# Patient Record
Sex: MALE | Race: WHITE | NOT HISPANIC OR LATINO | Employment: OTHER | URBAN - METROPOLITAN AREA
[De-identification: names, ages, dates, MRNs, and addresses within clinical notes are randomized per-mention and may not be internally consistent; named-entity substitution may affect disease eponyms.]

---

## 2017-01-27 ENCOUNTER — LAB CONVERSION - ENCOUNTER (OUTPATIENT)
Dept: OTHER | Facility: OTHER | Age: 82
End: 2017-01-27

## 2017-01-27 LAB — HBA1C MFR BLD HPLC: 6.2 % OF TOTAL HGB

## 2017-01-28 ENCOUNTER — GENERIC CONVERSION - ENCOUNTER (OUTPATIENT)
Dept: OTHER | Facility: OTHER | Age: 82
End: 2017-01-28

## 2017-02-02 ENCOUNTER — ALLSCRIPTS OFFICE VISIT (OUTPATIENT)
Dept: OTHER | Facility: OTHER | Age: 82
End: 2017-02-02

## 2017-05-09 ENCOUNTER — GENERIC CONVERSION - ENCOUNTER (OUTPATIENT)
Dept: OTHER | Facility: OTHER | Age: 82
End: 2017-05-09

## 2017-07-31 DIAGNOSIS — E78.5 HYPERLIPIDEMIA: ICD-10-CM

## 2017-07-31 DIAGNOSIS — I10 ESSENTIAL (PRIMARY) HYPERTENSION: ICD-10-CM

## 2017-07-31 DIAGNOSIS — R73.01 IMPAIRED FASTING GLUCOSE: ICD-10-CM

## 2017-08-18 ENCOUNTER — GENERIC CONVERSION - ENCOUNTER (OUTPATIENT)
Dept: OTHER | Facility: OTHER | Age: 82
End: 2017-08-18

## 2017-08-18 ENCOUNTER — LAB CONVERSION - ENCOUNTER (OUTPATIENT)
Dept: OTHER | Facility: OTHER | Age: 82
End: 2017-08-18

## 2017-08-18 LAB
BUN SERPL-MCNC: 19 MG/DL (ref 7–25)
BUN/CREA RATIO (HISTORICAL): 17 (CALC) (ref 6–22)
CALCIUM SERPL-MCNC: 9.3 MG/DL (ref 8.6–10.3)
CHLORIDE SERPL-SCNC: 101 MMOL/L (ref 98–110)
CHOLEST SERPL-MCNC: 156 MG/DL (ref 125–200)
CHOLEST/HDLC SERPL: 2.7 (CALC)
CO2 SERPL-SCNC: 30 MMOL/L (ref 20–31)
CREAT SERPL-MCNC: 1.14 MG/DL (ref 0.7–1.11)
EGFR AFRICAN AMERICAN (HISTORICAL): 68 ML/MIN/1.73M2
EGFR-AMERICAN CALC (HISTORICAL): 59 ML/MIN/1.73M2
GLUCOSE (HISTORICAL): 98 MG/DL (ref 65–99)
HBA1C MFR BLD HPLC: 5.9 % OF TOTAL HGB
HDLC SERPL-MCNC: 57 MG/DL
LDL CHOLESTEROL (HISTORICAL): 71 MG/DL (CALC)
NON-HDL-CHOL (CHOL-HDL) (HISTORICAL): 99 MG/DL (CALC)
POTASSIUM SERPL-SCNC: 4.4 MMOL/L (ref 3.5–5.3)
SODIUM SERPL-SCNC: 139 MMOL/L (ref 135–146)
TRIGL SERPL-MCNC: 139 MG/DL

## 2017-08-24 ENCOUNTER — ALLSCRIPTS OFFICE VISIT (OUTPATIENT)
Dept: OTHER | Facility: OTHER | Age: 82
End: 2017-08-24

## 2017-10-26 ENCOUNTER — ALLSCRIPTS OFFICE VISIT (OUTPATIENT)
Dept: OTHER | Facility: OTHER | Age: 82
End: 2017-10-26

## 2018-01-12 NOTE — PROGRESS NOTES
Discussion/Summary    Immunizations up to date  No issues identified    Discussed end of life decisions  At a later date may want to look at "Five Wishes" and eventually POLST when appropriate  Chief Complaint  Wellness exam visit      History of Present Illness  HPI: Offers no concerns    Screenings are all non-remarkable  MMSE good   Welcome to [employer] and Wellness Visits: The patient is being seen for the initial annual wellness visit  Medicare Screening and Risk Factors   Hospitalizations: no previous hospitalizations  Medicare Screening Tests Risk Questions   Abdominal aortic aneurysm risk assessment: none indicated  Osteoporosis risk assessment:  and over 48years of age  HIV risk assessment: none indicated  Drug and Alcohol Use: The patient is a former cigarette smoker and quit smoking [id number]  He has smoked for 15 years year(s) and has 1 pack year(s) of cigarette use  The patient reports rare alcohol use  Alcohol concern:   The patient has no concerns about alcohol abuse  He has never used illicit drugs  Diet and Physical Activity: Current diet includes well balanced meals, low fat food choices, 1 servings of fruit per day, 1 servings of whole grains per day and 1 servings of dairy products per day  He exercises 6 times per week  Exercise: walking 45 minutes per day  Mood Disorder and Cognitive Impairment Screening:   Depression screening  no significant symptoms  He denies feeling down, depressed, or hopeless over the past two weeks  He denies feeling little interest or pleasure in doing things over the past two weeks  Cognitive impairment screening: denies difficulty learning/retaining new information, denies difficulty handling complex tasks, denies difficulty with reasoning, denies difficulty with spatial ability and orientation, denies difficulty with language and denies difficulty with behavior     Functional Ability/Level of Safety: Hearing is normal bilaterally, normal in the right ear, normal in the left ear and a hearing aid is not used  The patient is currently able to do activities of daily living without limitations, able to do instrumental activities of daily living without limitations, able to participate in social activities without limitations and able to drive without limitations  Activities of daily living details: does not need help using the phone, no transportation help needed, does not need help shopping, no meal preparation help needed, does not need help doing housework, does not need help doing laundry, does not need help managing medications and does not need help managing money  Fall risk factors: The patient fell 1 times in the past 12 months , no polypharmacy, no alcohol use, no mobility impairment, no antidepressant use, no deconditioning, no postural hypotension, no sedative use, no visual impairment, no urinary incontinence, no antihypertensive use, no cognitive impairment, up and go test was normal and no previous fall   tripped  Home safety risk factors:  no unfamiliar surroundings, no loose rugs, no poor household lighting, no uneven floors, no household clutter, grab bars in the bathroom and handrails on the stairs  Advance Directives: Advance directives: living will, durable power of  for health care directives and advance directives  Co-Managers and Medical Equipment/Suppliers: See Patient Care Team      Patient Care Team    Care Team Member Role Specialty Office Number   Amee MILLIGAN ALFONZO  Orthopedic Surgery [phone]     Review of Systems    Over the past 2 weeks, how often have you been bothered by the following problems? 1 ) Little interest or pleasure in doing things? Not at all    2 ) Feeling down, depressed or hopeless? Not at all    3 ) Trouble falling asleep or sleeping too much? Not at all    4 ) Feeling tired or having little energy? Not at all    5 ) Poor appetite or overeating?  Not at all    6 ) Feeling bad about yourself, or that you are a failure, or have let yourself or your family down? Not at all    7 ) Trouble concentrating on things, such as reading a newspaper or watching television? Not at all    8 ) Moving or speaking so slowly that other people could have noticed, or the opposite, moving or speaking faster than usual? Not at all    9 ) Thoughts that you would be better off dead or of hurting yourself in some way? Not at all  Active Problems    1  Allergic rhinitis (477 9) (J30 9)   2  Arteriosclerotic coronary artery disease (414 00) (I25 10)   3  Benign essential hypertension (401 1) (I10)   4  Erectile dysfunction of non-organic origin (302 72) (F52 21)   5  Eustachian tube dysfunction (381 81) (H69 80)   6  Hyperlipidemia (272 4) (E78 5)   7  Hypocalcemia (275 41) (E83 51)   8  Need for pneumococcal vaccination (V03 82) (Z23)   9   Seborrheic keratosis (702 19) (L82 1)    Past Medical History    · History of Acute myocardial infarction (410 90) (I21 3)   · History of Anxiety (300 00) (F41 9)   · History of Bimalleolar fracture of left ankle (824 4) (H66 687Q)   · History of Cough (786 2) (R05)   · History of Erectile dysfunction of non-organic origin (302 72) (F52 21)   · Former smoker (U38 51) (O13 541)   · History of Herpes zoster with complication (042 5) (G38 5)   · History of acute bronchitis (V12 69) (Z87 09)   · History of acute sinusitis (V12 69) (Z87 09)   · History of benign prostatic hypertrophy (V13 89) (E44 906)   · History of hematuria (V13 09) (Z87 448)   · History of Impaired fasting glucose (790 21) (R73 01)   · History of Insomnia (780 52) (G47 00)   · History of Joint pain, knee (719 46) (M25 569)    Surgical History    · History of CABG    Family History    · Family history of Malignant Pancreatic Neoplasm    · Family history of Essential Hypertension    Social History    · Being A Social Drinker   · Death In The Family Spouse   · Exercising Regularly   · Marital History -    · Phylicia Ardon  after a prolonged francis with lymphoma   · Never a smoker   · Occupation: Retired   · clergy    Current Meds   1  Cialis 5 MG Oral Tablet; TAKE AS DIRECTED; Therapy: 46CBE6966 to Recorded   2  Claritin 10 MG Oral Tablet; take 1 tab daily; Therapy: 2015 to (Evaluate:2015); Last Rx:2015 Ordered   3  DrRx Medrol Dose Pack 4 MG #21; Take as directed; Therapy: 2015 to (Last Rx:2015) Ordered   4  Fluticasone Propionate 50 MCG/ACT Nasal Suspension; use 2 spr  in each nostril daily; Therapy: 2015 to (Last Rx:2015)  Requested for: 2015 Ordered   5  Furosemide 20 MG Oral Tablet; TAKE 1 TABLET DAILY; Therapy: 99TBB3422 to (Evaluate:63Fzu6881)  Requested for: 2015; Last   Rx:2015 Ordered   6  Hydrocod Polst-CPM Polst ER 10-8 MG/5ML Oral Liquid Extended Release; Take 1/2 - 1   teaspoon every 12 hours as needed for cough; Therapy: 57PUW4643 to (Evaluate:2015); Last Rx:2015 Ordered   7  Klor-Con M20 20 MEQ Oral Tablet Extended Release; TAKE 1 TABLET DAILY; Therapy: 70XZD4118 to (Evaluate:63Ath6453)  Requested for: 31HDT1678; Last   Rx:28Qzc9802 Ordered   8  LORazepam 0 5 MG Oral Tablet; take 1 tab before bedtime; Therapy: 50EPI3580 to (Last Rx:15Ytx6334) Ordered   9  Losartan Potassium 50 MG Oral Tablet; take 1 tablet by mouth once daily as directed; Therapy: 51Gbw8513 to (Evaluate:26Fqr6839)  Requested for: 38Ehj3869; Last   Rx:2015 Ordered   10  Metoprolol Tartrate 100 MG Oral Tablet; TAKE 1 TABLET BY MOUTH ONCE DAILY; Therapy: 08PLA5874 to (Evaluate:2017)  Requested for: 55HBT6415; Last    Rx:2016 Ordered   11  Simvastatin 40 MG Oral Tablet; TAKE 1 TABLET DAILY IN THE EVENING AS    DIRECTED; Therapy: 44PTS5809 to (Pereira Keely)  Requested for: 83QWE8610; Last    Rx:2015 Ordered    Allergies    1  Penicillins    Immunizations   ** Printed in Appendix #1 below       Vitals  Signs [Data Includes: Current Encounter]    Temperature: 97 5 F  Heart Rate: 71  Respiration: 16  Systolic: 871  Diastolic: 80  Height: 5 ft 7 in  Weight: 203 lb 4 00 oz  BMI Calculated: 31 83  BSA Calculated: 2 04  O2 Saturation: 96  Pain Scale: 0    Health Management  Health Maintenance   Medicare Annual Wellness Visit; every 1 year; Next Due: 72Blr7515;  Overdue    Signatures   Electronically signed by : CHEL Wong ; 2016  2:46PM EST                       (Author)    Appendix #1     Patient: Marlyse Romberg ; : 1932; MRN: [id number]      1 2 3 4 5 6    Influenza  55Wpk3106 52GDE8906 37IIQ9507 54QNI6687 93IRL2461 23LKC1276    Pneumococcal   85IOV2516        Td/DT  54IUG2403 31Rhu7638 38QWD1295       Zoster  Temporarily Deferred: Patient reports item recently done, Got at pharmacy in past

## 2018-01-12 NOTE — PROGRESS NOTES
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)   8  Erectile dysfunction of non-organic origin (302 72) (F52 21)   9  Hyperlipidemia (272 4) (E78 5)   10  Impaired fasting glucose (790 21) (R73 01)   11  Medicare annual wellness visit, initial (V70 0) (Z00 00)   12  Need for influenza vaccination (V04 81) (Z23)   13  Obesity (278 00) (E66 9)   14  Seborrheic keratosis (702 19) (L82 1)    Current Meds   1  Furosemide 20 MG Oral Tablet; TAKE 1 TABLET DAILY; Therapy: 15YKF4308 to (Evaluate:66Xgu8642)  Requested for: 08Liu2712; Last   Rx:00Bbr8168 Ordered   2  Klor-Con M20 20 MEQ Oral Tablet Extended Release; TAKE 1 TABLET DAILY; Therapy: 68ZUU0223 to (Evaluate:90Ugj4460)  Requested for: 51GVM9321; Last   Rx:36Rxz6853 Ordered   3  Losartan Potassium 50 MG Oral Tablet; take 1 tablet by mouth once daily as directed; Therapy: 79Bfk6891 to (Evaluate:92Rsi7089)  Requested for: 17Zen2308; Last   Rx:41Zop4262 Ordered   4  Metoprolol Tartrate 100 MG Oral Tablet; TAKE 1 TABLET BY MOUTH ONCE DAILY; Therapy: 95RRX6885 to (Evaluate:12Jan2017)  Requested for: 05SVK8577; Last   Rx:18Jan2016 Ordered   5  Simvastatin 40 MG Oral Tablet; TAKE 1 TABLET DAILY IN THE EVENING AS   DIRECTED; Therapy: 61XPQ4846 to (Kathy Drain)  Requested for: 20DRB0023; Last   Rx:07Mar2016 Ordered   6  Zolpidem Tartrate 5 MG Oral Tablet; take 1 tablet at bedtime as needed; Therapy: 27DDR6240 to (Evaluate:81Dql9057); Last Rx:21Pkj2725 Ordered    Allergies    1   Penicillins    Plan  Need for influenza vaccination    · Fluzone High-Dose 0 5 ML Intramuscular Suspension Prefilled Syringe    Future Appointments    Date/Time Provider Specialty Site   02/02/2017 03:00 PM CHEL Wetzel  Family Medicine [address]     Signatures   Electronically signed by :  Yong Treviño MD; Sep  8 2016  4:53PM EST                       (Review)

## 2018-01-13 VITALS
RESPIRATION RATE: 12 BRPM | HEIGHT: 67 IN | DIASTOLIC BLOOD PRESSURE: 78 MMHG | BODY MASS INDEX: 30.96 KG/M2 | SYSTOLIC BLOOD PRESSURE: 130 MMHG | HEART RATE: 64 BPM | WEIGHT: 197.25 LBS | TEMPERATURE: 98.5 F

## 2018-01-13 VITALS
RESPIRATION RATE: 16 BRPM | HEART RATE: 64 BPM | HEIGHT: 67 IN | BODY MASS INDEX: 31.55 KG/M2 | TEMPERATURE: 98.3 F | SYSTOLIC BLOOD PRESSURE: 132 MMHG | WEIGHT: 201 LBS | DIASTOLIC BLOOD PRESSURE: 78 MMHG

## 2018-01-13 NOTE — RESULT NOTES
Verified Results  (1) CBC/PLT/DIFF 38IJR9735 06:42AM Rei Saleh   REPORT COMMENT:  FASTING:YES     Test Name Result Flag Reference   WHITE BLOOD CELL COUNT 7 7 Thousand/uL  3 8-10 8   RED BLOOD CELL COUNT 4 49 Million/uL  4 20-5 80   HEMOGLOBIN 13 5 g/dL  13 2-17 1   HEMATOCRIT 41 0 %  38 5-50 0   MCV 91 3 fL  80 0-100 0   MCH 30 0 pg  27 0-33 0   MCHC 32 9 g/dL  32 0-36 0   RDW 14 1 %  11 0-15 0   PLATELET COUNT 091 Thousand/uL  140-400   MPV 8 5 fL  7 5-11 5   ABSOLUTE NEUTROPHILS 3411 cells/uL  2044-2905   ABSOLUTE LYMPHOCYTES 3180 cells/uL  850-3900   ABSOLUTE MONOCYTES 801 cells/uL  200-950   ABSOLUTE EOSINOPHILS 270 cells/uL     ABSOLUTE BASOPHILS 39 cells/uL  0-200   NEUTROPHILS 44 3 %     LYMPHOCYTES 41 3 %     MONOCYTES 10 4 %     EOSINOPHILS 3 5 %     BASOPHILS 0 5 %       (1) COMPREHENSIVE METABOLIC PANEL [id number] 60:49UB José Luis Saleh     Test Name Result Flag Reference   GLUCOSE 100 mg/dL H 65-99   Fasting reference interval   UREA NITROGEN (BUN) 16 mg/dL  7-25   CREATININE 0 94 mg/dL  0 70-1 11   For patients >52years of age, the reference limit  for Creatinine is approximately 13% higher for people  identified as -American  eGFR NON-AFR   AMERICAN 75 mL/min/1 73m2  > OR = 60   eGFR AFRICAN AMERICAN 87 mL/min/1 73m2  > OR = 60   BUN/CREATININE RATIO   4-38   NOT APPLICABLE (calc)   SODIUM 138 mmol/L  135-146   POTASSIUM 4 2 mmol/L  3 5-5 3   CHLORIDE 101 mmol/L     CARBON DIOXIDE 26 mmol/L  19-30   CALCIUM 8 9 mg/dL  8 6-10 3   PROTEIN, TOTAL 6 9 g/dL  6 1-8 1   ALBUMIN 4 3 g/dL  3 6-5 1   GLOBULIN 2 6 g/dL (calc)  1 9-3 7   ALBUMIN/GLOBULIN RATIO 1 7 (calc)  1 0-2 5   BILIRUBIN, TOTAL 0 4 mg/dL  0 2-1 2   ALKALINE PHOSPHATASE 59 U/L     AST 14 U/L  10-35   ALT 14 U/L  9-46     (1) LIPID PANEL, FASTING [id number] 06:42AM eRi Saleh     Test Name Result Flag Reference   CHOLESTEROL, TOTAL 166 mg/dL  125-200   HDL CHOLESTEROL 50 mg/dL  > OR = 40   TRIGLICERIDES 234 mg/dL H <150   LDL-CHOLESTEROL 69 mg/dL (calc)  <130   Desirable range <100 mg/dL for patients with CHD or  diabetes and <70 mg/dL for diabetic patients with  known heart disease  CHOL/HDLC RATIO 3 3 (calc)  < OR = 5 0   NON HDL CHOLESTEROL 116 mg/dL (calc)     Target for non-HDL cholesterol is 30 mg/dL higher than   LDL cholesterol target       (1) URINALYSIS (will reflex a microscopy if leukocytes, occult blood, protein or nitrites are not within normal limits) [id number] 06:42AM Abgott, Isidoro Gottron     Test Name Result Flag Reference   COLOR YELLOW  YELLOW   APPEARANCE CLEAR  CLEAR   SPECIFIC GRAVITY 1 023  1 001-1 035   PH 6 0  5 0-8 0   GLUCOSE NEGATIVE  NEGATIVE   BILIRUBIN NEGATIVE  NEGATIVE   KETONES NEGATIVE  NEGATIVE   OCCULT BLOOD NEGATIVE  NEGATIVE   PROTEIN NEGATIVE  NEGATIVE   NITRITE NEGATIVE  NEGATIVE   LEUKOCYTE ESTERASE NEGATIVE  NEGATIVE   WBC NONE SEEN /HPF  < OR = 5   RBC NONE SEEN /HPF  < OR = 2   SQUAMOUS EPITHELIAL CELLS NONE SEEN /HPF  < OR = 5   BACTERIA NONE SEEN /HPF  NONE SEEN   HYALINE CAST NONE SEEN /LPF  NONE SEEN

## 2018-01-14 NOTE — RESULT NOTES
Verified Results  (1) BASIC METABOLIC PROFILE [id number] 06:50AM Georgina Saleh     Test Name Result Flag Reference   GLUCOSE 100 mg/dL H 65-99   Fasting reference interval   UREA NITROGEN (BUN) 19 mg/dL  7-25   CREATININE 1 07 mg/dL  0 70-1 11   For patients >52years of age, the reference limit  for Creatinine is approximately 13% higher for people  identified as -American  eGFR NON-AFR  AMERICAN 63 mL/min/1 73m2  > OR = 60   eGFR AFRICAN AMERICAN 73 mL/min/1 73m2  > OR = 60   BUN/CREATININE RATIO   3-91   NOT APPLICABLE (calc)   SODIUM 139 mmol/L  135-146   POTASSIUM 4 1 mmol/L  3 5-5 3   CHLORIDE 102 mmol/L     CARBON DIOXIDE 31 mmol/L  20-31   CALCIUM 9 2 mg/dL  8 6-10 3     (1) LIPID PANEL, FASTING [id number] 06:50AM Georgina Saleh     Test Name Result Flag Reference   CHOLESTEROL, TOTAL 166 mg/dL  125-200   HDL CHOLESTEROL 52 mg/dL  > OR = 40   TRIGLICERIDES 879 mg/dL H <150   LDL-CHOLESTEROL 78 mg/dL (calc)  <130   Desirable range <100 mg/dL for patients with CHD or  diabetes and <70 mg/dL for diabetic patients with  known heart disease  CHOL/HDLC RATIO 3 2 (calc)  < OR = 5 0   NON HDL CHOLESTEROL 114 mg/dL (calc)     Target for non-HDL cholesterol is 30 mg/dL higher than   LDL cholesterol target  (Q) HEMOGLOBIN A1c 26Jan2017 06:50AM Georgina Saleh   REPORT COMMENT:  FASTING:YES     Test Name Result Flag Reference   HEMOGLOBIN A1c 6 2 % of total Hgb H <5 7   According to ADA guidelines, hemoglobin A1c <7 0%  represents optimal control in non-pregnant diabetic  patients  Different metrics may apply to specific  patient populations  Standards of Medical Care in    Diabetes Care   2013;36:s11-s66     For the purpose of screening for the presence of  diabetes  <5 7%       Consistent with the absence of diabetes  5 7-6 4%    Consistent with increased risk for diabetes              (prediabetes)  >or=6 5%    Consistent with diabetes     This assay result is consistent with a higher risk  of diabetes  Currently, no consensus exists for use of hemoglobin  A1c for diagnosis of diabetes for children

## 2018-01-16 NOTE — RESULT NOTES
Verified Results  (1) LIPID PANEL, FASTING 21Jan2016 06:30AM Letty Saleh   REPORT COMMENT:  FASTING:YES     Test Name Result Flag Reference   CHOLESTEROL, TOTAL 166 mg/dL  125-200   HDL CHOLESTEROL 51 mg/dL  > OR = 40   TRIGLICERIDES 836 mg/dL H <150   LDL-CHOLESTEROL 81 mg/dL (calc)  <130   Desirable range <100 mg/dL for patients with CHD or  diabetes and <70 mg/dL for diabetic patients with  known heart disease  CHOL/HDLC RATIO 3 3 (calc)  < OR = 5 0   NON HDL CHOLESTEROL 115 mg/dL (calc)     Target for non-HDL cholesterol is 30 mg/dL higher than   LDL cholesterol target

## 2018-01-17 NOTE — PROGRESS NOTES
Assessment    1  Benign essential hypertension (401 1) (I10)   2  Arteriosclerotic coronary artery disease (414 00) (I25 10)   · MI 1980 admitted 18 Shanon Cervantes MD follows him / CABG 1999 at      [address] [facility] [address]   3  Hyperlipidemia (272 4) (E78 5)   4  Seborrheic keratosis (702 19) (L82 1)   5  Post-nasal drip (784 91) (R09 82)    Plan  Arteriosclerotic coronary artery disease, Benign essential hypertension, Hyperlipidemia    · (1) CBC/PLT/DIFF; Status:Active; Requested SAF:33DET2436;    · (1) COMPREHENSIVE METABOLIC PANEL; Status:Active; Requested KGJ:13AHL2380;    · (1) LIPID PANEL, FASTING; Status:Active; Requested MQB:09CLE7823;    · (1) URINALYSIS (will reflex a microscopy if leukocytes, occult blood, protein or nitrites are  not within normal limits); Status:Active; Requested AHE:43QJY0793;   Post-nasal drip    · Follow-up visit in 6 months Evaluation and Treatment  Follow-up  Status: Hold For -  Scheduling  Requested for: 69HAL5354    Discussion/Summary    Continue current therapy    Reveiwed recent lipids - excellent  Has been watching carbos    Labs before next visit    Try sinus rinses   May also use loratidine and flonase prn  Chief Complaint  Patient is here for his 6 months f/u visit      History of Present Illness  Generally doing well    Has some post nasal drip that makes him cough (tickle  No sinus pain or nasal congestion    Continues to be active  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       Review of Systems    Constitutional: as noted in HPI  Eyes: gets annual eye exam - has early cataract - plans on seeing Dr Jennifer Kumar  ENT: as noted in HPI  Cardiovascular: no chest pain, no intermittent leg claudication, no palpitations and no extremity edema  Respiratory: cough, but as noted in HPI and no shortness of breath during exertion  Gastrointestinal: no problems  Genitourinary: uses Cialis prn  Musculoskeletal: no problems  Neurological: no headache and no confusion  Psychiatric: no anxiety and no depression  Over the past 2 weeks, how often have you been bothered by the following problems? 1 ) Little interest or pleasure in doing things? Not at all    2 ) Feeling down, depressed or hopeless? Not at all    3 ) Trouble falling asleep or sleeping too much? Not at all    4 ) Feeling tired or having little energy? Not at all    5 ) Poor appetite or overeating? Not at all    6 ) Feeling bad about yourself, or that you are a failure, or have let yourself or your family down? Not at all    7 ) Trouble concentrating on things, such as reading a newspaper or watching television? Not at all    8 ) Moving or speaking so slowly that other people could have noticed, or the opposite, moving or speaking faster than usual? Not at all    9 ) Thoughts that you would be better off dead or of hurting yourself in some way? Not at all  Score 0      Active Problems    1  Allergic rhinitis (477 9) (J30 9)   2  Arteriosclerotic coronary artery disease (414 00) (I25 10)   3  Benign essential hypertension (401 1) (I10)   4  Erectile dysfunction of non-organic origin (302 72) (F52 21)   5  Eustachian tube dysfunction (381 81) (H69 80)   6  Hyperlipidemia (272 4) (E78 5)   7  Hypocalcemia (275 41) (E83 51)   8  Need for pneumococcal vaccination (V03 82) (Z23)   9  Seborrheic keratosis (702 19) (L82 1)    Past Medical History    1  History of Acute myocardial infarction (410 90) (I21 3)   2  History of Anxiety (300 00) (F41 9)   3  History of Bimalleolar fracture of left ankle (824 4) (S82 842A)   4  History of Cough (786 2) (R05)   5  History of Erectile dysfunction of non-organic origin (302 72) (F52 21)   6  Former smoker (V15 82) (T68 016)   7  History of Herpes zoster with complication (364 3) (X17 9)   8  History of acute bronchitis (V12 69) (Z87 09)   9  History of acute sinusitis (V12 69) (Z87 09)   10   History of benign prostatic hypertrophy (V13 89) (Z87 438)   11  History of hematuria (V13 09) (Z87 448)   12  History of Impaired fasting glucose (790 21) (R73 01)   13  History of Insomnia (780 52) (G47 00)   14  History of Joint pain, knee (719 46) (M25 569)    Surgical History    1  History of CABG    Family History    1  Family history of Malignant Pancreatic Neoplasm    2  Family history of Essential Hypertension    Social History    · Being A Social Drinker   · Death In The Family Spouse   · Exercising Regularly   · Marital History -    · Never a smoker   · Occupation: Retired    Current Meds   1  Cialis 5 MG Oral Tablet; TAKE AS DIRECTED; Therapy: 82HNQ5083 to Recorded   2  Claritin 10 MG Oral Tablet; take 1 tab daily; Therapy: 50Vbt4683 to (Evaluate:21Apr2015); Last Rx:14Apr2015 Ordered   3  DrRx Medrol Dose Pack 4 MG #21; Take as directed; Therapy: 92Rsw5099 to (Last Rx:14Apr2015) Ordered   4  Fluticasone Propionate 50 MCG/ACT Nasal Suspension; use 2 spr  in each nostril daily; Therapy: 85Vef2927 to (Last Rx:14Apr2015)  Requested for: 14Apr2015 Ordered   5  Furosemide 20 MG Oral Tablet; TAKE 1 TABLET DAILY; Therapy: 35QHY0675 to (Evaluate:43Ytu6221)  Requested for: 85Xoh9546; Last   Rx:28Dec2015 Ordered   6  Hydrocod Polst-CPM Polst ER 10-8 MG/5ML Oral Liquid Extended Release; Take 1/2 - 1   teaspoon every 12 hours as needed for cough; Therapy: 26OLV1734 to (Evaluate:11Jan2015); Last Rx:05Jan2015 Ordered   7  Klor-Con M20 20 MEQ Oral Tablet Extended Release; TAKE 1 TABLET DAILY; Therapy: 33VMF2394 to (Evaluate:47Zmw6657)  Requested for: 04PAY8434; Last   Rx:59Cmx7778 Ordered   8  LORazepam 0 5 MG Oral Tablet; take 1 tab before bedtime; Therapy: 95JED1487 to (Last Rx:70Ovt8019) Ordered   9  Losartan Potassium 50 MG Oral Tablet; take 1 tablet by mouth once daily as directed; Therapy: 71Pty8381 to (Evaluate:52Bmv1047)  Requested for: 92Alr4225; Last   Rx:30Fot6878 Ordered   10   Metoprolol Tartrate 100 MG Oral Tablet; TAKE 1 TABLET BY MOUTH ONCE DAILY; Therapy: 22GVJ2713 to (Evaluate:12Jan2017)  Requested for: 22IET8332; Last    Rx:18Jan2016 Ordered   11  Simvastatin 40 MG Oral Tablet; TAKE 1 TABLET DAILY IN THE EVENING AS DIRECTED; Therapy: 99NPT1273 to (Caterina Halim)  Requested for: 79NKO5887; Last    Rx:05Jan2015 Ordered    The medication list was reviewed and updated today  Allergies    1  Penicillins    Vitals  Vital Signs [Data Includes: Current Encounter]    Recorded: 30DCZ6933 01:43PM   Temperature 97 5 F   Heart Rate 71   Respiration 16   Systolic 229   Diastolic 80   Height 5 ft 7 in   Weight 203 lb 4 00 oz   BMI Calculated 31 83   BSA Calculated 2 04   O2 Saturation 96   Pain Scale 0     Physical Exam    Constitutional   General appearance: No acute distress, well appearing and well nourished  Eyes glasses  Ears, Nose, Mouth, and Throat   Otoscopic examination: Tympanic membrance translucent with normal light reflex  Canals patent without erythema  Nasal mucosa, septum, and turbinates: Abnormal   slightly boggy turbinates  Oropharynx: Normal with no erythema, edema, exudate or lesions  Pulmonary   Respiratory effort: No increased work of breathing or signs of respiratory distress  Auscultation of lungs: Clear to auscultation, equal breath sounds bilaterally, no wheezes, no rales, no rhonci  Cardiovascular   Auscultation of heart: Normal rate and rhythm, normal S1 and S2, without murmurs  Examination of extremities for edema and/or varicosities: Normal     Carotid pulses: Normal     Skin MANY seborrheic keratosis over body - no suspicious noted on limited exam today     Psychiatric   Orientation to person, place and time: Normal     Mood and affect: Normal          Results/Data  Results   (1) LIPID PANEL, FASTING [id number] 06:30AM Chris Saleh   REPORT COMMENT:  FASTING:YES     Test Name Result Flag Reference   CHOLESTEROL, TOTAL 166 mg/dL  125-200   HDL CHOLESTEROL 51 mg/dL  > OR = 40 TRIGLICERIDES 013 mg/dL H <150   LDL-CHOLESTEROL 81 mg/dL (calc)  <130   Desirable range <100 mg/dL for patients with CHD or  diabetes and <70 mg/dL for diabetic patients with  known heart disease  CHOL/HDLC RATIO 3 3 (calc)  < OR = 5 0   NON HDL CHOLESTEROL 115 mg/dL (calc)     Target for non-HDL cholesterol is 30 mg/dL higher than   LDL cholesterol target  (1) LIPID PANEL, FASTING 55UFD7818 07:12AM [employer]     Test Name Result Flag Reference   CHOLESTEROL, TOTAL 159 mg/dL  125-200   HDL CHOLESTEROL 52 mg/dL  > OR = 40   TRIGLICERIDES 059 mg/dL H <150   LDL-CHOLESTEROL 67 mg/dL (calc)  <130   Desirable range <100 mg/dL for patients with CHD or  diabetes and <70 mg/dL for diabetic patients with  known heart disease  CHOL/HDLC RATIO 3 1 (calc)  < OR = 5 0   NON HDL CHOLESTEROL 107 mg/dL (calc)     Target for non-HDL cholesterol is 30 mg/dL higher than   LDL cholesterol target  (1) COMPREHENSIVE METABOLIC PANEL 89JEB9673 49:17KA [employer]     Test Name Result Flag Reference   GLUCOSE 96 mg/dL  65-99   Fasting reference interval   UREA NITROGEN (BUN) 16 mg/dL  7-25   CREATININE 0 95 mg/dL  0 70-1 11   For patients >52years of age, the reference limit  for Creatinine is approximately 13% higher for people  identified as -American  eGFR NON-AFR   AMERICAN 74 mL/min/1 73m2  > OR = 60   eGFR AFRICAN AMERICAN 86 mL/min/1 73m2  > OR = 60   BUN/CREATININE RATIO   9-28   NOT APPLICABLE (calc)   SODIUM 140 mmol/L  135-146   POTASSIUM 4 1 mmol/L  3 5-5 3   CHLORIDE 103 mmol/L     CARBON DIOXIDE 26 mmol/L  19-30   CALCIUM 9 0 mg/dL  8 6-10 3   PROTEIN, TOTAL 6 7 g/dL  6 1-8 1   ALBUMIN 4 2 g/dL  3 6-5 1   GLOBULIN 2 5 g/dL (calc)  1 9-3 7   ALBUMIN/GLOBULIN RATIO 1 7 (calc)  1 0-2 5   BILIRUBIN, TOTAL 0 4 mg/dL  0 2-1 2   ALKALINE PHOSPHATASE 56 U/L     AST 16 U/L  10-35   ALT 15 U/L  9-46     (1) URINALYSIS (will reflex a microscopy if leukocytes, occult blood, protein or nitrites are not within normal limits) 21LCW4170 07:12AM Sixto Saleh   REPORT COMMENT:  ON HOLD  FASTING:YES     Test Name Result Flag Reference   COLOR YELLOW  YELLOW   APPEARANCE CLEAR  CLEAR   SPECIFIC GRAVITY 1 021  1 001-1 035   PH 6 5  5 0-8 0   GLUCOSE NEGATIVE  NEGATIVE   BILIRUBIN NEGATIVE  NEGATIVE   KETONES NEGATIVE  NEGATIVE   OCCULT BLOOD NEGATIVE  NEGATIVE   PROTEIN NEGATIVE  NEGATIVE   NITRITE NEGATIVE  NEGATIVE   LEUKOCYTE ESTERASE NEGATIVE  NEGATIVE   WBC NONE SEEN /HPF  < OR = 5   RBC 0-2 /HPF  < OR = 2   SQUAMOUS EPITHELIAL CELLS NONE SEEN /HPF  < OR = 5   BACTERIA NONE SEEN /HPF  NONE SEEN   HYALINE CAST NONE SEEN /LPF  NONE SEEN     Health Management  Health Maintenance   Medicare Annual Wellness Visit; every 1 year; Next Due: 79Hkv0878;  Overdue    Signatures   Electronically signed by : CHEL Mckeon ; Jan 28 2016  3:13PM EST                       (Author)

## 2018-01-17 NOTE — PROGRESS NOTES
Chief Complaint  Patient presents for a high-dose flu shot  Active Problems    1  Arteriosclerotic coronary artery disease (414 00) (I25 10)   2  Benign essential hypertension (401 1) (I10)   3  Erectile dysfunction of non-organic origin (302 72) (F52 21)   4  Hyperlipidemia (272 4) (E78 5)   5  Impaired fasting glucose (790 21) (R73 01)   6  Need for influenza vaccination (V04 81) (Z23)   7  Obesity (278 00) (E66 9)   8  Seborrheic keratosis (702 19) (L82 1)    Current Meds   1  Furosemide 20 MG Oral Tablet; TAKE 1 TABLET DAILY; Therapy: 76IQH8714 to (Evaluate:53Wby0177)  Requested for: 12Jho5569; Last   Rx:90Elq0548 Ordered   2  Klor-Con M20 20 MEQ Oral Tablet Extended Release; TAKE 1 TABLET DAILY; Therapy: 81YQR7812 to ()  Requested for: 87LPB1645; Last   Rx:14Jan2017 Ordered   3  Losartan Potassium 50 MG Oral Tablet; take 1 tablet by mouth once daily as directed; Therapy: 23Ejh6082 to (Evaluate:83Wiw9615)  Requested for: 43Wgh3885; Last   Rx:45Yeu7973 Ordered   4  Metoprolol Tartrate 100 MG Oral Tablet; TAKE 1 TABLET BY MOUTH ONCE DAILY; Therapy: 02HOV5067 to (22 319895)  Requested for: 64ZEO3761; Last   Rx:12Jan2017 Ordered   5  Simvastatin 40 MG Oral Tablet; TAKE 1 TABLET DAILY IN THE EVENING AS   DIRECTED; Therapy: 81RPH5702 to (Evaluate:15Mar2018)  Requested for: 20Mar2017; Last   Rx:20Mar2017 Ordered   6  Zolpidem Tartrate 5 MG Oral Tablet; take 1 tablet at bedtime as needed; Therapy: 70KPK4746 to (Evaluate:18Jan2017); Last Rx:63Apo0991 Ordered    Allergies    1  Penicillins    Plan  Need for influenza vaccination    · Fluzone High-Dose 0 5 ML Intramuscular Suspension Prefilled Syringe    Future Appointments    Date/Time Provider Specialty Site   03/29/2018 01:40 PM CHEL Craig   [address]     Signatures   Electronically signed by : CHEL Roland ; Oct 26 2017  9:55PM EST                       (Author)

## 2018-01-18 NOTE — RESULT NOTES
Verified Results  (1) LIPID PANEL, FASTING [id number] 07:05AM Casey Saleh Albaroelkin     Test Name Result Flag Reference   CHOLESTEROL, TOTAL 156 mg/dL  125-200   HDL CHOLESTEROL 57 mg/dL  > OR = 40   TRIGLICERIDES 215 mg/dL  <150   LDL-CHOLESTEROL 71 mg/dL (calc)  <130   Desirable range <100 mg/dL for patients with CHD or  diabetes and <70 mg/dL for diabetic patients with  known heart disease  CHOL/HDLC RATIO 2 7 (calc)  < OR = 5 0   NON HDL CHOLESTEROL 99 mg/dL (calc)     Target for non-HDL cholesterol is 30 mg/dL higher than   LDL cholesterol target  (1) BASIC METABOLIC PROFILE 42GTD7621 07:05AM Abgott, Isidoro Gottron     Test Name Result Flag Reference   GLUCOSE 98 mg/dL  65-99   Fasting reference interval   UREA NITROGEN (BUN) 19 mg/dL  7-25   CREATININE 1 14 mg/dL H 0 70-1 11   For patients >52years of age, the reference limit  for Creatinine is approximately 13% higher for people  identified as -American  eGFR NON-AFR  AMERICAN 59 mL/min/1 73m2 L > OR = 60   eGFR AFRICAN AMERICAN 68 mL/min/1 73m2  > OR = 60   BUN/CREATININE RATIO 17 (calc)  6-22   SODIUM 139 mmol/L  135-146   POTASSIUM 4 4 mmol/L  3 5-5 3   CHLORIDE 101 mmol/L     CARBON DIOXIDE 30 mmol/L  20-31   CALCIUM 9 3 mg/dL  8 6-10 3     (Q) HEMOGLOBIN A1c 13Uff9105 07:05AM Rei Saleh   REPORT COMMENT:  FASTING:YES     Test Name Result Flag Reference   HEMOGLOBIN A1c 5 9 % of total Hgb H <5 7   For someone without known diabetes, a hemoglobin   A1c value between 5 7% and 6 4% is consistent with  prediabetes and should be confirmed with a   follow-up test      For someone with known diabetes, a value <7%  indicates that their diabetes is well controlled  A1c  targets should be individualized based on duration of  diabetes, age, comorbid conditions, and other  considerations  This assay result is consistent with an increased risk  of diabetes       Currently, no consensus exists regarding use of  hemoglobin A1c for diagnosis of diabetes for children

## 2018-02-26 RX ORDER — FUROSEMIDE 20 MG/1
1 TABLET ORAL DAILY
COMMUNITY
Start: 2011-10-13 | End: 2018-09-27 | Stop reason: SDUPTHER

## 2018-02-26 RX ORDER — METOPROLOL TARTRATE 100 MG/1
50 TABLET ORAL EVERY 12 HOURS SCHEDULED
COMMUNITY
Start: 2011-11-07 | End: 2018-09-27 | Stop reason: SDUPTHER

## 2018-02-26 RX ORDER — LOSARTAN POTASSIUM 50 MG/1
1 TABLET ORAL DAILY
COMMUNITY
Start: 2012-04-26 | End: 2018-03-07 | Stop reason: HOSPADM

## 2018-02-26 RX ORDER — POTASSIUM CHLORIDE 20 MEQ/1
1 TABLET, EXTENDED RELEASE ORAL DAILY
COMMUNITY
Start: 2011-10-13 | End: 2018-09-27 | Stop reason: SDUPTHER

## 2018-02-26 RX ORDER — SIMVASTATIN 40 MG
40 TABLET ORAL
COMMUNITY
Start: 2011-11-07 | End: 2018-03-07 | Stop reason: HOSPADM

## 2018-02-26 RX ORDER — ZOLPIDEM TARTRATE 5 MG/1
1 TABLET ORAL
COMMUNITY
Start: 2016-07-28 | End: 2018-03-06

## 2018-03-06 ENCOUNTER — HOSPITAL ENCOUNTER (INPATIENT)
Facility: HOSPITAL | Age: 83
LOS: 1 days | Discharge: HOME HEALTH | DRG: 065 | End: 2018-03-07
Attending: EMERGENCY MEDICINE | Admitting: INTERNAL MEDICINE
Payer: COMMERCIAL

## 2018-03-06 ENCOUNTER — APPOINTMENT (INPATIENT)
Dept: RADIOLOGY | Facility: HOSPITAL | Age: 83
DRG: 065 | End: 2018-03-06
Payer: COMMERCIAL

## 2018-03-06 ENCOUNTER — TELEPHONE (OUTPATIENT)
Dept: NEUROLOGY | Facility: CLINIC | Age: 83
End: 2018-03-06

## 2018-03-06 ENCOUNTER — APPOINTMENT (EMERGENCY)
Dept: RADIOLOGY | Facility: HOSPITAL | Age: 83
DRG: 065 | End: 2018-03-06
Payer: COMMERCIAL

## 2018-03-06 ENCOUNTER — APPOINTMENT (INPATIENT)
Dept: NON INVASIVE DIAGNOSTICS | Facility: HOSPITAL | Age: 83
DRG: 065 | End: 2018-03-06
Payer: COMMERCIAL

## 2018-03-06 DIAGNOSIS — I63.9 CVA (CEREBRAL VASCULAR ACCIDENT) (HCC): ICD-10-CM

## 2018-03-06 DIAGNOSIS — I48.91 ATRIAL FIBRILLATION AND FLUTTER (HCC): ICD-10-CM

## 2018-03-06 DIAGNOSIS — I48.92 ATRIAL FIBRILLATION AND FLUTTER (HCC): ICD-10-CM

## 2018-03-06 DIAGNOSIS — R29.90 STROKE-LIKE SYMPTOM: Primary | ICD-10-CM

## 2018-03-06 PROBLEM — I25.10 CAD (CORONARY ARTERY DISEASE): Chronic | Status: ACTIVE | Noted: 2018-03-06

## 2018-03-06 PROBLEM — I10 ESSENTIAL HYPERTENSION: Chronic | Status: ACTIVE | Noted: 2018-03-06

## 2018-03-06 PROBLEM — E78.5 HLD (HYPERLIPIDEMIA): Chronic | Status: ACTIVE | Noted: 2018-03-06

## 2018-03-06 LAB
ANION GAP BLD CALC-SCNC: 16 MMOL/L (ref 4–13)
ANION GAP SERPL CALCULATED.3IONS-SCNC: 8 MMOL/L (ref 4–13)
APTT PPP: 28 SECONDS (ref 23–35)
ATRIAL RATE: 326 BPM
BUN BLD-MCNC: 23 MG/DL (ref 5–25)
BUN SERPL-MCNC: 19 MG/DL (ref 5–25)
CA-I BLD-SCNC: 1.13 MMOL/L (ref 1.12–1.32)
CALCIUM SERPL-MCNC: 8.8 MG/DL (ref 8.3–10.1)
CHLORIDE BLD-SCNC: 102 MMOL/L (ref 100–108)
CHLORIDE SERPL-SCNC: 104 MMOL/L (ref 100–108)
CO2 SERPL-SCNC: 28 MMOL/L (ref 21–32)
CREAT BLD-MCNC: 1 MG/DL (ref 0.6–1.3)
CREAT SERPL-MCNC: 1.15 MG/DL (ref 0.6–1.3)
ERYTHROCYTE [DISTWIDTH] IN BLOOD BY AUTOMATED COUNT: 13.2 % (ref 11.6–15.1)
GFR SERPL CREATININE-BSD FRML MDRD: 58 ML/MIN/1.73SQ M
GFR SERPL CREATININE-BSD FRML MDRD: 68 ML/MIN/1.73SQ M
GLUCOSE SERPL-MCNC: 120 MG/DL (ref 65–140)
GLUCOSE SERPL-MCNC: 127 MG/DL (ref 65–140)
HCT VFR BLD AUTO: 42.9 % (ref 36.5–49.3)
HCT VFR BLD CALC: 44 % (ref 36.5–49.3)
HGB BLD-MCNC: 14.8 G/DL (ref 12–17)
HGB BLDA-MCNC: 15 G/DL (ref 12–17)
INR PPP: 1.01 (ref 0.86–1.16)
MCH RBC QN AUTO: 30.9 PG (ref 26.8–34.3)
MCHC RBC AUTO-ENTMCNC: 34.5 G/DL (ref 31.4–37.4)
MCV RBC AUTO: 90 FL (ref 82–98)
P AXIS: 73 DEGREES
PCO2 BLD: 29 MMOL/L (ref 21–32)
PLATELET # BLD AUTO: 182 THOUSANDS/UL (ref 149–390)
PMV BLD AUTO: 10.5 FL (ref 8.9–12.7)
POTASSIUM BLD-SCNC: 4.7 MMOL/L (ref 3.5–5.3)
POTASSIUM SERPL-SCNC: 4.3 MMOL/L (ref 3.5–5.3)
PROTHROMBIN TIME: 13.3 SECONDS (ref 12.1–14.4)
QRS AXIS: -72 DEGREES
QRSD INTERVAL: 84 MS
QT INTERVAL: 364 MS
QTC INTERVAL: 442 MS
RBC # BLD AUTO: 4.79 MILLION/UL (ref 3.88–5.62)
SODIUM BLD-SCNC: 140 MMOL/L (ref 136–145)
SODIUM SERPL-SCNC: 140 MMOL/L (ref 136–145)
SPECIMEN SOURCE: ABNORMAL
T WAVE AXIS: 179 DEGREES
VENTRICULAR RATE: 89 BPM
WBC # BLD AUTO: 8.62 THOUSAND/UL (ref 4.31–10.16)

## 2018-03-06 PROCEDURE — 70551 MRI BRAIN STEM W/O DYE: CPT

## 2018-03-06 PROCEDURE — 85014 HEMATOCRIT: CPT

## 2018-03-06 PROCEDURE — 71045 X-RAY EXAM CHEST 1 VIEW: CPT

## 2018-03-06 PROCEDURE — 99291 CRITICAL CARE FIRST HOUR: CPT | Performed by: PHYSICIAN ASSISTANT

## 2018-03-06 PROCEDURE — 80047 BASIC METABLC PNL IONIZED CA: CPT

## 2018-03-06 PROCEDURE — 93306 TTE W/DOPPLER COMPLETE: CPT | Performed by: INTERNAL MEDICINE

## 2018-03-06 PROCEDURE — 36415 COLL VENOUS BLD VENIPUNCTURE: CPT | Performed by: EMERGENCY MEDICINE

## 2018-03-06 PROCEDURE — 99285 EMERGENCY DEPT VISIT HI MDM: CPT

## 2018-03-06 PROCEDURE — 85730 THROMBOPLASTIN TIME PARTIAL: CPT | Performed by: EMERGENCY MEDICINE

## 2018-03-06 PROCEDURE — 85610 PROTHROMBIN TIME: CPT | Performed by: EMERGENCY MEDICINE

## 2018-03-06 PROCEDURE — 99221 1ST HOSP IP/OBS SF/LOW 40: CPT | Performed by: INTERNAL MEDICINE

## 2018-03-06 PROCEDURE — C8929 TTE W OR WO FOL WCON,DOPPLER: HCPCS

## 2018-03-06 PROCEDURE — 70450 CT HEAD/BRAIN W/O DYE: CPT

## 2018-03-06 PROCEDURE — 80048 BASIC METABOLIC PNL TOTAL CA: CPT | Performed by: EMERGENCY MEDICINE

## 2018-03-06 PROCEDURE — 93005 ELECTROCARDIOGRAM TRACING: CPT

## 2018-03-06 PROCEDURE — 99223 1ST HOSP IP/OBS HIGH 75: CPT | Performed by: INTERNAL MEDICINE

## 2018-03-06 PROCEDURE — 70496 CT ANGIOGRAPHY HEAD: CPT

## 2018-03-06 PROCEDURE — 85027 COMPLETE CBC AUTOMATED: CPT | Performed by: EMERGENCY MEDICINE

## 2018-03-06 PROCEDURE — 70498 CT ANGIOGRAPHY NECK: CPT

## 2018-03-06 RX ORDER — UREA 10 %
500 LOTION (ML) TOPICAL DAILY
COMMUNITY
End: 2021-11-05 | Stop reason: ALTCHOICE

## 2018-03-06 RX ORDER — ASPIRIN 325 MG
325 TABLET ORAL ONCE
Status: COMPLETED | OUTPATIENT
Start: 2018-03-06 | End: 2018-03-06

## 2018-03-06 RX ORDER — UREA 10 %
500 LOTION (ML) TOPICAL DAILY
Status: DISCONTINUED | OUTPATIENT
Start: 2018-03-06 | End: 2018-03-07 | Stop reason: HOSPADM

## 2018-03-06 RX ORDER — CLOPIDOGREL BISULFATE 75 MG/1
300 TABLET ORAL ONCE
Status: COMPLETED | OUTPATIENT
Start: 2018-03-06 | End: 2018-03-06

## 2018-03-06 RX ORDER — ASPIRIN 81 MG/1
81 TABLET ORAL DAILY
Status: DISCONTINUED | OUTPATIENT
Start: 2018-03-07 | End: 2018-03-07 | Stop reason: HOSPADM

## 2018-03-06 RX ORDER — CHOLECALCIFEROL (VITAMIN D3) 125 MCG
200 CAPSULE ORAL DAILY
Status: DISCONTINUED | OUTPATIENT
Start: 2018-03-06 | End: 2018-03-07 | Stop reason: HOSPADM

## 2018-03-06 RX ORDER — ONDANSETRON 2 MG/ML
4 INJECTION INTRAMUSCULAR; INTRAVENOUS EVERY 6 HOURS PRN
Status: DISCONTINUED | OUTPATIENT
Start: 2018-03-06 | End: 2018-03-07 | Stop reason: HOSPADM

## 2018-03-06 RX ORDER — LANOLIN ALCOHOL/MO/W.PET/CERES
6 CREAM (GRAM) TOPICAL
Status: DISCONTINUED | OUTPATIENT
Start: 2018-03-06 | End: 2018-03-07 | Stop reason: HOSPADM

## 2018-03-06 RX ORDER — CHLORAL HYDRATE 500 MG
1000 CAPSULE ORAL DAILY
Status: DISCONTINUED | OUTPATIENT
Start: 2018-03-06 | End: 2018-03-07 | Stop reason: HOSPADM

## 2018-03-06 RX ORDER — UBIDECARENONE 200 MG
200 CAPSULE ORAL DAILY
COMMUNITY

## 2018-03-06 RX ORDER — CHLORAL HYDRATE 500 MG
1000 CAPSULE ORAL DAILY
COMMUNITY
End: 2018-05-31 | Stop reason: ALTCHOICE

## 2018-03-06 RX ORDER — ATORVASTATIN CALCIUM 40 MG/1
40 TABLET, FILM COATED ORAL
Status: DISCONTINUED | OUTPATIENT
Start: 2018-03-06 | End: 2018-03-07 | Stop reason: HOSPADM

## 2018-03-06 RX ORDER — ASPIRIN 81 MG/1
81 TABLET ORAL DAILY
COMMUNITY
End: 2019-11-06 | Stop reason: HOSPADM

## 2018-03-06 RX ORDER — MAGNESIUM HYDROXIDE 400 MG/5ML
1 SUSPENSION, ORAL (FINAL DOSE FORM) ORAL DAILY
COMMUNITY
End: 2019-11-14 | Stop reason: ALTCHOICE

## 2018-03-06 RX ORDER — ACETAMINOPHEN 325 MG/1
650 TABLET ORAL EVERY 4 HOURS PRN
Status: DISCONTINUED | OUTPATIENT
Start: 2018-03-06 | End: 2018-03-07 | Stop reason: HOSPADM

## 2018-03-06 RX ORDER — SODIUM PHOSPHATE,MONO-DIBASIC 19G-7G/118
500 ENEMA (ML) RECTAL DAILY
Status: DISCONTINUED | OUTPATIENT
Start: 2018-03-06 | End: 2018-03-07 | Stop reason: HOSPADM

## 2018-03-06 RX ORDER — MULTIVIT WITH MINERALS/LUTEIN
1000 TABLET ORAL DAILY
COMMUNITY
End: 2018-05-31 | Stop reason: ALTCHOICE

## 2018-03-06 RX ORDER — SODIUM CHLORIDE 9 MG/ML
60 INJECTION, SOLUTION INTRAVENOUS CONTINUOUS
Status: DISCONTINUED | OUTPATIENT
Start: 2018-03-06 | End: 2018-03-07 | Stop reason: HOSPADM

## 2018-03-06 RX ADMIN — ASPIRIN 325 MG: 325 TABLET ORAL at 08:58

## 2018-03-06 RX ADMIN — MELATONIN TAB 3 MG 6 MG: 3 TAB at 22:05

## 2018-03-06 RX ADMIN — CLOPIDOGREL BISULFATE 300 MG: 75 TABLET ORAL at 08:59

## 2018-03-06 RX ADMIN — PERFLUTREN 1 ML/MIN: 6.52 INJECTION, SUSPENSION INTRAVENOUS at 09:57

## 2018-03-06 RX ADMIN — Medication 1 TABLET: at 17:12

## 2018-03-06 RX ADMIN — Medication 200 MG: at 17:13

## 2018-03-06 RX ADMIN — Medication 500 MG: at 17:14

## 2018-03-06 RX ADMIN — ENOXAPARIN SODIUM 90 MG: 100 INJECTION SUBCUTANEOUS at 09:00

## 2018-03-06 RX ADMIN — Medication 1000 MG: at 17:13

## 2018-03-06 RX ADMIN — IOHEXOL 100 ML: 350 INJECTION, SOLUTION INTRAVENOUS at 07:44

## 2018-03-06 RX ADMIN — SODIUM CHLORIDE 1000 ML: 0.9 INJECTION, SOLUTION INTRAVENOUS at 09:00

## 2018-03-06 RX ADMIN — ATORVASTATIN CALCIUM 40 MG: 40 TABLET, FILM COATED ORAL at 17:13

## 2018-03-06 RX ADMIN — SODIUM CHLORIDE 60 ML/HR: 0.9 INJECTION, SOLUTION INTRAVENOUS at 16:19

## 2018-03-06 NOTE — CONSULTS
Consultation - Cardiology Team One  Jessica Spears 80 y o  male MRN: [id number]  Unit/Bed#: [facility] 705-01 Encounter: [id number]    Inpatient consult to Cardiology  Consult performed by: Fritz Hill ordered by: Alisha Jordan      Physician Requesting Consult: Umu Chow MD  Reason for Consult / Principal Problem: New onset atrial flutter    History of Present Illness   HPI: Jessica Spears is a 80y o  year old male who has a history of CAD s/p CABG in 1999 at [facility], hyperlipidemia and hypertension  He does not  follow with a cardiologist for the past 5-6 years  He presents to [facility] ER 3/6/18 with complaint of R sided weakness  He reports yesterday evening he was in his normal state of health but on waking up this morning he noticed he was stumbling and had weakness in his R arm  He called his close friend who brought him into the hospital  Stroke alert was called on his arrival  CT head showed small to moderate sized chronic right frontal cortical infarction without acute intracranial hemorrhage  CTA of head showed no hemodynamically significant stenosis in the major arteries of the neck and no intracranial aneurysm or major intracranial arterial stenosis  MRI of brain showed small acute and recent L MCA cortical infarction involving a small portion of the insular cortex extending into the periventricular white matter  Cardiology consulted due to ECG on admission showed atrial flutter  He reports no history of dysrhythmias in the past  He denies palpitations, chest pain or SOB  He converted to NSR  Echocardiogram showed EF 45% with akinesis and aneurysmal deformity of the mid-apical anterior, mid anteroseptal, apical inferior, apical septal and apical walls and no evidence of thrombus  No recent echocardiogram to compare  Patient reports no recent cardiac workup  He lives independently  He denies chest pain, palpitations or SOB at rest or with exertion   He denies recent illness  No falls  Review of Systems   Constitution: Negative for chills and fever  Cardiovascular: Negative for chest pain, leg swelling, orthopnea and palpitations  Respiratory: Negative for shortness of breath  Musculoskeletal: Negative for falls  Gastrointestinal: Negative for nausea and vomiting  Neurological: Negative for dizziness and light-headedness  Psychiatric/Behavioral: Negative for altered mental status       Historical Information   Past Medical History:   Diagnosis Date    CAD (coronary artery disease)     Hyperlipidemia     Hypertension      Past Surgical History:   Procedure Laterality Date    ANKLE FRACTURE SURGERY Left     CORONARY ARTERY BYPASS GRAFT       History   Alcohol Use No     Comment: 2 times/yr     History   Drug Use No     History   Smoking Status    Never Smoker   Smokeless Tobacco    Never Used     Family History:   Family History   Problem Relation Age of Onset    Cancer Mother        Meds/Allergies   all current active meds have been reviewed and current meds:   Current Facility-Administered Medications   Medication Dose Route Frequency    acetaminophen (TYLENOL) tablet 650 mg  650 mg Oral Q4H PRN    [START ON 3/7/2018] aspirin (ECOTRIN LOW STRENGTH) EC tablet 81 mg  81 mg Oral Daily    atorvastatin (LIPITOR) tablet 40 mg  40 mg Oral Daily With Dinner    co-enzyme Q-10 capsule 200 mg  200 mg Oral Daily    [START ON 3/7/2018] enoxaparin (LOVENOX) subcutaneous injection 40 mg  40 mg Subcutaneous Daily    fish oil capsule 1,000 mg  1,000 mg Oral Daily    glucosamine sulfate capsule 500 mg  500 mg Oral Daily    magnesium gluconate (MAGONATE) tablet 500 mg  500 mg Oral Daily    multivitamin-minerals (CENTRUM) tablet 1 tablet  1 tablet Oral Daily    ondansetron (ZOFRAN) injection 4 mg  4 mg Intravenous Q6H PRN    sodium chloride 0 9 % infusion  60 mL/hr Intravenous Continuous       sodium chloride 60 mL/hr       Allergies   Allergen Reactions    Penicillins Edema and Rash     Reaction Date: ; Category: Allergy; Annotation - [id number]: Severe reaction with hospitaization at [facility]       Objective   Vitals: Blood pressure 133/75, pulse 75, temperature 98 °F (36 7 °C), temperature source Oral, resp  rate 18, weight 91 3 kg (201 lb 4 5 oz), SpO2 98 %  ,     Body mass index is 31 52 kg/m²  ,     Systolic (36KXQ), WDI:264 , Min:102 , GH     Diastolic (55QFL), WLI:23, Min:62, Max:80            Intake/Output Summary (Last 24 hours) at 18 1531  Last data filed at 18 1000   Gross per 24 hour   Intake             1000 ml   Output                0 ml   Net             1000 ml     Weight (last 2 days)     Date/Time   Weight    18 0708  91 3 (201 28)            Invasive Devices     Peripheral Intravenous Line            Peripheral IV 18 Left Forearm less than 1 day                  Physical Exam   Constitutional: He is oriented to person, place, and time  No distress  Neck: Neck supple  JVD present  Mild JVD    Cardiovascular: Normal rate, regular rhythm, normal heart sounds and intact distal pulses  Pulmonary/Chest: Effort normal and breath sounds normal  No respiratory distress  He has no wheezes  Abdominal: Soft  Bowel sounds are normal    Musculoskeletal: He exhibits no edema  Neurological: He is alert and oriented to person, place, and time  Skin: Skin is warm and dry  He is not diaphoretic  Psychiatric: He has a normal mood and affect   His behavior is normal  Judgment and thought content normal      LABORATORY RESULTS:      CBC with diff:   Results from last 7 days  Lab Units 1817 1814   WBC Thousand/uL 8 62  --    HEMOGLOBIN g/dL 14 8  --    I STAT HEMOGLOBIN g/dl  --  15 0   HEMATOCRIT % 42 9  --    MCV fL 90  --    PLATELETS Thousands/uL 182  --    MCH pg 30 9  --    MCHC g/dL 34 5  --    RDW % 13 2  --    MPV fL 10 5  --        CMP:  Results from last 7 days  Lab Units 18  0717 18  0714   SODIUM mmol/L 140  --    POTASSIUM mmol/L 4 3  --    CHLORIDE mmol/L 104  --    CO2 mmol/L 28  --    ANION GAP mmol/L 8  --    BUN mg/dL 19  --    CREATININE mg/dL 1 15  --    GLUCOSE RANDOM mg/dL 120  --    GLUCOSE, ISTAT mg/dl  --  127   CALCIUM mg/dL 8 8  --    EGFR ml/min/1 73sq m 58 68       BMP:  Results from last 7 days  Lab Units 18  0717   SODIUM mmol/L 140   POTASSIUM mmol/L 4 3   CHLORIDE mmol/L 104   CO2 mmol/L 28   BUN mg/dL 19   CREATININE mg/dL 1 15   GLUCOSE RANDOM mg/dL 120   CALCIUM mg/dL 8 8           Results from last 7 days  Lab Units 18  0717   INR  1 01     Lipid Profile:   Lab Results   Component Value Date    CHOL 156 2017    CHOL 166 2017    CHOL 166 2016     Lab Results   Component Value Date    HDL 57 2017    HDL 52 2017    HDL 50 2016     No results found for: [facility]  Lab Results   Component Value Date    TRIG 139 2017    TRIG 179 (H) 2017    TRIG 234 (H) 2016         Cardiac testing:   Results for orders placed during the hospital encounter of 18   Echo complete with contrast if indicated    Freeman [address]  [address] [place] [address]  [phone]    Transthoracic Echocardiogram  2D, M-mode, Doppler, and Color Doppler    Study date:  06-Mar-2018    Patient: Cori Shannon  MR number: [id number]  Account number: [de-identified]  : 01-Sep-1932  Age: 80 years  Gender: Male  Status: Inpatient  Location: Bedside  Height: 67 in  Weight: 200 6 lb  BP: 125/ 71 mmHg    Indications: Stroke    Diagnoses: G45 9 - Transient cerebral ischemic attack, unspecified    Sonographer:  BRANDY Coy, RDCS  Primary Physician:  Vania Cancino MD  Referring Physician:  Blayne Vasquez DO  Group:  [facility]  Interpreting Physician:  Raleigh Zafar MD    SUMMARY    PROCEDURE INFORMATION:  This was a technically difficult study    Intravenous contrast ( 1 0 mL/min of Definity in NSS) was administered to opacify the left ventricle  LEFT VENTRICLE:  Size was normal   Systolic function was mildly reduced  Ejection fraction was estimated to be 45 %  There was akinesis and aneurysmal deformity of the mid-apical anterior, mid anteroseptal, apical inferior, apical septal, and apical wall(s)  No evidence of apical thrombus  HISTORY: PRIOR HISTORY: Atrial Fibrillation, CAD, Hyperlipidemia, Obesity    PROCEDURE: The procedure was performed at the bedside  This was a routine study  The transthoracic approach was used  The study included complete 2D imaging, M-mode, complete spectral Doppler, and color Doppler  The heart rate was 104 bpm,  at the start of the study  Images were obtained from the parasternal, apical, subcostal, and suprasternal notch acoustic windows  Intravenous contrast ( 1 0 mL/min of Definity in NSS) was administered to opacify the left ventricle  Echocardiographic views were limited due to poor acoustic window availability and lung interference  This was a technically difficult study  LEFT VENTRICLE: Size was normal  Systolic function was mildly reduced  Ejection fraction was estimated to be 45 %  There was akinesis and aneurysmal deformity of the mid-apical anterior, mid anteroseptal, apical inferior, apical septal,  and apical wall(s)  Wall thickness was normal  No evidence of apical thrombus  DOPPLER: Transmitral flow pattern: atrial fibrillation  VENTRICULAR SEPTUM: There was sigmoid septal appearance  RIGHT VENTRICLE: The size was normal  Systolic function was normal  Not well visualized  LEFT ATRIUM: The atrium was mildly dilated  RIGHT ATRIUM: Size was normal  Not well visualized  MITRAL VALVE: Valve structure was normal  There was normal leaflet separation  DOPPLER: There was no evidence for stenosis  There was trace regurgitation  AORTIC VALVE: The valve was trileaflet   Leaflets exhibited normal thickness, calcification, normal cuspal separation, and sclerosis  DOPPLER: There was no evidence for stenosis  There was no regurgitation  TRICUSPID VALVE: The valve structure was normal  There was normal leaflet separation  DOPPLER: There was no evidence for stenosis  There was no significant regurgitation  PULMONIC VALVE: Leaflets exhibited normal thickness, no calcification, and normal cuspal separation  DOPPLER: The transpulmonic velocity was within the normal range  There was no regurgitation  PERICARDIUM: There was no pericardial effusion  AORTA: The root exhibited normal size  SYSTEMIC VEINS: IVC: The inferior vena cava was normal in size and course  SYSTEM MEASUREMENT TABLES    2D  %FS: 20 44 %  Ao Diam: 3 59 cm  EDV(Teich): 77 54 ml  EF(Teich): 42 12 %  ESV(Teich): 44 88 ml  IVSd: 1 27 cm  LA Diam: 4 57 cm  LVEDV MOD A4C: 110 86 ml  LVEF MOD A4C: 45 23 %  LVESV MOD A4C: 60 71 ml  LVIDd: 4 18 cm  LVIDs: 3 32 cm  LVLd A4C: 7 69 cm  LVLs A4C: 7 22 cm  LVPWd: 1 1 cm  SV MOD A4C: 50 14 ml  SV(Teich): 32 66 ml    MM  TAPSE: 1 29 cm    [employer] Accredited Echocardiography Laboratory    Prepared and electronically signed by    Angie Garza MD  Signed 06-Mar-2018 12:00:12       No results found for this or any previous visit  No procedure found  No results found for this or any previous visit  Imaging: I have personally reviewed pertinent reports  Mri Brain Wo Contrast    Result Date: 3/6/2018  Narrative: MRI BRAIN WITHOUT CONTRAST INDICATION: STROKE- WO BRAIN  History taken directly from the electronic ordering system  Right-sided weakness  Difficulty with balance  COMPARISON:   3/6/2018 TECHNIQUE:  Sagittal T1, axial T2, axial FLAIR, axial T1, axial [place] and axial diffusion imaging  IMAGE QUALITY:  Diagnostic   FINDINGS: BRAIN PARENCHYMA:  There is a small band of diffusion restriction in the left insular cortex extending into the periventricular white matter of the left frontal lobe images 19 through 23 of series 3 indicative of recent left MCA infarction  There is bandlike associated FLAIR hyperintensity in this region  Elsewhere there is a small to moderate chronic right frontal infarction with cystic encephalomalacia and gliosis  There is somewhat patchy periventricular FLAIR hyperintensity as well  No acute intracranial hemorrhage or extra-axial fluid collection  Cerebellar tonsils are normally positioned  VENTRICLES:  The ventricles are normal in size and contour  SELLA AND PITUITARY GLAND:  Normal  ORBITS:  Normal  PARANASAL SINUSES:  Normal  VASCULATURE:  Evaluation of the major intracranial vasculature demonstrates appropriate flow voids  CALVARIUM AND SKULL BASE:  Normal  EXTRACRANIAL SOFT TISSUES:  Normal      Impression: 1  Small acute/recent left MCA cortical infarction involving a small portion of the insular cortex extending into the periventricular white matter  2   Chronic small to moderate right frontal infarction and mild to moderate, chronic microangiopathy  I personally discussed this study with Dr Tahmina Cardenas on 3/6/2018 at 11:42 AM  Workstation performed: [id number]     Xr Stroke Alert Portable Chest    Result Date: 3/6/2018  Narrative: CHEST INDICATION:  Right-sided weakness  Stroke COMPARISON:  None EXAM PERFORMED/VIEWS:  XR STROKE ALERT PORTABLE CHEST FINDINGS:  There are median sternotomy wires indicating prior cardiac surgery  Cardiomediastinal silhouette appears unremarkable  The lungs are clear  No pneumothorax or pleural effusion  Osseous structures appear within normal limits for patient age  Impression: No acute cardiopulmonary disease  Workstation performed: [id number]     Ct Stroke Alert Brain    Result Date: 3/6/2018  Narrative: CT BRAIN - STROKE ALERT PROTOCOL INDICATION:  Woke up with right-sided weakness  Pronator drift  Cerebellar symptoms  COMPARISON:  None  TECHNIQUE:  CT examination of the brain was performed    In addition to axial images, coronal reformatted images were created and submitted for interpretation  Radiation dose length product (DLP) for this visit:   This examination, like all CT scans performed in the [facility], was performed utilizing techniques to minimize radiation dose exposure, including the use of iterative reconstruction  and automated exposure control  IMAGE QUALITY:  Diagnostic  FINDINGS:  PARENCHYMA:  Small to moderate-sized area of encephalomalacia and gliosis right frontal lobe indicative of chronic infarction  Elsewhere there are mild periventricular hypodensities  No acute intracranial hemorrhage  Atherosclerotic calcifications noted  VENTRICLES AND EXTRA-AXIAL SPACES:  Age-appropriate volume loss is noted  No hydrocephalus  VISUALIZED ORBITS AND PARANASAL SINUSES:  Orbits appear normal   Mild scattered sinus mucosal thickening is noted  No fluid levels are seen  CALVARIUM AND EXTRACRANIAL SOFT TISSUES:   Normal      Impression: 1  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, chronic microangiopathy  2   No acute intracranial hemorrhage  Findings were directly discussed with [address] on 3/6/2018 7:31 AM  Workstation performed: [id number]     Cta Stroke Alert (head/neck)    Result Date: 3/6/2018  Narrative: CTA NECK AND BRAIN WITH AND WITHOUT CONTRAST INDICATION: Left-sided weakness  Stroke alert  Prominent or drift  Pointing  COMPARISON:   None  TECHNIQUE:  Routine CT imaging of the Brain without contrast   Post contrast imaging was performed after administration of iodinated contrast through the neck and brain  Post contrast axial 0 625 mm images timed to opacify the arterial system  3D rendering was performed on an independent workstation  MIP reconstructions performed  Coronal reconstructions were performed of the noncontrast portion of the brain  Radiation dose length product (DLP) for this visit:  526 3 mGy-cm     This examination, like all CT scans performed in the [facility], was performed utilizing techniques to minimize radiation dose exposure, including the use of iterative reconstruction and automated exposure control  IV Contrast:  100 mL of iohexol (OMNIPAQUE)  IMAGE QUALITY:   Diagnostic FINDINGS: NONCONTRAST BRAIN: Reported separately CERVICAL VASCULATURE AORTIC ARCH AND GREAT VESSELS:  Mild athersclerotic disease of the arch, proximal great vessels and visualized subclavian vessels  No significant stenosis  Sternotomy wires and mediastinal clips are noted, compatible with prior CABG  RIGHT VERTEBRAL ARTERY CERVICAL SEGMENT:  Mild stenosis at the origin  The vessel is normal in caliber throughout the neck  LEFT VERTEBRAL ARTERY CERVICAL SEGMENT:  Mild stenosis at the origin  The vessel is normal in caliber throughout the neck  Left vertebral artery is congenitally dominant  RIGHT EXTRACRANIAL CAROTID SEGMENT:  Normal caliber common carotid artery  Normal bifurcation and cervical internal carotid artery  No stenosis or dissection  LEFT EXTRACRANIAL CAROTID SEGMENT:  Mild atherosclerotic disease of the distal common carotid artery and proximal cervical internal carotid artery without significant stenosis compared to the more distal ICA  NASCET criteria was used to determine the degree of internal carotid artery diameter stenosis  INTRACRANIAL VASCULATURE INTERNAL CAROTID ARTERIES:  Atherosclerotic calcifications of the cavernous segment of the internal carotid artery are very mild  Normal ophthalmic artery origins  Normal ICA terminus  ANTERIOR CIRCULATION:  Symmetric A1 segments and anterior cerebral arteries with normal enhancement  Normal anterior communicating artery  MIDDLE CEREBRAL ARTERY CIRCULATION:  M1 segment and middle cerebral artery branches demonstrate normal enhancement bilaterally  DISTAL VERTEBRAL ARTERIES:  Normal distal vertebral arteries    Posterior inferior cerebellar artery origins are normal  Normal vertebral basilar junction  BASILAR ARTERY:  Basilar artery is normal in caliber  Normal superior cerebellar arteries  POSTERIOR CEREBRAL ARTERIES: Both posterior cerebral arteries arises from the basilar tip  Both arteries demonstrate normal enhancement  Normal posterior communicating arteries  DURAL VENOUS SINUSES:  Not well visualized on this early arterial phase scan  NON VASCULAR ANATOMY BONY STRUCTURES:  No acute osseous abnormality  SOFT TISSUES OF THE NECK:  Unremarkable  THORACIC INLET:  Unremarkable  Impression: 1  No hemodynamically significant stenosis in the major arteries of the neck  2   No intracranial aneurysm or major intracranial arterial stenosis  I personally discussed this study with Britney Adams on 3/6/2018 at 7:31 AM  Workstation performed: [id number]       EKG reviewed personally:  Atrial flutter   Ventricular rate 89 bpm  QRSD interval 84 ms  QT interval 364 ms  QTc interval 442 ms    Telemetry reviewed personally:   Normal sinus rhythm HR 70-80s    Assessment/ Plan    1  New onset atrial flutter- reviewed ECG  Patient converted to sinus rhythm   Check TSH  Echocardiogram reviewed showing EF 45% with akinesis and aneurysmal deformity of the mid-apical anterior, mid anteroseptal, apical inferior, apical septal and apical walls and no evidence of thrombus  No recent echocardiogram to compare  [id number] VASC score: 7  Recommend [address]  Patient received therapeutic lovenox today   Patient on metoprolol 50 mg PO BID at home  Continue to monitor on telemetry     2  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   MRI of brain showed small acute and recent L MCA cortical infarction involving a small portion of the insular cortex extending into the periventricular white matter  Continue aspirin and statin     3  CAD s/p CABG in 1999  Continue aspirin and statin   Patient on metoprolol at home     4   Hypertension-118/70 average BP  Holding home medication: losartan, furosemide and metoprolol   Continue to monitor BP  Neurology recommending permissive hypertension 140-180s due to recent CVA  5  Hyperlipidemia- lipid panel pending   Continue statin     Thank you for allowing us to participate in this patient's care  Counseling / Coordination of Care  Total floor / unit time spent today 45 minutes  Greater than 50% of total time was spent with the patient and / or family counseling and / or coordination of care  A description of the counseling / coordination of care: Review of history, current assessment, development of a plan  Code Status: Level 1 - Full Code    ** Please Note: Dragon 360 Dictation voice to text software may have been used in the creation of this document   **

## 2018-03-06 NOTE — SPEECH THERAPY NOTE
Speech Language/Pathology  Pt adm w/ R sided weakness, now r/o CVA  Speech is clear, no aphasia, swallow is wfl  No formal assessment needed at this time  Please reconsult if needed during stay

## 2018-03-06 NOTE — ED PROVIDER NOTES
History  Chief Complaint   Patient presents with    Extremity Weakness     right sided weakness, states woke up with symptoms, balance is off     80year-old male presenting to the ER today with a chief complaint of weakness on the right side of his body  Patient states the onset of  Symptoms was at 6:00 a m  Annmarie Wright Patient cannot identify any palliative or provoking factors  Patient describes the quality of his symptoms as weakness  Weakness is the entire right side  Severity currently is 10/10  Symptoms are constant  History provided by:  Patient   used: No    Extremity Weakness   Location:  Right side   Quality:  "weakness"   Severity:  Moderate  Onset quality:  Gradual  Timing:  Constant  Progression:  Unchanged  Chronicity:  New  Context:    Patient woke up with symptoms  Relieved by:   none  Worsened by:   none  Ineffective treatments:   none tried  Associated symptoms: no abdominal pain, no congestion, no diarrhea, no fatigue, no fever, no myalgias, no nausea, no sore throat and no vomiting        Prior to Admission Medications   Prescriptions Last Dose Informant Patient Reported? Taking?    Coenzyme Q10 200 MG capsule 3/5/2018 at Unknown time  Yes Yes   Sig: Take 200 mg by mouth daily   Glucosamine-Chondroit-Vit C-Mn (GLUCOSAMINE CHONDROITIN COMPLX) CAPS 3/5/2018 at Unknown time  Yes Yes   Sig: Take 1 capsule by mouth daily   Multiple Vitamins-Minerals (MULTIVITAMIN ADULT PO) 3/5/2018 at Unknown time  Yes Yes   Sig: Take 1 tablet by mouth daily   Omega-3 Fatty Acids (FISH OIL) 1,000 mg 3/5/2018 at Unknown time  Yes Yes   Sig: Take 1,000 mg by mouth daily   Probiotic Product (PROBIOTIC COLON SUPPORT) CAPS 3/5/2018 at Unknown time  Yes Yes   Sig: Take 1 capsule by mouth daily   aspirin (ECOTRIN LOW STRENGTH) 81 mg EC tablet 3/5/2018 at Unknown time  Yes Yes   Sig: Take 81 mg by mouth daily   furosemide (LASIX) 20 mg tablet 3/5/2018 at Unknown time  Yes Yes   Sig: Take 1 tablet by mouth daily   losartan (COZAAR) 50 mg tablet 3/5/2018 at Unknown time  Yes Yes   Sig: Take 1 tablet by mouth daily   magnesium gluconate (MAGONATE) 500 mg tablet 3/5/2018 at Unknown time  Yes Yes   Sig: Take 500 mg by mouth daily   metoprolol tartrate (LOPRESSOR) 100 mg tablet 3/5/2018 at Unknown time  Yes Yes   Sig: Take 50 mg by mouth every 12 (twelve) hours     potassium chloride (KLOR-CON M20) 20 mEq tablet 3/5/2018 at Unknown time  Yes Yes   Sig: Take 1 tablet by mouth daily   selenium 200 mcg 3/5/2018 at Unknown time  Yes Yes   Sig: Take 200 mcg by mouth daily   simvastatin (ZOCOR) 40 mg tablet 3/5/2018 at Unknown time  Yes Yes   Sig: Take 40 mg by mouth daily after dinner     vitamin E, tocopherol, 1,000 units capsule 3/5/2018 at Unknown time  Yes Yes   Sig: Take 1,000 Units by mouth daily      Facility-Administered Medications: None       Past Medical History:   Diagnosis Date    CAD (coronary artery disease)     Hyperlipidemia     Hypertension        Past Surgical History:   Procedure Laterality Date    CORONARY ARTERY BYPASS GRAFT         History reviewed  No pertinent family history  I have reviewed and agree with the history as documented  Social History   Substance Use Topics    Smoking status: Never Smoker    Smokeless tobacco: Never Used    Alcohol use No      Comment: 2 times/yr        Review of Systems   Constitutional: Negative for activity change, appetite change, chills, fatigue and fever  HENT: Negative  Negative for congestion, sinus pain, sore throat, trouble swallowing and voice change  Eyes: Negative  Respiratory: Negative  Cardiovascular: Negative  Gastrointestinal: Negative for abdominal distention, abdominal pain, blood in stool, constipation, diarrhea, nausea and vomiting  Endocrine: Negative      Genitourinary: Negative for decreased urine volume, difficulty urinating, dysuria, enuresis, flank pain, frequency, hematuria, penile swelling, scrotal swelling, testicular pain and urgency  Musculoskeletal: Positive for extremity weakness and gait problem  Negative for back pain, joint swelling, myalgias and neck pain  Skin: Negative  Allergic/Immunologic: Negative  Neurological: Positive for weakness  Hematological: Negative  Psychiatric/Behavioral: Negative  Negative for agitation and behavioral problems  The patient is not nervous/anxious  All other systems reviewed and are negative  Physical Exam  ED Triage Vitals   Temp Pulse Respirations Blood Pressure SpO2   -- 03/06/18 0708 03/06/18 0708 03/06/18 0708 03/06/18 0708    84 16 125/71 97 %      Temp src Heart Rate Source Patient Position - Orthostatic VS BP Location FiO2 (%)   -- 03/06/18 0718 03/06/18 0718 -- --    Monitor Lying        Pain Score       03/06/18 0708       No Pain           Orthostatic Vital Signs  Vitals:    03/06/18 0708 03/06/18 0718 03/06/18 0730   BP: 125/71 118/70 102/68   Pulse: 84 82 92   Patient Position - Orthostatic VS:  Lying        Physical Exam   Constitutional: He is oriented to person, place, and time  He appears well-developed and well-nourished  HENT:   Head: Normocephalic and atraumatic  Right Ear: External ear normal    Left Ear: External ear normal    Nose: Nose normal    Mouth/Throat: Oropharynx is clear and moist    Eyes: Conjunctivae and EOM are normal  Pupils are equal, round, and reactive to light  Neck: Normal range of motion  Neck supple  No JVD present  No tracheal deviation present  No thyromegaly present  Cardiovascular: Normal rate, regular rhythm, normal heart sounds and intact distal pulses  Exam reveals no gallop and no friction rub  No murmur heard  Pulmonary/Chest: Effort normal and breath sounds normal  No stridor  No respiratory distress  He has no wheezes  He has no rales  He exhibits no tenderness  Abdominal: Soft  Bowel sounds are normal  He exhibits no distension and no mass  There is no tenderness   There is no rebound and no guarding  No hernia  Musculoskeletal: Normal range of motion  He exhibits no edema, tenderness or deformity  Lymphadenopathy:     He has no cervical adenopathy  Neurological: He is alert and oriented to person, place, and time  He has normal reflexes  He displays normal reflexes  A cranial nerve deficit is present  No sensory deficit  He exhibits normal muscle tone  Coordination abnormal    Positive pronator drift on the right  Positive past pointing on the right  Skin: Skin is warm  No rash noted  No erythema  No pallor  Psychiatric: He has a normal mood and affect  His behavior is normal  Judgment and thought content normal    Nursing note and vitals reviewed  ED Medications  Medications   enoxaparin (LOVENOX) subcutaneous injection 90 mg (not administered)   aspirin tablet 325 mg (not administered)   clopidogrel (PLAVIX) tablet 300 mg (not administered)   atorvastatin (LIPITOR) tablet 40 mg (not administered)   aspirin (ECOTRIN LOW STRENGTH) EC tablet 81 mg (not administered)   sodium chloride 0 9 % bolus 1,000 mL (not administered)   iohexol (OMNIPAQUE) 350 MG/ML injection (MULTI-DOSE) 100 mL (100 mL Intravenous Given 3/6/18 0744)       Diagnostic Studies  Results Reviewed     Procedure Component Value Units Date/Time    Basic metabolic panel [[id number]] Collected:  03/06/18 0717    Lab Status:  Final result Specimen:  Blood from Arm, Left Updated:  03/06/18 0741     Sodium 140 mmol/L      Potassium 4 3 mmol/L      Chloride 104 mmol/L      CO2 28 mmol/L      Anion Gap 8 mmol/L      BUN 19 mg/dL      Creatinine 1 15 mg/dL      Glucose 120 mg/dL      Calcium 8 8 mg/dL      eGFR 58 ml/min/1 73sq m     Narrative:         National Kidney Disease Education Program recommendations are as follows:  GFR calculation is accurate only with a steady state creatinine  Chronic Kidney disease less than 60 ml/min/1 73 sq  meters  Kidney failure less than 15 ml/min/1 73 sq  meters      APTT [[id number]]  (Normal) Collected:  03/06/18 0717    Lab Status:  Final result Specimen:  Blood from Arm, Left Updated:  03/06/18 0739     PTT 28 seconds     Narrative: Therapeutic Heparin Range = 60-90 seconds    Protime-INR [[id number]]  (Normal) Collected:  03/06/18 0717    Lab Status:  Final result Specimen:  Blood from Arm, Left Updated:  03/06/18 0739     Protime 13 3 seconds      INR 1 01    CBC [[id number]]  (Normal) Collected:  03/06/18 0717    Lab Status:  Final result Specimen:  Blood from Arm, Left Updated:  03/06/18 0725     WBC 8 62 Thousand/uL      RBC 4 79 Million/uL      Hemoglobin 14 8 g/dL      Hematocrit 42 9 %      MCV 90 fL      MCH 30 9 pg      MCHC 34 5 g/dL      RDW 13 2 %      Platelets 478 Thousands/uL      MPV 10 5 fL     POCT Chem 8+ [[id number]]  (Abnormal) Collected:  03/06/18 0714    Lab Status:  Final result Updated:  03/06/18 0718     SODIUM, I-STAT 140 mmol/l      Potassium, i-STAT 4 7 mmol/L      Chloride, istat 102 mmol/L      CO2, i-STAT 29 mmol/L      Anion Gap, Istat 16 (H) mmol/L      Calcium, Ionized i-STAT 1 13 mmol/L      BUN, I-STAT 23 mg/dl      Creatinine, i-STAT 1 0 mg/dl      eGFR 68 ml/min/1 73sq m      Glucose, i-STAT 127 mg/dl      Hct, i-STAT 44 %      Hgb, i-STAT 15 0 g/dl      Specimen Type VENOUS                 XR stroke alert portable chest   Final Result by Yomi Yeung MD (03/06 0677)      No acute cardiopulmonary disease  Workstation performed: [id number]         CTA stroke alert (head/neck)   Final Result by Anuradha Rodriguez MD (03/06 6369)         1  No hemodynamically significant stenosis in the major arteries of the neck  2   No intracranial aneurysm or major intracranial arterial stenosis  I personally discussed this study with Elsy Xavier on 3/6/2018 at 7:31 AM                Workstation performed: [id number]         CT stroke alert brain   Final Result by Anuradha Rodriguez MD (03/06 5368)         1    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, chronic microangiopathy  2   No acute intracranial hemorrhage  Findings were directly discussed with Ann Neri on 3/6/2018 7:31 AM       Workstation performed: [id number]         MRI inpatient order    (Results Pending)         Procedures  Procedures      Phone Consults  ED Phone Contact    ED Course  ED Course as of Mar 06 0829   Tue Mar 06, 2018   0726  Neurology at bedside evaluating the patient    0736  No tPA recommended by neurology team   No large vessel occlusion noted on CTA, no acute abnormality noted on CT noncontrast     0736   Will admit patient under the medicine service under the stroke pathway  0745   Patient currently in atrial fibrillation  Rate is less than 100 however  Patient states this is a new diagnosis for him  Will give Lovenox            Identification of Seniors at [address] [place] [address] Most Recent Value   (ISAR) Identification of Seniors at Risk   Before the illness or injury that brought you to the Emergency, did you need someone to help you on a regular basis? 0 Filed at: 03/06/2018 0806   In the last 24 hours, have you needed more help than usual?  0 Filed at: 03/06/2018 0126   Have you been hospitalized for one or more nights during the past 6 months? 0 Filed at: 03/06/2018 0806   In general, do you see well?  0 Filed at: 03/06/2018 3490   In general, do you have serious problems with your memory? 0 Filed at: 03/06/2018 0806   Do you take more than three different medications every day?   1 Filed at: 03/06/2018 0806   ISAR Score  1 Filed at: 03/06/2018 0806        NIH Stroke Scale    Flowsheet Row Most Recent Value   Level of Consciousness (1a )  0 Filed at: 03/06/2018 0708   LOC Questions (1b )  0 Filed at: 03/06/2018 0708   LOC Commands (1c )  0 Filed at: 03/06/2018 0708   Best Gaze (2 )  0 Filed at: 03/06/2018 0708   Visual (3 )  0 Filed at: 03/06/2018 0708   Facial Palsy (4 )  0 Filed at: 03/06/2018 0708   Motor Arm, Left (5a )  0 Filed at: 03/06/2018 5201   Motor Arm, Right (5b )  2 Filed at: 03/06/2018 7146   Motor Leg, Left (6a )  0 Filed at: 03/06/2018 5623   Motor Leg, Right (6b )  0 Filed at: 03/06/2018 0708   Limb Ataxia (7 )  1 Filed at: 03/06/2018 1857   Sensory (8 )  0 Filed at: 03/06/2018 1232   Best Language (9 )  0 Filed at: 03/06/2018 0708   Dysarthria (10 )  0 Filed at: 03/06/2018 0708   Extinction and Inattention (11 ) (Formerly Neglect)  0 Filed at: 03/06/2018 0708   Total  3 Filed at: 03/06/2018 0708                        [facility]  Number of Diagnoses or Management Options  [facility] ): new and requires workup  Stroke-like symptom: new and requires workup  Diagnosis management comments:  Assessment:  -  A 71-year-old male with a chief complaint of weakness to the right side   Plan:  -  Stroke alert called  - NIH is 3  - CT CTA  - disposition pending  - CT/CTA did not reveal any acute abnormality  - neurology recommended Plavix and aspirin and being admitted under the stroke pathway  - during patient's stay in the ER he will went into a new onset atrial fibrillation/ atrial flutter  This was rate controlled    Patient was given Lovenox  - patient was admitted to the Internal Medicine service       Amount and/or Complexity of Data Reviewed  Clinical lab tests: ordered and reviewed  Tests in the radiology section of CPT®: ordered and reviewed  Tests in the medicine section of CPT®: reviewed and ordered  Decide to obtain previous medical records or to obtain history from someone other than the patient: yes      CritCare Time    Disposition  Final diagnoses:   Stroke-like symptom   Atrial fibrillation and flutter ([facility] [address] [facility] )     Time reflects when diagnosis was documented in both MDM as applicable and the Disposition within this note     Time User Action Codes Description Comment    3/6/2018  7:10 AM Lenda Click Add [R29 90] Stroke-like symptom     3/6/2018  8:23 AM Lenda Click Add [I48 91,  I48 92] Atrial fibrillation and flutter [facility])       ED Disposition     ED Disposition Condition Comment    Admit  Case was discussed with Yony Anaya and the patient's admission status was agreed to be Admission Status: inpatient status to the service of Dr Brown Speak   Follow-up Information    None       Patient's Medications   Discharge Prescriptions    No medications on file     No discharge procedures on file  ED Provider  Attending physically available and evaluated [facility]  I managed the patient along with the ED Attending      Electronically Signed by         Se Vincent DO  03/06/18 333 SANDOVAL Kwan DO  03/06/18 5921

## 2018-03-06 NOTE — CONSULTS
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 in setting of new onset atrial fibrillation    - Recommend give loading dose of  mg and Plavix 300 mg now  Will continue on ASA 81 mg QD starting tomorrow  Will await MRI brain to determine stroke burden, but will need eventual anticoagulation in setting of new onset of atrial fibrillation  Hold off on full-anticoagulation at this point until MRI brain obtained and can further evaluate stroke burden  - Start atorvastatin 40 mg QPM     - SBP goal 140-180, recommend start IVF if no history of CHF  - Check fasting lipid panel, hemoglobin A1c, TSH     - Check echocardiogram    - Will start Lovenox 40 mg QD for DVT prophylaxis      - PT/OT evaluations  - Stroke education     - Monitor exam and notify with changes  - Patient will need eventual outpatient stroke follow-up, appointment with office has been requested  2  HLD    - Start atorvastatin 40 mg QPM     - Check fasting lipid panel  3  HTN   - BPs running on low side currently, in setting of new stroke, would recommend permissive hypertension with goal -180  Spoke with ED resident and will plan to start IVF  History of Present Illness     Reason for Consult / Principal Problem: Stroke  Stroke Alert activated: [address] [facility] Vuong And Reji  Neurology response at bedside: 0710  NIHSS: 3  tPA decision: No IV tPA given secondary to being outside of window  IR decision: No IR intervention secondary to no target being identified on CTA head and neck  HPI: Luke Lutz is a 80 y o  male who presents with complaint of waking up and noting R sided weakness and difficulty with balance   He notes that he went to bed and felt normal and woke up with the symptoms  He notes he has not had similar symptoms previously and notes that he has never had a stroke in the past that he was aware of  Stroke alert was activated and he was taken to CT  He was noted on telemetry to be in atrial fibrillation during stroke alert  NIHSS:    1a Level of Consciousness: 0 = Alert   1b  LOC Questions: 0 = Answers both correctly   1c  LOC Commands: 0 = Obeys both correctly   2  Best Gaze: 0 = Normal   3  Visual: 0 = No visual field loss   4  Facial Palsy: 1=Minor paralysis (flattened nasolabial fold, asymmetric on smiling)   5a  Motor Right Arm: 0=No drift, limb holds 90 (or 45) degrees for full 10 seconds   5b  Motor Left Arm: 0=No drift, limb holds 90 (or 45) degrees for full 10 seconds   6a  Motor Right Le=No drift, limb holds 90 (or 45) degrees for full 10 seconds   6b  Motor Left Le=No drift, limb holds 90 (or 45) degrees for full 10 seconds   7  Limb Ataxia:  2=Present in two limbs   8  Sensory: 0=Normal; no sensory loss   9  Best Language:  0=No aphasia, normal   10  Dysarthria: 0=Normal articulation   11  Extinction and Inattention (formerly Neglect): 0=No abnormality   Total Score: 3                     Consult to Neurology  Consult performed by: Lynn Moses ordered by: [address] [facility], [address]          Review of Systems   Respiratory: Negative for shortness of breath  Cardiovascular: Negative for chest pain  Gastrointestinal: Negative for nausea and vomiting  Neurological: Positive for weakness  Negative for numbness and headaches  Historical Information   Past Medical History:   Diagnosis Date    CAD (coronary artery disease)     Hyperlipidemia     Hypertension      Past Surgical History:   Procedure Laterality Date    CORONARY ARTERY BYPASS GRAFT       Social History   History   Alcohol Use No     History   Drug use: Unknown     History   Smoking Status    Never Smoker   Smokeless Tobacco    Not on file     Family History: History reviewed  No pertinent family history  Review of previous medical records was completed  Patient follows with his PCP for HTN, HLD and history of CABG  Meds/Allergies   Scheduled Meds:  Current Facility-Administered Medications:  [START ON 3/7/2018] aspirin 81 mg Oral Daily Negar De La Rosa PA-C    atorvastatin 40 mg Oral Daily With Reginald Sandifer, PA-C    sodium chloride 1,000 mL Intravenous Once Maxine Bibles, DO Last Rate: 1,000 mL (03/06/18 0900)     Continuous Infusions:   PRN Meds:  Allergies   Allergen Reactions    Penicillins Edema and Rash     Reaction Date: [id number]; Category: Allergy; Annotation - [id number]: Severe reaction with hospitaization at [facility]       Objective   Vitals:Blood pressure 102/68, pulse 92, resp  rate 20, weight 91 3 kg (201 lb 4 5 oz), SpO2 94 %  ,Body mass index is 31 52 kg/m²  No intake or output data in the 24 hours ending 03/06/18 0747    Invasive Devices: Invasive Devices     Peripheral Intravenous Line            Peripheral IV 03/06/18 Left Forearm less than 1 day                Physical Exam   Constitutional: He is oriented to person, place, and time  He appears well-developed and well-nourished  No distress  HENT:   Head: Normocephalic and atraumatic  Eyes: Conjunctivae and EOM are normal  Pupils are equal, round, and reactive to light  No scleral icterus  Neck: Normal range of motion  Neck supple  Cardiovascular:   Irregularly irregular   Pulmonary/Chest: Effort normal and breath sounds normal  No respiratory distress  Abdominal: Soft  Bowel sounds are normal  He exhibits no distension  There is no tenderness  Musculoskeletal: Normal range of motion  He exhibits no edema  Neurological: He is alert and oriented to person, place, and time  He has an abnormal Finger-Nose-Finger Test (Right) and an abnormal Heel to [employer] (Right)  Skin: Skin is warm and dry  No rash noted  He is not diaphoretic  No erythema  No pallor     Psychiatric: He has a normal mood and affect  His speech is normal and behavior is normal      Neurologic Exam     Mental Status   Oriented to person, place, and time  Oriented to person  Oriented to place  Oriented to time  Attention: normal  Concentration: normal    Speech: speech is normal   Level of consciousness: alert  Able to name object  Able to read  Able to repeat  No aphasia noted  Cranial Nerves     CN II   Right visual field deficit: none  Left visual field deficit: none     CN III, IV, VI   Pupils are equal, round, and reactive to light  Extraocular motions are normal    Right pupil: Size: 4 mm  Shape: regular  Reactivity: brisk  Consensual response: intact  Left pupil: Size: 4 mm  Shape: regular  Reactivity: brisk  Consensual response: intact  Nystagmus: none   Diplopia: none  Ophthalmoparesis: none  Upgaze: normal  Downgaze: normal  Conjugate gaze: present    CN V   Right facial sensation deficit: none  Left facial sensation deficit: none    CN VII   Right facial weakness: central  Left facial weakness: none    CN VIII   Hearing: intact    CN IX, X   Palate: symmetric    CN XI   Right sternocleidomastoid strength: normal  Left sternocleidomastoid strength: normal    CN XII   Tongue: not atrophic  Fasciculations: absent  Tongue deviation: none    Motor Exam   Muscle bulk: normal  Overall muscle tone: normal  Right arm tone: normal  Left arm tone: normal  Right arm pronator drift: absent  Left arm pronator drift: absent  Right leg tone: normal  Left leg tone: normalExaminable weakness noted on exam RUE, but no drift noted  No drift RLE, LUE, LLE        Sensory Exam   Light touch normal    Right arm light touch: normal  Left arm light touch: normal  Right leg light touch: normal  Left leg light touch: normal  Pinprick normal    Right arm pinprick: normal  Left arm pinprick: normal  Right leg pinprick: normal  Left leg pinprick: normal    Gait, Coordination, and Reflexes     Coordination   Finger to nose coordination: abnormal (Right)  Heel to shin coordination: abnormal (Right)Limited exam         Lab Results:  Recent Results (from the past 24 hour(s))   POCT Chem 8+    Collection Time: 03/06/18  7:14 AM   Result Value Ref Range    SODIUM, I-STAT 140 136 - 145 mmol/l    Potassium, i-STAT 4 7 3 5 - 5 3 mmol/L    Chloride, istat 102 100 - 108 mmol/L    CO2, i-STAT 29 21 - 32 mmol/L    Anion Gap, Istat 16 (H) 4 - 13 mmol/L    Calcium, Ionized i-STAT 1 13 1 12 - 1 32 mmol/L    BUN, I-STAT 23 5 - 25 mg/dl    Creatinine, i-STAT 1 0 0 6 - 1 3 mg/dl    eGFR 68 ml/min/1 73sq m    Glucose, i-STAT 127 65 - 140 mg/dl    Hct, i-STAT 44 36 5 - 49 3 %    Hgb, i-STAT 15 0 12 0 - 17 0 g/dl    Specimen Type VENOUS    APTT    Collection Time: 03/06/18  7:17 AM   Result Value Ref Range    PTT 28 23 - 35 seconds   Basic metabolic panel    Collection Time: 03/06/18  7:17 AM   Result Value Ref Range    Sodium 140 136 - 145 mmol/L    Potassium 4 3 3 5 - 5 3 mmol/L    Chloride 104 100 - 108 mmol/L    CO2 28 21 - 32 mmol/L    Anion Gap 8 4 - 13 mmol/L    BUN 19 5 - 25 mg/dL    Creatinine 1 15 0 60 - 1 30 mg/dL    Glucose 120 65 - 140 mg/dL    Calcium 8 8 8 3 - 10 1 mg/dL    eGFR 58 ml/min/1 73sq m   CBC    Collection Time: 03/06/18  7:17 AM   Result Value Ref Range    WBC 8 62 4 31 - 10 16 Thousand/uL    RBC 4 79 3 88 - 5 62 Million/uL    Hemoglobin 14 8 12 0 - 17 0 g/dL    Hematocrit 42 9 36 5 - 49 3 %    MCV 90 82 - 98 fL    MCH 30 9 26 8 - 34 3 pg    MCHC 34 5 31 4 - 37 4 g/dL    RDW 13 2 11 6 - 15 1 %    Platelets 163 709 - 592 Thousands/uL    MPV 10 5 8 9 - 12 7 fL   Protime-INR    Collection Time: 03/06/18  7:17 AM   Result Value Ref Range    Protime 13 3 12 1 - 14 4 seconds    INR 1 01 0 86 - 1 16       Imaging Studies: I have personally reviewed pertinent reports  and I have personally reviewed pertinent films in PACS  CTH- 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

## 2018-03-06 NOTE — ED ATTENDING ATTESTATION
Gordy Padilla MD, saw and evaluated the patient  I have discussed the patient with the resident/non-physician practitioner and agree with the resident's/non-physician practitioner's findings, Plan of Care, and MDM as documented in the resident's/non-physician practitioner's note, except where noted  All available labs and Radiology studies were reviewed  At this point I agree with the current assessment done in the Emergency Department  I have conducted an independent evaluation of this patient a history and physical is as follows:   The patient awoke this morning with right-sided weakness patient went to bed feeling fine  Denies headache denies fever chills denies difficulty speaking  Patient history of CAD and hypertension  Exam the patient is awake alert lungs clear heart regular abdomen soft nontender neurologic exam patient has mild right upper extremity weakness  Cranial nerves intact speech normal  NIH stroke scale 3  Stroke alert activated Neurology with patient at [address] [facility] Time    Procedures

## 2018-03-06 NOTE — ED NOTES
MRI Screening completed  ECHO being performed at the bedside  Contacted MRI to make aware screening form, and Ceclia, MRI schedules pt for 1100  Will call back if earlier appt available       Rosa Victor RN  03/06/18 9990

## 2018-03-06 NOTE — ED NOTES
Noted O2 Sat 88% RA  Applied O2 via NC at 3L/min   Noted rise in O2 Sat to 96%     Henrietta Farnsworth RN  03/06/18 7699

## 2018-03-06 NOTE — H&P
History and Physical - [facility] Internal Medicine    Patient Information: Emelina Navarrete 80 y o  male MRN: [id number]  Unit/Bed#: ED 06 Encounter: [id number]  Admitting Physician: Lynne Desai MD  PCP: Tatiana Daly MD  Date of Admission:  03/06/18     Assessment/plan:  1  Acute left MCA CVA - discussed with neurology  Received  mg and Plavix 300 mg  ASA 81 mg from am, Atorvastatin 40 mg daily, PT/OT, echo  Will need eventual anticoagulation - timing to be determined by neurology  2  Aflutter/Afib - new diagnosis  Rate controlled at present  Was on Metoprolol 50 mg BID at home (took last dose last night)  Anticoagulation when cleared by neurology  Hold Metoprolol to maintain -180  Cardiology consult  Echo ordered  3  History of HTN - BP on low side currently in the setting of new CVA  Neurology recommends -180  IVF's  Hold Lasix, Losartan, Metoprolol  4  HLD - was on Simvastatin 40 mg at home - changed to Atorvastatin 40 mg daily  Check FLP  5  CAD s/p CABG - ASA, statins  Restart Metoprolol when BP improves  VTE Prophylaxis: Enoxaparin (Lovenox)  / sequential compression device   Code Status: Full code  POLST: There is no POLST form on file for this patient (pre-hospital)    Anticipated Length of Stay:  Patient will be admitted on an Inpatient basis with an anticipated length of stay of  Greater than 2 midnights  Justification for Hospital Stay: Acute CVA    Discussed with friend Keyur Robles on the phone  Called son  Nadia An [facility]) - left a message # [phone]    Total Time for Visit, including Counseling / Coordination of Care: 1 hour  Greater than 50% of this total time spent on direct patient counseling and coordination of care  Chief Complaint:     Right sided weakness    History of Present Illness:    Emelina Navarrete is a 80 y o  male who presents with right sided weakness    On waking up this morning at 5 am he noted right upper and lower extremity weakness and inability to maintain his balance when he tried to walk  He had felt normal when he went to sleep last night  Evaluation by neurology in the ED showed right-sided ataxia with mild weakness in the face and proximal arm  He was not a tPA candidate due to timing  EKG done in the ED showed atrial flutter which is a new diagnosis for him  MRI brain showed a small acute/recent left MCA cortical infarction involving a small portion of the insular cortex extending into the periventricular white matter  Review of Systems:    Review of Systems   Neurological: Positive for weakness  All other systems reviewed and are negative  Past Medical and Surgical History:     Past Medical History:   Diagnosis Date    CAD (coronary artery disease)     Hyperlipidemia     Hypertension        Past Surgical History:   Procedure Laterality Date    ANKLE FRACTURE SURGERY Left     CORONARY ARTERY BYPASS GRAFT         Meds/Allergies:    all medications and allergies reviewed    Allergies: Allergies   Allergen Reactions    Penicillins Edema and Rash     Reaction Date: [id number]; Category: Allergy; Annotation - [id number]: Severe reaction with hospitaization at [facility]     History:     Marital Status:      Substance Use History:   History   Alcohol Use No     Comment: 2 times/yr     History   Smoking Status    Never Smoker   Smokeless Tobacco    Never Used     History   Drug Use No       Family History:    Family History   Problem Relation Age of Onset    Cancer Mother        Physical Exam:     Vitals:   Blood Pressure: 111/66 (03/06/18 0945)  Pulse: 99 (03/06/18 0945)  Temperature: 98 1 °F (36 7 °C) (03/06/18 1038)  Temp Source: Oral (03/06/18 1038)  Respirations: 20 (03/06/18 0945)  Weight - Scale: 91 3 kg (201 lb 4 5 oz) (03/06/18 0708)  SpO2: 96 % (03/06/18 0945)    Physical Exam   Constitutional: He is oriented to person, place, and time  HENT:   Head: Atraumatic     Eyes: EOM are normal    Neck: Neck supple  No JVD present  No tracheal deviation present  No thyromegaly present  Cardiovascular: An irregularly irregular rhythm present  Pulmonary/Chest: Effort normal and breath sounds normal  No respiratory distress  He has no wheezes  He has no rales  Abdominal: Soft  Bowel sounds are normal  He exhibits no distension  There is no tenderness  There is no rebound  Musculoskeletal: He exhibits no edema  Neurological: He is alert and oriented to person, place, and time  Right side ataxia  Minor right proximal arm weakness   Skin: Skin is warm and dry  Psychiatric: He has a normal mood and affect  Lab Results: I have personally reviewed pertinent reports  Results from last 7 days  Lab Units 03/06/18  0717   WBC Thousand/uL 8 62   HEMOGLOBIN g/dL 14 8   HEMATOCRIT % 42 9   PLATELETS Thousands/uL 182       Results from last 7 days  Lab Units 03/06/18  0717   SODIUM mmol/L 140   POTASSIUM mmol/L 4 3   CHLORIDE mmol/L 104   CO2 mmol/L 28   BUN mg/dL 19   CREATININE mg/dL 1 15   CALCIUM mg/dL 8 8   GLUCOSE RANDOM mg/dL 120       Results from last 7 days  Lab Units 03/06/18  0717   INR  1 01       Imaging: I have personally reviewed pertinent reports  Mri Brain Wo Contrast    Result Date: 3/6/2018  Narrative: MRI BRAIN WITHOUT CONTRAST INDICATION: STROKE- WO BRAIN  History taken directly from the electronic ordering system  Right-sided weakness  Difficulty with balance  COMPARISON:   3/6/2018 TECHNIQUE:  Sagittal T1, axial T2, axial FLAIR, axial T1, axial [place] and axial diffusion imaging  IMAGE QUALITY:  Diagnostic  FINDINGS: BRAIN PARENCHYMA:  There is a small band of diffusion restriction in the left insular cortex extending into the periventricular white matter of the left frontal lobe images 19 through 23 of series 3 indicative of recent left MCA infarction  There is bandlike associated FLAIR hyperintensity in this region   Elsewhere there is a small to moderate chronic right frontal infarction with cystic encephalomalacia and gliosis  There is somewhat patchy periventricular FLAIR hyperintensity as well  No acute intracranial hemorrhage or extra-axial fluid collection  Cerebellar tonsils are normally positioned  VENTRICLES:  The ventricles are normal in size and contour  SELLA AND PITUITARY GLAND:  Normal  ORBITS:  Normal  PARANASAL SINUSES:  Normal  VASCULATURE:  Evaluation of the major intracranial vasculature demonstrates appropriate flow voids  CALVARIUM AND SKULL BASE:  Normal  EXTRACRANIAL SOFT TISSUES:  Normal      Impression: 1  Small acute/recent left MCA cortical infarction involving a small portion of the insular cortex extending into the periventricular white matter  2   Chronic small to moderate right frontal infarction and mild to moderate, chronic microangiopathy  I personally discussed this study with Dr Benjamín Oliveira on 3/6/2018 at 11:42 AM  Workstation performed: [id number]     Xr Stroke Alert Portable Chest    Result Date: 3/6/2018  Narrative: CHEST INDICATION:  Right-sided weakness  Stroke COMPARISON:  None EXAM PERFORMED/VIEWS:  XR STROKE ALERT PORTABLE CHEST FINDINGS:  There are median sternotomy wires indicating prior cardiac surgery  Cardiomediastinal silhouette appears unremarkable  The lungs are clear  No pneumothorax or pleural effusion  Osseous structures appear within normal limits for patient age  Impression: No acute cardiopulmonary disease  Workstation performed: [id number]     Ct Stroke Alert Brain    Result Date: 3/6/2018  Narrative: CT BRAIN - STROKE ALERT PROTOCOL INDICATION:  Woke up with right-sided weakness  Pronator drift  Cerebellar symptoms  COMPARISON:  None  TECHNIQUE:  CT examination of the brain was performed  In addition to axial images, coronal reformatted images were created and submitted for interpretation  Radiation dose length product (DLP) for this visit:     This examination, like all CT scans performed in the [facility], was performed utilizing techniques to minimize radiation dose exposure, including the use of iterative reconstruction  and automated exposure control  IMAGE QUALITY:  Diagnostic  FINDINGS:  PARENCHYMA:  Small to moderate-sized area of encephalomalacia and gliosis right frontal lobe indicative of chronic infarction  Elsewhere there are mild periventricular hypodensities  No acute intracranial hemorrhage  Atherosclerotic calcifications noted  VENTRICLES AND EXTRA-AXIAL SPACES:  Age-appropriate volume loss is noted  No hydrocephalus  VISUALIZED ORBITS AND PARANASAL SINUSES:  Orbits appear normal   Mild scattered sinus mucosal thickening is noted  No fluid levels are seen  CALVARIUM AND EXTRACRANIAL SOFT TISSUES:   Normal      Impression: 1  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, chronic microangiopathy  2   No acute intracranial hemorrhage  Findings were directly discussed with Yael Beck on 3/6/2018 7:31 AM  Workstation performed: [id number]     Cta Stroke Alert (head/neck)    Result Date: 3/6/2018  Narrative: CTA NECK AND BRAIN WITH AND WITHOUT CONTRAST INDICATION: Left-sided weakness  Stroke alert  Prominent or drift  Pointing  COMPARISON:   None  TECHNIQUE:  Routine CT imaging of the Brain without contrast   Post contrast imaging was performed after administration of iodinated contrast through the neck and brain  Post contrast axial 0 625 mm images timed to opacify the arterial system  3D rendering was performed on an independent workstation  MIP reconstructions performed  Coronal reconstructions were performed of the noncontrast portion of the brain  Radiation dose length product (DLP) for this visit:  526 3 mGy-cm     This examination, like all CT scans performed in the [facility], was performed utilizing techniques to minimize radiation dose exposure, including the use of iterative reconstruction and automated exposure control  IV Contrast:  100 mL of iohexol (OMNIPAQUE)  IMAGE QUALITY:   Diagnostic FINDINGS: NONCONTRAST BRAIN: Reported separately CERVICAL VASCULATURE AORTIC ARCH AND GREAT VESSELS:  Mild athersclerotic disease of the arch, proximal great vessels and visualized subclavian vessels  No significant stenosis  Sternotomy wires and mediastinal clips are noted, compatible with prior CABG  RIGHT VERTEBRAL ARTERY CERVICAL SEGMENT:  Mild stenosis at the origin  The vessel is normal in caliber throughout the neck  LEFT VERTEBRAL ARTERY CERVICAL SEGMENT:  Mild stenosis at the origin  The vessel is normal in caliber throughout the neck  Left vertebral artery is congenitally dominant  RIGHT EXTRACRANIAL CAROTID SEGMENT:  Normal caliber common carotid artery  Normal bifurcation and cervical internal carotid artery  No stenosis or dissection  LEFT EXTRACRANIAL CAROTID SEGMENT:  Mild atherosclerotic disease of the distal common carotid artery and proximal cervical internal carotid artery without significant stenosis compared to the more distal ICA  NASCET criteria was used to determine the degree of internal carotid artery diameter stenosis  INTRACRANIAL VASCULATURE INTERNAL CAROTID ARTERIES:  Atherosclerotic calcifications of the cavernous segment of the internal carotid artery are very mild  Normal ophthalmic artery origins  Normal ICA terminus  ANTERIOR CIRCULATION:  Symmetric A1 segments and anterior cerebral arteries with normal enhancement  Normal anterior communicating artery  MIDDLE CEREBRAL ARTERY CIRCULATION:  M1 segment and middle cerebral artery branches demonstrate normal enhancement bilaterally  DISTAL VERTEBRAL ARTERIES:  Normal distal vertebral arteries  Posterior inferior cerebellar artery origins are normal  Normal vertebral basilar junction  BASILAR ARTERY:  Basilar artery is normal in caliber  Normal superior cerebellar arteries   POSTERIOR CEREBRAL ARTERIES: Both posterior cerebral arteries arises from the basilar tip  Both arteries demonstrate normal enhancement  Normal posterior communicating arteries  DURAL VENOUS SINUSES:  Not well visualized on this early arterial phase scan  NON VASCULAR ANATOMY BONY STRUCTURES:  No acute osseous abnormality  SOFT TISSUES OF THE NECK:  Unremarkable  THORACIC INLET:  Unremarkable  Impression: 1  No hemodynamically significant stenosis in the major arteries of the neck  2   No intracranial aneurysm or major intracranial arterial stenosis  I personally discussed this study with Kevin Horan on 3/6/2018 at 7:31 AM  Workstation performed: [id number]       EKG: Atrial flutter with variable block    [employer] Records Reviewed: Yes     ** Please Note: Dragon 360 Dictation voice to text software may have been used in the creation of this document   **

## 2018-03-07 VITALS
RESPIRATION RATE: 18 BRPM | TEMPERATURE: 98.5 F | OXYGEN SATURATION: 94 % | BODY MASS INDEX: 30.51 KG/M2 | SYSTOLIC BLOOD PRESSURE: 111 MMHG | HEART RATE: 57 BPM | DIASTOLIC BLOOD PRESSURE: 67 MMHG | WEIGHT: 201.28 LBS | HEIGHT: 68 IN

## 2018-03-07 LAB
CHOLEST SERPL-MCNC: 142 MG/DL (ref 50–200)
EST. AVERAGE GLUCOSE BLD GHB EST-MCNC: 128 MG/DL
HBA1C MFR BLD: 6.1 % (ref 4.2–6.3)
HDLC SERPL-MCNC: 46 MG/DL (ref 40–60)
LDLC SERPL CALC-MCNC: 54 MG/DL (ref 0–100)
PLATELET # BLD AUTO: 142 THOUSANDS/UL (ref 149–390)
PMV BLD AUTO: 10.7 FL (ref 8.9–12.7)
TRIGL SERPL-MCNC: 211 MG/DL
TSH SERPL DL<=0.05 MIU/L-ACNC: 3 UIU/ML (ref 0.36–3.74)

## 2018-03-07 PROCEDURE — G8987 SELF CARE CURRENT STATUS: HCPCS

## 2018-03-07 PROCEDURE — 99233 SBSQ HOSP IP/OBS HIGH 50: CPT | Performed by: PHYSICIAN ASSISTANT

## 2018-03-07 PROCEDURE — G8979 MOBILITY GOAL STATUS: HCPCS

## 2018-03-07 PROCEDURE — 84443 ASSAY THYROID STIM HORMONE: CPT | Performed by: PHYSICIAN ASSISTANT

## 2018-03-07 PROCEDURE — 85049 AUTOMATED PLATELET COUNT: CPT | Performed by: INTERNAL MEDICINE

## 2018-03-07 PROCEDURE — 99239 HOSP IP/OBS DSCHRG MGMT >30: CPT | Performed by: PHYSICIAN ASSISTANT

## 2018-03-07 PROCEDURE — G8988 SELF CARE GOAL STATUS: HCPCS

## 2018-03-07 PROCEDURE — G8980 MOBILITY D/C STATUS: HCPCS

## 2018-03-07 PROCEDURE — G8978 MOBILITY CURRENT STATUS: HCPCS

## 2018-03-07 PROCEDURE — 97166 OT EVAL MOD COMPLEX 45 MIN: CPT

## 2018-03-07 PROCEDURE — 80061 LIPID PANEL: CPT | Performed by: PHYSICIAN ASSISTANT

## 2018-03-07 PROCEDURE — 99232 SBSQ HOSP IP/OBS MODERATE 35: CPT | Performed by: INTERNAL MEDICINE

## 2018-03-07 PROCEDURE — 97162 PT EVAL MOD COMPLEX 30 MIN: CPT

## 2018-03-07 PROCEDURE — 83036 HEMOGLOBIN GLYCOSYLATED A1C: CPT | Performed by: PHYSICIAN ASSISTANT

## 2018-03-07 RX ORDER — ATORVASTATIN CALCIUM 40 MG/1
40 TABLET, FILM COATED ORAL
Qty: 30 TABLET | Refills: 0 | Status: SHIPPED | OUTPATIENT
Start: 2018-03-07 | End: 2018-03-29 | Stop reason: SDUPTHER

## 2018-03-07 RX ORDER — FUROSEMIDE 20 MG/1
20 TABLET ORAL DAILY
Status: DISCONTINUED | OUTPATIENT
Start: 2018-03-07 | End: 2018-03-07 | Stop reason: HOSPADM

## 2018-03-07 RX ORDER — METOPROLOL TARTRATE 50 MG/1
50 TABLET, FILM COATED ORAL EVERY 12 HOURS SCHEDULED
Status: DISCONTINUED | OUTPATIENT
Start: 2018-03-07 | End: 2018-03-07 | Stop reason: HOSPADM

## 2018-03-07 RX ADMIN — SODIUM CHLORIDE 60 ML/HR: 0.9 INJECTION, SOLUTION INTRAVENOUS at 08:42

## 2018-03-07 RX ADMIN — ENOXAPARIN SODIUM 40 MG: 40 INJECTION SUBCUTANEOUS at 08:42

## 2018-03-07 RX ADMIN — ASPIRIN 81 MG: 81 TABLET, COATED ORAL at 08:42

## 2018-03-07 RX ADMIN — Medication 500 MG: at 08:43

## 2018-03-07 RX ADMIN — Medication 200 MG: at 08:42

## 2018-03-07 RX ADMIN — Medication 1000 MG: at 08:42

## 2018-03-07 RX ADMIN — Medication 500 MG: at 08:42

## 2018-03-07 RX ADMIN — Medication 1 TABLET: at 08:42

## 2018-03-07 NOTE — PROGRESS NOTES
Progress Note - Rene Monsivais 9/1/1932, 80 y o  male MRN: [id number]  Unit/Bed#: [facility] 705-01 Encounter: [id number]  Primary Care Provider: Julia Issa MD   Date and time admitted to hospital: 3/6/2018  7:01 AM    * CVA (cerebral vascular accident) [facility])   Assessment & Plan    · CTA head and neck:  · No hemodynamically significant stenosis in the major arteries of the neck  · No intracranial aneurysm or major intracranial arterial stenosis  · MRI brain:  · Small acute/recent left MCA cortical infarction involving a small portion of the insular cortex extending into the periventricular white matter  · Chronic small to moderate right frontal infarction with 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  · Continue statin  · Starting on Eliquis  · PT/OT has cleared patient for outpatient versus home therapy  Atrial flutter ([address] )   Assessment & Plan    · Have not been able to restart beta-blocker given heart rates  · Home regimen included metoprolol 50 mg every 12 hours  · Patient will be started on Eliquis for stroke prevention  CAD (coronary artery disease)   Assessment & Plan    · History of remote CABG  · Cardiology recommends continuing ASA  · Continue statin  HLD (hyperlipidemia)   Assessment & Plan    · Continue statin  Neurology recommendation is to continue with Lipitor rather than simvastatin  Essential hypertension   Assessment & Plan    · May restart losartan 50 mg daily  · May restart Lasix 20 mg daily  · Consider restarting metoprolol however will defer to Cardiology  VTE Pharmacologic Prophylaxis:   Pharmacologic: Enoxaparin (Lovenox)  Mechanical: Mechanical VTE prophylaxis in place  Patient Centered Rounds: I have performed bedside rounds with nursing staff today  Discussions with Specialists or Other Care Team Provider: Neurology, cardiology  Education and Discussions with Family / Patient:  All patient questions answered to the best my ability    Time Spent for Care: 20 minutes  More than 50% of total time spent on counseling and coordination of care as described above  Current Length of Stay: 1 day(s)  Current Patient Status: Inpatient   Certification Statement: The patient will continue to require additional inpatient hospital stay due to Awaiting insurance authorization for Eliquis  Discharge Plan:  Patient is medically stable for discharge today if insurance authorizes Eliquis and patient can obtain a ride home in the snow storm  Code Status: Level 1 - Full Code    Subjective:   Patient is doing well overall with no current complaints  Objective:   Vitals:   Temp (24hrs), Av 1 °F (36 7 °C), Min:97 4 °F (36 3 °C), Max:98 5 °F (36 9 °C)    HR:  [57-76] 57  Resp:  [18-20] 18  BP: (111-150)/(62-87) 111/67  SpO2:  [92 %-98 %] 94 %  Body mass index is 30 6 kg/m²  Input and Output Summary (last 24 hours): Intake/Output Summary (Last 24 hours) at 18 1103  Last data filed at 18 1053   Gross per 24 hour   Intake             1612 ml   Output             2200 ml   Net             -588 ml       Physical Exam:     Physical Exam   HENT:   Head: Normocephalic and atraumatic  Mouth/Throat: Oropharynx is clear and moist and mucous membranes are normal    Eyes: No scleral icterus  Cardiovascular: Normal rate and regular rhythm  No murmur heard  Pulmonary/Chest: Breath sounds normal  He has no wheezes  He has no rales  He exhibits no tenderness  Abdominal: Soft  Bowel sounds are normal  He exhibits no distension  There is no tenderness  Musculoskeletal: Normal range of motion  He exhibits no edema  Skin: Skin is warm and dry  No rash noted  Psychiatric: He has a normal mood and affect  Vitals reviewed      Additional Data:   Labs:    Results from last 7 days  Lab Units 18  0826 18  0717   WBC Thousand/uL  --  8 62   HEMOGLOBIN g/dL  --  14 8   HEMATOCRIT %  --  42 9   PLATELETS Thousands/uL 142* 182       Results from last 7 days  Lab Units 03/06/18  0717   SODIUM mmol/L 140   POTASSIUM mmol/L 4 3   CHLORIDE mmol/L 104   CO2 mmol/L 28   BUN mg/dL 19   CREATININE mg/dL 1 15   CALCIUM mg/dL 8 8   GLUCOSE RANDOM mg/dL 120       Results from last 7 days  Lab Units 03/06/18  0717   INR  1 01       * I Have Reviewed All Lab Data Listed Above  * Additional Pertinent Lab Tests Reviewed: All Labs Within Last 24 Hours Reviewed    Imaging:    Imaging Reports Reviewed Today Include:  None new    Cultures:   Blood Culture: No results found for: BLOODCX  Urine Culture: No results found for: URINECX  Sputum Culture: No components found for: SPUTUMCX  Wound Culture: No results found for: WOUNDCULT    Last 24 Hours Medication List:     Current Facility-Administered Medications:  acetaminophen 650 mg Oral Q4H PRN Adryan Bello MD    aspirin 81 mg Oral Daily Nahed Shelton PA-C    atorvastatin 40 mg Oral Daily With [employer]    co-enzyme Q-10 200 mg Oral Daily Adryan Bello MD    enoxaparin 40 mg Subcutaneous Daily Adryan Bello MD    fish oil 1,000 mg Oral Daily Adryan Bello MD    glucosamine sulfate 500 mg Oral Daily Adryan Bello MD    magnesium gluconate 500 mg Oral Daily Adryan Bello MD    melatonin 6 mg Oral HS Margarita Fung PA-C    multivitamin-minerals 1 tablet Oral Daily Adryan Bello MD    ondansetron 4 mg Intravenous Q6H PRN Adryan Bello MD    sodium chloride 60 mL/hr Intravenous Continuous Adryan Bello MD Last Rate: Stopped (03/07/18 1053)        Today, Patient Was Seen By: Radha Toledo PA-C    ** Please Note: Dragon 360 Dictation voice to text software may have been used in the creation of this document   **

## 2018-03-07 NOTE — ASSESSMENT & PLAN NOTE
· History of remote CABG  · Cardiology recommends continuing ASA  · Continue statin    · Restart beta-blocker

## 2018-03-07 NOTE — ASSESSMENT & PLAN NOTE
· Restart home regimen included metoprolol 50 mg every 12 hours  · Patient will be started on Eliquis for stroke prevention

## 2018-03-07 NOTE — PHYSICAL THERAPY NOTE
PT Evaluation      03/07/18 0920   Note Type   Note type Eval only   Pain Assessment   Pain Assessment No/denies pain   Pain Score No Pain   Home Living   Type of Home Apartment   Home Layout One level   Bathroom Shower/Tub Tub/shower unit   [address] [place] chair   Additional Comments pt lives at [address]   Level of [address] with ADLs and functional mobility   Lives With Alone   Receives Help From [place])   ADL Assistance Independent   IADLs Independent   Falls in the last 6 months 0   Vocational Retired   Restrictions/Precautions   [facility] Bearing Precautions Per Order No   Other Precautions Fall Risk;Multiple lines; Bed Alarm; Chair Alarm   Cognition   Overall Cognitive Status WFL   Arousal/Participation Cooperative   Orientation Level Oriented X4   Memory Within functional limits   Following Commands Follows all commands and directions without difficulty   RLE Assessment   RLE Assessment WFL   LLE Assessment   LLE Assessment WFL   Coordination   Movements are Fluid and Coordinated 1   Bed Mobility   Supine to Sit 5  Supervision   Additional Comments pt sat on EOB where he put his socks and shoes on, having difficulty with the RLE  pt reports that is normal and needs to sit on a chair with a back to support him   pt was seated in chair at end of chair    Transfers   Sit to Stand 5  Supervision   Stand to Sit 5  Supervision   Stand pivot 5  Supervision   Ambulation/Elevation   Gait pattern Improper Weight shift   Gait Assistance 5  Supervision   Assistive Device None   Distance 150ft   Balance   Static Sitting Fair +   Dynamic Sitting Fair   Static Standing Fair   Dynamic Standing Fair -   Ambulatory Fair -   Endurance Deficit   Endurance Deficit No   Activity Tolerance   Activity Tolerance Patient tolerated treatment well   Nurse Made Aware yes-Marika   Assessment   Prognosis Good   Problem List Impaired balance   Assessment pt is seen for moderate complexity PT evaluation  pt is a 79 y/o male with a history of CAD, HLD, Atrial flutter, and HTN who is now admitted for acute L MCA cortical infarction with R sided weakness  PT consulted to assess transfers, ambulation, and overall mobility  Despite these medical complications, pt is able to [facility] around the room at supervision level with no AD  pt walked in hallway with close supervision, pt did have 1 LOB when he turned around too quickly and bumped into the doorframe but was able to correct himself  pt reports that this is his normal walking and functional baseline  pt reports that he does not have to walk far to get to the dinning pelletier or the rec room  Nursing and restorative can continue to walk w/ pt in hallway  PT will d/c pt  Recommend d/c home with Home therapies when medically stable     Goals   Patient Goals to go home   Treatment Day 0   Recommendation   Recommendation Home PT;D/C PT   PT - OK to Discharge Yes  (when medically stable)   Modified McLean Scale   Modified McLean Scale 2   Barthel Index   Feeding 10   Bathing 0   Grooming Score 5   Dressing Score 10   Bladder Score 10   Bowels Score 10   Toilet Use Score 10   Transfers (Bed/Chair) Score 15   Mobility (Level Surface) Score 15   Stairs Score 0   Barthel Index Score 85     Rebeca Saenz, SPT

## 2018-03-07 NOTE — DISCHARGE SUMMARY
Discharge- Isadora Murdock 9/1/1932, 80 y o  male MRN: [id number]  Unit/Bed#: [facility] 705-01 Encounter: [id number]  Primary Care Provider: David Garcia MD   Date and time admitted to hospital: 3/6/2018  7:01 AM    * CVA (cerebral vascular accident) [facility])   Assessment & Plan    · CTA head and neck:  · No hemodynamically significant stenosis in the major arteries of the neck  · No intracranial aneurysm or major intracranial arterial stenosis  · MRI brain:  · Small acute/recent left MCA cortical infarction involving a small portion of the insular cortex extending into the periventricular white matter  · Chronic small to moderate right frontal infarction with 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  · Continue statin  · Starting on Eliquis  · PT/OT has cleared patient for outpatient versus home therapy  Atrial flutter ([address] )   Assessment & Plan    · Restart home regimen included metoprolol 50 mg every 12 hours  · Patient will be started on Eliquis for stroke prevention  CAD (coronary artery disease)   Assessment & Plan    · History of remote CABG  · Cardiology recommends continuing ASA  · Continue statin  · Restart beta-blocker        HLD (hyperlipidemia)   Assessment & Plan    · Continue statin  Neurology recommendation is to continue with Lipitor rather than simvastatin  Essential hypertension   Assessment & Plan    · May restart metoprolol 50 mg daily  · May restart Lasix 20 mg daily  · Hold off on restarting losartan per Cardiology  Resolved Problems  Date Reviewed: 3/7/2018    None        Consultations During Hospital Stay:  · Neurology  · Cardiology    Procedures Performed:   · Chest x-ray:  No active cardiopulmonary disease  · CT head:  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 chronic microangiopathy  No acute intracranial hemorrhage  · CTA head neck:  No hemodynamically significant stenosis in the major arteries of the neck    No intracranial aneurysm or major intracranial arterial stenosis  · MRI brain:  Small acute, recent left MCA cortical infarction involving a small portion of the insular cortex extending into the periventricular white matter  Chronic small to moderate right frontal infarction and 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  · Echocardiogram:  EF 45%  Akinesis and aneurysmal deformity of the mid apical anterior, mid anteroseptal, apical inferior, apical septal and apical walls of the left ventricle  No evidence of apical thrombus  Significant Findings / Test Results:   · See above    Incidental Findings:   · None     Test Results Pending at Discharge (will require follow up): · None     Outpatient Tests Requested:  · None    Complications:  None    Reason for Admission:  Right-sided weakness    Hospital Course:     Manley Ormond is a 80 y o  male patient who originally presented to the hospital on 3/6/2018 due to right-sided weakness upon awakening and balance difficulty  Patient had a suspected stroke but was not a tPA candidate secondary to onset of symptoms during sleep  He was found to have new onset atrial flutter and was seen in consultation by both Neurology and Cardiology during his hospitalization  The patient significantly improved from a functionality standpoint was cleared by PT and OT to be discharged home to have home therapy  He will be discharged home on Eliquis for anticoagulation and stroke prevention  Given his history of coronary artery disease he needs to remain on aspirin  His statin was changed from simvastatin to atorvastatin  He will be discharged home on metoprolol and Lasix however his losartan will be held indefinitely  He will follow up with both Cardiology and Neurology on an outpatient basis  Please see above list of diagnoses and related plan for additional information       Condition at Discharge: stable     Discharge Day Visit / Exam:     * Please refer to separate progress note for these details *    Discussion with Family:  None    Discharge instructions/Information to patient and family:   See after visit summary for information provided to patient and family  Provisions for Follow-Up Care:  See after visit summary for information related to follow-up care and any pertinent home health orders  Disposition:     Home    For Discharges to [address]:   · Not Applicable to this Patient - Not Applicable to this Patient    Planned Readmission:  No     Discharge Statement:  I spent 45 minutes discharging the patient  This time was spent on the day of discharge  I had direct contact with the patient on the day of discharge  Greater than 50% of the total time was spent examining patient, answering all patient questions, arranging and discussing plan of care with patient as well as directly providing post-discharge instructions  Additional time then spent on discharge activities  Discharge Medications:  See after visit summary for reconciled discharge medications provided to patient and family        ** Please Note: This note has been constructed using a voice recognition system **

## 2018-03-07 NOTE — PROGRESS NOTES
Progress Note - Neurology   Isa Gomez 80 y o  male MRN: [id number]  Unit/Bed#: [facility] 705-01 Encounter: [id number]    Assessment:  3 80year old male with L MCA ischemic infarct   - MRI brain completed and confirms acute L MCA cortical infarction     - Echocardiogram reviewed, technically difficult study  EF 45%, akinesis and aneurysmal dilatation of the mid-apical anterior, mid anteroseptal, apical inferior, apical septal, and apical walls  No evidence of apical thrombus  L atrium mildly dilated  - Hemoglobin A1c reviewed, 6 1     - Fasting lipid panel reviewed, total cholesterol 142, triglycerides 211, HDL 46, LDL 54    - Given relatively small size of stroke, okay to start oral anticoagulation with NOAC for secondary stroke prevention     - Would continue on atorvastatin 40 mg QPM     - PT/OT evals pending     - Patient will need outpatient stroke neurology follow-up  Appointment has been requested  2  New onset atrial flutter   - Recommend rate control as per primary team and cardiology     - Recommend NOAC for secondary stroke prevention  Okay to start now from neurology standpoint  3  History of CAD   - Defer to cardiology for decision regarding whether ASA should be continued  From neurology standpoint, okay with NOAC for secondary stroke prevention but may need ASA from CAD standpoint so would defer to cardiology regarding that  4  HTN     - Blood pressures well-controlled, would continue on anti-hypertensives as per primary team      5  HLD   - Fasting lipid panel reviewed  Will continue on atorvastatin 40 mg QPM      Subjective:   Latha Massey is an 80year old male admitted yesterday after waking up and noting new onset R sided weakness  He was noted to be in atrial flutter in the emergency room  He notes that he is feeling improved compared to yesterday  He feels as though his right-sided weakness has resolved  He denies any new complaints      ROS:  History obtained from the patient  General ROS: negative for - chills, fatigue or fever  Ophthalmic ROS: negative for - blurry vision, decreased vision or double vision  Respiratory ROS: negative for - shortness of breath  Cardiovascular ROS: negative for - chest pain  Gastrointestinal ROS: negative for - abdominal pain or nausea/vomiting  Musculoskeletal ROS: negative for - gait disturbance or muscular weakness  Neurological ROS: negative for - confusion, dizziness, headaches, numbness/tingling, speech problems, visual changes or weakness    Medications  Scheduled Meds:  Current Facility-Administered Medications:  acetaminophen 650 mg Oral Q4H PRN Yaima Fisher MD    aspirin 81 mg Oral Daily Andrew James PA-C    atorvastatin 40 mg Oral Daily With America Napier PA-C    co-enzyme Q-10 200 mg Oral Daily Eston Natalia, MD    enoxaparin 40 mg Subcutaneous Daily Eston , MD    fish oil 1,000 mg Oral Daily Eston , MD    glucosamine sulfate 500 mg Oral Daily Eston , MD    magnesium gluconate 500 mg Oral Daily Eston , MD    melatonin 6 mg Oral HS Tona Gallego PA-C    multivitamin-minerals 1 tablet Oral Daily Eston , MD    ondansetron 4 mg Intravenous Q6H PRN Yaima Fisher, MD    sodium chloride 60 mL/hr Intravenous Continuous Yaima Fisher MD Last Rate: 60 mL/hr (03/06/18 1619)     Continuous Infusions:  sodium chloride 60 mL/hr Last Rate: 60 mL/hr (03/06/18 1619)     PRN Meds:   acetaminophen    ondansetron    Vitals: Blood pressure 111/67, pulse 57, temperature 98 5 °F (36 9 °C), temperature source Oral, resp  rate 18, height 5' 8" (1 727 m), weight 91 3 kg (201 lb 4 5 oz), SpO2 94 %  ,Body mass index is 30 6 kg/m²      Physical Exam: /67 (BP Location: Right arm)   Pulse 57   Temp 98 5 °F (36 9 °C) (Oral)   Resp 18   Ht 5' 8" (1 727 m)   Wt 91 3 kg (201 lb 4 5 oz)   SpO2 94%   BMI 30 60 kg/m²   General appearance: alert and oriented, in no acute distress  Head: Normocephalic, without obvious abnormality, atraumatic  Eyes: negative findings: lids and lashes normal, conjunctivae and sclerae normal, pupils equal, round, reactive to light and accomodation and visual fields full to confrontation  Lungs: clear to auscultation bilaterally  Heart: regular rate and rhythm  Abdomen: soft, non-tender; bowel sounds normal; no masses,  no organomegaly  Extremities: extremities normal, warm and well-perfused; no cyanosis, clubbing, or edema  Skin: Skin color, texture, turgor normal  No rashes or lesions  Neurologic: Mental status: Alert, oriented, thought content appropriate  Cranial nerves: II: visual field normal, II: pupils equal, round, reactive to light and accommodation, III,VII: ptosis no ptosis noted, III,IV,VI: extraocular muscles extra-ocular motions intact, V: facial light touch sensation normal bilaterally, VII: upper facial muscle function normal bilaterally, VII: lower facial muscle function normal bilaterally, XII: tongue strength normal  Sensory: normal, Grossly intact to crude touch  Motor: Motor strength 5/5 B/L UE and LE  Coordination: finger to nose normal bilaterally    Labs  Recent Results (from the past 24 hour(s))   Lipid Panel with Direct LDL reflex    Collection Time: 03/07/18  5:05 AM   Result Value Ref Range    Cholesterol 142 50 - 200 mg/dL    Triglycerides 211 (H) <=150 mg/dL    HDL, Direct 46 40 - 60 mg/dL    LDL Calculated 54 0 - 100 mg/dL   HEMOGLOBIN A1C W/ EAG ESTIMATION    Collection Time: 03/07/18  5:05 AM   Result Value Ref Range    Hemoglobin A1C 6 1 4 2 - 6 3 %     mg/dl   TSH, 3rd generation    Collection Time: 03/07/18  5:05 AM   Result Value Ref Range    TSH 3RD GENERATON 3 000 0 358 - 3 740 uIU/mL     Imaging   Personally reviewed images and reports in PACs   MRI brain without contrast- Small acute/recent L MCA cortical infarction involving a small portion of the insular cortex extending into the periventricular white matter  Chronic small to moderate right frontal infarction and mild to moderate, chronic microangiopathy  Echocardiogram- Technically difficult study  EF 45%, akinesis and aneurysmal dilatation of the mid-apical anterior, mid anteroseptal, apical inferior, apical septal, and apical walls  No evidence of apical thrombus  L atrium mildly dilated  VTE Prophylaxis: Enoxaparin (Lovenox)    Counseling / Coordination of Care  Total time spent today 20 minutes  Greater than 50% of total time was spent with the patient and / or family counseling and / or coordination of care  A description of the counseling / coordination of care: Discussed results of MRI brain with patient at bedside, confirms left MCA cortical ischemic infarct  Discussed that feel etiology is likely secondary to atrial flutter and would recommend full anticoagulation for secondary stroke prevention  Will confer with insurance company to see which NOAC is covered  Will also continue with statin  Patient should follow up with Stroke Neurology team in the office  He expressed understanding

## 2018-03-07 NOTE — SOCIAL WORK
Met with pt to discuss the role of CM and to discuss any help pt may need prior to dc  Pt resides at [address] [facility] alone with a 1st floor setup; no EDDIE  Pt has elevator access if needed  Pt performed ADL's indptly pta, no use of DME  NO hx of HHC  Pt's pharmacy preference is [facility] in [place]  No hx of mental health or D&A treatment  Pt's PC is Dr Lucinda Escoto  CM discussed recommendation for home therapy  Pt is agreeable and prefers [facility]  [facility] referral sent to [facility]  CM added [facility] to pts dc instructions  Contact: Kristi Schroeder (family friend) [phone]  [facility]an[facility] Talavera will transport pt home at dc  CM was informed that pt will need Eliquis at dc  Pt is requesting to use [facility]  Script sent to [facility] a call from Ade at [facility] who states Eliquis copay is $42  Met with pt to discuss--pt is agreeable  CM sent free 30 day trial card to [facility] to deliver Eliquis to pts room today prior to dc  Valentina Aggarwal PA-C aware of same  CM reviewed d/c planning process including the following: identifying help at home, patient preference for d/c planning needs, Discharge Lounge, [facility] Meds to Bed program, availability of treatment team to discuss questions or concerns patient and/or family may have regarding understanding medications and recognizing signs and symptoms once discharged  CM also encouraged patient to follow up with all recommended appointments after discharge  Patient advised of importance for patient and family to participate in managing patients medical well being

## 2018-03-07 NOTE — PLAN OF CARE
Problem: DISCHARGE PLANNING - CARE MANAGEMENT  Goal: Discharge to post-acute care or home with appropriate resources  INTERVENTIONS:  - Conduct assessment to determine patient/family and health care team treatment goals, and need for post-acute services based on payer coverage, community resources, and patient preferences, and barriers to discharge  - Address psychosocial, clinical, and financial barriers to discharge as identified in assessment in conjunction with the patient/family and health care team  - Arrange appropriate level of post-acute services according to patient's   needs and preference and payer coverage in collaboration with the physician and health care team  - Communicate with and update the patient/family, physician, and health care team regarding progress on the discharge plan  - Arrange appropriate transportation to post-acute venues  - Plan for discharge to [address] [place] [address]    Outcome: Progressing

## 2018-03-07 NOTE — OCCUPATIONAL THERAPY NOTE
OccupationalTherapy Evaluation     Patient Name: Bubba Lloyd Date: 3/7/2018  Problem List  Patient Active Problem List   Diagnosis    [facility]A [facility]cere[facility] accident) [facility])    Essential hypertension    HLD (hyperlipidemia)    CAD (coronary artery disease)    Atrial flutter ([address] )     Past Medical History  Past Medical History:   Diagnosis Date    CAD (coronary artery disease)     Hyperlipidemia     Hypertension      Past Surgical History  Past Surgical History:   Procedure Laterality Date    ANKLE FRACTURE SURGERY Left     CORONARY ARTERY BYPASS GRAFT              03/07/18 0919   Note Type   Note type Eval/Treat   Restrictions/Precautions   Weight Bearing Precautions Per Order No   Other Precautions Fall Risk;Multiple lines; Bed Alarm; Chair Alarm   Pain Assessment   Pain Assessment No/denies pain   Pain Score No Pain   Home Living   Type of Home Apartment   Home Layout One level   Bathroom Shower/Tub Tub/shower unit   Bathroom Toilet Standard   Bathroom Equipment Shower chair   Bathroom Accessibility Accessible   Additional Comments NO DME USE AT BASELINE  Prior Function   Level of [place] Independent with ADLs and functional mobility   Lives With Alone   Receives Help From Friend(s)   ADL Assistance Independent   IADLs Independent   Falls in the last 6 months 0   Vocational Retired   Lifestyle   Autonomy PT [facility] ADLS/IADLS/DRIVING  NO DME USE AT BASELINE AND NO RECENT H/O FALLS  Reciprocal Relationships PT RESIDES ALONE AT [address] [facility]  REPORTS LOCAL FRIENDS  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  Intrinsic Gratification PT ENJOYS VOLUNTEERING TO CARE FOR THE [facility]     Psychosocial   Psychosocial (WDL) WDL   Subjective   Subjective "I FEEL GOOD TODAY, YESTERDAY I COULDN'T DO THIS"   ADL   Where Assessed Chair   Eating Assistance 5  Supervision/Setup   Grooming Assistance 5  Supervision/Setup UB Bathing Assistance 4  Minimal Assistance   LB Bathing Assistance 4  Minimal Assistance   UB Dressing Assistance 5  Supervision/Setup   LB Dressing Assistance 4  Minimal Assistance   Toileting Assistance  4  Minimal Assistance   Bed Mobility   Supine to Sit 5  Supervision   Sit to Supine Unable to assess   Additional Comments PT OOB IN CHAIR UPON ARRIVAL  PT LEFT IN ROOM CHAIR POST EVAL  Transfers   Sit to Stand 5  Supervision   Stand to Sit 5  Supervision   Functional Mobility   Functional Mobility 5  Supervision   Additional Comments PT W/ 1 MINOR LOB, ABLE TO SELF CORRECT   Balance   Static Sitting Fair +   Dynamic Sitting Fair   Static Standing Fair   Dynamic Standing Fair -   Ambulatory Fair -   Activity Tolerance   Activity Tolerance Patient tolerated treatment well   Medical Staff Made Aware F/U W/ CM JERED/DAVID   Nurse Made Aware OKAY TO SEE PER RN CAMILA DE LUNA Assessment   RUE Assessment WFL   LUE Assessment   LUE Assessment WFL   Hand Function   Gross Motor Coordination Functional   Fine Motor Coordination Functional   Cognition   Overall Cognitive Status WFL   Arousal/Participation Alert   Attention Within functional limits   Orientation Level Oriented X4   Memory Within functional limits   Following Commands Follows all commands and directions without difficulty   Comments PT ENGAGES IN APPROPRIATE CONVERSATION W/ THERAPIST THIS AM     Assessment   Limitation Decreased ADL status; Decreased high-level ADLs; Decreased self-care trans  (BALANCE, MEDICAL STATUS)   Prognosis Good   Assessment PT IS 79 YO M ADMIT W/ R SIDED WEAKNESS  PT NOTED W/ ATAXIA, FACIAL WEAKNESS AND RUE WEAKNESS IN ED  PT DEEMED TO NOT BE A CANDIDATE FOR tPA  CT BRAIN REVEALED SMALL TO MODERATE CHRONIC R FRONTAL CORTICAL INFARCTION AND MILD, CHRONIC MICROANGIOPATHY W/ NO ACUTE INTRACRANIAL HEMORRHAGE   MRI BRAIN REVEALED SMALL ACUTE/RECENT L MCA CORTICAL INFARCTION INVOLVING A SMALL PORTION OF THE INSULAR CORTEX EXTENDING INTO THE PERIVENTRICULAR WHITE MATTER  PT W/ PMH SIGNIFICANT FOR CAD, HTN, HLD, L ANKLE FX, CABG  PT RESIDES AT [address] [facility] AND REPORTS BASELINE INDEPENDENCE IN ADLS/IADLS/DRIVING  NO DME USE AT BASELINE AND NO RECENT H/O FALLS  CURRENTLY PT REQUIRING SBA UB ADLS, MIN A LB ADLS, SBA FUNCTIONAL TRANSFERS/AMBULATION USING NO DME  PT W/ 1 MINOR LOB HOWEVER ABLE TO SELF CORRECT  STRENGTH/ROM/COORDINATION/SENSATION WFL  PT HOWEVER NOTED W/ MILD R [place] INATTENTION  OT ANTICIPATES D/C HOME W/ THE ADDITION OF HOME PT/OT SERVICES  WILL CONTINUE TO FOLLOW PT 3-5X/WEEK IN ORDER TO MEET THE BELOW DESCRIBED GOALS IN 7-10 DAYS  Goals   Patient Goals PT WOULD LIKE TO RETURN HOME     LTG Time Frame 7-10   Long Term Goal #1 PLEASE SEE BELOW DESCRIBED GOALS  Plan   Treatment Interventions ADL retraining;Functional transfer training;Patient/family training; Compensatory technique education;Continued evaluation; Energy conservation; Activityengagement   Goal Expiration Date 03/17/18   OT Frequency 3-5x/wk   Recommendation   OT Discharge Recommendation Home OT   OT - OK to Discharge Yes   Barthel Index   Feeding 10   Bathing 0   Grooming Score 5   Dressing Score 5   Bladder Score 10   Bowels Score 10   Toilet Use Score 10   Transfers (Bed/Chair) Score 10   Mobility (Level Surface) Score 10   Stairs Score 0   Barthel Index Score 70   Modified Kerr Scale   Modified Kerr Scale 3     GOALS TO BE MET IN 7-10 DAYS:    1) Pt will increase bed mobility to MOD I and transfer EOB to participate in functional activity with G tolerance and balance  2) Pt will improve functional transfers to MOD I on/off all surfaces using DME PRN w/ G balance/safety including toileting  3) Pt will increase independence in all ADLS to MOD I with G balance sitting upright in chair  4) Pt will complete toileting w/ MOD I w/ G hygiene/thoroughness using DME PRN       5) Pt will improve activity tolerance to G for min 30 min txment sessions  6) Pt will participate in light grooming task with MOD I using setup standing at sink ~3-5mins with G safety and balance  7) Pt will participate in completion of functional recovery indeces to evaluate and identify long term prognostic outcome potential with focus on affected UE, trunk control, anxiety and depression       DOCUMENTATION COMPLETED BY Steen Gitelman, MS, OTR/L

## 2018-03-07 NOTE — ASSESSMENT & PLAN NOTE
· May restart losartan 50 mg daily  · May restart Lasix 20 mg daily  · Consider restarting metoprolol however will defer to Cardiology

## 2018-03-07 NOTE — PLAN OF CARE
Activity Intolerance/Impaired Mobility     Mobility/activity is maintained at optimum level for patient Progressing        Neurological Deficit     Neurological status is stable or improving Progressing        PAIN - ADULT     Verbalizes/displays adequate comfort level or baseline comfort level Progressing        Potential for Aspiration     Non-ventilated patient's risk of aspiration is minimized Progressing        SAFETY ADULT     Patient will remain free of falls Progressing     Maintain or return to baseline ADL function Progressing

## 2018-03-07 NOTE — CASE MANAGEMENT
Initial Clinical Review    Admission: Date/Time/Statement: 3/6/18 @ 85 99 60     Orders Placed This Encounter   Procedures    Inpatient Admission     Standing Status:   Standing     Number of Occurrences:   1     Order Specific Question:   Admitting Physician     Answer:   Desi Celeste     Order Specific Question:   Level of Care     Answer:   Med Surg [16]     Order Specific Question:   Bed Type     Answer:   Karen [4]     Order Specific Question:   Estimated length of stay     Answer:   More than 2 Midnights     Order Specific Question:   Certification     Answer:   I certify that inpatient services are medically necessary for this patient for a duration of greater than two midnights  See H&P and MD Progress Notes for additional information about the patient's course of treatment  ED: Date/Time/Mode of Arrival:   ED Arrival Information     Expected Arrival Acuity Means of Arrival Escorted By Service Admission Type    - 3/6/2018 06:59 Emergent Walk-In Self General Medicine Emergency    Arrival Complaint    cva           Chief Complaint:   Chief Complaint   Patient presents with    Extremity Weakness     right sided weakness, states woke up with symptoms, balance is off       History of Illness:       Kuldeep Hernandez is a 80 y o  male who presents with right sided weakness  On waking up this morning at 5 am he noted right upper and lower extremity weakness and inability to maintain his balance when he tried to walk  He had felt normal when he went to sleep last night  Evaluation by neurology in the ED showed right-sided ataxia with mild weakness in the face and proximal arm  He was not a tPA candidate due to timing  EKG done in the ED showed atrial flutter which is a new diagnosis for him    MRI brain showed a small acute/recent left MCA cortical infarction involving a small portion of the insular cortex extending into the periventricular white matter         ED Vital Signs:   ED Triage Vitals   Temperature Pulse Respirations Blood Pressure SpO2   03/05/18 2000 03/05/18 2000 03/05/18 2000 03/05/18 2000 03/05/18 2000   98 3 °F (36 8 °C) 70 18 155/76 95 %      Temp Source Heart Rate Source Patient Position - Orthostatic VS BP Location FiO2 (%)   03/05/18 2000 03/05/18 2000 03/05/18 2000 03/05/18 2000 --   Oral Monitor Lying Right arm       Pain Score       03/06/18 0708       No Pain        Wt Readings from Last 1 Encounters:   03/06/18 91 3 kg (201 lb 4 5 oz)       Vital Signs (abnormal):    03/06/18 08:42:26  --  95  20  114/80   88 %  --  Lying         Abnormal Labs/Diagnostic Test Results:    Right side ataxia  Minor right proximal arm weakness     Ct Stroke Alert Brain  Impression: 1  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, chronic microangiopathy  2   No acute intracranial hemorrhage    Cta Stroke Alert (head/neck)  Impression: 1  No hemodynamically significant stenosis in the major arteries of the neck  2   No intracranial aneurysm or major intracranial arterial stenosis      Mri Brain Wo Contrast  Impression: 1  Small acute/recent left MCA cortical infarction involving a small portion of the insular cortex extending into the periventricular white matter   2   Chronic small to moderate right frontal infarction and mild to moderate, chronic microangiopathy    EKG: Atrial flutter with variable block        ED Treatment:   Medication Administration from 03/06/2018 0659 to 03/06/2018 1501       Date/Time Order Dose Route     03/06/2018 0900 enoxaparin (LOVENOX) subcutaneous injection 90 mg 90 mg Subcutaneous     03/06/2018 0858 aspirin tablet 325 mg 325 mg Oral     03/06/2018 0859 clopidogrel (PLAVIX) tablet 300 mg 300 mg Oral     03/06/2018 0900 sodium chloride 0 9 % bolus 1,000 mL 1,000 mL Intravenous     03/06/2018 0957 perflutren lipid microsphere (DEFINITY) injection 1 mL/min Intravenous       Past Medical/Surgical History:       Past Medical History:    CAD (coronary artery disease)     Hyperlipidemia     Hypertension        Admitting Diagnosis: CVA (cerebral vascular accident) ([facility] ) [I63 9]  Stroke-like symptom [R29 90]  Atrial fibrillation and flutter ([facility] ) [I48 91, I48 92]    Age/Sex: 80 y o  male    Assessment/Plan:    INTERNAL MEDICINE   1  Acute left MCA CVA - discussed with neurology  Received  mg and Plavix 300 mg  ASA 81 mg from am, Atorvastatin 40 mg daily, PT/OT, echo  Will need eventual anticoagulation - timing to be determined by neurology  2  Aflutter/Afib - new diagnosis  Rate controlled at present  Was on Metoprolol 50 mg BID at home (took last dose last night)  Anticoagulation when cleared by neurology  Hold Metoprolol to maintain -180  Cardiology consult  Echo ordered  3  History of HTN - BP on low side currently in the setting of new CVA  Neurology recommends -180  IVF's  Hold Lasix, Losartan, Metoprolol  4  HLD - was on Simvastatin 40 mg at home - changed to Atorvastatin 40 mg daily  Check FLP  5  CAD s/p CABG - ASA, statins  Restart Metoprolol when BP improves  CONSULT NEUROLOGY   3 80year old male who presents with R sided weakness and difficulty with balance    - Recommend admit on stroke pathway  - Likely small embolic pattern stroke in setting of new onset atrial fibrillation    - Recommend give loading dose of  mg and Plavix 300 mg now  Will continue on ASA 81 mg QD starting tomorrow  Will await MRI brain to determine stroke burden, but will need eventual anticoagulation in setting of new onset of atrial fibrillation  Hold off on full-anticoagulation at this point until MRI brain obtained and can further evaluate stroke burden  - Start atorvastatin 40 mg QPM     - SBP goal 140-180, recommend start IVF if no history of CHF  - Check fasting lipid panel, hemoglobin A1c, TSH     - Check echocardiogram    - Will start Lovenox 40 mg QD for DVT prophylaxis      - PT/OT evaluations  - Stroke education     - Monitor exam and notify with changes  - Patient will need eventual outpatient stroke follow-up, appointment with office has been requested      2  HLD    - Start atorvastatin 40 mg QPM     - Check fasting lipid panel       3  HTN   - BPs running on low side currently, in setting of new stroke, would recommend permissive hypertension with goal -180  Spoke with ED resident and will plan to start IVF    [address] [place] [address]   1  New onset atrial flutter- reviewed ECG  Patient converted to sinus rhythm   Check TSH  Echocardiogram reviewed showing EF 45% with akinesis and aneurysmal deformity of the mid-apical anterior, mid anteroseptal, apical inferior, apical septal and apical walls and no evidence of thrombus  No recent echocardiogram to compare  [id number] VASC score: 7  Recommend [address]  Patient received therapeutic lovenox today   Patient on metoprolol 50 mg PO BID at home  Continue to monitor on telemetry      2  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   MRI of brain showed small acute and recent L MCA cortical infarction involving a small portion of the insular cortex extending into the periventricular white matter  Continue aspirin and statin      3  CAD s/p CABG in 1999  Continue aspirin and statin   Patient on metoprolol at home      4  Hypertension-118/70 average BP  Holding home medication: losartan, furosemide and metoprolol   Continue to monitor BP  Neurology recommending permissive hypertension 140-180s due to recent CVA       5   Hyperlipidemia- lipid panel pending   Continue statin     Admission Orders:    LIPID PANEL WITH LDL  ECHO  PT OT AND SPEECH EVAL AND TREAT  STROKE EDUCATION  DYSPHAGIA ASSESSMENT PRIOR TO PO   NEURO CHECKS Q4 HR  TELEMETRY  Reason for not initiating IV thrombolytic: Last known well - Unknown   SEQUENTIAL COMPRESSION DEVICE  CARDIAC STEP 1 DIET     Scheduled Meds:   Current Facility-Administered Medications:  acetaminophen 650 mg Oral Q4H PRN aspirin 81 mg Oral Daily   atorvastatin 40 mg Oral Daily With Dinner   co-enzyme Q-10 200 mg Oral Daily   enoxaparin 40 mg Subcutaneous Daily   fish oil 1,000 mg Oral Daily   glucosamine sulfate 500 mg Oral Daily   magnesium gluconate 500 mg Oral Daily   melatonin 6 mg Oral HS   multivitamin-minerals 1 tablet Oral Daily   ondansetron 4 mg Intravenous Q6H PRN   sodium chloride 60 mL/hr Intravenous Continuous     Continuous Infusions:   sodium chloride 60 mL/hr     PRN Meds:   acetaminophen    ondansetron

## 2018-03-07 NOTE — ASSESSMENT & PLAN NOTE
· Have not been able to restart beta-blocker given heart rates  · Home regimen included metoprolol 50 mg every 12 hours  · Patient will be started on Eliquis for stroke prevention

## 2018-03-07 NOTE — ASSESSMENT & PLAN NOTE
· CTA head and neck:  · No hemodynamically significant stenosis in the major arteries of the neck  · No intracranial aneurysm or major intracranial arterial stenosis  · MRI brain:  · Small acute/recent left MCA cortical infarction involving a small portion of the insular cortex extending into the periventricular white matter  · Chronic small to moderate right frontal infarction with 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  · Continue statin  · Starting on Eliquis  · PT/OT has cleared patient for outpatient versus home therapy

## 2018-03-07 NOTE — ASSESSMENT & PLAN NOTE
· May restart metoprolol 50 mg daily  · May restart Lasix 20 mg daily  · Hold off on restarting losartan per Cardiology

## 2018-03-07 NOTE — PLAN OF CARE
Problem: OCCUPATIONAL THERAPY ADULT  Goal: Performs self-care activities at highest level of function for planned discharge setting  See evaluation for individualized goals  Treatment Interventions: ADL retraining, Functional transfer training, Patient/family training, Compensatory technique education, Continued evaluation, Energy conservation, Activityengagement          See flowsheet documentation for full assessment, interventions and recommendations  Limitation: Decreased ADL status, Decreased high-level ADLs, Decreased self-care trans (BALANCE, MEDICAL STATUS)  Prognosis: Good  Assessment: PT IS 79 YO M ADMIT W/ R SIDED WEAKNESS  PT NOTED W/ ATAXIA, FACIAL WEAKNESS AND RUE WEAKNESS IN ED  PT DEEMED TO NOT BE A CANDIDATE FOR tPA  CT BRAIN REVEALED SMALL TO MODERATE CHRONIC R FRONTAL CORTICAL INFARCTION AND MILD, CHRONIC MICROANGIOPATHY W/ NO ACUTE INTRACRANIAL HEMORRHAGE  MRI BRAIN REVEALED SMALL ACUTE/RECENT L MCA CORTICAL INFARCTION INVOLVING A SMALL PORTION OF THE INSULAR CORTEX EXTENDING INTO THE PERIVENTRICULAR WHITE MATTER  PT W/ PMH SIGNIFICANT FOR CAD, HTN, HLD, L ANKLE FX, CABG  PT RESIDES AT [address] [facility] AND REPORTS BASELINE INDEPENDENCE IN ADLS/IADLS/DRIVING  NO DME USE AT BASELINE AND NO RECENT H/O FALLS  CURRENTLY PT REQUIRING SBA UB ADLS, MIN A LB ADLS, SBA FUNCTIONAL TRANSFERS/AMBULATION USING NO DME  PT W/ 1 MINOR LOB HOWEVER ABLE TO SELF CORRECT  STRENGTH/ROM/COORDINATION/SENSATION WFL  PT HOWEVER NOTED W/ MILD R [place] INATTENTION  OT ANTICIPATES D/C HOME W/ THE ADDITION OF HOME PT/OT SERVICES  WILL CONTINUE TO FOLLOW PT 3-5X/WEEK IN ORDER TO MEET THE BELOW DESCRIBED GOALS IN 7-10 DAYS  OT Discharge Recommendation: Home OT  OT - OK to Discharge:  Yes

## 2018-03-08 ENCOUNTER — TRANSITIONAL CARE MANAGEMENT (OUTPATIENT)
Dept: FAMILY MEDICINE CLINIC | Facility: CLINIC | Age: 83
End: 2018-03-08

## 2018-03-08 ENCOUNTER — TELEPHONE (OUTPATIENT)
Dept: FAMILY MEDICINE CLINIC | Facility: CLINIC | Age: 83
End: 2018-03-08

## 2018-03-21 PROBLEM — I25.5 ISCHEMIC CARDIOMYOPATHY: Status: ACTIVE | Noted: 2018-03-21

## 2018-03-21 PROBLEM — I48.3 TYPICAL ATRIAL FLUTTER (HCC): Status: ACTIVE | Noted: 2018-03-06

## 2018-03-21 PROBLEM — Z95.1 HX OF CABG: Status: ACTIVE | Noted: 2018-03-21

## 2018-03-21 PROBLEM — I49.9 ARRHYTHMIA: Status: RESOLVED | Noted: 2018-03-06 | Resolved: 2018-03-21

## 2018-03-21 PROBLEM — I49.9 ARRHYTHMIA: Status: ACTIVE | Noted: 2018-03-06

## 2018-03-21 PROBLEM — E78.5 DYSLIPIDEMIA: Status: ACTIVE | Noted: 2018-03-06

## 2018-03-21 PROBLEM — I63.412 CEREBROVASCULAR ACCIDENT (CVA) DUE TO EMBOLISM OF LEFT MIDDLE CEREBRAL ARTERY (HCC): Status: ACTIVE | Noted: 2018-03-06

## 2018-03-21 PROBLEM — I48.92 ATRIAL FLUTTER (HCC): Status: ACTIVE | Noted: 2018-03-06

## 2018-03-21 PROBLEM — I10 BENIGN ESSENTIAL HYPERTENSION: Status: ACTIVE | Noted: 2018-03-06

## 2018-03-22 ENCOUNTER — OFFICE VISIT (OUTPATIENT)
Dept: CARDIOLOGY CLINIC | Facility: CLINIC | Age: 83
End: 2018-03-22
Payer: COMMERCIAL

## 2018-03-22 ENCOUNTER — PROCEDURE VISIT (OUTPATIENT)
Dept: CARDIOLOGY CLINIC | Facility: CLINIC | Age: 83
End: 2018-03-22
Payer: COMMERCIAL

## 2018-03-22 VITALS
HEIGHT: 66 IN | BODY MASS INDEX: 31.5 KG/M2 | WEIGHT: 196 LBS | HEART RATE: 111 BPM | SYSTOLIC BLOOD PRESSURE: 128 MMHG | DIASTOLIC BLOOD PRESSURE: 72 MMHG

## 2018-03-22 DIAGNOSIS — Z95.1 HX OF CABG: ICD-10-CM

## 2018-03-22 DIAGNOSIS — I63.412 CEREBROVASCULAR ACCIDENT (CVA) DUE TO EMBOLISM OF LEFT MIDDLE CEREBRAL ARTERY (HCC): ICD-10-CM

## 2018-03-22 DIAGNOSIS — I25.5 ISCHEMIC CARDIOMYOPATHY: ICD-10-CM

## 2018-03-22 DIAGNOSIS — I25.10 CORONARY ARTERY DISEASE INVOLVING NATIVE CORONARY ARTERY OF NATIVE HEART WITHOUT ANGINA PECTORIS: Chronic | ICD-10-CM

## 2018-03-22 DIAGNOSIS — I48.92 ATRIAL FLUTTER, UNSPECIFIED TYPE (HCC): Primary | ICD-10-CM

## 2018-03-22 DIAGNOSIS — E78.5 DYSLIPIDEMIA: ICD-10-CM

## 2018-03-22 DIAGNOSIS — I48.3 TYPICAL ATRIAL FLUTTER (HCC): ICD-10-CM

## 2018-03-22 DIAGNOSIS — I10 BENIGN ESSENTIAL HYPERTENSION: ICD-10-CM

## 2018-03-22 PROCEDURE — 93000 ELECTROCARDIOGRAM COMPLETE: CPT | Performed by: INTERNAL MEDICINE

## 2018-03-22 PROCEDURE — 99214 OFFICE O/P EST MOD 30 MIN: CPT | Performed by: INTERNAL MEDICINE

## 2018-03-22 NOTE — PROGRESS NOTES
Cardiology Follow Up    Traci Alicea  9/1/1932  [id number]  [address] [facility]  [address]    1  Typical atrial flutter (HCC)  POCT ECG    Holter monitor - 24 hour   2  Cerebrovascular accident (CVA) due to embolism of left middle cerebral artery ([address] )     3  Coronary artery disease involving native coronary artery of native heart without angina pectoris     4  Hx of CABG     5  Ischemic cardiomyopathy     6  Benign essential hypertension     7  Dyslipidemia         Discussion/Summary:  Mr Cassandria Lombard is a pleasant 44-year-old gentleman who presents to the office today for hospital follow-up  He was admitted with a left MCA stroke in the setting of newly diagnosed atrial flutter  Today his heart rate control is suboptimal   I have asked that he undergo a 24-hour Holter monitor to assess his heart rate control and his AV elizabeth blocking agents will be adjusted if deemed necessary  He will remain on Eliquis for systemic anticoagulation  His blood pressure control appears adequate  His most recent lipids were reviewed  They are acceptable with the exception of a slightly elevated triglyceride level for which therapeutic lifestyle modifications were recommended  He did undergo an echocardiogram during his hospital stay which revealed LV dysfunction and EF of 45% with an akinetic LAD territory  I have no old echo for comparison  Given he is asymptomatic no further testing is recommended  He is euvolemic on his oral diuretic regimen  I will see him back in the office in three months or sooner if deemed necessary  Interval History:   Mr Cassandria Lombard is a pleasant 44-year-old gentleman who presents to the office today for hospital follow-up  I had met him when he was admitted with a left MCA CVA  He was deemed not a candidate for tPA due to timing  His symptoms included right-sided weakness    He was noted to have atrial flutter on his presenting ECG  With this he was asymptomatic  Therefore he was placed on systemic anticoagulation with Eliquis  An echocardiogram revealed a mildly reduced LV function with an EF of 45% an akinetic LAD territory  No LV thrombus was identified  Since discharge he has been feeling well  He leads a sedentary lifestyle but with the activity he performs he denies any exertional chest pain or shortness of breath  He denies any signs or symptoms of congestive heart failure including increasing lower extremity edema, paroxysmal nocturnal dyspnea, orthopnea, acute weight gain or increasing abdominal girth  He denies lightheadedness, syncope, presyncope, palpitations or symptoms of claudication  He remains on systemic anticoagulation in the form of Eliquis without any bleeding consequences  He did undergo physical therapy for his right upper extremity weakness  Problem List     CVA (cerebral vascular accident) ([facility] [address] )    Essential hypertension (Chronic)    HLD (hyperlipidemia) (Chronic)    CAD (coronary artery disease) (Chronic)    Atrial flutter (HCC) (Chronic)        Past Medical History:   Diagnosis Date    Acute myocardial infarction     Allergic rhinitis     Anxiety     Bimalleolar fracture of left ankle     CAD (coronary artery disease)     Erectile dysfunction of non-organic origin     Hematuria     workup was negative and felt to be benign    Herpes zoster with complication     Hyperlipidemia     Hypertension     Impaired fasting glucose     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    Prostatic hypertrophy     benign     Social History     Social History    Marital status:       Spouse name: moncho    Number of children: N/A    Years of education: N/A     Occupational History    retired      clergy     Social History Main Topics    Smoking status: Never Smoker    Smokeless tobacco: Never Used    Alcohol use Yes Comment: social- per allscripts    Drug use: No    Sexual activity: Not on file     Other Topics Concern    Not on file     Social History Narrative    Death in the family- spouse, moncho  after a prolonged francis with lymphoma     Exercises regularly      Family History   Problem Relation Age of Onset    Cancer Mother     Hypertension Mother      essential    Pancreatic cancer Mother      Past Surgical History:   Procedure Laterality Date    ANKLE FRACTURE SURGERY Left     CORONARY ARTERY BYPASS GRAFT         Current Outpatient Prescriptions:     apixaban (ELIQUIS) 5 mg, Take 1 tablet (5 mg total) by mouth 2 (two) times a day, Disp: 60 tablet, Rfl: 1    aspirin (ECOTRIN LOW STRENGTH) 81 mg EC tablet, Take 81 mg by mouth daily, Disp: , Rfl:     atorvastatin (LIPITOR) 40 mg tablet, Take 1 tablet (40 mg total) by mouth daily with dinner for 30 days, Disp: 30 tablet, Rfl: 0    Coenzyme Q10 200 MG capsule, Take 200 mg by mouth daily, Disp: , Rfl:     furosemide (LASIX) 20 mg tablet, Take 1 tablet by mouth daily, Disp: , Rfl:     Glucosamine-Chondroit-Vit C-Mn (GLUCOSAMINE CHONDROITIN COMPLX) CAPS, Take 1 capsule by mouth daily, Disp: , Rfl:     magnesium gluconate (MAGONATE) 500 mg tablet, Take 500 mg by mouth daily, Disp: , Rfl:     metoprolol tartrate (LOPRESSOR) 100 mg tablet, Take 50 mg by mouth every 12 (twelve) hours  , Disp: , Rfl:     Multiple Vitamins-Minerals (MULTIVITAMIN ADULT PO), Take 1 tablet by mouth daily, Disp: , Rfl:     Probiotic Product (PROBIOTIC COLON SUPPORT) CAPS, Take 1 capsule by mouth daily, Disp: , Rfl:     selenium 200 mcg, Take 200 mcg by mouth daily, Disp: , Rfl:     Omega-3 Fatty Acids (FISH OIL) 1,000 mg, Take 1,000 mg by mouth daily, Disp: , Rfl:     potassium chloride (KLOR-CON M20) 20 mEq tablet, Take 1 tablet by mouth daily, Disp: , Rfl:     vitamin E, tocopherol, 1,000 units capsule, Take 1,000 Units by mouth daily, Disp: , Rfl:   Allergies   Allergen Reactions    Penicillins Edema and Rash     Reaction Date: [id number]; Category: Allergy; Annotation - [id number]: Severe reaction with hospitaization at [facility] AND [facility]       Labs:     Chemistry        Component Value Date/Time     03/06/2018 0717     08/17/2017 0705    K 4 3 03/06/2018 0717    K 4 4 08/17/2017 0705     03/06/2018 0717     08/17/2017 0705    CO2 28 03/06/2018 0717    CO2 30 08/17/2017 0705    BUN 19 03/06/2018 0717    BUN 19 08/17/2017 0705    CREATININE 1 15 03/06/2018 0717    CREATININE 1 14 (H) 08/17/2017 0705        Component Value Date/Time    CALCIUM 8 8 03/06/2018 0717    CALCIUM 9 3 08/17/2017 0705    ALKPHOS 59 07/19/2016 0642    AST 14 07/19/2016 0642    ALT 14 07/19/2016 0642    BILITOT 0 4 07/19/2016 0642            Lab Results   Component Value Date    CHOL 142 03/07/2018    CHOL 156 08/17/2017    CHOL 166 01/26/2017     Lab Results   Component Value Date    HDL 46 03/07/2018    HDL 57 08/17/2017    HDL 52 01/26/2017     Lab Results   Component Value Date    LDLCALC 54 03/07/2018     Lab Results   Component Value Date    TRIG 211 (H) 03/07/2018    TRIG 139 08/17/2017    TRIG 179 (H) 01/26/2017     No components found for: CHOLHDL    Imaging: Mri Brain Wo Contrast    Result Date: 3/6/2018  Narrative: MRI BRAIN WITHOUT CONTRAST INDICATION: STROKE- WO BRAIN  History taken directly from the electronic ordering system  Right-sided weakness  Difficulty with balance  COMPARISON:   3/6/2018 TECHNIQUE:  Sagittal T1, axial T2, axial FLAIR, axial T1, axial Randolph and axial diffusion imaging  IMAGE QUALITY:  Diagnostic  FINDINGS: BRAIN PARENCHYMA:  There is a small band of diffusion restriction in the left insular cortex extending into the periventricular white matter of the left frontal lobe images 19 through 23 of series 3 indicative of recent left MCA infarction  There is bandlike associated FLAIR hyperintensity in this region   Elsewhere there is a small to moderate chronic right frontal infarction with cystic encephalomalacia and gliosis  There is somewhat patchy periventricular FLAIR hyperintensity as well  No acute intracranial hemorrhage or extra-axial fluid collection  Cerebellar tonsils are normally positioned  VENTRICLES:  The ventricles are normal in size and contour  SELLA AND PITUITARY GLAND:  Normal  ORBITS:  Normal  PARANASAL SINUSES:  Normal  VASCULATURE:  Evaluation of the major intracranial vasculature demonstrates appropriate flow voids  CALVARIUM AND SKULL BASE:  Normal  EXTRACRANIAL SOFT TISSUES:  Normal      Impression: 1  Small acute/recent left MCA cortical infarction involving a small portion of the insular cortex extending into the periventricular white matter  2   Chronic small to moderate right frontal infarction and mild to moderate, chronic microangiopathy  I personally discussed this study with Dr Thirza Aase on 3/6/2018 at 11:42 AM  Workstation performed: [id number]     Xr Stroke Alert Portable Chest    Result Date: 3/6/2018  Narrative: CHEST INDICATION:  Right-sided weakness  Stroke COMPARISON:  None EXAM PERFORMED/VIEWS:  XR STROKE ALERT PORTABLE CHEST FINDINGS:  There are median sternotomy wires indicating prior cardiac surgery  Cardiomediastinal silhouette appears unremarkable  The lungs are clear  No pneumothorax or pleural effusion  Osseous structures appear within normal limits for patient age  Impression: No acute cardiopulmonary disease  Workstation performed: [id number]     Ct Stroke Alert Brain    Result Date: 3/6/2018  Narrative: CT BRAIN - STROKE ALERT PROTOCOL INDICATION:  Woke up with right-sided weakness  Pronator drift  Cerebellar symptoms  COMPARISON:  None  TECHNIQUE:  CT examination of the brain was performed  In addition to axial images, coronal reformatted images were created and submitted for interpretation  Radiation dose length product (DLP) for this visit:     This examination, like all CT scans performed in the [facility], was performed utilizing techniques to minimize radiation dose exposure, including the use of iterative reconstruction  and automated exposure control  IMAGE QUALITY:  Diagnostic  FINDINGS:  PARENCHYMA:  Small to moderate-sized area of encephalomalacia and gliosis right frontal lobe indicative of chronic infarction  Elsewhere there are mild periventricular hypodensities  No acute intracranial hemorrhage  Atherosclerotic calcifications noted  VENTRICLES AND EXTRA-AXIAL SPACES:  Age-appropriate volume loss is noted  No hydrocephalus  VISUALIZED ORBITS AND PARANASAL SINUSES:  Orbits appear normal   Mild scattered sinus mucosal thickening is noted  No fluid levels are seen  CALVARIUM AND EXTRACRANIAL SOFT TISSUES:   Normal      Impression: 1  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, chronic microangiopathy  2   No acute intracranial hemorrhage  Findings were directly discussed with Yury Thomson on 3/6/2018 7:31 AM  Workstation performed: [id number]     Cta Stroke Alert (head/neck)    Result Date: 3/6/2018  Narrative: CTA NECK AND BRAIN WITH AND WITHOUT CONTRAST INDICATION: Left-sided weakness  Stroke alert  Prominent or drift  Pointing  COMPARISON:   None  TECHNIQUE:  Routine CT imaging of the Brain without contrast   Post contrast imaging was performed after administration of iodinated contrast through the neck and brain  Post contrast axial 0 625 mm images timed to opacify the arterial system  3D rendering was performed on an independent workstation  MIP reconstructions performed  Coronal reconstructions were performed of the noncontrast portion of the brain  Radiation dose length product (DLP) for this visit:  526 3 mGy-cm     This examination, like all CT scans performed in the [facility], was performed utilizing techniques to minimize radiation dose exposure, including the use of iterative reconstruction and automated exposure control  IV Contrast:  100 mL of iohexol (OMNIPAQUE)  IMAGE QUALITY:   Diagnostic FINDINGS: NONCONTRAST BRAIN: Reported separately CERVICAL VASCULATURE AORTIC ARCH AND GREAT VESSELS:  Mild athersclerotic disease of the arch, proximal great vessels and visualized subclavian vessels  No significant stenosis  Sternotomy wires and mediastinal clips are noted, compatible with prior CABG  RIGHT VERTEBRAL ARTERY CERVICAL SEGMENT:  Mild stenosis at the origin  The vessel is normal in caliber throughout the neck  LEFT VERTEBRAL ARTERY CERVICAL SEGMENT:  Mild stenosis at the origin  The vessel is normal in caliber throughout the neck  Left vertebral artery is congenitally dominant  RIGHT EXTRACRANIAL CAROTID SEGMENT:  Normal caliber common carotid artery  Normal bifurcation and cervical internal carotid artery  No stenosis or dissection  LEFT EXTRACRANIAL CAROTID SEGMENT:  Mild atherosclerotic disease of the distal common carotid artery and proximal cervical internal carotid artery without significant stenosis compared to the more distal ICA  NASCET criteria was used to determine the degree of internal carotid artery diameter stenosis  INTRACRANIAL VASCULATURE INTERNAL CAROTID ARTERIES:  Atherosclerotic calcifications of the cavernous segment of the internal carotid artery are very mild  Normal ophthalmic artery origins  Normal ICA terminus  ANTERIOR CIRCULATION:  Symmetric A1 segments and anterior cerebral arteries with normal enhancement  Normal anterior communicating artery  MIDDLE CEREBRAL ARTERY CIRCULATION:  M1 segment and middle cerebral artery branches demonstrate normal enhancement bilaterally  DISTAL VERTEBRAL ARTERIES:  Normal distal vertebral arteries  Posterior inferior cerebellar artery origins are normal  Normal vertebral basilar junction  BASILAR ARTERY:  Basilar artery is normal in caliber  Normal superior cerebellar arteries   POSTERIOR CEREBRAL ARTERIES: Both posterior cerebral arteries arises from the basilar tip  Both arteries demonstrate normal enhancement  Normal posterior communicating arteries  DURAL VENOUS SINUSES:  Not well visualized on this early arterial phase scan  NON VASCULAR ANATOMY BONY STRUCTURES:  No acute osseous abnormality  SOFT TISSUES OF THE NECK:  Unremarkable  THORACIC INLET:  Unremarkable  Impression: 1  No hemodynamically significant stenosis in the major arteries of the neck  2   No intracranial aneurysm or major intracranial arterial stenosis  I personally discussed this study with Jeremiah Beach on 3/6/2018 at 7:31 AM  Workstation performed: [id number]       ECG:  Atrial flutter with rapid ventricular response and variable AV block, left axis deviation, anteroseptal infarct      Review of Systems   Cardiovascular: Negative for claudication, cyanosis, dyspnea on exertion, irregular heartbeat, leg swelling, near-syncope, orthopnea and palpitations  All other systems reviewed and are negative  Vitals:    03/22/18 1354   BP: 138/76   Pulse: (!) 111     Vitals:    03/22/18 1354   Weight: 88 9 kg (196 lb)     Height: 5' 6" (167 6 cm)   Body mass index is 31 64 kg/m²      Physical Exam:   General appearance:  Appears stated age, alert, well appearing and in no distress  HEENT:  PERRLA, EOMI, no scleral icterus, no conjunctival pallor  NECK:  Supple, No elevated JVP, no thyromegaly, no carotid bruits  HEART:  Irregularly irregular and tachycardic, variable S1/S2, no murmur   LUNGS:  Clear to auscultation bilaterally  ABDOMEN:  Soft, non-tender, positive bowel sounds, no rebound or guarding, no organomegaly   EXTREMITIES:  No edema  VASCULAR:  Normal pedal pulses   SKIN: No lesions or rashes on exposed skin  NEURO:  CN II-XII intact, no focal deficits

## 2018-03-27 ENCOUNTER — HOSPITAL ENCOUNTER (OUTPATIENT)
Dept: NON INVASIVE DIAGNOSTICS | Facility: HOSPITAL | Age: 83
Discharge: HOME | End: 2018-03-27
Payer: COMMERCIAL

## 2018-03-27 DIAGNOSIS — I48.3 TYPICAL ATRIAL FLUTTER (HCC): ICD-10-CM

## 2018-03-27 LAB
ALBUMIN SERPL-MCNC: 4.5 G/DL (ref 3.6–5.1)
ALBUMIN/GLOB SERPL: 1.7 (CALC) (ref 1–2.5)
ALP SERPL-CCNC: 57 U/L (ref 40–115)
ALT SERPL-CCNC: 16 U/L (ref 9–46)
APPEARANCE UR: CLEAR
AST SERPL-CCNC: 16 U/L (ref 10–35)
BACTERIA UR QL AUTO: NORMAL /HPF
BILIRUB SERPL-MCNC: 0.7 MG/DL (ref 0.2–1.2)
BILIRUB UR QL STRIP: NEGATIVE
BUN SERPL-MCNC: 17 MG/DL (ref 7–25)
BUN/CREAT SERPL: ABNORMAL (CALC) (ref 6–22)
CALCIUM SERPL-MCNC: 9.5 MG/DL (ref 8.6–10.3)
CHLORIDE SERPL-SCNC: 104 MMOL/L (ref 98–110)
CHOLEST SERPL-MCNC: 124 MG/DL
CHOLEST/HDLC SERPL: 2.3 (CALC)
CO2 SERPL-SCNC: 29 MMOL/L (ref 20–31)
COLOR UR: YELLOW
CREAT SERPL-MCNC: 1.11 MG/DL (ref 0.7–1.11)
ERYTHROCYTE [DISTWIDTH] IN BLOOD BY AUTOMATED COUNT: 13.2 % (ref 11–15)
GLOBULIN SER CALC-MCNC: 2.7 G/DL (CALC) (ref 1.9–3.7)
GLUCOSE SERPL-MCNC: 100 MG/DL (ref 65–99)
GLUCOSE UR QL STRIP: NEGATIVE
HBA1C MFR BLD: 5.8 % OF TOTAL HGB
HCT VFR BLD AUTO: 42.1 % (ref 38.5–50)
HDLC SERPL-MCNC: 54 MG/DL
HGB BLD-MCNC: 14.6 G/DL (ref 13.2–17.1)
HGB UR QL STRIP: NEGATIVE
HYALINE CASTS #/AREA URNS LPF: NORMAL /LPF
KETONES UR QL STRIP: NEGATIVE
LDLC SERPL CALC-MCNC: 49 MG/DL (CALC)
LEUKOCYTE ESTERASE UR QL STRIP: NEGATIVE
MCH RBC QN AUTO: 31.6 PG (ref 27–33)
MCHC RBC AUTO-ENTMCNC: 34.7 G/DL (ref 32–36)
MCV RBC AUTO: 91.1 FL (ref 80–100)
NITRITE UR QL STRIP: NEGATIVE
NONHDLC SERPL-MCNC: 70 MG/DL (CALC)
PH UR STRIP: 6.5 [PH] (ref 5–8)
PLATELET # BLD AUTO: 185 THOUSAND/UL (ref 140–400)
PMV BLD REES-ECKER: 11.2 FL (ref 7.5–12.5)
POTASSIUM SERPL-SCNC: 4.4 MMOL/L (ref 3.5–5.3)
PROT SERPL-MCNC: 7.2 G/DL (ref 6.1–8.1)
PROT UR QL STRIP: NEGATIVE
RBC # BLD AUTO: 4.62 MILLION/UL (ref 4.2–5.8)
RBC #/AREA URNS HPF: NORMAL /HPF
SL AMB EGFR AFRICAN AMERICAN: 70 ML/MIN/1.73M2
SL AMB EGFR NON AFRICAN AMERICAN: 60 ML/MIN/1.73M2
SODIUM SERPL-SCNC: 141 MMOL/L (ref 135–146)
SP GR UR STRIP: 1.02 (ref 1–1.03)
SQUAMOUS #/AREA URNS HPF: NORMAL /HPF
TRIGL SERPL-MCNC: 119 MG/DL
WBC # BLD AUTO: 7 THOUSAND/UL (ref 3.8–10.8)
WBC #/AREA URNS HPF: NORMAL /HPF

## 2018-03-27 PROCEDURE — 93226 XTRNL ECG REC<48 HR SCAN A/R: CPT

## 2018-03-27 PROCEDURE — 93224 XTRNL ECG REC UP TO 48 HRS: CPT | Performed by: INTERNAL MEDICINE

## 2018-03-27 PROCEDURE — 93225 XTRNL ECG REC<48 HRS REC: CPT

## 2018-03-29 ENCOUNTER — OFFICE VISIT (OUTPATIENT)
Dept: FAMILY MEDICINE CLINIC | Facility: CLINIC | Age: 83
End: 2018-03-29
Payer: COMMERCIAL

## 2018-03-29 VITALS
TEMPERATURE: 98.2 F | BODY MASS INDEX: 31.25 KG/M2 | SYSTOLIC BLOOD PRESSURE: 130 MMHG | DIASTOLIC BLOOD PRESSURE: 80 MMHG | RESPIRATION RATE: 16 BRPM | WEIGHT: 193.6 LBS | HEART RATE: 72 BPM

## 2018-03-29 DIAGNOSIS — R73.01 IMPAIRED FASTING GLUCOSE: ICD-10-CM

## 2018-03-29 DIAGNOSIS — Z95.1 HX OF CABG: ICD-10-CM

## 2018-03-29 DIAGNOSIS — I10 BENIGN ESSENTIAL HYPERTENSION: Primary | ICD-10-CM

## 2018-03-29 DIAGNOSIS — I25.5 ISCHEMIC CARDIOMYOPATHY: ICD-10-CM

## 2018-03-29 DIAGNOSIS — I48.3 TYPICAL ATRIAL FLUTTER (HCC): ICD-10-CM

## 2018-03-29 DIAGNOSIS — I63.412 CEREBROVASCULAR ACCIDENT (CVA) DUE TO EMBOLISM OF LEFT MIDDLE CEREBRAL ARTERY (HCC): ICD-10-CM

## 2018-03-29 DIAGNOSIS — E78.5 DYSLIPIDEMIA: ICD-10-CM

## 2018-03-29 DIAGNOSIS — I25.10 CORONARY ARTERY DISEASE INVOLVING NATIVE CORONARY ARTERY OF NATIVE HEART WITHOUT ANGINA PECTORIS: Chronic | ICD-10-CM

## 2018-03-29 PROBLEM — F51.03 PARADOXICAL INSOMNIA: Status: ACTIVE | Noted: 2018-03-29

## 2018-03-29 PROCEDURE — 99214 OFFICE O/P EST MOD 30 MIN: CPT | Performed by: FAMILY MEDICINE

## 2018-03-29 RX ORDER — ATORVASTATIN CALCIUM 40 MG/1
40 TABLET, FILM COATED ORAL
Qty: 90 TABLET | Refills: 3 | Status: SHIPPED | OUTPATIENT
Start: 2018-03-29 | End: 2018-09-27

## 2018-03-29 NOTE — ASSESSMENT & PLAN NOTE
With weight loss his impaired fasting sugar has improved with the current fasting blood sugar being 100 and his A1C 5 8 which is down from a previous 6 2  Continue current diet and weight reduction

## 2018-03-29 NOTE — ASSESSMENT & PLAN NOTE
He will continue to use melatonin and Unisom as needed  I have also recommended he try sleepy time tea which contains chamomile

## 2018-03-29 NOTE — ASSESSMENT & PLAN NOTE
Blood pressure is reasonably well controlled with my recheck being 130/80 and he did that at the same time with his home wrist cuff and he got 139/82 therefore we know there is a minor difference between them but certainly it would give him morning if his blood pressure jumped up or down substantially  Continue same medications

## 2018-03-29 NOTE — ASSESSMENT & PLAN NOTE
Currently in normal sinus rhythm by auscultation  Cardiology is managing and the results of a recent Holter monitor are pending    He continues on ELIQUIS and ASA

## 2018-03-29 NOTE — PROGRESS NOTES
Assessment/Plan:    His immunizations and screenings are up-to-date  Although not recorded in the chart he said he had a zoster vaccine probably 3 years ago or so at the [facility]  We discussed possibly getting the new shingles vaccine when available as a booster  We reviewed his recent labs some of which are noted below  Of note his creatinine was 1 11 and GFR 60  CBC was normal and CMP was normal    Cardiology will be seeing him within the next 2 months and I will therefore see him in 6 months unless he has other problems before that time  Benign essential hypertension  Blood pressure is reasonably well controlled with my recheck being 130/80 and he did that at the same time with his home wrist cuff and he got 139/82 therefore we know there is a minor difference between them but certainly it would give him morning if his blood pressure jumped up or down substantially  Continue same medications  Typical atrial flutter (HCC)  Currently in normal sinus rhythm by auscultation  Cardiology is managing and the results of a recent Holter monitor are pending  He continues on ELIQUIS and ASA    Dyslipidemia  Recent blood work showed his lipids to be optimal on atorvastatin 40 mg daily  Continue same    Cerebrovascular accident (CVA) due to embolism of left middle cerebral artery ([address] )  He has minor residual hemiparesis mostly affecting his right hand and functionally his writing  He is not otherwise limited  He continues with home exercises as taught by physical therapy  Impaired fasting glucose  With weight loss his impaired fasting sugar has improved with the current fasting blood sugar being 100 and his A1C 5 8 which is down from a previous 6 2  Continue current diet and weight reduction  Paradoxical insomnia  He will continue to use melatonin and Unisom as needed  I have also recommended he try sleepy time tea which contains chamomile         Diagnoses and all orders for this visit: Benign essential hypertension    Dyslipidemia    Cerebrovascular accident (CVA) due to embolism of left middle cerebral artery (HCC)    Coronary artery disease involving native coronary artery of native heart without angina pectoris    Typical atrial flutter (HCC)    Ischemic cardiomyopathy    Hx of CABG    Impaired fasting glucose    Other orders  -     atorvastatin (LIPITOR) 40 mg tablet; Take 1 tablet (40 mg total) by mouth daily with dinner for 30 days          Subjective:      Patient ID: Traci Alicea is a 80 y o  male  Chart also is here today for follow-up on his chronic conditions as well as post hospital    He was recently hospitalized for a cerebral vascular accident that caused a right-sided paresis  He also had new onset of atrial fibrillation /flutter at that time  He states he went to formal physical therapy on for several sessions and then is continue doing the exercises at home  He states that the clumsiness in his right arm and leg have improved but he is still having some difficulty writing    He has seen a cardiologist since discharge in just had a Holter monitor the results of which are pending  He is on Eliquis and aspirin because of the atrial fib/flutter and history of coronary disease and recent stroke    On discharge his statin drug was changed from simvastatin to atorvastatin 40 mg for higher dosing  He notes that he has had a history of some insomnia periodically and at 1 point and had Ambien that he used p r n     When he left the hospital he was told to use melatonin which she had been given during his hospital stay and found that it initially worked well but then seemed not to  He spoke with Dr Baldev Valle in the cardiology office and she had recommended that he try Unisom which he bought over the counter  This again work for a couple of days and then stopped  Last night he took a melatonin and to use some and slept great      He at this point is back to his usual activities taking walks, going to the stables and working with the police sources, and other activities  He has lost 11 lb since leaving the hospital and had been given a lengthy information packet on healthy diet  He notes that his friend Siri helps him be compliant with this  The following portions of the patient's history were reviewed and updated as appropriate: allergies, current medications, past medical history and problem list     Review of Systems   HENT: Negative for dental problem and tinnitus  Eyes:        Gets annual eye exams  Will likely have cataract surgery on right side this year   Respiratory: Negative for cough, choking, chest tightness, shortness of breath and wheezing  Cardiovascular: Negative for chest pain, palpitations (no awareness of irregular heartbeats) and leg swelling  Gastrointestinal:        BM daily  No GERD or dyspepsia   Genitourinary:        No issues  Generally does not void at night   Musculoskeletal: Negative for arthralgias and joint swelling  Skin: Negative for rash  Neurological: Negative for dizziness, seizures, syncope and light-headedness  Residual clumbsiness right arm and hand - notes when he writes - otherwise does not effect function  No leg drag and gait normal / stable   Psychiatric/Behavioral: Negative for confusion and dysphoric mood  The patient is nervous/anxious (Does get anxious if he wakes during the night)  Objective:      /80 (BP Location: Right arm, Patient Position: Sitting, Cuff Size: Standard)   Pulse 72   Temp 98 2 °F (36 8 °C) (Tympanic)   Resp 16   Wt 87 8 kg (193 lb 9 6 oz)   BMI 31 25 kg/m²          Physical Exam   Constitutional: He is oriented to person, place, and time  Eyes:   glasses   Cardiovascular: Normal rate and regular rhythm  No murmur heard  Pulmonary/Chest: Effort normal and breath sounds normal    Neurological: He is alert and oriented to person, place, and time  Skin: No rash noted  Lots of skin lesions  All over especially torso   Psychiatric: He has a normal mood and affect  His behavior is normal  Judgment and thought content normal    Nursing note and vitals reviewed

## 2018-03-29 NOTE — ASSESSMENT & PLAN NOTE
He has minor residual hemiparesis mostly affecting his right hand and functionally his writing  He is not otherwise limited  He continues with home exercises as taught by physical therapy

## 2018-05-30 NOTE — PROGRESS NOTES
Cardiology Follow Up    Kyree Estrada  9/1/1932  [id number]  [address] [facility]  [address]  [address]    1  Typical atrial flutter ([address] )     2  Cerebrovascular accident (CVA) due to embolism of left middle cerebral artery ([address] )     3  Coronary artery disease involving native coronary artery of native heart without angina pectoris  POCT ECG   4  Hx of CABG     5  Ischemic cardiomyopathy     6  Dyslipidemia         Discussion/Summary:  Mr Rufus Cranker is a pleasant 79-year-old gentleman who presents to the office today for routine follow-up  He was admitted with a left MCA stroke in the setting of newly diagnosed atrial flutter  Today he is maintaining normal sinus rhythm  He has no signs or symptoms suggestive of recurrent atrial fibrillation although this was seen on his Holter monitor  He will remain on his current AV elizabeth blocking regimen and systemic anticoagulation in the form of Eliquis  His blood pressure control appears adequate  His most recent lipids were reviewed  They are acceptable with the exception of a slightly elevated triglyceride level for which therapeutic lifestyle modifications were recommended  He did undergo an echocardiogram during his hospital stay which revealed LV dysfunction and EF of 45% with an akinetic LAD territory  I have no old echo for comparison although he seems to remember being told he had a similar EF in the past   Given he is asymptomatic no further testing is recommended  He is euvolemic on his oral diuretic regimen  I will see him back in the office in six months or sooner if deemed necessary  Interval History:   Mr Rufus Cranker is a pleasant 79-year-old gentleman who presents to the office today for hospital routine follow-up  Since his last he has been feeling well    He leads a sedentary lifestyle but with the activity he performs he denies any exertional chest pain or shortness of breath  He denies any signs or symptoms of congestive heart failure including increasing lower extremity edema, paroxysmal nocturnal dyspnea, orthopnea, acute weight gain or increasing abdominal girth  He denies lightheadedness, syncope, presyncope, palpitations or symptoms of claudication  He remains on systemic anticoagulation in the form of Eliquis without any bleeding consequences  Problem List     CVA (cerebral vascular accident) ([facility] )    Essential hypertension (Chronic)    HLD (hyperlipidemia) (Chronic)    CAD (coronary artery disease) (Chronic)    Atrial flutter (HCC) (Chronic)        Past Medical History:   Diagnosis Date    Acute myocardial infarction ([facility] )     Allergic rhinitis     Anxiety     Bimalleolar fracture of left ankle     CAD (coronary artery disease)     Erectile dysfunction of non-organic origin     Hematuria     workup was negative and felt to be benign    Herpes zoster with complication     Hyperlipidemia     Hypertension     Impaired fasting glucose     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    Prostatic hypertrophy     benign     Social History     Social History    Marital status:       Spouse name: moncho    Number of children: N/A    Years of education: N/A     Occupational History    retired      clergy     Social History Main Topics    Smoking status: Never Smoker    Smokeless tobacco: Never Used    Alcohol use Yes      Comment: social- per allscripts    Drug use: No    Sexual activity: Not on file     Other Topics Concern    Not on file     Social History Narrative    Death in the family- spouse, moncho  after a prolonged francis with lymphoma     Exercises regularly      Family History   Problem Relation Age of Onset    Cancer Mother     Hypertension Mother      essential    Pancreatic cancer Mother      Past Surgical History:   Procedure Laterality Date    ANKLE FRACTURE SURGERY Left     CORONARY ARTERY BYPASS GRAFT         Current Outpatient Prescriptions:     apixaban (ELIQUIS) 5 mg, Take 1 tablet (5 mg total) by mouth 2 (two) times a day, Disp: 60 tablet, Rfl: 1    aspirin (ECOTRIN LOW STRENGTH) 81 mg EC tablet, Take 81 mg by mouth daily, Disp: , Rfl:     atorvastatin (LIPITOR) 40 mg tablet, Take 1 tablet (40 mg total) by mouth daily with dinner for 30 days, Disp: 90 tablet, Rfl: 3    Coenzyme Q10 200 MG capsule, Take 200 mg by mouth daily, Disp: , Rfl:     furosemide (LASIX) 20 mg tablet, Take 1 tablet by mouth daily, Disp: , Rfl:     Glucosamine-Chondroit-Vit C-Mn (GLUCOSAMINE CHONDROITIN COMPLX) CAPS, Take 1 capsule by mouth daily, Disp: , Rfl:     magnesium gluconate (MAGONATE) 500 mg tablet, Take 500 mg by mouth daily, Disp: , Rfl:     metoprolol tartrate (LOPRESSOR) 100 mg tablet, Take 50 mg by mouth every 12 (twelve) hours  , Disp: , Rfl:     Multiple Vitamins-Minerals (MULTIVITAMIN ADULT PO), Take 1 tablet by mouth daily, Disp: , Rfl:     potassium chloride (KLOR-CON M20) 20 mEq tablet, Take 1 tablet by mouth daily, Disp: , Rfl:     Probiotic Product (PROBIOTIC COLON SUPPORT) CAPS, Take 1 capsule by mouth daily, Disp: , Rfl:     selenium 200 mcg, Take 200 mcg by mouth daily, Disp: , Rfl:   Allergies   Allergen Reactions    Penicillins Edema and Rash     Reaction Date: [id number]; Category: Allergy;  Annotation - [id number]: Severe reaction with hosp[facility]ization at [facility]       Labs:     Chemistry        Component Value Date/Time     03/06/2018 0717     08/17/2017 0705    K 4 3 03/06/2018 0717    K 4 4 08/17/2017 0705     03/06/2018 0717     08/17/2017 0705    CO2 28 03/06/2018 0717    CO2 30 08/17/2017 0705    BUN 17 03/26/2018 0648    CREATININE 1 11 03/26/2018 0648    CREATININE 1 15 03/06/2018 0717    CREATININE 1 14 (H) 08/17/2017 0705        Component Value Date/Time    CALCIUM 9 5 03/26/2018 0648    ALKPHOS 59 07/19/2016 0642    AST 14 07/19/2016 3343 ALT 14 07/19/2016 0642    BILITOT 0 4 07/19/2016 0642            Lab Results   Component Value Date    CHOL 142 03/07/2018    CHOL 156 08/17/2017    CHOL 166 01/26/2017     Lab Results   Component Value Date    HDL 46 03/07/2018    HDL 57 08/17/2017    HDL 52 01/26/2017     Lab Results   Component Value Date    LDLCALC 54 03/07/2018     Lab Results   Component Value Date    TRIG 119 03/26/2018    TRIG 211 (H) 03/07/2018    TRIG 139 08/17/2017     No components found for: CHOLHDL    Imaging: Mri Brain Wo Contrast    Result Date: 3/6/2018  Narrative: MRI BRAIN WITHOUT CONTRAST INDICATION: STROKE- WO BRAIN  History taken directly from the electronic ordering system  Right-sided weakness  Difficulty with balance  COMPARISON:   3/6/2018 TECHNIQUE:  Sagittal T1, axial T2, axial FLAIR, axial T1, axial Dennison and axial diffusion imaging  IMAGE QUALITY:  Diagnostic  FINDINGS: BRAIN PARENCHYMA:  There is a small band of diffusion restriction in the left insular cortex extending into the periventricular white matter of the left frontal lobe images 19 through 23 of series 3 indicative of recent left MCA infarction  There is bandlike associated FLAIR hyperintensity in this region  Elsewhere there is a small to moderate chronic right frontal infarction with cystic encephalomalacia and gliosis  There is somewhat patchy periventricular FLAIR hyperintensity as well  No acute intracranial hemorrhage or extra-axial fluid collection  Cerebellar tonsils are normally positioned  VENTRICLES:  The ventricles are normal in size and contour  SELLA AND PITUITARY GLAND:  Normal  ORBITS:  Normal  PARANASAL SINUSES:  Normal  VASCULATURE:  Evaluation of the major intracranial vasculature demonstrates appropriate flow voids  CALVARIUM AND SKULL BASE:  Normal  EXTRACRANIAL SOFT TISSUES:  Normal      Impression: 1    Small acute/recent left MCA cortical infarction involving a small portion of the insular cortex extending into the periventricular white matter  2   Chronic small to moderate right frontal infarction and mild to moderate, chronic microangiopathy  I personally discussed this study with Dr Mary Tadeo on 3/6/2018 at 11:42 AM  Workstation performed: [id number]     Xr Stroke Alert Portable Chest    Result Date: 3/6/2018  Narrative: CHEST INDICATION:  Right-sided weakness  Stroke COMPARISON:  None EXAM PERFORMED/VIEWS:  XR STROKE ALERT PORTABLE CHEST FINDINGS:  There are median sternotomy wires indicating prior cardiac surgery  Cardiomediastinal silhouette appears unremarkable  The lungs are clear  No pneumothorax or pleural effusion  Osseous structures appear within normal limits for patient age  Impression: No acute cardiopulmonary disease  Workstation performed: [id number]     Ct Stroke Alert Brain    Result Date: 3/6/2018  Narrative: CT BRAIN - STROKE ALERT PROTOCOL INDICATION:  Woke up with right-sided weakness  Pronator drift  Cerebellar symptoms  COMPARISON:  None  TECHNIQUE:  CT examination of the brain was performed  In addition to axial images, coronal reformatted images were created and submitted for interpretation  Radiation dose length product (DLP) for this visit:   This examination, like all CT scans performed in the [facility], was performed utilizing techniques to minimize radiation dose exposure, including the use of iterative reconstruction  and automated exposure control  IMAGE QUALITY:  Diagnostic  FINDINGS:  PARENCHYMA:  Small to moderate-sized area of encephalomalacia and gliosis right frontal lobe indicative of chronic infarction  Elsewhere there are mild periventricular hypodensities  No acute intracranial hemorrhage  Atherosclerotic calcifications noted  VENTRICLES AND EXTRA-AXIAL SPACES:  Age-appropriate volume loss is noted  No hydrocephalus  VISUALIZED ORBITS AND PARANASAL SINUSES:  Orbits appear normal   Mild scattered sinus mucosal thickening is noted  No fluid levels are seen  CALVARIUM AND EXTRACRANIAL SOFT TISSUES:   Normal      Impression: 1  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, chronic microangiopathy  2   No acute intracranial hemorrhage  Findings were directly discussed with Jim Robison on 3/6/2018 7:31 AM  Workstation performed: [id number]     Cta Stroke Alert (head/neck)    Result Date: 3/6/2018  Narrative: CTA NECK AND BRAIN WITH AND WITHOUT CONTRAST INDICATION: Left-sided weakness  Stroke alert  Prominent or drift  Pointing  COMPARISON:   None  TECHNIQUE:  Routine CT imaging of the Brain without contrast   Post contrast imaging was performed after administration of iodinated contrast through the neck and brain  Post contrast axial 0 625 mm images timed to opacify the arterial system  3D rendering was performed on an independent workstation  MIP reconstructions performed  Coronal reconstructions were performed of the noncontrast portion of the brain  Radiation dose length product (DLP) for this visit:  526 3 mGy-cm   This examination, like all CT scans performed in the [facility], was performed utilizing techniques to minimize radiation dose exposure, including the use of iterative reconstruction and automated exposure control  IV Contrast:  100 mL of iohexol (OMNIPAQUE)  IMAGE QUALITY:   Diagnostic FINDINGS: NONCONTRAST BRAIN: Reported separately CERVICAL VASCULATURE AORTIC ARCH AND GREAT VESSELS:  Mild athersclerotic disease of the arch, proximal great vessels and visualized subclavian vessels  No significant stenosis  Sternotomy wires and mediastinal clips are noted, compatible with prior CABG  RIGHT VERTEBRAL ARTERY CERVICAL SEGMENT:  Mild stenosis at the origin  The vessel is normal in caliber throughout the neck  LEFT VERTEBRAL ARTERY CERVICAL SEGMENT:  Mild stenosis at the origin  The vessel is normal in caliber throughout the neck  Left vertebral artery is congenitally dominant   RIGHT EXTRACRANIAL CAROTID SEGMENT:  Normal caliber common carotid artery  Normal bifurcation and cervical internal carotid artery  No stenosis or dissection  LEFT EXTRACRANIAL CAROTID SEGMENT:  Mild atherosclerotic disease of the distal common carotid artery and proximal cervical internal carotid artery without significant stenosis compared to the more distal ICA  NASCET criteria was used to determine the degree of internal carotid artery diameter stenosis  INTRACRANIAL VASCULATURE INTERNAL CAROTID ARTERIES:  Atherosclerotic calcifications of the cavernous segment of the internal carotid artery are very mild  Normal ophthalmic artery origins  Normal ICA terminus  ANTERIOR CIRCULATION:  Symmetric A1 segments and anterior cerebral arteries with normal enhancement  Normal anterior communicating artery  MIDDLE CEREBRAL ARTERY CIRCULATION:  M1 segment and middle cerebral artery branches demonstrate normal enhancement bilaterally  DISTAL VERTEBRAL ARTERIES:  Normal distal vertebral arteries  Posterior inferior cerebellar artery origins are normal  Normal vertebral basilar junction  BASILAR ARTERY:  Basilar artery is normal in caliber  Normal superior cerebellar arteries  POSTERIOR CEREBRAL ARTERIES: Both posterior cerebral arteries arises from the basilar tip  Both arteries demonstrate normal enhancement  Normal posterior communicating arteries  DURAL VENOUS SINUSES:  Not well visualized on this early arterial phase scan  NON VASCULAR ANATOMY BONY STRUCTURES:  No acute osseous abnormality  SOFT TISSUES OF THE NECK:  Unremarkable  THORACIC INLET:  Unremarkable  Impression: 1  No hemodynamically significant stenosis in the major arteries of the neck  2   No intracranial aneurysm or major intracranial arterial stenosis    I personally discussed this study with Wagner Hemphill on 3/6/2018 at 7:31 AM  Workstation performed: [id number]       ECG:  Normal sinus rhythm with premature atrial complexes with aberrancy, left atrial abnormality, left axis deviation, anteroseptal infarct, nonspecific ST and T-wave abnormality      Review of Systems   Cardiovascular: Negative for claudication, cyanosis, dyspnea on exertion, irregular heartbeat, leg swelling, near-syncope, orthopnea and palpitations  All other systems reviewed and are negative  Vitals:    05/31/18 1331   BP: 126/68   Pulse: 60     Vitals:    05/31/18 1331   Weight: 86 6 kg (191 lb)     Height: 5' 6" (167 6 cm)   Body mass index is 30 83 kg/m²      Physical Exam:   General appearance:  Appears stated age, alert, well appearing and in no distress  HEENT:  PERRLA, EOMI, no scleral icterus, no conjunctival pallor  NECK:  Supple, No elevated JVP, no thyromegaly, no carotid bruits  HEART:  Irregularly irregular and tachycardic, variable S1/S2, no murmur   LUNGS:  Clear to auscultation bilaterally  ABDOMEN:  Soft, non-tender, positive bowel sounds, no rebound or guarding, no organomegaly   EXTREMITIES:  No edema  VASCULAR:  Normal pedal pulses   SKIN: No lesions or rashes on exposed skin  NEURO:  CN II-XII intact, no focal deficits

## 2018-05-31 ENCOUNTER — OFFICE VISIT (OUTPATIENT)
Dept: CARDIOLOGY CLINIC | Facility: CLINIC | Age: 83
End: 2018-05-31
Payer: COMMERCIAL

## 2018-05-31 VITALS
HEIGHT: 66 IN | HEART RATE: 60 BPM | BODY MASS INDEX: 30.7 KG/M2 | SYSTOLIC BLOOD PRESSURE: 126 MMHG | WEIGHT: 191 LBS | DIASTOLIC BLOOD PRESSURE: 68 MMHG

## 2018-05-31 DIAGNOSIS — I25.5 ISCHEMIC CARDIOMYOPATHY: ICD-10-CM

## 2018-05-31 DIAGNOSIS — I48.3 TYPICAL ATRIAL FLUTTER (HCC): Primary | ICD-10-CM

## 2018-05-31 DIAGNOSIS — I63.412 CEREBROVASCULAR ACCIDENT (CVA) DUE TO EMBOLISM OF LEFT MIDDLE CEREBRAL ARTERY (HCC): ICD-10-CM

## 2018-05-31 DIAGNOSIS — Z95.1 HX OF CABG: ICD-10-CM

## 2018-05-31 DIAGNOSIS — E78.5 DYSLIPIDEMIA: ICD-10-CM

## 2018-05-31 DIAGNOSIS — I25.10 CORONARY ARTERY DISEASE INVOLVING NATIVE CORONARY ARTERY OF NATIVE HEART WITHOUT ANGINA PECTORIS: Chronic | ICD-10-CM

## 2018-05-31 PROCEDURE — 93000 ELECTROCARDIOGRAM COMPLETE: CPT | Performed by: INTERNAL MEDICINE

## 2018-05-31 PROCEDURE — 99214 OFFICE O/P EST MOD 30 MIN: CPT | Performed by: INTERNAL MEDICINE

## 2018-09-13 ENCOUNTER — TELEPHONE (OUTPATIENT)
Dept: FAMILY MEDICINE CLINIC | Facility: CLINIC | Age: 83
End: 2018-09-13

## 2018-09-13 DIAGNOSIS — I10 BENIGN ESSENTIAL HYPERTENSION: ICD-10-CM

## 2018-09-13 DIAGNOSIS — E78.5 DYSLIPIDEMIA: ICD-10-CM

## 2018-09-13 DIAGNOSIS — R73.01 IMPAIRED FASTING GLUCOSE: Primary | ICD-10-CM

## 2018-09-13 NOTE — TELEPHONE ENCOUNTER
Patient has an appointment in September and he needs a bloodwork script  He can pick it up  Can be reached at [phone] please advise

## 2018-09-14 NOTE — TELEPHONE ENCOUNTER
Left message letting patient know I am mailing orders, but [facility] can use them without the physical papers

## 2018-09-22 LAB
BUN SERPL-MCNC: 20 MG/DL (ref 7–25)
BUN/CREAT SERPL: ABNORMAL (CALC) (ref 6–22)
CALCIUM SERPL-MCNC: 9.1 MG/DL (ref 8.6–10.3)
CHLORIDE SERPL-SCNC: 103 MMOL/L (ref 98–110)
CHOLEST SERPL-MCNC: 150 MG/DL
CHOLEST/HDLC SERPL: 2.8 (CALC)
CO2 SERPL-SCNC: 29 MMOL/L (ref 20–32)
CREAT SERPL-MCNC: 1.02 MG/DL (ref 0.7–1.11)
EST. AVERAGE GLUCOSE BLD GHB EST-MCNC: 120 (CALC)
EST. AVERAGE GLUCOSE BLD GHB EST-SCNC: 6.6 (CALC)
GLUCOSE SERPL-MCNC: 101 MG/DL (ref 65–99)
HBA1C MFR BLD: 5.8 % OF TOTAL HGB
HDLC SERPL-MCNC: 54 MG/DL
LDLC SERPL CALC-MCNC: 76 MG/DL (CALC)
NONHDLC SERPL-MCNC: 96 MG/DL (CALC)
POTASSIUM SERPL-SCNC: 4.3 MMOL/L (ref 3.5–5.3)
SL AMB EGFR AFRICAN AMERICAN: 77 ML/MIN/1.73M2
SL AMB EGFR NON AFRICAN AMERICAN: 66 ML/MIN/1.73M2
SODIUM SERPL-SCNC: 139 MMOL/L (ref 135–146)
TRIGL SERPL-MCNC: 116 MG/DL

## 2018-09-27 ENCOUNTER — OFFICE VISIT (OUTPATIENT)
Dept: FAMILY MEDICINE CLINIC | Facility: CLINIC | Age: 83
End: 2018-09-27
Payer: COMMERCIAL

## 2018-09-27 VITALS
SYSTOLIC BLOOD PRESSURE: 130 MMHG | WEIGHT: 188.2 LBS | DIASTOLIC BLOOD PRESSURE: 70 MMHG | BODY MASS INDEX: 30.25 KG/M2 | TEMPERATURE: 97.6 F | HEIGHT: 66 IN | HEART RATE: 78 BPM | RESPIRATION RATE: 16 BRPM

## 2018-09-27 DIAGNOSIS — Z00.00 MEDICARE ANNUAL WELLNESS VISIT, SUBSEQUENT: ICD-10-CM

## 2018-09-27 DIAGNOSIS — I63.412 CEREBROVASCULAR ACCIDENT (CVA) DUE TO EMBOLISM OF LEFT MIDDLE CEREBRAL ARTERY (HCC): ICD-10-CM

## 2018-09-27 DIAGNOSIS — Z95.1 HX OF CABG: ICD-10-CM

## 2018-09-27 DIAGNOSIS — R73.01 IMPAIRED FASTING GLUCOSE: ICD-10-CM

## 2018-09-27 DIAGNOSIS — I25.5 ISCHEMIC CARDIOMYOPATHY: ICD-10-CM

## 2018-09-27 DIAGNOSIS — I25.10 CORONARY ARTERY DISEASE INVOLVING NATIVE CORONARY ARTERY OF NATIVE HEART WITHOUT ANGINA PECTORIS: Chronic | ICD-10-CM

## 2018-09-27 DIAGNOSIS — I48.3 TYPICAL ATRIAL FLUTTER (HCC): ICD-10-CM

## 2018-09-27 DIAGNOSIS — Z23 FLU VACCINE NEED: ICD-10-CM

## 2018-09-27 DIAGNOSIS — E78.5 DYSLIPIDEMIA: ICD-10-CM

## 2018-09-27 DIAGNOSIS — I10 BENIGN ESSENTIAL HYPERTENSION: Primary | ICD-10-CM

## 2018-09-27 PROCEDURE — 1125F AMNT PAIN NOTED PAIN PRSNT: CPT | Performed by: FAMILY MEDICINE

## 2018-09-27 PROCEDURE — 99214 OFFICE O/P EST MOD 30 MIN: CPT | Performed by: FAMILY MEDICINE

## 2018-09-27 PROCEDURE — 3725F SCREEN DEPRESSION PERFORMED: CPT | Performed by: FAMILY MEDICINE

## 2018-09-27 PROCEDURE — 90662 IIV NO PRSV INCREASED AG IM: CPT

## 2018-09-27 PROCEDURE — 1101F PT FALLS ASSESS-DOCD LE1/YR: CPT | Performed by: FAMILY MEDICINE

## 2018-09-27 PROCEDURE — 1170F FXNL STATUS ASSESSED: CPT | Performed by: FAMILY MEDICINE

## 2018-09-27 PROCEDURE — G0008 ADMIN INFLUENZA VIRUS VAC: HCPCS

## 2018-09-27 PROCEDURE — G0439 PPPS, SUBSEQ VISIT: HCPCS | Performed by: FAMILY MEDICINE

## 2018-09-27 RX ORDER — POTASSIUM CHLORIDE 20 MEQ/1
20 TABLET, EXTENDED RELEASE ORAL DAILY
Qty: 90 TABLET | Refills: 3 | Status: SHIPPED | OUTPATIENT
Start: 2018-09-27 | End: 2018-12-17 | Stop reason: SDUPTHER

## 2018-09-27 RX ORDER — ATORVASTATIN CALCIUM 40 MG/1
40 TABLET, FILM COATED ORAL DAILY
Qty: 90 TABLET | Refills: 3 | Status: SHIPPED | OUTPATIENT
Start: 2018-09-27 | End: 2019-10-07 | Stop reason: SDUPTHER

## 2018-09-27 RX ORDER — METOPROLOL TARTRATE 100 MG/1
50 TABLET ORAL EVERY 12 HOURS SCHEDULED
Qty: 180 TABLET | Refills: 3 | Status: SHIPPED | OUTPATIENT
Start: 2018-09-27 | End: 2019-10-14 | Stop reason: SDUPTHER

## 2018-09-27 RX ORDER — FUROSEMIDE 20 MG/1
20 TABLET ORAL DAILY
Qty: 90 TABLET | Refills: 3 | Status: SHIPPED | OUTPATIENT
Start: 2018-09-27 | End: 2019-10-07 | Stop reason: SDUPTHER

## 2018-09-27 RX ORDER — ATORVASTATIN CALCIUM 40 MG/1
TABLET, FILM COATED ORAL
COMMUNITY
Start: 2018-07-09 | End: 2018-09-27 | Stop reason: SDUPTHER

## 2018-09-27 NOTE — ASSESSMENT & PLAN NOTE
No residual affects    Plan is to continue good blood pressure control, aspirin, and Eliquis which he is also on for his atrial flutter

## 2018-09-27 NOTE — PROGRESS NOTES
Assessment and Plan:    His immunizations are up-to-date in regard to Prevnar 13, Pneumovax, and TD  He will be due for a Tdap next year  He received a high dose flu shot today  We discussed the new Shingrix vaccine and he will check with his insurance for coverage but believes he needs to get our office and therefore we will put him on the waiting list to call when we get the vaccine in  He had done hemoccults in the past for colon cancer screening but never had a colonoscopy  There is no colon cancer in his family history and he otherwise is not at high risk  At current age they are no screening indications to do a colonoscopy unless he has symptoms  Problem List Items Addressed This Visit     Cerebrovascular accident (CVA) due to embolism of left middle cerebral artery (HCC)    Benign essential hypertension - Primary    Dyslipidemia    CAD (coronary artery disease) (Chronic)    Typical atrial flutter (HCC)    Hx of CABG    Ischemic cardiomyopathy    Impaired fasting glucose        Health Maintenance Due   Topic Date Due    SLP PLAN OF CARE  09/01/1932    Fall Risk  09/01/1997    DTaP,Tdap,and Td Vaccines (1 - Tdap) 06/29/2013    INFLUENZA VACCINE  09/01/2018         HPI:  Allyson Bailey is a 80 y o  male here for his Subsequent Wellness Visit  He reports that he lives independently and now lives at 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  No identified risks    No falls     Mini-mental status exam was 30 out of 30   Depression screen was negative    Patient Active Problem List   Diagnosis    Cerebrovascular accident (CVA) due to embolism of left middle cerebral artery (HCC)    Benign essential hypertension    Dyslipidemia  CAD (coronary artery disease)    Typical atrial flutter (HCC)    Hx of CABG    Ischemic cardiomyopathy    Impaired fasting glucose    Paradoxical insomnia     Past Medical History:   Diagnosis Date    Acute myocardial infarction ([address] )     Allergic rhinitis     Anxiety     Bimalleolar fracture of left ankle     CAD (coronary artery disease)     Erectile dysfunction of non-organic origin     Hematuria     workup was negative and felt to be benign    Herpes zoster with complication     Hyperlipidemia     Hypertension     Impaired fasting glucose     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    Prostatic hypertrophy     benign     Past Surgical History:   Procedure Laterality Date    ANKLE FRACTURE SURGERY Left     CORONARY ARTERY BYPASS GRAFT       Family History   Problem Relation Age of Onset    Cancer Mother     Hypertension Mother         essential    Pancreatic cancer Mother      History   Smoking Status    Never Smoker   Smokeless Tobacco    Never Used     History   Alcohol Use    Yes     Comment: social- per allscripts      History   Drug Use No       Current Outpatient Prescriptions   Medication Sig Dispense Refill    apixaban (ELIQUIS) 5 mg Take 1 tablet (5 mg total) by mouth 2 (two) times a day 60 tablet 1    aspirin (ECOTRIN LOW STRENGTH) 81 mg EC tablet Take 81 mg by mouth daily      atorvastatin (LIPITOR) 40 mg tablet Take 1 tablet (40 mg total) by mouth daily with dinner for 30 days 90 tablet 3    Coenzyme Q10 200 MG capsule Take 200 mg by mouth daily      furosemide (LASIX) 20 mg tablet Take 1 tablet by mouth daily      Glucosamine-Chondroit-Vit C-Mn (GLUCOSAMINE CHONDROITIN COMPLX) CAPS Take 1 capsule by mouth daily      magnesium gluconate (MAGONATE) 500 mg tablet Take 500 mg by mouth daily      metoprolol tartrate (LOPRESSOR) 100 mg tablet Take 50 mg by mouth every 12 (twelve) hours        Multiple Vitamins-Minerals (MULTIVITAMIN ADULT PO) Take 1 tablet by mouth daily      potassium chloride (KLOR-CON M20) 20 mEq tablet Take 1 tablet by mouth daily      Probiotic Product (PROBIOTIC COLON SUPPORT) CAPS Take 1 capsule by mouth daily      selenium 200 mcg Take 200 mcg by mouth daily       No current facility-administered medications for this visit  Allergies   Allergen Reactions    Penicillins Edema and Rash     Reaction Date: [id number]; Category: Allergy; Annotation - [id number]: Severe reaction with hospitaization at [facility]     Immunization History   Administered Date(s) Administered    Influenza Split High Dose Preservative Free IM 09/12/2009, 11/01/2010, 11/17/2011, 10/05/2014, 09/14/2015, 09/08/2016, 10/26/2017    Influenza TIV (IM) 11/17/2011, 11/15/2012, 12/09/2013    Pneumococcal Conjugate 13-Valent 07/13/2015    Pneumococcal Polysaccharide PPV23 01/01/2004    Td (adult), adsorbed 04/13/2009, 04/13/2009, 06/28/2013       Patient Care Team:  Dodie Stockton MD as PCP - General  Marvin Esquivel MD    Medicare Screening Tests and Risk Assessments:  Harika Pittman is here for his Subsequent Wellness visit  Health Risk Assessment:  Patient rates overall health as very good  Patient feels that their physical health rating is Same  Eyesight was rated as Same  Hearing was rated as Same  Patient feels that their emotional and mental health rating is Same  Pain experienced by patient in the last 7 days has been Some  Patient's pain rating has been 3/10  Patient states that he has experienced no weight loss or gain in last 6 months  Emotional/Mental Health:  Patient has been feeling nervous/anxious  PHQ-9 Depression Screening:    Frequency of the following problems over the past two weeks:      1  Little interest or pleasure in doing things: 0 - not at all      2  Feeling down, depressed, or hopeless: 0 - not at all  PHQ-2 Score: 0          Broken Bones/Falls:     Fall Risk Assessment:    In the past year, patient has experienced: No history of falling in past year          Bladder/Bowel:  Patient has not leaked urine accidently in the last six months  Patient reports no loss of bowel control  Immunizations:  Patient has had a flu vaccination within the last year  Patient has received a pneumonia shot  Patient has received a shingles shot  Home Safety:  Patient does not have trouble with stairs inside or outside of their home  Patient currently reports that there are no safety hazards present in home, working smoke alarms, working carbon monoxide detectors  Preventative Screenings:   prostate cancer screen performed, 3/1/2018  no colon cancer screen completed, cholesterol screen completed, 3/1/2018  glaucoma eye exam completed, 6/1/2017      Nutrition:  Current diet: Regular and Limited junk food with servings of the following:    Medications:  Patient is currently taking over-the-counter supplements  Patient is able to manage medications  Lifestyle Choices:  Patient reports no tobacco use  Patient has smoked or used tobacco in the past   Patient has stopped his tobacco use  Tobacco use quit date: 9/1/1968  Patient reports no alcohol use  Patient drives a vehicle  Patient wears seat belt  Activities of Daily Living:  Can get out of bed by his or her self, able to dress self, able to make own meals, able to do own shopping, able to bathe self, can do own laundry/housekeeping, can manage own money, pay bills and track expenses    Previous Hospitalizations:  Hospitalization or ED visit in past 12 months  Number of hospitalizations within the last year: 1-2  Additional Comments: Minor stroke, everything is better now    Advanced Directives:  Patient has decided on a power of   Patient has spoken to designated power of   Patient has completed advanced directive

## 2018-09-27 NOTE — ASSESSMENT & PLAN NOTE
Cardiology follows  He is on furosemide and potassium replacement  Recent renal function showed creatinine to be 1 02 with GFR of 66 and normal electrolytes

## 2018-09-27 NOTE — PROGRESS NOTES
Assessment/Plan: We will see him again in 6 months and he will get labs before that visit    In the interim he will see Cardiology    Impaired fasting glucose  Current fasting blood sugar was 101 and A1c 5 8  Continue diet control of sugar    Benign essential hypertension  Blood pressure at target on current medication    Cerebrovascular accident (CVA) due to embolism of left middle cerebral artery (HCC)  No residual affects    Plan is to continue good blood pressure control, aspirin, and Eliquis which he is also on for his atrial flutter    Ischemic cardiomyopathy  Cardiology follows  He is on furosemide and potassium replacement  Recent renal function showed creatinine to be 1 02 with GFR of 66 and normal electrolytes    Typical atrial flutter [facility])  Cardiology follows  He is on aspirin and Eliquis as well as metoprolol for rate control    Dyslipidemia  Recent lipids show total cholesterol 150 HDL 54 triglycerides 116 LDL 76  He will       Diagnoses and all orders for this visit:    Benign essential hypertension  -     metoprolol tartrate (LOPRESSOR) 100 mg tablet; Take 0 5 tablets (50 mg total) by mouth every 12 (twelve) hours for 360 days  -     potassium chloride (KLOR-CON M20) 20 mEq tablet; Take 1 tablet (20 mEq total) by mouth daily for 360 days  -     furosemide (LASIX) 20 mg tablet; Take 1 tablet (20 mg total) by mouth daily for 360 days  -     CBC and Platelet; Future  -     Comprehensive metabolic panel; Future    Impaired fasting glucose  -     Comprehensive metabolic panel; Future  -     HEMOGLOBIN A1C W/ EAG ESTIMATION; Future    Dyslipidemia  -     atorvastatin (LIPITOR) 40 mg tablet; Take 1 tablet (40 mg total) by mouth daily for 360 days  -     Comprehensive metabolic panel; Future  -     Lipid panel;  Future    Cerebrovascular accident (CVA) due to embolism of left middle cerebral artery (HCC)    Coronary artery disease involving native coronary artery of native heart without angina pectoris  - metoprolol tartrate (LOPRESSOR) 100 mg tablet; Take 0 5 tablets (50 mg total) by mouth every 12 (twelve) hours for 360 days    Hx of CABG    Ischemic cardiomyopathy  -     potassium chloride (KLOR-CON M20) 20 mEq tablet; Take 1 tablet (20 mEq total) by mouth daily for 360 days  -     furosemide (LASIX) 20 mg tablet; Take 1 tablet (20 mg total) by mouth daily for 360 days    Typical atrial flutter (HCC)  -     apixaban (ELIQUIS) 5 mg; Take 1 tablet (5 mg total) by mouth 2 (two) times a day    Flu vaccine need  -     influenza vaccine, 4290-1430, high-dose, PF 0 5 mL, for patients 65 yr+ (FLUZONE HIGH-DOSE)    Other orders  -     Discontinue: atorvastatin (LIPITOR) 40 mg tablet;           Subjective:      Patient ID: Cade Reyes is a 80 y o  male  He is here today for follow-up on his chronic conditions    He reports that he feels good and does not have any concerns at the present time     He did have minor stroke this spring (see records)  He has no residual symptoms and is not limited in any fashion due to this  He reports that he sees Dr Emilia Castillo each year for urology evaluation and has a rectal exam /prostate exam as well as urinalysis done in his office  This year because I had ordered a urinalysis with his comprehensive blood work earlier in the spring in the urinalysis is a Dr Maryam Montez was not covered  In the future we will make sure not to order urine here is Dr Emilia Castillo does in his office    He is developing cataracts in her getting more dense and starting to affect his eyesight  He sees Dr Marcelina Rm and they are beginning discussed cataract surgery      He is somewhat nervous about getting cataract surgery because his wife had had surgery and lost the site in her eye as a complication    He reports no change in habits and tries to maintain a healthy lifestyle              The following portions of the patient's history were reviewed and updated as appropriate: allergies, current medications, past medical history, past social history and problem list     Review of Systems   Constitutional:        See HPI   HENT: Negative for dental problem and hearing loss  Eyes:        See HPI   Respiratory: Negative for cough and shortness of breath  Cardiovascular: Negative for chest pain, palpitations and leg swelling  Gastrointestinal:        BM daily  Uses daily probiotic    No dyspepsia or GERD   Genitourinary:        Sees   [facility] yearly  No issues   Musculoskeletal: Positive for arthralgias (variable joint pains / often weather and activity related) and back pain (left low back pain that occasionally radiates down lateral thigh)  Hx of LEFT ankle fracture and has fixation devices   Neurological: Negative for dizziness, tremors, light-headedness and headaches  Psychiatric/Behavioral: Positive for sleep disturbance  Negative for confusion, decreased concentration and dysphoric mood  Suicidal ideas: melatonin has been effective  The patient is not nervous/anxious  Objective:      /70   Pulse 78   Temp 97 6 °F (36 4 °C) (Tympanic)   Resp 16   Ht 5' 6" (1 676 m)   Wt 85 4 kg (188 lb 3 2 oz)   BMI 30 38 kg/m²          Physical Exam   Constitutional: He appears well-developed and well-nourished  No distress  Neck: No JVD present  No thyromegaly present  Cardiovascular: Normal rate and regular rhythm  No murmur heard  No bruits   Pulmonary/Chest: Effort normal and breath sounds normal    Musculoskeletal: He exhibits no edema  Skin: No rash noted  Psychiatric: He has a normal mood and affect  His behavior is normal  Judgment and thought content normal    Nursing note and vitals reviewed

## 2018-10-01 ENCOUNTER — TELEPHONE (OUTPATIENT)
Dept: FAMILY MEDICINE CLINIC | Facility: CLINIC | Age: 83
End: 2018-10-01

## 2018-11-04 NOTE — PROGRESS NOTES
Cardiology Follow Up    Kuldeep Hernandez  9/1/1932  [id number]  [address] [facility] Graham Desai  [address] [place] [address] [address]    1  Typical atrial flutter ([address] )     2  Coronary artery disease involving native coronary artery of native heart without angina pectoris     3  Hx of CABG     4  Ischemic cardiomyopathy     5  Benign essential hypertension     6  Dyslipidemia         Discussion/Summary:  Mr Blakely is a pleasant 55-year-old gentleman who presents to the office today for routine follow-up  Today he is maintaining normal sinus rhythm  He has no signs or symptoms suggestive of recurrent atrial fibrillation/flutter although this was seen on his Holter monitor  He will remain on his current AV elizabeth blocking regimen and systemic anticoagulation in the form of Eliquis  His blood pressure control appears adequate  His most recent lipids were reviewed  His LDL is slightly suboptimal in the setting of his CAD  We discussed therapeutic lifestyle modifications  He will have these reassessed next year  If they remain suboptimal I will escalate his statin regimen  Regarding his cataract surgery he is at low risk for any complications for this low risk surgery  He can proceed without any further testing his Eliquis can be continued perioperatively  He did undergo an echocardiogram during his hospital stay which revealed LV dysfunction and EF of 45% with an akinetic LAD territory  I have no old echo for comparison although he seems to remember being told he had a similar EF in the past   Given he is asymptomatic no further testing is recommended  He is euvolemic on his oral diuretic regimen  I will see him back in the office in six months or sooner if deemed necessary  Interval History:   Mr Blakely is a pleasant 55-year-old gentleman who presents to the office today for routine follow-up      Since his last he has been feeling well  He leads a sedentary lifestyle but with the activity he performs he denies any exertional chest pain or shortness of breath  He denies any signs or symptoms of congestive heart failure including increasing lower extremity edema, paroxysmal nocturnal dyspnea, orthopnea, acute weight gain or increasing abdominal girth  He denies lightheadedness, syncope, presyncope, palpitations or symptoms of claudication  He remains on systemic anticoagulation in the form of Eliquis without any bleeding consequences  Problem List     CVA (cerebral vascular accident) ([facility] )    Essential hypertension (Chronic)    HLD (hyperlipidemia) (Chronic)    CAD (coronary artery disease) (Chronic)    Atrial flutter (HCC) (Chronic)        Past Medical History:   Diagnosis Date    Acute myocardial infarction ([facility] )     Allergic rhinitis     Anxiety     Bimalleolar fracture of left ankle     CAD (coronary artery disease)     Erectile dysfunction of non-organic origin     Hematuria     workup was negative and felt to be benign    Herpes zoster with complication     Hyperlipidemia     Hypertension     Impaired fasting glucose     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    Prostatic hypertrophy     benign     Social History     Social History    Marital status:       Spouse name: moncho    Number of children: N/A    Years of education: N/A     Occupational History    retired      clergy     Social History Main Topics    Smoking status: Never Smoker    Smokeless tobacco: Never Used    Alcohol use Yes      Comment: social- per allscripts    Drug use: No    Sexual activity: Not on file     Other Topics Concern    Not on file     Social History Narrative    Death in the family- spousemoncho  after a prolonged francis with lymphoma     Exercises regularly      Family History   Problem Relation Age of Onset    Cancer Mother     Hypertension Mother         essential    Pancreatic cancer Mother      Past Surgical History:   Procedure Laterality Date    ANKLE FRACTURE SURGERY Left     CORONARY ARTERY BYPASS GRAFT         Current Outpatient Prescriptions:     apixaban (ELIQUIS) 5 mg, Take 1 tablet (5 mg total) by mouth 2 (two) times a day, Disp: 60 tablet, Rfl: 5    aspirin (ECOTRIN LOW STRENGTH) 81 mg EC tablet, Take 81 mg by mouth daily, Disp: , Rfl:     atorvastatin (LIPITOR) 40 mg tablet, Take 1 tablet (40 mg total) by mouth daily for 360 days, Disp: 90 tablet, Rfl: 3    Coenzyme Q10 200 MG capsule, Take 200 mg by mouth daily, Disp: , Rfl:     furosemide (LASIX) 20 mg tablet, Take 1 tablet (20 mg total) by mouth daily for 360 days, Disp: 90 tablet, Rfl: 3    Glucosamine-Chondroit-Vit C-Mn (GLUCOSAMINE CHONDROITIN COMPLX) CAPS, Take 1 capsule by mouth daily, Disp: , Rfl:     magnesium gluconate (MAGONATE) 500 mg tablet, Take 500 mg by mouth daily, Disp: , Rfl:     metoprolol tartrate (LOPRESSOR) 100 mg tablet, Take 0 5 tablets (50 mg total) by mouth every 12 (twelve) hours for 360 days, Disp: 180 tablet, Rfl: 3    Multiple Vitamins-Minerals (MULTIVITAMIN ADULT PO), Take 1 tablet by mouth daily, Disp: , Rfl:     potassium chloride (KLOR-CON M20) 20 mEq tablet, Take 1 tablet (20 mEq total) by mouth daily for 360 days, Disp: 90 tablet, Rfl: 3    Probiotic Product (PROBIOTIC COLON SUPPORT) CAPS, Take 1 capsule by mouth daily, Disp: , Rfl:     selenium 200 mcg, Take 200 mcg by mouth daily, Disp: , Rfl:   Allergies   Allergen Reactions    Penicillins Edema and Rash     Reaction Date: [id number]; Category: Allergy;  Annotation - [id number]: Severe reaction with hospitaization at [facility]       Labs:     Chemistry        Component Value Date/Time     08/17/2017 0705    K 4 3 09/21/2018 0707     09/21/2018 0707    CO2 29 09/21/2018 0707    BUN 20 09/21/2018 0707    CREATININE 1 15 03/06/2018 0717    CREATININE 1 14 (H) 08/17/2017 0705        Component Value Date/Time    CALCIUM 9 1 09/21/2018 0707    ALKPHOS 57 03/26/2018 0648    AST 14 07/19/2016 0642    ALT 14 07/19/2016 0642    BILITOT 0 4 07/19/2016 0642            Lab Results   Component Value Date    CHOL 156 08/17/2017    CHOL 166 01/26/2017    CHOL 166 07/19/2016     Lab Results   Component Value Date    HDL 54 09/21/2018    HDL 54 03/26/2018    HDL 46 03/07/2018     Lab Results   Component Value Date    LDLCALC 54 03/07/2018     Lab Results   Component Value Date    TRIG 116 09/21/2018    TRIG 119 03/26/2018    TRIG 211 (H) 03/07/2018     No results found for: CHOLHDL    Imaging: Mri Brain Wo Contrast    Result Date: 3/6/2018  Narrative: MRI BRAIN WITHOUT CONTRAST INDICATION: STROKE- WO BRAIN  History taken directly from the electronic ordering system  Right-sided weakness  Difficulty with balance  COMPARISON:   3/6/2018 TECHNIQUE:  Sagittal T1, axial T2, axial FLAIR, axial T1, axial [place] and axial diffusion imaging  IMAGE QUALITY:  Diagnostic  FINDINGS: BRAIN PARENCHYMA:  There is a small band of diffusion restriction in the left insular cortex extending into the periventricular white matter of the left frontal lobe images 19 through 23 of series 3 indicative of recent left MCA infarction  There is bandlike associated FLAIR hyperintensity in this region  Elsewhere there is a small to moderate chronic right frontal infarction with cystic encephalomalacia and gliosis  There is somewhat patchy periventricular FLAIR hyperintensity as well  No acute intracranial hemorrhage or extra-axial fluid collection  Cerebellar tonsils are normally positioned  VENTRICLES:  The ventricles are normal in size and contour  SELLA AND PITUITARY GLAND:  Normal  ORBITS:  Normal  PARANASAL SINUSES:  Normal  VASCULATURE:  Evaluation of the major intracranial vasculature demonstrates appropriate flow voids  CALVARIUM AND SKULL BASE:  Normal  EXTRACRANIAL SOFT TISSUES:  Normal      Impression: 1    Small acute/recent left MCA cortical infarction involving a small portion of the insular cortex extending into the periventricular white matter  2   Chronic small to moderate right frontal infarction and mild to moderate, chronic microangiopathy  I personally discussed this study with Dr Merary Vann on 3/6/2018 at 11:42 AM  Workstation performed: [id number]     Xr Stroke Alert Portable Chest    Result Date: 3/6/2018  Narrative: CHEST INDICATION:  Right-sided weakness  Stroke COMPARISON:  None EXAM PERFORMED/VIEWS:  XR STROKE ALERT PORTABLE CHEST FINDINGS:  There are median sternotomy wires indicating prior cardiac surgery  Cardiomediastinal silhouette appears unremarkable  The lungs are clear  No pneumothorax or pleural effusion  Osseous structures appear within normal limits for patient age  Impression: No acute cardiopulmonary disease  Workstation performed: [id number]     Ct Stroke Alert Brain    Result Date: 3/6/2018  Narrative: CT BRAIN - STROKE ALERT PROTOCOL INDICATION:  Woke up with right-sided weakness  Pronator drift  Cerebellar symptoms  COMPARISON:  None  TECHNIQUE:  CT examination of the brain was performed  In addition to axial images, coronal reformatted images were created and submitted for interpretation  Radiation dose length product (DLP) for this visit:   This examination, like all CT scans performed in the [facility], was performed utilizing techniques to minimize radiation dose exposure, including the use of iterative reconstruction  and automated exposure control  IMAGE QUALITY:  Diagnostic  FINDINGS:  PARENCHYMA:  Small to moderate-sized area of encephalomalacia and gliosis right frontal lobe indicative of chronic infarction  Elsewhere there are mild periventricular hypodensities  No acute intracranial hemorrhage  Atherosclerotic calcifications noted  VENTRICLES AND EXTRA-AXIAL SPACES:  Age-appropriate volume loss is noted  No hydrocephalus   VISUALIZED ORBITS AND PARANASAL SINUSES:  Orbits appear normal   Mild scattered sinus mucosal thickening is noted  No fluid levels are seen  CALVARIUM AND EXTRACRANIAL SOFT TISSUES:   Normal      Impression: 1  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, chronic microangiopathy  2   No acute intracranial hemorrhage  Findings were directly discussed with Gloria Gomez on 3/6/2018 7:31 AM  Workstation performed: [id number]     Cta Stroke Alert (head/neck)    Result Date: 3/6/2018  Narrative: CTA NECK AND BRAIN WITH AND WITHOUT CONTRAST INDICATION: Left-sided weakness  Stroke alert  Prominent or drift  Pointing  COMPARISON:   None  TECHNIQUE:  Routine CT imaging of the Brain without contrast   Post contrast imaging was performed after administration of iodinated contrast through the neck and brain  Post contrast axial 0 625 mm images timed to opacify the arterial system  3D rendering was performed on an independent workstation  MIP reconstructions performed  Coronal reconstructions were performed of the noncontrast portion of the brain  Radiation dose length product (DLP) for this visit:  526 3 mGy-cm   This examination, like all CT scans performed in the [facility], was performed utilizing techniques to minimize radiation dose exposure, including the use of iterative reconstruction and automated exposure control  IV Contrast:  100 mL of iohexol (OMNIPAQUE)  IMAGE QUALITY:   Diagnostic FINDINGS: NONCONTRAST BRAIN: Reported separately CERVICAL VASCULATURE AORTIC ARCH AND GREAT VESSELS:  Mild athersclerotic disease of the arch, proximal great vessels and visualized subclavian vessels  No significant stenosis  Sternotomy wires and mediastinal clips are noted, compatible with prior CABG  RIGHT VERTEBRAL ARTERY CERVICAL SEGMENT:  Mild stenosis at the origin  The vessel is normal in caliber throughout the neck  LEFT VERTEBRAL ARTERY CERVICAL SEGMENT:  Mild stenosis at the origin  The vessel is normal in caliber throughout the neck  Left vertebral artery is congenitally dominant  RIGHT EXTRACRANIAL CAROTID SEGMENT:  Normal caliber common carotid artery  Normal bifurcation and cervical internal carotid artery  No stenosis or dissection  LEFT EXTRACRANIAL CAROTID SEGMENT:  Mild atherosclerotic disease of the distal common carotid artery and proximal cervical internal carotid artery without significant stenosis compared to the more distal ICA  NASCET criteria was used to determine the degree of internal carotid artery diameter stenosis  INTRACRANIAL VASCULATURE INTERNAL CAROTID ARTERIES:  Atherosclerotic calcifications of the cavernous segment of the internal carotid artery are very mild  Normal ophthalmic artery origins  Normal ICA terminus  ANTERIOR CIRCULATION:  Symmetric A1 segments and anterior cerebral arteries with normal enhancement  Normal anterior communicating artery  MIDDLE CEREBRAL ARTERY CIRCULATION:  M1 segment and middle cerebral artery branches demonstrate normal enhancement bilaterally  DISTAL VERTEBRAL ARTERIES:  Normal distal vertebral arteries  Posterior inferior cerebellar artery origins are normal  Normal vertebral basilar junction  BASILAR ARTERY:  Basilar artery is normal in caliber  Normal superior cerebellar arteries  POSTERIOR CEREBRAL ARTERIES: Both posterior cerebral arteries arises from the basilar tip  Both arteries demonstrate normal enhancement  Normal posterior communicating arteries  DURAL VENOUS SINUSES:  Not well visualized on this early arterial phase scan  NON VASCULAR ANATOMY BONY STRUCTURES:  No acute osseous abnormality  SOFT TISSUES OF THE NECK:  Unremarkable  THORACIC INLET:  Unremarkable  Impression: 1  No hemodynamically significant stenosis in the major arteries of the neck  2   No intracranial aneurysm or major intracranial arterial stenosis    I personally discussed this study with Gloria Gomez on 3/6/2018 at 7:31 AM  Workstation performed: [id number]       ECG:  Normal sinus rhythm with premature atrial complexes with aberrancy, left atrial abnormality, left axis deviation, anteroseptal infarct, nonspecific ST and T-wave abnormality      Review of Systems   Cardiovascular: Negative for claudication, cyanosis, dyspnea on exertion, irregular heartbeat, leg swelling, near-syncope, orthopnea and palpitations  All other systems reviewed and are negative  There were no vitals filed for this visit  There were no vitals filed for this visit  There is no height or weight on file to calculate BMI      Physical Exam:   General appearance:  Appears stated age, alert, well appearing and in no distress  HEENT:  PERRLA, EOMI, no scleral icterus, no conjunctival pallor  NECK:  Supple, No elevated JVP, no thyromegaly, no carotid bruits  HEART:  Irregularly irregular and tachycardic, variable S1/S2, no murmur   LUNGS:  Clear to auscultation bilaterally  ABDOMEN:  Soft, non-tender, positive bowel sounds, no rebound or guarding, no organomegaly   EXTREMITIES:  No edema  VASCULAR:  Normal pedal pulses   SKIN: No lesions or rashes on exposed skin  NEURO:  CN II-XII intact, no focal deficits

## 2018-11-05 ENCOUNTER — OFFICE VISIT (OUTPATIENT)
Dept: CARDIOLOGY CLINIC | Facility: CLINIC | Age: 83
End: 2018-11-05
Payer: COMMERCIAL

## 2018-11-05 VITALS
HEART RATE: 70 BPM | DIASTOLIC BLOOD PRESSURE: 78 MMHG | SYSTOLIC BLOOD PRESSURE: 130 MMHG | BODY MASS INDEX: 30.86 KG/M2 | HEIGHT: 66 IN | WEIGHT: 192 LBS

## 2018-11-05 DIAGNOSIS — I25.10 CORONARY ARTERY DISEASE INVOLVING NATIVE CORONARY ARTERY OF NATIVE HEART WITHOUT ANGINA PECTORIS: Chronic | ICD-10-CM

## 2018-11-05 DIAGNOSIS — I10 BENIGN ESSENTIAL HYPERTENSION: ICD-10-CM

## 2018-11-05 DIAGNOSIS — I25.5 ISCHEMIC CARDIOMYOPATHY: ICD-10-CM

## 2018-11-05 DIAGNOSIS — E78.5 DYSLIPIDEMIA: ICD-10-CM

## 2018-11-05 DIAGNOSIS — I48.3 TYPICAL ATRIAL FLUTTER (HCC): Primary | ICD-10-CM

## 2018-11-05 DIAGNOSIS — Z95.1 HX OF CABG: ICD-10-CM

## 2018-11-05 PROCEDURE — 99214 OFFICE O/P EST MOD 30 MIN: CPT | Performed by: INTERNAL MEDICINE

## 2018-11-06 RX ORDER — GATIFLOXACIN 5 MG/ML
SOLUTION/ DROPS OPHTHALMIC
COMMUNITY
Start: 2018-11-02 | End: 2019-11-06 | Stop reason: HOSPADM

## 2018-11-06 RX ORDER — PREDNISOLONE ACETATE 10 MG/ML
SUSPENSION/ DROPS OPHTHALMIC
Status: ON HOLD | COMMUNITY
Start: 2018-11-02 | End: 2019-11-05 | Stop reason: ALTCHOICE

## 2018-11-07 ENCOUNTER — OFFICE VISIT (OUTPATIENT)
Dept: FAMILY MEDICINE CLINIC | Facility: CLINIC | Age: 83
End: 2018-11-07
Payer: COMMERCIAL

## 2018-11-07 VITALS
HEART RATE: 64 BPM | HEIGHT: 66 IN | TEMPERATURE: 97.8 F | DIASTOLIC BLOOD PRESSURE: 80 MMHG | WEIGHT: 190.6 LBS | SYSTOLIC BLOOD PRESSURE: 132 MMHG | BODY MASS INDEX: 30.63 KG/M2 | RESPIRATION RATE: 16 BRPM

## 2018-11-07 DIAGNOSIS — I25.10 CORONARY ARTERY DISEASE INVOLVING NATIVE CORONARY ARTERY OF NATIVE HEART WITHOUT ANGINA PECTORIS: Chronic | ICD-10-CM

## 2018-11-07 DIAGNOSIS — I63.412 CEREBROVASCULAR ACCIDENT (CVA) DUE TO EMBOLISM OF LEFT MIDDLE CEREBRAL ARTERY (HCC): ICD-10-CM

## 2018-11-07 DIAGNOSIS — I10 BENIGN ESSENTIAL HYPERTENSION: ICD-10-CM

## 2018-11-07 DIAGNOSIS — I48.3 TYPICAL ATRIAL FLUTTER (HCC): ICD-10-CM

## 2018-11-07 DIAGNOSIS — I25.5 ISCHEMIC CARDIOMYOPATHY: ICD-10-CM

## 2018-11-07 DIAGNOSIS — R73.01 IMPAIRED FASTING GLUCOSE: ICD-10-CM

## 2018-11-07 DIAGNOSIS — H26.9 CATARACT OF RIGHT EYE, UNSPECIFIED CATARACT TYPE: ICD-10-CM

## 2018-11-07 DIAGNOSIS — Z01.818 PREOPERATIVE CLEARANCE: Primary | ICD-10-CM

## 2018-11-07 PROCEDURE — 99214 OFFICE O/P EST MOD 30 MIN: CPT | Performed by: NURSE PRACTITIONER

## 2018-11-07 PROCEDURE — 1036F TOBACCO NON-USER: CPT | Performed by: NURSE PRACTITIONER

## 2018-11-07 PROCEDURE — 1160F RVW MEDS BY RX/DR IN RCRD: CPT | Performed by: NURSE PRACTITIONER

## 2018-11-07 PROCEDURE — 4040F PNEUMOC VAC/ADMIN/RCVD: CPT | Performed by: NURSE PRACTITIONER

## 2018-11-07 NOTE — PROGRESS NOTES
Subjective:     Avery Martinez is a 80 y o  male who presents to the office today for a preoperative consultation at the request of surgeon Dr Shannan Nelson who plans on performing Right cataract surgery on November 11  Pt follows with Cardiology, Dr Tia Sen, for A Flutter, CAD (h/o CABG), HTN, ischemic cardiomyopathy, dyslipidemia  He had a cardiac clearance done on 11/5/18  Currently taking Lasix, Metoprolol, Eliquis   Instructed to continue Eliquis perioperatively     Pt with a H/O CVA, no residual effects    Pt feels well today, offers no acute complaints  Denies fevers, chills, SOB, wheezing, coughing, Chest pain, palpitations, edema, N/V/D, dysuria     The following portions of the patient's history were reviewed and updated as appropriate: allergies, current medications, past family history, past medical history, past social history, past surgical history and problem list     Review of Systems  Constitutional: negative  Eyes: negative  Ears, nose, mouth, throat, and face: negative  Respiratory: negative  Cardiovascular: negative  Gastrointestinal: negative  Genitourinary:negative  Integument/breast: negative  Hematologic/lymphatic: negative  Musculoskeletal:negative  Neurological: negative  Behavioral/Psych: negative  Endocrine: negative  Allergic/Immunologic: negative     Objective:     /80   Pulse 64   Temp 97 8 °F (36 6 °C) (Tympanic)   Resp 16   Ht 5' 6" (1 676 m)   Wt 86 5 kg (190 lb 9 6 oz)   BMI 30 76 kg/m²   General appearance: alert and oriented, in no acute distress  Head: Normocephalic, without obvious abnormality, atraumatic  Eyes: conjunctivae/corneas clear  PERRL, EOM's intact  Fundi benign  Ears: normal TM's and external ear canals both ears and right TM is completely obscured with impacted cerumen  Nose: Nares normal  Septum midline  Mucosa normal  No drainage or sinus tenderness  Throat: lips, mucosa, and tongue normal; teeth and gums normal  Back: symmetric, no curvature   ROM normal  No CVA tenderness  Lungs: clear to auscultation bilaterally  Heart: irregularly irregular rhythm  Abdomen: soft, non-tender; bowel sounds normal; no masses,  no organomegaly  Extremities: extremities normal, warm and well-perfused; no cyanosis, clubbing, or edema  Skin: Skin color, texture, turgor normal  No rashes or lesions  Lymph nodes: Cervical, supraclavicular, and axillary nodes normal   Neurologic: Grossly normal    Predictors of intubation difficulty:   Morbid obesity? no   Anatomically abnormal facies? no    Lab Review   Lab Results   Component Value Date     08/17/2017    K 4 3 09/21/2018     09/21/2018    CO2 29 09/21/2018    BUN 20 09/21/2018    CREATININE 1 15 03/06/2018    CREATININE 1 14 (H) 08/17/2017    GLUCOSE 127 03/06/2018    GLUCOSE 107 08/01/2014    CALCIUM 9 1 09/21/2018     Lab Results   Component Value Date    WBC 7 0 03/26/2018    WBC 8 62 03/06/2018    WBC 7 7 07/19/2016    WBC NONE SEEN 07/19/2016    HGB 14 6 03/26/2018    HGB 14 8 03/06/2018    HGB 13 5 07/19/2016    HCT 42 1 03/26/2018    HCT 42 9 03/06/2018    HCT 41 0 07/19/2016    MCV 91 1 03/26/2018    MCV 90 03/06/2018    MCV 91 3 07/19/2016     03/26/2018     (L) 03/07/2018     07/19/2016     Lab Results   Component Value Date    CHOL 156 08/17/2017    TRIG 116 09/21/2018    HDL 54 09/21/2018        Assessment:     80 y o  male who is cleared for Right Cataract Surgery with Dr Samara Rodarte on 11/12/18  He is at a low risk for this procedure    Plan:     1  Preoperative workup as follows: recent labs from 9/2018 reviewed and are acceptable  EKG done with Cardiology June of 2018 with NSR with PACs, left atrial abnormality, left axis deviation, anteroseptal infarct, nonspecific ST and T wave abnormality  2  Patient had Cardiac Clearance 11/5/18 via Dr Everton Villatoro  Instructed to continue Eliquis perioperatively for A flutter    3   HTN- BP is stable today, continue Metoprolol, Lasix (may hold Lasix the morning of surgery and resume that afternoon or the following day)    4  H/O CVA- no residual effects    5   RTO as scheduled

## 2018-11-30 ENCOUNTER — TELEPHONE (OUTPATIENT)
Dept: FAMILY MEDICINE CLINIC | Facility: CLINIC | Age: 83
End: 2018-11-30

## 2018-11-30 NOTE — TELEPHONE ENCOUNTER
Ally Acosta called, asking if pre-op clearance done on 11-07-18 for right eye cataract surgery would be good for left eye cataract surgery to be done on 12-10-18  Anisa Leonard said yes, as long as Dr Michael Bloom agreed  Dr Michael Bloom did agree  Pt aware

## 2018-12-17 DIAGNOSIS — I10 BENIGN ESSENTIAL HYPERTENSION: ICD-10-CM

## 2018-12-17 DIAGNOSIS — I25.5 ISCHEMIC CARDIOMYOPATHY: ICD-10-CM

## 2018-12-17 RX ORDER — POTASSIUM CHLORIDE 20 MEQ/1
20 TABLET, EXTENDED RELEASE ORAL DAILY
Qty: 90 TABLET | Refills: 3 | Status: SHIPPED | OUTPATIENT
Start: 2018-12-17 | End: 2019-10-17 | Stop reason: SDUPTHER

## 2018-12-17 NOTE — TELEPHONE ENCOUNTER
Patient wants a refill on Klor-Con m20 20 meq tablet take 1 tablet by mouth total 90 tablets to [facility]  Can be reached at [phone] any questions

## 2019-03-29 LAB
ALBUMIN SERPL-MCNC: 4.4 G/DL (ref 3.6–5.1)
ALBUMIN/GLOB SERPL: 1.6 (CALC) (ref 1–2.5)
ALP SERPL-CCNC: 78 U/L (ref 40–115)
ALT SERPL-CCNC: 14 U/L (ref 9–46)
AST SERPL-CCNC: 14 U/L (ref 10–35)
BASOPHILS # BLD AUTO: 47 CELLS/UL (ref 0–200)
BASOPHILS NFR BLD AUTO: 0.6 %
BILIRUB SERPL-MCNC: 0.5 MG/DL (ref 0.2–1.2)
BUN SERPL-MCNC: 17 MG/DL (ref 7–25)
BUN/CREAT SERPL: ABNORMAL (CALC) (ref 6–22)
CALCIUM SERPL-MCNC: 9.4 MG/DL (ref 8.6–10.3)
CHLORIDE SERPL-SCNC: 100 MMOL/L (ref 98–110)
CHOLEST SERPL-MCNC: 153 MG/DL
CHOLEST/HDLC SERPL: 2.9 (CALC)
CO2 SERPL-SCNC: 32 MMOL/L (ref 20–32)
CREAT SERPL-MCNC: 1.09 MG/DL (ref 0.7–1.11)
EOSINOPHIL # BLD AUTO: 300 CELLS/UL (ref 15–500)
EOSINOPHIL NFR BLD AUTO: 3.8 %
ERYTHROCYTE [DISTWIDTH] IN BLOOD BY AUTOMATED COUNT: 12.7 % (ref 11–15)
EST. AVERAGE GLUCOSE BLD GHB EST-MCNC: 123 (CALC)
EST. AVERAGE GLUCOSE BLD GHB EST-SCNC: 6.8 (CALC)
GLOBULIN SER CALC-MCNC: 2.7 G/DL (CALC) (ref 1.9–3.7)
GLUCOSE SERPL-MCNC: 106 MG/DL (ref 65–99)
HBA1C MFR BLD: 5.9 % OF TOTAL HGB
HCT VFR BLD AUTO: 42.5 % (ref 38.5–50)
HDLC SERPL-MCNC: 53 MG/DL
HGB BLD-MCNC: 14.5 G/DL (ref 13.2–17.1)
LDLC SERPL CALC-MCNC: 75 MG/DL (CALC)
LYMPHOCYTES # BLD AUTO: 3223 CELLS/UL (ref 850–3900)
LYMPHOCYTES NFR BLD AUTO: 40.8 %
MCH RBC QN AUTO: 30.9 PG (ref 27–33)
MCHC RBC AUTO-ENTMCNC: 34.1 G/DL (ref 32–36)
MCV RBC AUTO: 90.4 FL (ref 80–100)
MONOCYTES # BLD AUTO: 924 CELLS/UL (ref 200–950)
MONOCYTES NFR BLD AUTO: 11.7 %
NEUTROPHILS # BLD AUTO: 3405 CELLS/UL (ref 1500–7800)
NEUTROPHILS NFR BLD AUTO: 43.1 %
NONHDLC SERPL-MCNC: 100 MG/DL (CALC)
PLATELET # BLD AUTO: 210 THOUSAND/UL (ref 140–400)
PMV BLD REES-ECKER: 11.2 FL (ref 7.5–12.5)
POTASSIUM SERPL-SCNC: 4.2 MMOL/L (ref 3.5–5.3)
PROT SERPL-MCNC: 7.1 G/DL (ref 6.1–8.1)
RBC # BLD AUTO: 4.7 MILLION/UL (ref 4.2–5.8)
SL AMB EGFR AFRICAN AMERICAN: 71 ML/MIN/1.73M2
SL AMB EGFR NON AFRICAN AMERICAN: 61 ML/MIN/1.73M2
SODIUM SERPL-SCNC: 139 MMOL/L (ref 135–146)
TRIGL SERPL-MCNC: 150 MG/DL
WBC # BLD AUTO: 7.9 THOUSAND/UL (ref 3.8–10.8)

## 2019-03-29 PROCEDURE — 99284 EMERGENCY DEPT VISIT MOD MDM: CPT

## 2019-03-30 ENCOUNTER — HOSPITAL ENCOUNTER (EMERGENCY)
Facility: HOSPITAL | Age: 84
Discharge: HOME | End: 2019-03-30
Attending: EMERGENCY MEDICINE | Admitting: EMERGENCY MEDICINE
Payer: COMMERCIAL

## 2019-03-30 VITALS
WEIGHT: 185 LBS | HEIGHT: 67 IN | RESPIRATION RATE: 17 BRPM | SYSTOLIC BLOOD PRESSURE: 173 MMHG | OXYGEN SATURATION: 95 % | BODY MASS INDEX: 29.03 KG/M2 | DIASTOLIC BLOOD PRESSURE: 88 MMHG | HEART RATE: 70 BPM | TEMPERATURE: 98.3 F

## 2019-03-30 DIAGNOSIS — I10 HYPERTENSION, UNSPECIFIED TYPE: Primary | ICD-10-CM

## 2019-03-30 LAB
ANION GAP SERPL CALCULATED.3IONS-SCNC: 4 MMOL/L (ref 4–13)
BASOPHILS # BLD AUTO: 0.04 THOUSANDS/ΜL (ref 0–0.1)
BASOPHILS NFR BLD AUTO: 1 % (ref 0–1)
BUN SERPL-MCNC: 22 MG/DL (ref 5–25)
CALCIUM SERPL-MCNC: 8.9 MG/DL (ref 8.3–10.1)
CHLORIDE SERPL-SCNC: 106 MMOL/L (ref 100–108)
CO2 SERPL-SCNC: 28 MMOL/L (ref 21–32)
CREAT SERPL-MCNC: 1.2 MG/DL (ref 0.6–1.3)
EOSINOPHIL # BLD AUTO: 0.2 THOUSAND/ΜL (ref 0–0.61)
EOSINOPHIL NFR BLD AUTO: 3 % (ref 0–6)
ERYTHROCYTE [DISTWIDTH] IN BLOOD BY AUTOMATED COUNT: 13 % (ref 11.6–15.1)
GFR SERPL CREATININE-BSD FRML MDRD: 54 ML/MIN/1.73SQ M
GLUCOSE SERPL-MCNC: 141 MG/DL (ref 65–140)
HCT VFR BLD AUTO: 39.3 % (ref 36.5–49.3)
HGB BLD-MCNC: 13 G/DL (ref 12–17)
IMM GRANULOCYTES # BLD AUTO: 0.03 THOUSAND/UL (ref 0–0.2)
IMM GRANULOCYTES NFR BLD AUTO: 0 % (ref 0–2)
LYMPHOCYTES # BLD AUTO: 2.07 THOUSANDS/ΜL (ref 0.6–4.47)
LYMPHOCYTES NFR BLD AUTO: 26 % (ref 14–44)
MCH RBC QN AUTO: 30.5 PG (ref 26.8–34.3)
MCHC RBC AUTO-ENTMCNC: 33.1 G/DL (ref 31.4–37.4)
MCV RBC AUTO: 92 FL (ref 82–98)
MONOCYTES # BLD AUTO: 0.98 THOUSAND/ΜL (ref 0.17–1.22)
MONOCYTES NFR BLD AUTO: 12 % (ref 4–12)
NEUTROPHILS # BLD AUTO: 4.8 THOUSANDS/ΜL (ref 1.85–7.62)
NEUTS SEG NFR BLD AUTO: 58 % (ref 43–75)
NRBC BLD AUTO-RTO: 0 /100 WBCS
PLATELET # BLD AUTO: 190 THOUSANDS/UL (ref 149–390)
PMV BLD AUTO: 10.8 FL (ref 8.9–12.7)
POTASSIUM SERPL-SCNC: 4.1 MMOL/L (ref 3.5–5.3)
RBC # BLD AUTO: 4.26 MILLION/UL (ref 3.88–5.62)
SODIUM SERPL-SCNC: 138 MMOL/L (ref 136–145)
TROPONIN I SERPL-MCNC: <0.02 NG/ML
WBC # BLD AUTO: 8.12 THOUSAND/UL (ref 4.31–10.16)

## 2019-03-30 PROCEDURE — 80048 BASIC METABOLIC PNL TOTAL CA: CPT | Performed by: EMERGENCY MEDICINE

## 2019-03-30 PROCEDURE — 85025 COMPLETE CBC W/AUTO DIFF WBC: CPT | Performed by: EMERGENCY MEDICINE

## 2019-03-30 PROCEDURE — 84484 ASSAY OF TROPONIN QUANT: CPT | Performed by: EMERGENCY MEDICINE

## 2019-03-30 PROCEDURE — 36415 COLL VENOUS BLD VENIPUNCTURE: CPT | Performed by: EMERGENCY MEDICINE

## 2019-03-30 PROCEDURE — 93005 ELECTROCARDIOGRAM TRACING: CPT

## 2019-04-01 LAB
ATRIAL RATE: 62 BPM
P AXIS: 61 DEGREES
PR INTERVAL: 188 MS
QRS AXIS: -70 DEGREES
QRSD INTERVAL: 82 MS
QT INTERVAL: 402 MS
QTC INTERVAL: 408 MS
T WAVE AXIS: 126 DEGREES
VENTRICULAR RATE: 62 BPM

## 2019-04-01 PROCEDURE — 93010 ELECTROCARDIOGRAM REPORT: CPT | Performed by: INTERNAL MEDICINE

## 2019-04-02 PROBLEM — F51.04 PSYCHOPHYSIOLOGICAL INSOMNIA: Status: ACTIVE | Noted: 2018-03-29

## 2019-04-04 ENCOUNTER — OFFICE VISIT (OUTPATIENT)
Dept: FAMILY MEDICINE CLINIC | Facility: CLINIC | Age: 84
End: 2019-04-04
Payer: COMMERCIAL

## 2019-04-04 VITALS
SYSTOLIC BLOOD PRESSURE: 130 MMHG | HEART RATE: 68 BPM | HEIGHT: 67 IN | TEMPERATURE: 98.2 F | DIASTOLIC BLOOD PRESSURE: 80 MMHG | BODY MASS INDEX: 30.23 KG/M2 | WEIGHT: 192.6 LBS | RESPIRATION RATE: 16 BRPM

## 2019-04-04 DIAGNOSIS — R73.01 IMPAIRED FASTING GLUCOSE: ICD-10-CM

## 2019-04-04 DIAGNOSIS — E78.5 DYSLIPIDEMIA: ICD-10-CM

## 2019-04-04 DIAGNOSIS — Z95.1 HX OF CABG: ICD-10-CM

## 2019-04-04 DIAGNOSIS — M54.16 SUBACUTE LEFT LUMBAR RADICULOPATHY: ICD-10-CM

## 2019-04-04 DIAGNOSIS — I25.10 CORONARY ARTERY DISEASE INVOLVING NATIVE CORONARY ARTERY OF NATIVE HEART WITHOUT ANGINA PECTORIS: Chronic | ICD-10-CM

## 2019-04-04 DIAGNOSIS — I63.412 CEREBROVASCULAR ACCIDENT (CVA) DUE TO EMBOLISM OF LEFT MIDDLE CEREBRAL ARTERY (HCC): ICD-10-CM

## 2019-04-04 DIAGNOSIS — E66.9 OBESITY (BMI 30.0-34.9): ICD-10-CM

## 2019-04-04 DIAGNOSIS — I10 BENIGN ESSENTIAL HYPERTENSION: Primary | ICD-10-CM

## 2019-04-04 PROBLEM — E66.811 OBESITY (BMI 30.0-34.9): Status: ACTIVE | Noted: 2019-04-04

## 2019-04-04 PROCEDURE — 3725F SCREEN DEPRESSION PERFORMED: CPT | Performed by: FAMILY MEDICINE

## 2019-04-04 PROCEDURE — 99214 OFFICE O/P EST MOD 30 MIN: CPT | Performed by: FAMILY MEDICINE

## 2019-04-04 PROCEDURE — 1036F TOBACCO NON-USER: CPT | Performed by: FAMILY MEDICINE

## 2019-04-04 PROCEDURE — 1160F RVW MEDS BY RX/DR IN RCRD: CPT | Performed by: FAMILY MEDICINE

## 2019-04-19 ENCOUNTER — CLINICAL SUPPORT (OUTPATIENT)
Dept: FAMILY MEDICINE CLINIC | Facility: CLINIC | Age: 84
End: 2019-04-19
Payer: COMMERCIAL

## 2019-04-19 DIAGNOSIS — Z23 ENCOUNTER FOR HERPES ZOSTER VACCINATION: Primary | ICD-10-CM

## 2019-04-19 PROCEDURE — 90750 HZV VACC RECOMBINANT IM: CPT

## 2019-04-19 PROCEDURE — 90471 IMMUNIZATION ADMIN: CPT

## 2019-06-10 DIAGNOSIS — I48.3 TYPICAL ATRIAL FLUTTER (HCC): ICD-10-CM

## 2019-06-14 ENCOUNTER — OFFICE VISIT (OUTPATIENT)
Dept: CARDIOLOGY CLINIC | Facility: CLINIC | Age: 84
End: 2019-06-14
Payer: COMMERCIAL

## 2019-06-14 VITALS
DIASTOLIC BLOOD PRESSURE: 72 MMHG | BODY MASS INDEX: 30.76 KG/M2 | WEIGHT: 196 LBS | HEART RATE: 52 BPM | SYSTOLIC BLOOD PRESSURE: 128 MMHG | HEIGHT: 67 IN

## 2019-06-14 DIAGNOSIS — I25.10 CORONARY ARTERY DISEASE INVOLVING NATIVE CORONARY ARTERY OF NATIVE HEART WITHOUT ANGINA PECTORIS: Chronic | ICD-10-CM

## 2019-06-14 DIAGNOSIS — E78.5 DYSLIPIDEMIA: ICD-10-CM

## 2019-06-14 DIAGNOSIS — I25.5 ISCHEMIC CARDIOMYOPATHY: ICD-10-CM

## 2019-06-14 DIAGNOSIS — I10 BENIGN ESSENTIAL HYPERTENSION: ICD-10-CM

## 2019-06-14 DIAGNOSIS — I48.3 TYPICAL ATRIAL FLUTTER (HCC): ICD-10-CM

## 2019-06-14 DIAGNOSIS — Z95.1 HX OF CABG: ICD-10-CM

## 2019-06-14 DIAGNOSIS — I48.3 TYPICAL ATRIAL FLUTTER (HCC): Primary | ICD-10-CM

## 2019-06-14 DIAGNOSIS — E66.9 OBESITY (BMI 30.0-34.9): ICD-10-CM

## 2019-06-14 PROCEDURE — 99214 OFFICE O/P EST MOD 30 MIN: CPT | Performed by: INTERNAL MEDICINE

## 2019-10-07 DIAGNOSIS — I10 BENIGN ESSENTIAL HYPERTENSION: ICD-10-CM

## 2019-10-07 DIAGNOSIS — I25.5 ISCHEMIC CARDIOMYOPATHY: ICD-10-CM

## 2019-10-07 DIAGNOSIS — E78.5 DYSLIPIDEMIA: ICD-10-CM

## 2019-10-07 RX ORDER — FUROSEMIDE 20 MG/1
20 TABLET ORAL DAILY
Qty: 90 TABLET | Refills: 3 | Status: SHIPPED | OUTPATIENT
Start: 2019-10-07 | End: 2020-10-07

## 2019-10-07 RX ORDER — ATORVASTATIN CALCIUM 40 MG/1
40 TABLET, FILM COATED ORAL DAILY
Qty: 90 TABLET | Refills: 3 | Status: SHIPPED | OUTPATIENT
Start: 2019-10-07 | End: 2020-07-20

## 2019-10-07 NOTE — TELEPHONE ENCOUNTER
Refill Atorvastatin 40 mg  1 daily #90              Furosemide 20 mg 1 Daily #90  [facility]

## 2019-10-14 DIAGNOSIS — I25.10 CORONARY ARTERY DISEASE INVOLVING NATIVE CORONARY ARTERY OF NATIVE HEART WITHOUT ANGINA PECTORIS: Chronic | ICD-10-CM

## 2019-10-14 DIAGNOSIS — I10 BENIGN ESSENTIAL HYPERTENSION: ICD-10-CM

## 2019-10-14 RX ORDER — METOPROLOL TARTRATE 100 MG/1
TABLET ORAL
Qty: 180 TABLET | Refills: 3 | Status: SHIPPED | OUTPATIENT
Start: 2019-10-14 | End: 2019-11-06 | Stop reason: HOSPADM

## 2019-10-17 ENCOUNTER — OFFICE VISIT (OUTPATIENT)
Dept: FAMILY MEDICINE CLINIC | Facility: CLINIC | Age: 84
End: 2019-10-17
Payer: COMMERCIAL

## 2019-10-17 VITALS
WEIGHT: 189 LBS | DIASTOLIC BLOOD PRESSURE: 68 MMHG | RESPIRATION RATE: 16 BRPM | OXYGEN SATURATION: 97 % | SYSTOLIC BLOOD PRESSURE: 122 MMHG | HEIGHT: 64 IN | TEMPERATURE: 97.7 F | HEART RATE: 92 BPM | BODY MASS INDEX: 32.27 KG/M2

## 2019-10-17 DIAGNOSIS — Z79.01 CHRONIC ANTICOAGULATION: ICD-10-CM

## 2019-10-17 DIAGNOSIS — I10 BENIGN ESSENTIAL HYPERTENSION: Primary | ICD-10-CM

## 2019-10-17 DIAGNOSIS — I48.3 TYPICAL ATRIAL FLUTTER (HCC): ICD-10-CM

## 2019-10-17 DIAGNOSIS — R07.9 EXERTIONAL CHEST PAIN: ICD-10-CM

## 2019-10-17 DIAGNOSIS — Z95.1 HX OF CABG: ICD-10-CM

## 2019-10-17 DIAGNOSIS — Z23 NEED FOR ZOSTER VACCINATION: ICD-10-CM

## 2019-10-17 DIAGNOSIS — I25.5 ISCHEMIC CARDIOMYOPATHY: ICD-10-CM

## 2019-10-17 DIAGNOSIS — E66.9 OBESITY (BMI 30.0-34.9): ICD-10-CM

## 2019-10-17 DIAGNOSIS — I25.10 CORONARY ARTERY DISEASE INVOLVING NATIVE CORONARY ARTERY OF NATIVE HEART WITHOUT ANGINA PECTORIS: Chronic | ICD-10-CM

## 2019-10-17 DIAGNOSIS — R73.01 IMPAIRED FASTING GLUCOSE: ICD-10-CM

## 2019-10-17 DIAGNOSIS — I63.412 CEREBROVASCULAR ACCIDENT (CVA) DUE TO EMBOLISM OF LEFT MIDDLE CEREBRAL ARTERY (HCC): ICD-10-CM

## 2019-10-17 DIAGNOSIS — I25.10 CORONARY ARTERY DISEASE INVOLVING NATIVE CORONARY ARTERY OF NATIVE HEART, ANGINA PRESENCE UNSPECIFIED: Primary | Chronic | ICD-10-CM

## 2019-10-17 DIAGNOSIS — Z00.00 MEDICARE ANNUAL WELLNESS VISIT, SUBSEQUENT: ICD-10-CM

## 2019-10-17 DIAGNOSIS — E78.5 DYSLIPIDEMIA: ICD-10-CM

## 2019-10-17 DIAGNOSIS — Z23 NEED FOR INFLUENZA VACCINATION: ICD-10-CM

## 2019-10-17 PROCEDURE — 90471 IMMUNIZATION ADMIN: CPT | Performed by: FAMILY MEDICINE

## 2019-10-17 PROCEDURE — G0008 ADMIN INFLUENZA VIRUS VAC: HCPCS | Performed by: FAMILY MEDICINE

## 2019-10-17 PROCEDURE — 99214 OFFICE O/P EST MOD 30 MIN: CPT | Performed by: FAMILY MEDICINE

## 2019-10-17 PROCEDURE — 90750 HZV VACC RECOMBINANT IM: CPT | Performed by: FAMILY MEDICINE

## 2019-10-17 PROCEDURE — G0439 PPPS, SUBSEQ VISIT: HCPCS | Performed by: FAMILY MEDICINE

## 2019-10-17 PROCEDURE — 90662 IIV NO PRSV INCREASED AG IM: CPT | Performed by: FAMILY MEDICINE

## 2019-10-17 PROCEDURE — 1170F FXNL STATUS ASSESSED: CPT | Performed by: FAMILY MEDICINE

## 2019-10-17 PROCEDURE — 1125F AMNT PAIN NOTED PAIN PRSNT: CPT | Performed by: FAMILY MEDICINE

## 2019-10-17 RX ORDER — POTASSIUM CHLORIDE 20 MEQ/1
20 TABLET, EXTENDED RELEASE ORAL DAILY
Qty: 90 TABLET | Refills: 3 | Status: SHIPPED | OUTPATIENT
Start: 2019-10-17 | End: 2020-01-07 | Stop reason: SDUPTHER

## 2019-10-17 RX ORDER — NITROGLYCERIN 0.4 MG/1
0.4 TABLET SUBLINGUAL
Qty: 25 TABLET | Refills: 1 | Status: SHIPPED | OUTPATIENT
Start: 2019-10-17

## 2019-10-17 NOTE — PROGRESS NOTES
Assessment/Plan:    His significant other Chacha Martinez is here with him throughout the visits today  A most importance today is his complaint of recent onset of exertional chest tightness and I will be directly communicating with Cardiology (Dr Iggy Gresham) to see him soon and evaluate/test accordingly  In the interim he was given a prescription for Nitrostat as well as directions to use  He also was given instructions that should he get more severe or persistent chest tightness that he should be seen and evaluated immediately including at the emergency room  Continue current meds    Labs were ordered    Impaired fasting glucose  Will check labs    Typical atrial flutter [facility])  Cardiology follows  Holter monitor 2018 showed sinus rhythm with periods of atrial fibrillation and flutter that were rather frequent  Is on Eliquis  Is on metoprolol    Ischemic cardiomyopathy  Cardiology follows  Echocardiogram 2018 showed LV EF of 45%    Cerebrovascular accident (CVA) due to embolism of left middle cerebral artery ([address] )  No significant residual    CAD (coronary artery disease)  History of CABG 1999  Recent symptoms suggestive of exertional angina  Will ask Cardiology to follow up and evaluate    He is on beta-blocker, statin, aspirin    Benign essential hypertension  Blood pressure controlled on current meds  Continue same    Obesity (BMI 30 0-34  9)  He is aware of the need to be diligent with his diet and to continue to exercise regularly  Ideally he could get his weight below BMI of 30 would be great    Exertional chest pain  Concerning for angina  Referred to Cardiology for evaluation and testing    Dyslipidemia  Given order for labs    Chronic anticoagulation  Is on Eliquis for paroxysmal AFib/flutter  Cardiology manages       Diagnoses and all orders for this visit:    Benign essential hypertension  -     CBC and Platelet; Future  -     Comprehensive metabolic panel;  Future    Impaired fasting glucose  -     Comprehensive metabolic panel; Future    Obesity (BMI 30 0-34  9)    Dyslipidemia  -     Lipid panel; Future    Exertional chest pain    Coronary artery disease involving native coronary artery of native heart without angina pectoris    Hx of CABG    Ischemic cardiomyopathy    Typical atrial flutter (HCC)    Chronic anticoagulation  -     CBC and Platelet; Future    Cerebrovascular accident (CVA) due to embolism of left middle cerebral artery ([facility] [address]    Medicare annual wellness visit, subsequent    Need for influenza vaccination  -     influenza vaccine, [id number], high-dose, PF 0 5 mL (FLUZONE HIGH-DOSE)    Need for zoster vaccination  -     Zoster Vaccine Recombinant IM          Subjective:      Patient ID: Abhijit Cardenas is a 80 y o  male  He is here today for follow-up on his chronic conditions    Patient notes that he has been having a nagging discomfort in his left lower back radiating into his left leg  This tends to come and go  This tends to be more nagging but occasionally gets sharp moderately severe pain  Denies any numbness, tingling, or weakness of the leg  He is seen Dr Mikayla Lau for chiropractic care  He took an x-ray of that area as well as spinal scan which showed some curvature  He has been using ice on the lower back hip area and heat in the calf  He also had been sitting in a chair that was quite soft his changed to a firmer or supportive chair  He has been receiving chiropractic therapy and he believes it has been helping    He reports that on 2 occasions in sequence will days recently [place] that he had an upper chest discomfort  He describes this as a tightness that went across his upper chest bilaterally  There was no radiation to his arms neck or jaw  There were no associated symptoms such as dyspnea, diaphoresis, fatigue, lightheadedness  It resolved after 5-10 minutes once he reached his car and sat down for short time    He does this walk daily and previously had not had any difficulty nor does he report any difficulty walking stairs at his facility  He does have a history of bypass surgery in 1999    Denies any reflux or dyspepsia    Otherwise doing well      The following portions of the patient's history were reviewed and updated as appropriate: allergies, current medications, past family history, past medical history, past social history, past surgical history and problem list     Review of Systems   Constitutional:        See HPI   HENT: Negative for dental problem and hearing loss  Eyes:        Cataract surgery December and January - vision much improved   Respiratory: Negative for cough and shortness of breath  Cardiovascular: Positive for chest pain (see HPI)  Negative for palpitations and leg swelling  Gastrointestinal:        BM regular    No dyspepsia or GERD   Genitourinary:        No nocturia   Musculoskeletal: Positive for back pain (see HPI)  Skin: Negative for rash  Neurological: Negative for dizziness, tremors, light-headedness and headaches (never)  Psychiatric/Behavioral: Negative for confusion, decreased concentration, dysphoric mood and sleep disturbance (goes to bed 11;30-12m and gets up 6:30-7am / feels rested)  The patient is not nervous/anxious  Objective:      /68 (BP Location: Right arm, Patient Position: Sitting, Cuff Size: Standard)   Pulse 92   Temp 97 7 °F (36 5 °C) (Tympanic)   Resp 16   Ht 5' 4 25" (1 632 m)   Wt 85 7 kg (189 lb)   SpO2 97%   BMI 32 19 kg/m²          Physical Exam   Constitutional: He is oriented to person, place, and time  He appears well-developed and well-nourished  No distress  Eyes:   glasses   Neck: Normal range of motion  Neck supple  No JVD present  No thyromegaly present  Cardiovascular: Normal rate  No murmur heard  No carotid bruits    Ectopy but generally regular   Pulmonary/Chest: Effort normal and breath sounds normal    Musculoskeletal: He exhibits no edema     Neurological: He is alert and oriented to person, place, and time  Skin: No rash noted  Psychiatric: He has a normal mood and affect  His behavior is normal  Judgment and thought content normal    Nursing note and vitals reviewed

## 2019-10-17 NOTE — PROGRESS NOTES
Chief Complaint   Patient presents with   [facility] Wellness Visit     Subsequent   Follow-up     6 months   Flu Vaccine    Immunizations     2nd Shingrix vaccine  Health Maintenance   Topic Date Due    SLP PLAN OF CARE  09/01/1932    DTaP,Tdap,and Td Vaccines (1 - Tdap) 06/29/2013    INFLUENZA VACCINE  07/01/2019    Fall Risk  09/27/2019    Medicare Annual Wellness Visit (AWV)  09/27/2019    Depression Screening PHQ  04/04/2020    BMI: Followup Plan  04/04/2020    BMI: Adult  06/14/2020    Pneumococcal Vaccine: 65+ Years  Completed    Pneumococcal Vaccine: Pediatrics (0 to 5 Years) and At-Risk Patients (6 to 59 Years)  Aged Out    HEPATITIS B VACCINES  Aged Out     BMI Counseling: Body mass index is 32 19 kg/m²  The BMI is above normal  Nutrition recommendations include decreasing overall calorie intake, 3-5 servings of fruits/vegetables daily and moderation in carbohydrate intake  Exercise recommendations include exercising 3-5 times per week  Does try to watch diet  Walks most days        Assessment and Plan:     His significant other Raul Arenas is with him throughout the visit today    Mini cog was 4 5/5  He reports no cognitive difficulties  Depression screen was negative    He is up-to-date on any appropriate screenings for age    He will receive a flu shot today    He will receive his 2nd Shingrix vaccine today    He previously had both pneumococcal vaccines    He was encouraged continue a healthy lifestyle both in diet and exercise    No specific risks were identified other than those related with age    Problem List Items Addressed This Visit        Endocrine    Impaired fasting glucose       Cardiovascular and Mediastinum    CAD (coronary artery disease) (Chronic)    Cerebrovascular accident (CVA) due to embolism of left middle cerebral artery ([address] )    Benign essential hypertension - Primary    Typical atrial flutter ([address] )    Ischemic cardiomyopathy       Other    Dyslipidemia    Hx of CABG Medicare annual wellness visit, subsequent    Obesity (BMI 30 0-34  9)    Chronic anticoagulation           Preventive health issues were discussed with patient, and age appropriate screening tests were ordered as noted in patient's After Visit Summary  Personalized health advice and appropriate referrals for health education or preventive services given if needed, as noted in patient's After Visit Summary  History of Present Illness:     Patient presents for Medicare Annual Wellness visit    Continues to live independently in an apartment [facility]  He remains active  He drives without difficulty  He tries to maintain a reasonably healthy diet  He walks pretty much daily along the [facility] as well as in his building  He uses the stairs in lieu of the elevator  Patient Care Team:  Lj Garrison MD as PCP - General  Anna Reyes MD (Urology)  Maulik Mena DO (Cardiology)     Problem List:     Patient Active Problem List   Diagnosis    Cerebrovascular accident (CVA) due to embolism of left middle cerebral artery ([address] )    Benign essential hypertension    Dyslipidemia    CAD (coronary artery disease)    Typical atrial flutter ([address] )    Hx of CABG    Ischemic cardiomyopathy    Impaired fasting glucose    Psychophysiological insomnia    Medicare annual wellness visit, subsequent    Subacute left lumbar radiculopathy    Obesity (BMI 30 0-34  9)    Chronic anticoagulation      Past Medical and Surgical History:     Past Medical History:   Diagnosis Date    Acute myocardial infarction ([address] )     Allergic rhinitis     Anxiety     Bimalleolar fracture of left ankle     CAD (coronary artery disease)     Erectile dysfunction of non-organic origin     Hematuria     workup was negative and felt to be benign    Herpes zoster with complication     Hyperlipidemia     Hypertension     Impaired fasting glucose     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    Prostatic hypertrophy     benign    Stroke [facility])      Past Surgical History:   Procedure Laterality Date    ANKLE FRACTURE SURGERY Left     CORONARY ARTERY BYPASS GRAFT        Family History:     Family History   Problem Relation Age of Onset    Cancer Mother     Hypertension Mother         essential    Pancreatic cancer Mother       Social History:     Social History     Socioeconomic History    Marital status:       Spouse name: moncho    Number of children: Not on file    Years of education: Not on file    Highest education level: Not on file   Occupational History    Occupation: retired     Comment: clergy   Social Needs    Financial resource strain: Not on file    Food insecurity:     Worry: Not on file     Inability: Not on file   Simplist needs:     Medical: Not on file     Non-medical: Not on file   Tobacco Use    Smoking status: Former Smoker    Smokeless tobacco: Never Used   Substance and Sexual Activity    Alcohol use: Yes     Comment: social- per allscripts    Drug use: No    Sexual activity: Not on file   Lifestyle    Physical activity:     Days per week: Not on file     Minutes per session: Not on file    Stress: Not on file   Relationships    Social connections:     Talks on phone: Not on file     Gets together: Not on file     Attends Quaker service: Not on file     Active member of club or organization: Not on file     Attends meetings of clubs or organizations: Not on file     Relationship status: Not on file    Intimate partner violence:     Fear of current or ex partner: Not on file     Emotionally abused: Not on file     Physically abused: Not on file     Forced sexual activity: Not on file   Other Topics Concern    Not on file   Social History Narrative    Death in the family- spousemoncho  after a prolonged francis with lymphoma     Exercises regularly       Medications and Allergies:     Current Outpatient Medications   Medication Sig Dispense Refill    apixaban (ELIQUIS) 5 mg Take 1 tablet (5 mg total) by mouth 2 (two) times a day 84 tablet 0    aspirin (ECOTRIN LOW STRENGTH) 81 mg EC tablet Take 81 mg by mouth daily      atorvastatin (LIPITOR) 40 mg tablet Take 1 tablet (40 mg total) by mouth daily 90 tablet 3    Coenzyme Q10 200 MG capsule Take 200 mg by mouth daily      furosemide (LASIX) 20 mg tablet Take 1 tablet (20 mg total) by mouth daily 90 tablet 3    gatifloxacin (ZYMAXID) 0 5 %       Glucosamine-Chondroit-Vit C-Mn (GLUCOSAMINE CHONDROITIN COMPLX) CAPS Take 1 capsule by mouth daily      magnesium gluconate (MAGONATE) 500 mg tablet Take 500 mg by mouth daily      metoprolol tartrate (LOPRESSOR) 100 mg tablet TAKE 1/2 TABLET (50 MG TOTAL) BY MOUTH EVERY 12 (TWELVE) HOURS 180 tablet 3    potassium chloride (KLOR-CON M20) 20 mEq tablet Take 1 tablet (20 mEq total) by mouth daily for 360 days 90 tablet 3    prednisoLONE acetate (PRED FORTE) 1 % ophthalmic suspension       Probiotic Product (PROBIOTIC COLON SUPPORT) CAPS Take 1 capsule by mouth daily      selenium 200 mcg Take 200 mcg by mouth daily       No current facility-administered medications for this visit  Allergies   Allergen Reactions    Penicillins Edema and Rash     Reaction Date: [id number]; Category: Allergy; Annotation - [id number]: Severe reaction with hospitaization at [facility]      Immunizations:     Immunization History   Administered Date(s) Administered    Influenza Split High Dose Preservative Free IM 09/12/2009, 11/01/2010, 11/17/2011, 10/05/2014, 09/14/2015, 09/08/2016, 10/26/2017    Influenza TIV (IM) 11/17/2011, 11/15/2012, 12/09/2013    Influenza, high dose seasonal 0 5 mL 09/27/2018    Pneumococcal Conjugate 13-Valent 07/13/2015    Pneumococcal Polysaccharide PPV23 01/01/2004    Td (adult), adsorbed 04/13/2009, 04/13/2009, 06/28/2013    Zoster Vaccine Recombinant 04/19/2019      Health Maintenance:      There are no preventive care reminders to display for this patient  Topic Date Due    DTaP,Tdap,and Td Vaccines (1 - Tdap) 06/29/2013    INFLUENZA VACCINE  07/01/2019      Medicare Health Risk Assessment:     There were no vitals taken for this visit  Lashay Lea is here for his Subsequent Wellness visit  Last Medicare Wellness visit information reviewed, patient interviewed and updates made to the record today  Health Risk Assessment:   Patient rates overall health as good  Patient feels that their physical health rating is same  Eyesight was rated as same  Hearing was rated as same  Patient feels that their emotional and mental health rating is same  Pain experienced in the last 7 days has been some  Patient's pain rating has been 3/10  Depression Screening:   PHQ-2 Score: 0      Fall Risk Screening: In the past year, patient has experienced: no history of falling in past year      Home Safety:  Patient does not have trouble with stairs inside or outside of their home  Patient has working smoke alarms and has working carbon monoxide detector  Home safety hazards include: none  Nutrition:   Current diet is Low Cholesterol, No Added Salt and Limited junk food  Medications:   Patient is currently taking over-the-counter supplements  OTC medications include: see medication list  Patient is able to manage medications  Activities of Daily Living (ADLs)/Instrumental Activities of Daily Living (IADLs):   Walk and transfer into and out of bed and chair?: Yes  Dress and groom yourself?: Yes    Bathe or shower yourself?: Yes    Feed yourself? Yes  Do your laundry/housekeeping?: Yes  Manage your money, pay your bills and track your expenses?: Yes  Make your own meals?: Yes    Do your own shopping?: Yes    Advance Care Planning:     Durable POA for healthcare:  Yes    Advanced directive: Yes    End of Life Decisions reviewed with patient: Yes    Provider agrees with end of life decisions: Yes      Comments: Bennett Schneider    Desires CPR, etc  unless no quality of life    PREVENTIVE SCREENINGS      Cardiovascular Screening:    General: Screening Current      Diabetes Screening:     General: Screening Current      Colorectal Cancer Screening:     General: Screening Not Indicated      Prostate Cancer Screening:    General: Screening Not Indicated      Abdominal Aortic Aneurysm (AAA) Screening:    Risk factors include: tobacco use        Camilo Avalso MD

## 2019-10-17 NOTE — ASSESSMENT & PLAN NOTE
He is aware of the need to be diligent with his diet and to continue to exercise regularly    Ideally he could get his weight below BMI of 30 would be great

## 2019-10-17 NOTE — ASSESSMENT & PLAN NOTE
History of CABG 1999  Recent symptoms suggestive of exertional angina  Will ask Cardiology to follow up and evaluate    He is on beta-blocker, statin, aspirin

## 2019-10-17 NOTE — ASSESSMENT & PLAN NOTE
Cardiology follows  Holter monitor 2018 showed sinus rhythm with periods of atrial fibrillation and flutter that were rather frequent  Is on Eliquis  Is on metoprolol

## 2019-10-19 ENCOUNTER — APPOINTMENT (EMERGENCY)
Dept: RADIOLOGY | Facility: HOSPITAL | Age: 84
DRG: 224 | End: 2019-10-19
Payer: COMMERCIAL

## 2019-10-19 ENCOUNTER — HOSPITAL ENCOUNTER (INPATIENT)
Facility: HOSPITAL | Age: 84
LOS: 6 days | Discharge: NURSING, REHAB OR LONG-TERM CARE | DRG: 224 | End: 2019-10-25
Attending: EMERGENCY MEDICINE | Admitting: INTERNAL MEDICINE
Payer: COMMERCIAL

## 2019-10-19 DIAGNOSIS — I47.2 VENTRICULAR TACHYCARDIA (HCC): Primary | ICD-10-CM

## 2019-10-19 DIAGNOSIS — R07.9 CHEST PAIN, UNSPECIFIED TYPE: ICD-10-CM

## 2019-10-19 DIAGNOSIS — R77.8 ELEVATED TROPONIN: ICD-10-CM

## 2019-10-19 DIAGNOSIS — R31.9 HEMATURIA: ICD-10-CM

## 2019-10-19 DIAGNOSIS — I25.10 CORONARY ARTERY DISEASE INVOLVING NATIVE CORONARY ARTERY OF NATIVE HEART, ANGINA PRESENCE UNSPECIFIED: Chronic | ICD-10-CM

## 2019-10-19 PROBLEM — I47.20 VENTRICULAR TACHYCARDIA: Status: ACTIVE | Noted: 2019-10-19

## 2019-10-19 PROBLEM — I21.4 NSTEMI (NON-ST ELEVATED MYOCARDIAL INFARCTION) (HCC): Status: ACTIVE | Noted: 2019-10-19

## 2019-10-19 PROBLEM — N17.9 ACUTE KIDNEY INJURY (HCC): Status: ACTIVE | Noted: 2019-10-19

## 2019-10-19 LAB
ALBUMIN SERPL BCP-MCNC: 4.2 G/DL (ref 3.5–5)
ALP SERPL-CCNC: 75 U/L (ref 46–116)
ALT SERPL W P-5'-P-CCNC: 25 U/L (ref 12–78)
ANION GAP SERPL CALCULATED.3IONS-SCNC: 11 MMOL/L (ref 4–13)
APTT PPP: 28 SECONDS (ref 23–37)
AST SERPL W P-5'-P-CCNC: 46 U/L (ref 5–45)
BASOPHILS # BLD AUTO: 0.03 THOUSANDS/ΜL (ref 0–0.1)
BASOPHILS NFR BLD AUTO: 0 % (ref 0–1)
BILIRUB SERPL-MCNC: 0.83 MG/DL (ref 0.2–1)
BUN SERPL-MCNC: 20 MG/DL (ref 5–25)
CALCIUM SERPL-MCNC: 9.2 MG/DL (ref 8.3–10.1)
CHLORIDE SERPL-SCNC: 105 MMOL/L (ref 100–108)
CO2 SERPL-SCNC: 24 MMOL/L (ref 21–32)
CREAT SERPL-MCNC: 1.67 MG/DL (ref 0.6–1.3)
EOSINOPHIL # BLD AUTO: 0.08 THOUSAND/ΜL (ref 0–0.61)
EOSINOPHIL NFR BLD AUTO: 1 % (ref 0–6)
ERYTHROCYTE [DISTWIDTH] IN BLOOD BY AUTOMATED COUNT: 13.6 % (ref 11.6–15.1)
GFR SERPL CREATININE-BSD FRML MDRD: 36 ML/MIN/1.73SQ M
GLUCOSE SERPL-MCNC: 251 MG/DL (ref 65–140)
HCT VFR BLD AUTO: 42.1 % (ref 36.5–49.3)
HGB BLD-MCNC: 13.4 G/DL (ref 12–17)
IMM GRANULOCYTES # BLD AUTO: 0.04 THOUSAND/UL (ref 0–0.2)
IMM GRANULOCYTES NFR BLD AUTO: 0 % (ref 0–2)
INR PPP: 1.49 (ref 0.84–1.19)
LYMPHOCYTES # BLD AUTO: 1.44 THOUSANDS/ΜL (ref 0.6–4.47)
LYMPHOCYTES NFR BLD AUTO: 14 % (ref 14–44)
MCH RBC QN AUTO: 30.4 PG (ref 26.8–34.3)
MCHC RBC AUTO-ENTMCNC: 31.8 G/DL (ref 31.4–37.4)
MCV RBC AUTO: 96 FL (ref 82–98)
MONOCYTES # BLD AUTO: 0.52 THOUSAND/ΜL (ref 0.17–1.22)
MONOCYTES NFR BLD AUTO: 5 % (ref 4–12)
NEUTROPHILS # BLD AUTO: 8.56 THOUSANDS/ΜL (ref 1.85–7.62)
NEUTS SEG NFR BLD AUTO: 80 % (ref 43–75)
NRBC BLD AUTO-RTO: 0 /100 WBCS
PLATELET # BLD AUTO: 167 THOUSANDS/UL (ref 149–390)
PMV BLD AUTO: 10.6 FL (ref 8.9–12.7)
POTASSIUM SERPL-SCNC: 4.3 MMOL/L (ref 3.5–5.3)
PROT SERPL-MCNC: 7.6 G/DL (ref 6.4–8.2)
PROTHROMBIN TIME: 17.6 SECONDS (ref 11.6–14.5)
RBC # BLD AUTO: 4.41 MILLION/UL (ref 3.88–5.62)
SODIUM SERPL-SCNC: 140 MMOL/L (ref 136–145)
TROPONIN I SERPL-MCNC: 2.58 NG/ML
WBC # BLD AUTO: 10.67 THOUSAND/UL (ref 4.31–10.16)

## 2019-10-19 PROCEDURE — 99223 1ST HOSP IP/OBS HIGH 75: CPT | Performed by: INTERNAL MEDICINE

## 2019-10-19 PROCEDURE — 84439 ASSAY OF FREE THYROXINE: CPT | Performed by: INTERNAL MEDICINE

## 2019-10-19 PROCEDURE — 99285 EMERGENCY DEPT VISIT HI MDM: CPT | Performed by: EMERGENCY MEDICINE

## 2019-10-19 PROCEDURE — 80053 COMPREHEN METABOLIC PANEL: CPT | Performed by: EMERGENCY MEDICINE

## 2019-10-19 PROCEDURE — 84484 ASSAY OF TROPONIN QUANT: CPT | Performed by: EMERGENCY MEDICINE

## 2019-10-19 PROCEDURE — 36415 COLL VENOUS BLD VENIPUNCTURE: CPT | Performed by: EMERGENCY MEDICINE

## 2019-10-19 PROCEDURE — 93005 ELECTROCARDIOGRAM TRACING: CPT

## 2019-10-19 PROCEDURE — 99285 EMERGENCY DEPT VISIT HI MDM: CPT

## 2019-10-19 PROCEDURE — 84443 ASSAY THYROID STIM HORMONE: CPT | Performed by: INTERNAL MEDICINE

## 2019-10-19 PROCEDURE — 85610 PROTHROMBIN TIME: CPT | Performed by: EMERGENCY MEDICINE

## 2019-10-19 PROCEDURE — 85025 COMPLETE CBC W/AUTO DIFF WBC: CPT | Performed by: EMERGENCY MEDICINE

## 2019-10-19 PROCEDURE — 85730 THROMBOPLASTIN TIME PARTIAL: CPT | Performed by: EMERGENCY MEDICINE

## 2019-10-19 PROCEDURE — 83735 ASSAY OF MAGNESIUM: CPT | Performed by: INTERNAL MEDICINE

## 2019-10-19 PROCEDURE — 71046 X-RAY EXAM CHEST 2 VIEWS: CPT

## 2019-10-19 RX ORDER — HEPARIN SODIUM 1000 [USP'U]/ML
4000 INJECTION, SOLUTION INTRAVENOUS; SUBCUTANEOUS AS NEEDED
Status: DISCONTINUED | OUTPATIENT
Start: 2019-10-19 | End: 2019-10-21

## 2019-10-19 RX ORDER — HEPARIN SODIUM 10000 [USP'U]/100ML
3-20 INJECTION, SOLUTION INTRAVENOUS
Status: DISCONTINUED | OUTPATIENT
Start: 2019-10-19 | End: 2019-10-21

## 2019-10-19 RX ORDER — HEPARIN SODIUM 1000 [USP'U]/ML
4000 INJECTION, SOLUTION INTRAVENOUS; SUBCUTANEOUS ONCE
Status: COMPLETED | OUTPATIENT
Start: 2019-10-19 | End: 2019-10-19

## 2019-10-19 RX ORDER — HEPARIN SODIUM 1000 [USP'U]/ML
2000 INJECTION, SOLUTION INTRAVENOUS; SUBCUTANEOUS AS NEEDED
Status: DISCONTINUED | OUTPATIENT
Start: 2019-10-19 | End: 2019-10-21

## 2019-10-19 RX ADMIN — HEPARIN SODIUM AND DEXTROSE 11.8 UNITS/KG/HR: 10000; 5 INJECTION INTRAVENOUS at 23:34

## 2019-10-19 RX ADMIN — HEPARIN SODIUM 4000 UNITS: 1000 INJECTION, SOLUTION INTRAVENOUS; SUBCUTANEOUS at 23:34

## 2019-10-20 ENCOUNTER — APPOINTMENT (INPATIENT)
Dept: NON INVASIVE DIAGNOSTICS | Facility: HOSPITAL | Age: 84
DRG: 224 | End: 2019-10-20
Payer: COMMERCIAL

## 2019-10-20 LAB
ANION GAP SERPL CALCULATED.3IONS-SCNC: 8 MMOL/L (ref 4–13)
APTT PPP: 104 SECONDS (ref 23–37)
APTT PPP: 57 SECONDS (ref 23–37)
APTT PPP: 61 SECONDS (ref 23–37)
ATRIAL RATE: 340 BPM
ATRIAL RATE: 416 BPM
ATRIAL RATE: 84 BPM
ATRIAL RATE: 92 BPM
BACTERIA UR QL AUTO: ABNORMAL /HPF
BILIRUB UR QL STRIP: NEGATIVE
BUN SERPL-MCNC: 27 MG/DL (ref 5–25)
CALCIUM SERPL-MCNC: 9.4 MG/DL (ref 8.3–10.1)
CHLORIDE SERPL-SCNC: 105 MMOL/L (ref 100–108)
CHOLEST SERPL-MCNC: 136 MG/DL (ref 50–200)
CLARITY UR: ABNORMAL
CO2 SERPL-SCNC: 26 MMOL/L (ref 21–32)
COLOR UR: YELLOW
CREAT SERPL-MCNC: 1.29 MG/DL (ref 0.6–1.3)
ERYTHROCYTE [DISTWIDTH] IN BLOOD BY AUTOMATED COUNT: 13.7 % (ref 11.6–15.1)
EST. AVERAGE GLUCOSE BLD GHB EST-MCNC: 134 MG/DL
GFR SERPL CREATININE-BSD FRML MDRD: 50 ML/MIN/1.73SQ M
GLUCOSE SERPL-MCNC: 119 MG/DL (ref 65–140)
GLUCOSE UR STRIP-MCNC: NEGATIVE MG/DL
HBA1C MFR BLD: 6.3 % (ref 4.2–6.3)
HCT VFR BLD AUTO: 41.1 % (ref 36.5–49.3)
HDLC SERPL-MCNC: 58 MG/DL (ref 40–60)
HGB BLD-MCNC: 13.2 G/DL (ref 12–17)
HGB UR QL STRIP.AUTO: ABNORMAL
HYALINE CASTS #/AREA URNS LPF: ABNORMAL /LPF
KETONES UR STRIP-MCNC: NEGATIVE MG/DL
LDLC SERPL CALC-MCNC: 65 MG/DL (ref 0–100)
LEUKOCYTE ESTERASE UR QL STRIP: NEGATIVE
MAGNESIUM SERPL-MCNC: 2.1 MG/DL (ref 1.6–2.6)
MAGNESIUM SERPL-MCNC: 2.2 MG/DL (ref 1.6–2.6)
MCH RBC QN AUTO: 30.1 PG (ref 26.8–34.3)
MCHC RBC AUTO-ENTMCNC: 32.1 G/DL (ref 31.4–37.4)
MCV RBC AUTO: 94 FL (ref 82–98)
NITRITE UR QL STRIP: NEGATIVE
NON-SQ EPI CELLS URNS QL MICRO: ABNORMAL /HPF
P AXIS: 60 DEGREES
P AXIS: 85 DEGREES
PH UR STRIP.AUTO: 6 [PH]
PLATELET # BLD AUTO: 172 THOUSANDS/UL (ref 149–390)
PMV BLD AUTO: 10.9 FL (ref 8.9–12.7)
POTASSIUM SERPL-SCNC: 4.5 MMOL/L (ref 3.5–5.3)
PR INTERVAL: 198 MS
PR INTERVAL: 198 MS
PROT UR STRIP-MCNC: ABNORMAL MG/DL
QRS AXIS: -45 DEGREES
QRS AXIS: -53 DEGREES
QRS AXIS: -62 DEGREES
QRS AXIS: -89 DEGREES
QRSD INTERVAL: 72 MS
QRSD INTERVAL: 92 MS
QRSD INTERVAL: 92 MS
QRSD INTERVAL: 94 MS
QT INTERVAL: 290 MS
QT INTERVAL: 328 MS
QT INTERVAL: 332 MS
QT INTERVAL: 366 MS
QTC INTERVAL: 392 MS
QTC INTERVAL: 410 MS
QTC INTERVAL: 432 MS
QTC INTERVAL: 437 MS
RBC # BLD AUTO: 4.39 MILLION/UL (ref 3.88–5.62)
RBC #/AREA URNS AUTO: ABNORMAL /HPF
SODIUM SERPL-SCNC: 139 MMOL/L (ref 136–145)
SP GR UR STRIP.AUTO: 1.01 (ref 1–1.03)
T WAVE AXIS: 139 DEGREES
T WAVE AXIS: 155 DEGREES
T WAVE AXIS: 156 DEGREES
T WAVE AXIS: 171 DEGREES
T4 FREE SERPL-MCNC: 0.88 NG/DL (ref 0.76–1.46)
TRIGL SERPL-MCNC: 65 MG/DL
TROPONIN I SERPL-MCNC: >40 NG/ML
TSH SERPL DL<=0.05 MIU/L-ACNC: 6.61 UIU/ML (ref 0.36–3.74)
UROBILINOGEN UR QL STRIP.AUTO: 0.2 E.U./DL
VENTRICULAR RATE: 107 BPM
VENTRICULAR RATE: 110 BPM
VENTRICULAR RATE: 84 BPM
VENTRICULAR RATE: 92 BPM
WBC # BLD AUTO: 12.53 THOUSAND/UL (ref 4.31–10.16)
WBC #/AREA URNS AUTO: ABNORMAL /HPF

## 2019-10-20 PROCEDURE — 93010 ELECTROCARDIOGRAM REPORT: CPT | Performed by: INTERNAL MEDICINE

## 2019-10-20 PROCEDURE — 99223 1ST HOSP IP/OBS HIGH 75: CPT | Performed by: INTERNAL MEDICINE

## 2019-10-20 PROCEDURE — 81001 URINALYSIS AUTO W/SCOPE: CPT | Performed by: INTERNAL MEDICINE

## 2019-10-20 PROCEDURE — 84484 ASSAY OF TROPONIN QUANT: CPT | Performed by: INTERNAL MEDICINE

## 2019-10-20 PROCEDURE — 93306 TTE W/DOPPLER COMPLETE: CPT

## 2019-10-20 PROCEDURE — 83735 ASSAY OF MAGNESIUM: CPT | Performed by: INTERNAL MEDICINE

## 2019-10-20 PROCEDURE — 85730 THROMBOPLASTIN TIME PARTIAL: CPT | Performed by: INTERNAL MEDICINE

## 2019-10-20 PROCEDURE — 93005 ELECTROCARDIOGRAM TRACING: CPT

## 2019-10-20 PROCEDURE — 93306 TTE W/DOPPLER COMPLETE: CPT | Performed by: INTERNAL MEDICINE

## 2019-10-20 PROCEDURE — 80048 BASIC METABOLIC PNL TOTAL CA: CPT | Performed by: INTERNAL MEDICINE

## 2019-10-20 PROCEDURE — 80061 LIPID PANEL: CPT | Performed by: INTERNAL MEDICINE

## 2019-10-20 PROCEDURE — 99232 SBSQ HOSP IP/OBS MODERATE 35: CPT | Performed by: INTERNAL MEDICINE

## 2019-10-20 PROCEDURE — 83036 HEMOGLOBIN GLYCOSYLATED A1C: CPT | Performed by: INTERNAL MEDICINE

## 2019-10-20 PROCEDURE — 85027 COMPLETE CBC AUTOMATED: CPT | Performed by: INTERNAL MEDICINE

## 2019-10-20 RX ORDER — ASPIRIN 81 MG/1
81 TABLET ORAL DAILY
Status: DISCONTINUED | OUTPATIENT
Start: 2019-10-20 | End: 2019-10-25 | Stop reason: HOSPADM

## 2019-10-20 RX ORDER — TRAMADOL HYDROCHLORIDE 50 MG/1
50 TABLET ORAL EVERY 6 HOURS PRN
Status: DISCONTINUED | OUTPATIENT
Start: 2019-10-20 | End: 2019-10-25 | Stop reason: HOSPADM

## 2019-10-20 RX ORDER — LANOLIN ALCOHOL/MO/W.PET/CERES
3 CREAM (GRAM) TOPICAL
Status: DISCONTINUED | OUTPATIENT
Start: 2019-10-20 | End: 2019-10-25 | Stop reason: HOSPADM

## 2019-10-20 RX ORDER — ACETAMINOPHEN 325 MG/1
650 TABLET ORAL EVERY 6 HOURS PRN
Status: DISCONTINUED | OUTPATIENT
Start: 2019-10-20 | End: 2019-10-25 | Stop reason: HOSPADM

## 2019-10-20 RX ORDER — METOPROLOL TARTRATE 50 MG/1
50 TABLET, FILM COATED ORAL EVERY 12 HOURS SCHEDULED
Status: DISCONTINUED | OUTPATIENT
Start: 2019-10-20 | End: 2019-10-20

## 2019-10-20 RX ORDER — CHOLECALCIFEROL (VITAMIN D3) 125 MCG
200 CAPSULE ORAL DAILY
Status: DISCONTINUED | OUTPATIENT
Start: 2019-10-20 | End: 2019-10-25 | Stop reason: HOSPADM

## 2019-10-20 RX ORDER — SODIUM PHOSPHATE,MONO-DIBASIC 19G-7G/118
500 ENEMA (ML) RECTAL DAILY
Status: DISCONTINUED | OUTPATIENT
Start: 2019-10-20 | End: 2019-10-25 | Stop reason: HOSPADM

## 2019-10-20 RX ORDER — UREA 10 %
500 LOTION (ML) TOPICAL DAILY
Status: DISCONTINUED | OUTPATIENT
Start: 2019-10-20 | End: 2019-10-25 | Stop reason: HOSPADM

## 2019-10-20 RX ORDER — ATORVASTATIN CALCIUM 40 MG/1
40 TABLET, FILM COATED ORAL DAILY
Status: DISCONTINUED | OUTPATIENT
Start: 2019-10-20 | End: 2019-10-25 | Stop reason: HOSPADM

## 2019-10-20 RX ORDER — FUROSEMIDE 10 MG/ML
40 INJECTION INTRAMUSCULAR; INTRAVENOUS ONCE
Status: COMPLETED | OUTPATIENT
Start: 2019-10-20 | End: 2019-10-20

## 2019-10-20 RX ORDER — NITROGLYCERIN 0.4 MG/1
0.4 TABLET SUBLINGUAL
Status: DISCONTINUED | OUTPATIENT
Start: 2019-10-20 | End: 2019-10-25 | Stop reason: HOSPADM

## 2019-10-20 RX ORDER — METOPROLOL TARTRATE 5 MG/5ML
5 INJECTION INTRAVENOUS ONCE
Status: COMPLETED | OUTPATIENT
Start: 2019-10-20 | End: 2019-10-20

## 2019-10-20 RX ADMIN — METOPROLOL TARTRATE 25 MG: 25 TABLET ORAL at 10:05

## 2019-10-20 RX ADMIN — FUROSEMIDE 40 MG: 10 INJECTION, SOLUTION INTRAMUSCULAR; INTRAVENOUS at 17:21

## 2019-10-20 RX ADMIN — ATORVASTATIN CALCIUM 40 MG: 40 TABLET, FILM COATED ORAL at 08:55

## 2019-10-20 RX ADMIN — TRAMADOL HYDROCHLORIDE 50 MG: 50 TABLET, FILM COATED ORAL at 05:47

## 2019-10-20 RX ADMIN — ASPIRIN 81 MG: 81 TABLET, COATED ORAL at 08:55

## 2019-10-20 RX ADMIN — Medication 500 MG: at 08:54

## 2019-10-20 RX ADMIN — METOPROLOL TARTRATE 50 MG: 50 TABLET, FILM COATED ORAL at 08:55

## 2019-10-20 RX ADMIN — HEPARIN SODIUM AND DEXTROSE 9.8 UNITS/KG/HR: 10000; 5 INJECTION INTRAVENOUS at 20:57

## 2019-10-20 RX ADMIN — NITROGLYCERIN 0.4 MG: 0.4 TABLET SUBLINGUAL at 05:03

## 2019-10-20 RX ADMIN — Medication 200 MG: at 08:55

## 2019-10-20 RX ADMIN — METOPROLOL TARTRATE 5 MG: 5 INJECTION INTRAVENOUS at 05:05

## 2019-10-20 RX ADMIN — METOPROLOL TARTRATE 50 MG: 50 TABLET, FILM COATED ORAL at 01:42

## 2019-10-20 RX ADMIN — METOPROLOL TARTRATE 75 MG: 50 TABLET, FILM COATED ORAL at 21:05

## 2019-10-20 RX ADMIN — HEPARIN SODIUM 2000 UNITS: 1000 INJECTION, SOLUTION INTRAVENOUS; SUBCUTANEOUS at 21:06

## 2019-10-20 RX ADMIN — TICAGRELOR 180 MG: 90 TABLET ORAL at 14:58

## 2019-10-20 NOTE — ASSESSMENT & PLAN NOTE
· Had episode of symptomatic V-tach with hypotension pre-hospital   Cardioverted by EMS with resolution as patient was reportedly hypotensive  · Currently patient is chest pain-free, no further episodes noted on telemetry  Troponin elevation noted, EKG with possible new ST depressions in anterior leads  · Potassium level within normal limits, Mg, TSH/FT4 level to be obtained  · Will continue telemetry monitoring, SD2 admission      · Trend 2 further troponin and obtain serial EKGs; further plan for elevated troponin as below  · Will appreciate Cardiology consultation for consideration for further ischemic workup and possible pacemaker/ICD

## 2019-10-20 NOTE — H&P
H&P- [place] 9/1/1932, 80 y o  male MRN: [id number]    Unit/Bed#: ED 09 Encounter: [id number]    Primary Care Provider: Freida Guerra MD   Date and time admitted to hospital: 10/19/2019  9:46 PM        * Ventricular tachycardia [facility])  Assessment & Plan  · Had episode of symptomatic V-tach with hypotension pre-hospital   Cardioverted by EMS with resolution as patient was reportedly hypotensive  · Currently patient is chest pain-free, no further episodes noted on telemetry  Troponin elevation noted, EKG with possible new ST depressions in anterior leads  · Potassium level within normal limits, Mg, TSH/FT4 level to be obtained  · Will continue telemetry monitoring, SD2 admission  · Trend 2 further troponin and obtain serial EKGs; further plan for elevated troponin as below  · Will appreciate Cardiology consultation for consideration for further ischemic workup and possible pacemaker/ICD      Elevated troponin, concern NSTEMI  Assessment & Plan  · Troponin 2 58 on admission    Although patient was cardioverted cannot fully exclude ischemic event  · Patient is currently chest pain free at this time  · To be initiated on heparin GTT ACS protocol  · Trend 2 further troponin, serial EKGs  · Continue ASA and statin, beta-blocker with hold parameters  · Will appreciate Cardiology consultation as above; may need further ischemic workup however will defer to Cardiology recommendations    CAD (coronary artery disease)  Assessment & Plan  · S/P CABG in 1999  · Continue ASA, statin, beta-blocker as above    Ischemic cardiomyopathy  Assessment & Plan  · Echo 03/06/2018 revealing EF 45% with areas of apical inferior, apical septal, mid apical anterior, and mid anteroseptal akinesis  · With some persistent edema of left lower extremity, otherwise without orthopnea, shortness of breath at rest, or weight gain per patient  · Monitor daily weights, repeat echocardiogram in setting of above  · Will appreciate Cardiology consultation    Acute kidney injury ([facility] [address] )  Assessment & Plan  · POA, baseline appears 1 1-1 2  · Patient did have apparent episode of hypotension associated with V-tach episode  · Will hold nephrotoxins  · Measure PVR, bladder scan  Check UA with microscopic  · Will recheck creatinine with a m  Labs  · Aim to avoid further hypotension as able    Typical atrial flutter (HCC)  Assessment & Plan  · In sinus rhythm with occasional PVCs at this time  · Rate controlled on metoprolol with hold parameters  · Normally on Eliquis for anticoagulation  Will hold as patient on heparin GTT    Dyslipidemia  Assessment & Plan  · Continue statin therapy    Benign essential hypertension  Assessment & Plan  · With episode of hypotension pre-hospital  · Close monitoring of blood pressure per protocol      VTE Prophylaxis: Heparin Drip  / sequential compression device   Code Status:  Full code as discussed with patient  POLST: POLST form is not discussed and not completed at this time  Anticipated Length of Stay:  Patient will be admitted on an Inpatient basis with an anticipated length of stay of  greater than 2 midnights  Justification for Hospital Stay: Please see detailed plans noted above  Chief Complaint:     Chest pain, unstable V-tach  History of Present Illness:  Chidi Escudero is a 80 y o  male who has past medical history significant for CAD S/P CABG in 1999, ischemic cardiomyopathy with EF 45%, atrial flutter anticoagulated on Eliquis, hypertension, hyperlipidemia  He presents after episode of chest pain found to have unstable ventricular tachycardia  Patient reports that for approximately the past 1 week he had intermittent episodes of chest pain associated with exertion on long walks, resolved after approximately 5-10 minutes upon reaching car and sitting down  He was seen by his PCP for this with plan to obtain stress testing    This evening, however, patient had the sudden onset of substernal chest pressure without radiation without associated shortness of breath or dizziness/lightheadedness  On EMS arrival, patient apparently was found to have blood pressure 40/20 and in ventricular tachycardia  Patient was subsequently cardioverted with 100 joules with conversion into normal sinus rhythm and resolution of hypotension; additionally received 325mg ASA  On arrival to ED, patient was asymptomatic; found to have elevated troponin to 2 58; per discussion between ED physician and Cardiology was advised patient be initiated on heparin GTT  At the time my evaluation, patient remains chest pain-free, and currently denies any shortness of breath, lightheadedness, or lower extremity edema  Additionally denies any recent illnesses, fevers/chills, orthopnea, nausea/vomiting, or weight gain      Review of Systems:    Constitutional:  Denies fever or chills   Eyes:  Denies change in visual acuity   HENT:  Denies nasal congestion or sore throat   Respiratory:  Denies cough or shortness of breath   Cardiovascular:  Denies edema but endorsed chest pain and palpitations  GI:  Denies abdominal pain, nausea, vomiting, bloody stools or diarrhea   :  Denies dysuria   Musculoskeletal:  Denies back pain or joint pain   Integument:  Denies rash   Neurologic:  Denies headache, focal weakness or sensory changes   Endocrine:  Denies polyuria or polydipsia   Lymphatic:  Denies swollen glands   Psychiatric:  Denies depression or anxiety     Past Medical and Surgical History:   Past Medical History:   Diagnosis Date    Acute myocardial infarction ([address] )     Allergic rhinitis     Anxiety     Bimalleolar fracture of left ankle     CAD (coronary artery disease)     Erectile dysfunction of non-organic origin     Hematuria     workup was negative and felt to be benign    Herpes zoster with complication     Hyperlipidemia     Hypertension     Impaired fasting glucose     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)      Past Surgical History:   Procedure Laterality Date    ANKLE FRACTURE SURGERY Left     CORONARY ARTERY BYPASS GRAFT         Meds/Allergies:    Current Facility-Administered Medications:     heparin (porcine) 25,000 units in 250 mL infusion (premix), 3-20 Units/kg/hr (Order-Specific), Intravenous, Titrated, Erin Showers, DO, Last Rate: 10 mL/hr at 10/19/19 2334, 11 8 Units/kg/hr at 10/19/19 2334    heparin (porcine) injection 2,000 Units, 2,000 Units, Intravenous, PRN, Erin Showers, DO    heparin (porcine) injection 4,000 Units, 4,000 Units, Intravenous, PRN, Erin Showers, DO    Current Outpatient Medications:     apixaban (ELIQUIS) 5 mg, Take 1 tablet (5 mg total) by mouth 2 (two) times a day, Disp: 84 tablet, Rfl: 0    aspirin (ECOTRIN LOW STRENGTH) 81 mg EC tablet, Take 81 mg by mouth daily, Disp: , Rfl:     atorvastatin (LIPITOR) 40 mg tablet, Take 1 tablet (40 mg total) by mouth daily, Disp: 90 tablet, Rfl: 3    Coenzyme Q10 200 MG capsule, Take 200 mg by mouth daily, Disp: , Rfl:     furosemide (LASIX) 20 mg tablet, Take 1 tablet (20 mg total) by mouth daily, Disp: 90 tablet, Rfl: 3    Glucosamine-Chondroit-Vit C-Mn (GLUCOSAMINE CHONDROITIN COMPLX) CAPS, Take 1 capsule by mouth daily, Disp: , Rfl:     magnesium gluconate (MAGONATE) 500 mg tablet, Take 500 mg by mouth daily, Disp: , Rfl:     metoprolol tartrate (LOPRESSOR) 100 mg tablet, TAKE 1/2 TABLET (50 MG TOTAL) BY MOUTH EVERY 12 (TWELVE) HOURS, Disp: 180 tablet, Rfl: 3    potassium chloride (KLOR-CON M20) 20 mEq tablet, Take 1 tablet (20 mEq total) by mouth daily, Disp: 90 tablet, Rfl: 3    Probiotic Product (PROBIOTIC COLON SUPPORT) CAPS, Take 1 capsule by mouth daily, Disp: , Rfl:     selenium 200 mcg, Take 200 mcg by mouth daily, Disp: , Rfl:     gatifloxacin (ZYMAXID) 0 5 %, , Disp: , Rfl:     nitroglycerin (NITROSTAT) 0 4 mg SL tablet, Place 1 tablet (0 4 mg total) under the tongue every 5 (five) minutes as needed for chest pain, Disp: 25 tablet, Rfl: 1    prednisoLONE acetate (PRED FORTE) 1 % ophthalmic suspension, , Disp: , Rfl:     Allergies: Allergies   Allergen Reactions    Penicillins Edema and Rash     Reaction Date: [id number]; Category: Allergy; Annotation - [id number]: Severe reaction with hospitaization at [facility]     History:  Marital Status:      Substance Use History:   Social History     Substance and Sexual Activity   Alcohol Use Yes    Comment: social- per allscripts     Social History     Tobacco Use   Smoking Status Former Smoker   Smokeless Tobacco Never Used     Social History     Substance and Sexual Activity   Drug Use No       Family History:  Family History   Problem Relation Age of Onset    Cancer Mother     Hypertension Mother         essential    Pancreatic cancer Mother        Physical Exam:     Vitals:   Blood Pressure: 97/55 (10/19/19 2258)  Pulse: 89 (10/19/19 2258)  Temperature: 97 8 °F (36 6 °C) (10/19/19 2226)  Temp Source: Oral (10/19/19 2226)  Respirations: 17 (10/19/19 2258)  Height: 5' 4" (162 6 cm) (10/19/19 2149)  Weight - Scale: 85 7 kg (189 lb) (10/19/19 2149)  SpO2: 94 % (10/19/19 2258)    Constitutional:  Well developed, well nourished, no acute distress, non-toxic appearance   Eyes:  PERRL, conjunctiva normal   HENT:  Atraumatic, external ears normal, nose normal, oropharynx moist, no pharyngeal exudates  Neck- normal range of motion, no tenderness, supple   Respiratory:  No respiratory distress, normal breath sounds, no rales, no wheezing   Cardiovascular:  Normal rate, normal rhythm, no murmurs, no gallops, no rubs   GI:  Soft, nondistended, normal bowel sounds, nontender, no organomegaly, no mass, no rebound, no guarding   :  No costovertebral angle tenderness   Musculoskeletal:  No edema, no tenderness, no deformities   Back- no tenderness  Integument:  Well hydrated, no rash, cool toes bilaterally   Lymphatic:  No lymphadenopathy noted   Neurologic:  Alert &awake, communicative, CN 2-12 normal, normal motor function, normal sensory function, no focal deficits noted   Psychiatric:  Speech and behavior appropriate       Lab Results: I have personally reviewed pertinent reports  Results from last 7 days   Lab Units 10/19/19  2209   WBC Thousand/uL 10 67*   HEMOGLOBIN g/dL 13 4   HEMATOCRIT % 42 1   PLATELETS Thousands/uL 167   NEUTROS PCT % 80*   LYMPHS PCT % 14   MONOS PCT % 5   EOS PCT % 1     Results from last 7 days   Lab Units 10/19/19  2209   POTASSIUM mmol/L 4 3   CHLORIDE mmol/L 105   CO2 mmol/L 24   BUN mg/dL 20   CREATININE mg/dL 1 67*   CALCIUM mg/dL 9 2   ALK PHOS U/L 75   ALT U/L 25   AST U/L 46*     Results from last 7 days   Lab Units 10/19/19  2209   INR  1 49*       EKG: Sinus rhythm with occasional PVCs, persistent T-wave inversions in lateral leads, questionable ST depression in V2  Imaging: I have personally reviewed pertinent films in PACS    CXR personally reviewed, persistent evidence of infiltrates  Final radiologist read is pending  ** Please Note: Dragon 360 Dictation voice to text software was used in the creation of this document   **

## 2019-10-20 NOTE — PLAN OF CARE
Problem: Potential for Falls  Goal: Patient will remain free of falls  Description  INTERVENTIONS:  - Assess patient frequently for physical needs  -  Identify cognitive and physical deficits and behaviors that affect risk of falls    -  [place] fall precautions as indicated by assessment   - Educate patient/family on patient safety including physical limitations  - Instruct patient to call for assistance with activity based on assessment  - Modify environment to reduce risk of injury  - Consider OT/PT consult to assist with strengthening/mobility  Outcome: Progressing

## 2019-10-20 NOTE — ASSESSMENT & PLAN NOTE
· Had episode of symptomatic V-tach with hypotension pre-hospital   Cardioverted by EMS with resolution as patient was reportedly hypotensive    · Metoprolol dose increased  · For cardiac catheterization tomorrow  · Cardiology following, input noted

## 2019-10-20 NOTE — PROGRESS NOTES
Progress Note - Dianelys Barron 9/1/1932, 80 y o  male MRN: [id number]    Unit/Bed#: [facility] 409-01 Encounter: [id number]    Primary Care Provider: Avery Bowen MD   Date and time admitted to hospital: 10/19/2019  9:46 PM        * Ventricular tachycardia [facility])  Assessment & Plan  · Had episode of symptomatic V-tach with hypotension pre-hospital   Cardioverted by EMS with resolution as patient was reportedly hypotensive  · Metoprolol dose increased  · For cardiac catheterization tomorrow  · Cardiology following, input noted    Acute kidney injury [facility])  Assessment & Plan  Monitor kidney function closely  Avoid nephrotoxins  Monitor postvoid residuals    Elevated troponin, concern NSTEMI  Assessment & Plan  · IV heparin  · Continue beta-blocker  · For cardiac catheterization tomorrow  · Cardiology following    Ischemic cardiomyopathy  Assessment & Plan  EF 35%  Continue beta-blocker  Cardiology following    Typical atrial flutter (HCC)  Assessment & Plan  Metoprolol dose increased  IV heparin for anticoagulation  Cardiology following    CAD (coronary artery disease)  Assessment & Plan  · S/P CABG in 1999  · Continue ASA, statin, beta-blocker    Dyslipidemia  Assessment & Plan  · Continue statin therapy    Benign essential hypertension  Assessment & Plan  · Monitor blood pressures closely  · Avoid hypotension        VTE Pharmacologic Prophylaxis:   Pharmacologic: Heparin Drip  Mechanical VTE Prophylaxis in Place: Yes    Patient Centered Rounds: I have performed bedside rounds with nursing staff today  Discussions with Specialists or Other Care Team Provider:     Education and Discussions with Family / Patient:  Discussed with the patient, spouse at bedside updated    Time Spent for Care: 30 minutes  More than 50% of total time spent on counseling and coordination of care as described above      Current Length of Stay: 1 day(s)    Current Patient Status: Inpatient   Certification Statement: The patient will continue to require additional inpatient hospital stay due to As mentioned    Discharge Plan:  Cardiac catheterization tomorrow    Code Status: Level 1 - Full Code      Subjective:     Comfortably lying in bed  Denies chest pain shortness breath  History chart labs medications reviewed    Objective:     Vitals:   Temp (24hrs), Av 2 °F (36 8 °C), Min:97 5 °F (36 4 °C), Max:98 9 °F (37 2 °C)    Temp:  [97 5 °F (36 4 °C)-98 9 °F (37 2 °C)] 97 5 °F (36 4 °C)  HR:  [76-96] 96  Resp:  [17-22] 22  BP: ()/() 120/102  SpO2:  [90 %-95 %] 90 %  Body mass index is 32 43 kg/m²  Input and Output Summary (last 24 hours):        Intake/Output Summary (Last 24 hours) at 10/20/2019 1443  Last data filed at 10/20/2019 1427  Gross per 24 hour   Intake 424 33 ml   Output 0 ml   Net 424 33 ml       Physical Exam:     Physical Exam    Comfortably lying in bed  Neck supple  Lungs diminished breath sounds bilaterally  Heart sounds S1-S2 noted tachycardia noted, irregular  Abdomen soft nontender no organs palpable  Awake alert obeys simple commands  Moves all extremities  Pulses noted  No pedal edema  No rash    Additional Data:     Labs:    Results from last 7 days   Lab Units 10/20/19  0525 10/19/19  2209   WBC Thousand/uL 12 53* 10 67*   HEMOGLOBIN g/dL 13 2 13 4   HEMATOCRIT % 41 1 42 1   PLATELETS Thousands/uL 172 167   NEUTROS PCT %  --  80*   LYMPHS PCT %  --  14   MONOS PCT %  --  5   EOS PCT %  --  1     Results from last 7 days   Lab Units 10/20/19  0525 10/19/19  2209   SODIUM mmol/L 139 140   POTASSIUM mmol/L 4 5 4 3   CHLORIDE mmol/L 105 105   CO2 mmol/L 26 24   BUN mg/dL 27* 20   CREATININE mg/dL 1 29 1 67*   ANION GAP mmol/L 8 11   CALCIUM mg/dL 9 4 9 2   ALBUMIN g/dL  --  4 2   TOTAL BILIRUBIN mg/dL  --  0 83   ALK PHOS U/L  --  75   ALT U/L  --  25   AST U/L  --  46*   GLUCOSE RANDOM mg/dL 119 251*     Results from last 7 days   Lab Units 10/19/19  2209   INR  1 49*         Results from last 7 days   Lab Units 10/20/19  0525   HEMOGLOBIN A1C % 6 3               * I Have Reviewed All Lab Data Listed Above  * Additional Pertinent Lab Tests Reviewed: All Labs Within Last 24 Hours Reviewed    Imaging:    Imaging Reports Reviewed Today Include:  Imaging studies noted  Imaging Personally Reviewed by Myself Includes:     Recent Cultures (last 7 days):           Last 24 Hours Medication List:     Current Facility-Administered Medications:  acetaminophen 650 mg Oral Q6H PRN Jonna Hadley, DO    aspirin 81 mg Oral Daily Jonna Hadley, DO    atorvastatin 40 mg Oral Daily Jonna Hadley, DO    co-enzyme Q-10 200 mg Oral Daily Jonna Hadley, DO    glucosamine sulfate 500 mg Oral Daily Jonna Hadley, DO    heparin (porcine) 3-20 Units/kg/hr (Order-Specific) Intravenous Titrated Jonna Hadley, DO Last Rate: 9 8 Units/kg/hr (10/20/19 8620)   heparin (porcine) 2,000 Units Intravenous PRN Jonna Hadley, DO    heparin (porcine) 4,000 Units Intravenous PRN Jonna Hadley, DO    magnesium gluconate 500 mg Oral Daily Jonna Hadley, DO    metoprolol tartrate 75 mg Oral Q12H [address] Logan Frazier MD    nitroglycerin 0 4 mg Sublingual Q5 Min PRN Jonna Hadley, DO    perflutren lipid microsphere 0 6 mL/min Intravenous Once in imaging Jonna Hadley, DO    ticagrelor 180 mg Oral Once Logan Frazier MD    [START ON 10/21/2019] ticagrelor 90 mg Oral Q12H [address] Logan Frazier MD    traMADol 50 mg Oral Q6H PRN MARJAN Villa         Today, Patient Was Seen By: Brandt Li MD    ** Please Note: Dictation voice to text software may have been used in the creation of this document   **

## 2019-10-20 NOTE — CONSULTS
Cardiology Consult H&P  Edwige Castellanos 80 y o  male MRN: [id number]  Unit/Bed#: [facility] 409-01 Encounter: [id number]      Physician Requesting Consult: Nay Shipley MD  Reason for Consult: VT    HPI  80 y o  male with PMH of CAD s/p CABG (1999), ICM (EF45%), HTN, CVA, HLD, AFl who initially presented with chest pain found to be in VT with BP 39/20 s/p successful 100J cardioversion to SR by EMS  Patient states that about a week ago he was taking his usual mi and half walk with his friend their dog at the end of the walk started to feel upper substernal chest discomfort, like a tightness, that got better with rest  Since then he had followed up with his primary cardiologist who had scheduled a stress test for him  Otherwise prior to his episode of VT he was in his usual state of health with no change in his exercise tolerance  No distal knee on exertion, PND, orthopnea  No change in meds, diet, salt intake  Most recently prior to the episode of VT began to feel lower substernal pain, unable to characterize  His friend called EMS he, he was found to be in VT, and shocked  After this he does not think that he had any chest pain but from time of admission in the emergency department to time of writing on floors he began to feel worsening chest discomfort  Overnight perceived tramadol which did not completely resolve his pain  Sublingual nitroglycerin tablet 1 time likewise did not alleviate pain  This morning he denies chest pain  Overnight went into a flutter at around 30  He denies sensation of palpitations and at baseline does not typically feels when he goes into a flutter  Quit smoking 51 years ago  Denies drinking alcohol, favor based on this, illicit drugs, only use Tylenol PRN  EKG initially showed STD in I, II, aVL, and more significant in V6 compared to baseline that has since improved      PCP  Artie Rose MD    Cardiologist  Dr Ricardo Granda    Prior Cardiac Imaging  - TTE (3/16/19): akinesis of midapical anterior, mid anteroseptal, apical inferior, apical septal, and apex  - EKG (10/19/19, 21:53): STD I, II, aVL, more pronounced V6  - EKG (5/30/19): SR, TWI I, aVL, V4-V6, STD in V6    Physical exam  Gen:  Well-appearing, appears stated age  Psych:  Hip x3  Skin: intact  Cardiac: S1, S2, irregular rate, no S3 or S4 appreciated  No murmurs  +2 PT, radial pulses  No peripheral edema No carotid bruits  Warm extremities  Resp: CTABL  No crackles  MSK: 5/5 strength throughout muscle groups  Neuro: CN grossly intact  Sensory to light touch, pain, proprioception intact BL LE, UE  LN: no cervical LAD  Rheum: no joint deformities in UE or LE    A&P  80 y o  male with PMH of CAD s/p CABG (1999), ICM (EF45%), HTN, CVA, HLD, AFl who initially presented with chest pain found to be in VT    1  VT  - Given recent typical chest discomfort with exertion, history of CABG, elevated trops needs LHC Monday to evaluate progression of CAD  - aspirin 81mg, heparin gtt  - will load with Brilinta 180mg given unlikely to be surgical candidate  - will need ICD for secondary prevention  - f/u TTE  - SL NTg PRN  - trops >>40  continue to trend trops    2 CAD s/p CABG (1999), ICM (EF45%)  - aspirin 81mg, metop tartrate 50mg q12H will be increased as tolerates  - LDL (direct) 65, HDL 58, TG 65  Will place on atorvastatin 40mg  - no NSAIDs other than aspirin    3  Paroxysmal AFl  - typically remains in SR  Currently in AFl  - home eliquis 5mg BID on hold  Heparin gtt until cath Monday  - will need to be on triple antithrombotic therapy after cath if stent placed  - home metop 50mg q12  Will increase as tolerated    Please await attending physician final attestation  Yuly Iraheta MD  - PGY-4 Cardiology Fellow  - Tiger text enabled    ----              Review of Systems  ROS as noted above, otherwise 12 point review of systems was performed and is negative       Historical Information   Past Medical History:   Diagnosis Date    Acute myocardial infarction ([address] )     Allergic rhinitis     Anxiety     Bimalleolar fracture of left ankle     CAD (coronary artery disease)     Erectile dysfunction of non-organic origin     Hematuria     workup was negative and felt to be benign    Herpes zoster with complication     Hyperlipidemia     Hypertension     Impaired fasting glucose     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    Prostatic hypertrophy     benign    Stroke [facility])      Past Surgical History:   Procedure Laterality Date    ANKLE FRACTURE SURGERY Left     CORONARY ARTERY BYPASS GRAFT       Social History     Substance and Sexual Activity   Alcohol Use Yes    Comment: social- per allscripts     Social History     Substance and Sexual Activity   Drug Use No     Social History     Tobacco Use   Smoking Status Former Smoker   Smokeless Tobacco Never Used     Family History:   Family History   Problem Relation Age of Onset    Cancer Mother     Hypertension Mother         essential    Pancreatic cancer Mother        Meds/Allergies   Hospital Medications:   Current Facility-Administered Medications   Medication Dose Route Frequency    acetaminophen (TYLENOL) tablet 650 mg  650 mg Oral Q6H PRN    aspirin (ECOTRIN LOW STRENGTH) EC tablet 81 mg  81 mg Oral Daily    atorvastatin (LIPITOR) tablet 40 mg  40 mg Oral Daily    co-enzyme Q-10 capsule 200 mg  200 mg Oral Daily    glucosamine sulfate capsule 500 mg  500 mg Oral Daily    heparin (porcine) 25,000 units in 250 mL infusion (premix)  3-20 Units/kg/hr (Order-Specific) Intravenous Titrated    heparin (porcine) injection 2,000 Units  2,000 Units Intravenous PRN    heparin (porcine) injection 4,000 Units  4,000 Units Intravenous PRN    magnesium gluconate (MAGONATE) tablet 500 mg  500 mg Oral Daily    metoprolol tartrate (LOPRESSOR) tablet 50 mg  50 mg Oral Q12H [facility]    morphine injection 2 mg  2 mg Intravenous Q4H PRN    nitroglycerin (NITROSTAT) SL tablet 0 4 mg 0 4 mg Sublingual Q5 Min PRN    traMADol (ULTRAM) tablet 50 mg  50 mg Oral Q6H PRN     Home Medications:   Medications Prior to Admission   Medication    apixaban (ELIQUIS) 5 mg    aspirin (ECOTRIN LOW STRENGTH) 81 mg EC tablet    atorvastatin (LIPITOR) 40 mg tablet    Coenzyme Q10 200 MG capsule    furosemide (LASIX) 20 mg tablet    Glucosamine-Chondroit-Vit C-Mn (GLUCOSAMINE CHONDROITIN COMPLX) CAPS    magnesium gluconate (MAGONATE) 500 mg tablet    metoprolol tartrate (LOPRESSOR) 100 mg tablet    nitroglycerin (NITROSTAT) 0 4 mg SL tablet    potassium chloride (KLOR-CON M20) 20 mEq tablet    Probiotic Product (PROBIOTIC COLON SUPPORT) CAPS    selenium 200 mcg    gatifloxacin (ZYMAXID) 0 5 %    prednisoLONE acetate (PRED FORTE) 1 % ophthalmic suspension       Allergies   Allergen Reactions    Penicillins Edema and Rash     Reaction Date: [id number]; Category: Allergy; Annotation - [id number]: Severe reaction with hospitaization at \[facility] [place] [facility]\""       Objective   Vitals: Blood pressure 127/93, pulse 90, temperature 98 8 °F (37 1 °C), temperature source Oral, resp  rate 19, height 5' 4" (1 626 m), weight 85 7 kg (188 lb 15 oz), SpO2 91 %  Orthostatic Blood Pressures      Most Recent Value   Blood Pressure  127/93 [Map 109] filed at 10/20/2019 5341   Patient Position - Orthostatic VS  Lying filed at 10/20/2019 2469            Intake/Output Summary (Last 24 hours) at 10/20/2019 0754  Last data filed at 10/20/2019 0600  Gross per 24 hour   Intake 64 33 ml   Output --   Net 64 33 ml       Invasive Devices     Peripheral Intravenous Line            Peripheral IV 10/19/19 Right Hand less than 1 day    Peripheral IV 10/20/19 Right Forearm less than 1 day                Physical Exam    Lab Results: I have personally reviewed pertinent lab results      Results from last 7 days   Lab Units 10/20/19  0525 10/19/19  2209   WBC Thousand/uL 12 53* 10 67*   HEMOGLOBIN g/dL 13 2 13 4   HEMATOCRIT % 41 1 42 1   PLATELETS Thousands/uL 172 167     Results from last 7 days   Lab Units 10/20/19  0525 10/19/19  2209   POTASSIUM mmol/L 4 5 4 3   CHLORIDE mmol/L 105 105   CO2 mmol/L 26 24   BUN mg/dL 27* 20   CREATININE mg/dL 1 29 1 67*   CALCIUM mg/dL 9 4 9 2     Results from last 7 days   Lab Units 10/20/19  0525 10/19/19  2209   INR   --  1 49*   PTT seconds 104* 28     Results from last 7 days   Lab Units 10/20/19  0525 10/19/19  2209   MAGNESIUM mg/dL 2 2 2 1

## 2019-10-20 NOTE — ASSESSMENT & PLAN NOTE
· In sinus rhythm with occasional PVCs at this time  · Rate controlled on metoprolol with hold parameters  · Normally on Eliquis for anticoagulation    Will hold as patient on heparin GTT

## 2019-10-20 NOTE — PROGRESS NOTES
Pt  Exhibiting a-flutter periodically, noteable at 0320, on stepdown monitor  Tachycardic HR b/t 100-130's  Elevated troponin's ranging from 2 58 to >40  Pt  c/o of #2/10 CP, midsternal otherwise asymptomatic  Page out to [employer] with [employer], PA  Came to unit to review EKG and speak with pt  Ordered PRN metoprolol for HR >120 and advised giving SL Nitroglycerin  B/P 124/87   Will cont  To monitor

## 2019-10-20 NOTE — ED PROVIDER NOTES
History  Chief Complaint   Patient presents with    Chest Pain     Per EMS pt developed chest pain around 8:30pm, upon EMS arrival found to be in VTach, and BP 39/20 was shocked with 100J, HR responded to normal sinus  Upon arrival complaints of no pain  Ney Signs is a 80y o  year old male with PMH of CAD, cardiomyopathy presenting to the [facility] ED for chest pain  Patient started with chest pain 1 5 hours prior to arrival   Pain was substernal, described as a pressure which did not radiate to the arm/jaw/back  No associated lightheadedness or shortness of breath  Patient called EMS, who found patient to be in ventricular tachycardia  Patient also has noted to have blood pressure 40/20  Patient was cardioverted with 100 joules and converted into normal sinus rhythm  Patient provided 325 mg aspirin at the scene  At this time, patient has no chest pain, shortness of breath, lightheadedness  Patient on chronic daily aspirin and Eliquis  History provided by:  Patient   used: No    Chest Pain   Pain location:  Substernal area  Pain quality: pressure    Pain radiates to:  Does not radiate  Pain radiates to the back: no    Pain severity:  Moderate  Onset quality:  Sudden  Duration:  2 hours  Timing:  Constant  Progression:  Resolved  Chronicity:  New  Context: at rest    Context: not breathing    Relieved by: Cardioversion  Associated symptoms: palpitations    Associated symptoms: no abdominal pain, no altered mental status, no back pain, no cough, no dizziness, no fatigue, no fever, no headache, no lower extremity edema, no nausea, no near-syncope, no shortness of breath, no syncope, not vomiting and no weakness    Risk factors: coronary artery disease and surgery (Hx CABG)        Prior to Admission Medications   Prescriptions Last Dose Informant Patient Reported? Taking?    Coenzyme Q10 200 MG capsule 10/20/2019 at Unknown time Self Yes Yes   Sig: Take 200 mg by mouth daily   Glucosamine-Chondroit-Vit C-Mn ([facility]) CAPS 10/20/2019 at Unknown time Self Yes Yes   Sig: Take 1 capsule by mouth daily   Probiotic Product (PROBIOTIC COLON SUPPORT) CAPS 10/20/2019 at Unknown time Self Yes Yes   Sig: Take 1 capsule by mouth daily   apixaban (ELIQUIS) 5 mg 10/20/2019 at Unknown time Self No Yes   Sig: Take 1 tablet (5 mg total) by mouth 2 (two) times a day   aspirin (ECOTRIN LOW STRENGTH) 81 mg EC tablet 10/20/2019 at Unknown time Self Yes Yes   Sig: Take 81 mg by mouth daily   atorvastatin (LIPITOR) 40 mg tablet 10/20/2019 at Unknown time Self No Yes   Sig: Take 1 tablet (40 mg total) by mouth daily   furosemide (LASIX) 20 mg tablet 10/20/2019 at Unknown time Self No Yes   Sig: Take 1 tablet (20 mg total) by mouth daily   gatifloxacin (ZYMAXID) 0 5 % More than a month at Unknown time Self Yes No   magnesium gluconate (MAGONATE) 500 mg tablet 10/20/2019 at Unknown time Self Yes Yes   Sig: Take 500 mg by mouth daily   metoprolol tartrate (LOPRESSOR) 100 mg tablet 10/20/2019 at Unknown time Self No Yes   Sig: TAKE 1/2 TABLET (50 MG TOTAL) BY MOUTH EVERY 12 (TWELVE) HOURS   nitroglycerin (NITROSTAT) 0 4 mg SL tablet 10/20/2019 at Unknown time  No Yes   Sig: Place 1 tablet (0 4 mg total) under the tongue every 5 (five) minutes as needed for chest pain   potassium chloride (KLOR-CON M20) 20 mEq tablet 10/20/2019 at Unknown time  No Yes   Sig: Take 1 tablet (20 mEq total) by mouth daily   prednisoLONE acetate (PRED FORTE) 1 % ophthalmic suspension More than a month at Unknown time Self Yes No   selenium 200 mcg 10/20/2019 at Unknown time Self Yes Yes   Sig: Take 200 mcg by mouth daily      Facility-Administered Medications: None       Past Medical History:   Diagnosis Date    Acute myocardial infarction ([address] )     Allergic rhinitis     Anxiety     Bimalleolar fracture of left ankle     CAD (coronary artery disease)     Erectile dysfunction of non-organic 3 origin     Hematuria     workup was negative and felt to be benign    Herpes zoster with complication     Hyperlipidemia     Hypertension     Impaired fasting glucose     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)        Past Surgical History:   Procedure Laterality Date    ANKLE FRACTURE SURGERY Left     CORONARY ARTERY BYPASS GRAFT         Family History   Problem Relation Age of Onset    Cancer Mother     Hypertension Mother         essential    Pancreatic cancer Mother      I have reviewed and agree with the history as documented  Social History     Tobacco Use    Smoking status: Former Smoker    Smokeless tobacco: Never Used   Substance Use Topics    Alcohol use: Yes     Comment: social- per allscripts    Drug use: No        Review of Systems   Constitutional: Negative for chills, fatigue and fever  HENT: Negative for congestion, nosebleeds and rhinorrhea  Eyes: Negative for photophobia and visual disturbance  Respiratory: Negative for cough, chest tightness, shortness of breath and wheezing  Cardiovascular: Positive for chest pain and palpitations  Negative for leg swelling, syncope and near-syncope  Gastrointestinal: Negative for abdominal distention, abdominal pain, diarrhea, nausea and vomiting  Endocrine: Negative for polyuria  Genitourinary: Negative for dysuria and hematuria  Musculoskeletal: Negative for arthralgias, back pain, gait problem and joint swelling  Skin: Negative for rash  Neurological: Negative for dizziness, seizures, syncope, weakness, light-headedness and headaches  Hematological: Does not bruise/bleed easily  Psychiatric/Behavioral: Negative for behavioral problems and confusion  All other systems reviewed and are negative        Physical Exam  ED Triage Vitals   Temperature Pulse Respirations Blood Pressure SpO2   10/19/19 2226 10/19/19 2149 10/19/19 2149 10/19/19 2149 10/19/19 2149   97 8 °F (36 6 °C) 76 18 119/65 95 %      Temp Source Heart Rate Source Patient Position - Orthostatic VS BP Location FiO2 (%)   10/19/19 2226 10/19/19 2149 10/19/19 2149 10/19/19 2149 --   Oral Monitor Lying Right arm       Pain Score       10/19/19 2149       No Pain             Orthostatic Vital Signs  Vitals:    10/20/19 0142 10/20/19 0341 10/20/19 0713 10/20/19 1145   BP: 112/81 124/87 127/93 (!) 120/102   Pulse: 79 89 90 96   Patient Position - Orthostatic VS:  Lying Lying Lying       Physical Exam   Constitutional: He is oriented to person, place, and time  He appears well-developed and well-nourished  Non-toxic appearance  He does not appear ill  No distress  HENT:   Head: Normocephalic and atraumatic  Neck: Neck supple  No JVD present  Cardiovascular: Normal rate and regular rhythm  No murmur heard  Pulses:       Radial pulses are 2+ on the right side, and 2+ on the left side  Pulmonary/Chest: Effort normal and breath sounds normal  No respiratory distress  He has no wheezes  He has no rales  Abdominal: Soft  Bowel sounds are normal  He exhibits no distension  There is no tenderness  There is no rebound and no guarding  Musculoskeletal:        Right lower leg: He exhibits no tenderness and no edema  Left lower leg: He exhibits no tenderness and no edema  Neurological: He is alert and oriented to person, place, and time  Skin: Skin is warm  Capillary refill takes less than 2 seconds  Psychiatric: He has a normal mood and affect  His behavior is normal    Nursing note and vitals reviewed        ED Medications  Medications   heparin (porcine) 25,000 units in 250 mL infusion (premix) (9 8 Units/kg/hr × 85 kg (Order-Specific) Intravenous Rate/Dose Change 10/20/19 7884)   heparin (porcine) injection 4,000 Units (has no administration in time range)   heparin (porcine) injection 2,000 Units (has no administration in time range)   aspirin (ECOTRIN LOW STRENGTH) EC tablet 81 mg (81 mg Oral Given 10/20/19 0855)   atorvastatin (LIPITOR) tablet 40 mg (40 mg Oral Given 10/20/19 0855)   co-enzyme Q-10 capsule 200 mg (200 mg Oral Given 10/20/19 0855)   glucosamine sulfate capsule 500 mg (500 mg Oral Given 10/20/19 0854)   magnesium gluconate (MAGONATE) tablet 500 mg (500 mg Oral Given 10/20/19 0854)   nitroglycerin (NITROSTAT) SL tablet 0 4 mg (0 4 mg Sublingual Given 10/20/19 0503)   acetaminophen (TYLENOL) tablet 650 mg (has no administration in time range)   traMADol (ULTRAM) tablet 50 mg (50 mg Oral Given 10/20/19 0547)   metoprolol tartrate (LOPRESSOR) tablet 75 mg (has no administration in time range)   perflutren lipid microsphere (DEFINITY) injection (has no administration in time range)   heparin (porcine) injection 4,000 Units (4,000 Units Intravenous Given 10/19/19 2334)   metoprolol (LOPRESSOR) injection 5 mg (5 mg Intravenous Given 10/20/19 0505)   metoprolol tartrate (LOPRESSOR) tablet 25 mg (25 mg Oral Given 10/20/19 1005)       Diagnostic Studies  Results Reviewed     Procedure Component Value Units Date/Time    Troponin I [[id number]]  (Abnormal) Collected:  10/20/19 0336    Lab Status:  Final result Specimen:  Blood from Arm, Left Updated:  10/20/19 0416     Troponin I >40 00 ng/mL     Troponin I [[id number]]  (Abnormal) Collected:  10/20/19 0038    Lab Status:  Final result Specimen:  Blood from Arm, Left Updated:  10/20/19 0116     Troponin I >40 00 ng/mL     Magnesium [[id number]]  (Normal) Collected:  10/19/19 2209    Lab Status:  Final result Specimen:  Blood from Arm, Right Updated:  10/20/19 0051     Magnesium 2 1 mg/dL     TSH, 3rd generation [[id number]]  (Abnormal) Collected:  10/19/19 2209    Lab Status:  Final result Specimen:  Blood from Arm, Right Updated:  10/20/19 0051     TSH 3RD GENERATON 6 610 uIU/mL     Narrative:       Patients undergoing fluorescein dye angiography may retain small amounts of fluorescein in the body for 48-72 hours post procedure  Samples containing fluorescein can produce falsely depressed TSH values  If the patient had this procedure,a specimen should be resubmitted post fluorescein clearance        T4, free [[id number]]  (Normal) Collected:  10/19/19 2209    Lab Status:  Final result Specimen:  Blood from Arm, Right Updated:  10/20/19 0051     Free T4 0 88 ng/dL     APTT six (6) hours after Heparin bolus/drip initiation or dosing change [[id number]]     Lab Status:  No result Specimen:  Blood     Protime-INR [[id number]]  (Abnormal) Collected:  10/19/19 2209    Lab Status:  Final result Specimen:  Blood from Arm, Right Updated:  10/19/19 2240     Protime 17 6 seconds      INR 1 49    APTT [[id number]]  (Normal) Collected:  10/19/19 2209    Lab Status:  Final result Specimen:  Blood from Arm, Right Updated:  10/19/19 2240     PTT 28 seconds     Troponin I [[id number]]  (Abnormal) Collected:  10/19/19 2209    Lab Status:  Final result Specimen:  Blood from Arm, Right Updated:  10/19/19 2235     Troponin I 2 58 ng/mL     Comprehensive metabolic panel [[id number]]  (Abnormal) Collected:  10/19/19 2209    Lab Status:  Final result Specimen:  Blood from Arm, Right Updated:  10/19/19 2232     Sodium 140 mmol/L      Potassium 4 3 mmol/L      Chloride 105 mmol/L      CO2 24 mmol/L      ANION GAP 11 mmol/L      BUN 20 mg/dL      Creatinine 1 67 mg/dL      Glucose 251 mg/dL      Calcium 9 2 mg/dL      AST 46 U/L      ALT 25 U/L      Alkaline Phosphatase 75 U/L      Total Protein 7 6 g/dL      Albumin 4 2 g/dL      Total Bilirubin 0 83 mg/dL      eGFR 36 ml/min/1 73sq m     Narrative:       [place] guidelines for Chronic Kidney Disease (CKD):     Stage 1 with normal or high GFR (GFR > 90 mL/min/1 73 square meters)    Stage 2 Mild CKD (GFR = 60-89 mL/min/1 73 square meters)    Stage 3A Moderate CKD (GFR = 45-59 mL/min/1 73 square meters)    Stage 3B Moderate CKD (GFR = 30-44 mL/min/1 73 square meters)    Stage 4 Severe CKD (GFR = 15-29 mL/min/1 73 square meters)    Stage 5 End Stage CKD (GFR <15 mL/min/1 73 square meters)  Note: GFR calculation is accurate only with a steady state creatinine    CBC and differential [[id number]]  (Abnormal) Collected:  10/19/19 2209    Lab Status:  Final result Specimen:  Blood from Arm, Right Updated:  10/19/19 2221     WBC 10 67 Thousand/uL      RBC 4 41 Million/uL      Hemoglobin 13 4 g/dL      Hematocrit 42 1 %      MCV 96 fL      MCH 30 4 pg      MCHC 31 8 g/dL      RDW 13 6 %      MPV 10 6 fL      Platelets 557 Thousands/uL      nRBC 0 /100 WBCs      Neutrophils Relative 80 %      Immat GRANS % 0 %      Lymphocytes Relative 14 %      Monocytes Relative 5 %      Eosinophils Relative 1 %      Basophils Relative 0 %      Neutrophils Absolute 8 56 Thousands/µL      Immature Grans Absolute 0 04 Thousand/uL      Lymphocytes Absolute 1 44 Thousands/µL      Monocytes Absolute 0 52 Thousand/µL      Eosinophils Absolute 0 08 Thousand/µL      Basophils Absolute 0 03 Thousands/µL                  XR chest 2 views    (Results Pending)         Procedures  Procedures        ED Course  ED Course as of Oct 20 1205   Sat Oct 19, 2019   2240 Procedure Note: EKG  Date/Time: 10/19/19 10:40 PM   Interpreted by: Jimmy Baldwin DO  Indications / Diagnosis: CP  ECG reviewed by me, the ED Provider: yes   The EKG demonstrates:  Rhythm: Normal sinus @ 92  Intervals: Normal UT and QT intervals  Axis: Left axis  QRS/Blocks: Normal QRS  ST Changes: STD lateral  No acute ST Changes, no EDDIE       2244 Discussed with Dr Xavier Vegas  Recommends starting  heparin and admit to medicine with cardiology consult  MDM  Number of Diagnoses or Management Options  Diagnosis management comments: 80 y  o male with history of CAD, cardiomyopathy presenting for chest pain  EKG: ST depression laterally I, aVL  Troponin 2 56  Party due to cardioversion but ischemia not excluded    Received 325 mg ASA PTA via EMS   Discussed with cardiology Dr Selina Paiz who recommends initiating heparin gtt and admission to medicine  VSS in ED  Discussed with Dr Gopal Bolanos  Admit to Level 2/step down  I have discussed with the patient our recommendation of inpatient admission for further medical care  I have answered all of the patient's questions and concerns  The patient is in agreement with the plan to proceed with admission to the hospital         Amount and/or Complexity of Data Reviewed  Clinical lab tests: ordered and reviewed  Tests in the radiology section of CPT®: ordered and reviewed    Patient Progress  Patient progress: stable      Disposition  Final diagnoses:   Ventricular tachycardia ([address] )   Chest pain, unspecified type   Elevated troponin     Time reflects when diagnosis was documented in both MDM as applicable and the Disposition within this note     Time User Action Codes Description Comment    10/19/2019 11:21 PM Ahmet Bright D Add [I47 2] Ventricular tachycardia ([address] )     10/19/2019 11:21 PM Ahmet Bright D Modify [I47 2] Ventricular tachycardia ([address] )     10/19/2019 11:23 PM Ahmet Bright D Add [I25 10] Coronary artery disease involving native coronary artery of native heart, angina presence unspecified     10/19/2019 11:23 PM Ahmet Bright D Modify [I25 10] Coronary artery disease involving native coronary artery of native heart, angina presence unspecified     10/20/2019 12:04 PM Jeramie Fontan Add [R07 9] Chest pain, unspecified type     10/20/2019 12:04 PM Jeramie Fontan Modify [I47 2] Ventricular tachycardia ([address] )     10/20/2019 12:04 PM Jeramie Fontan Add [R79 89] Elevated troponin       ED Disposition     ED Disposition Condition Date/Time Comment    Admit Stable Sun Oct 20, 2019 12:04 PM Case was discussed with [facility] attending and the patient's admission status was agreed to be inpatient status to the service of Dr Gopal Bolanos          Follow-up Information    None         Current Discharge Medication List      CONTINUE these medications which have NOT CHANGED    Details   apixaban (ELIQUIS) 5 mg Take 1 tablet (5 mg total) by mouth 2 (two) times a day  Qty: 84 tablet, Refills: 0    Associated Diagnoses: Typical atrial flutter (HCC)      aspirin (ECOTRIN LOW STRENGTH) 81 mg EC tablet Take 81 mg by mouth daily      atorvastatin (LIPITOR) 40 mg tablet Take 1 tablet (40 mg total) by mouth daily  Qty: 90 tablet, Refills: 3    Associated Diagnoses: Dyslipidemia      Coenzyme Q10 200 MG capsule Take 200 mg by mouth daily      furosemide (LASIX) 20 mg tablet Take 1 tablet (20 mg total) by mouth daily  Qty: 90 tablet, Refills: 3    Associated Diagnoses: Benign essential hypertension; Ischemic cardiomyopathy      Glucosamine-Chondroit-Vit C-Mn (GLUCOSAMINE CHONDROITIN COMPLX) CAPS Take 1 capsule by mouth daily      magnesium gluconate (MAGONATE) 500 mg tablet Take 500 mg by mouth daily      metoprolol tartrate (LOPRESSOR) 100 mg tablet TAKE 1/2 TABLET (50 MG TOTAL) BY MOUTH EVERY 12 (TWELVE) HOURS  Qty: 180 tablet, Refills: 3    Associated Diagnoses: Benign essential hypertension; Coronary artery disease involving native coronary artery of native heart without angina pectoris      nitroglycerin (NITROSTAT) 0 4 mg SL tablet Place 1 tablet (0 4 mg total) under the tongue every 5 (five) minutes as needed for chest pain  Qty: 25 tablet, Refills: 1    Associated Diagnoses: Exertional chest pain; Coronary artery disease involving native coronary artery of native heart without angina pectoris      potassium chloride (KLOR-CON M20) 20 mEq tablet Take 1 tablet (20 mEq total) by mouth daily  Qty: 90 tablet, Refills: 3    Associated Diagnoses: Benign essential hypertension; Ischemic cardiomyopathy      Probiotic Product (PROBIOTIC COLON SUPPORT) CAPS Take 1 capsule by mouth daily      selenium 200 mcg Take 200 mcg by mouth daily      gatifloxacin (ZYMAXID) 0 5 %       prednisoLONE acetate (PRED FORTE) 1 % ophthalmic suspension No discharge procedures on file  ED Provider  Att[facility] and [facility]  I managed the patient along with the ED Attending      Electronically Signed by DO Yanique Ba DO  10/20/19 7920

## 2019-10-20 NOTE — ED ATTENDING ATTESTATION
10/19/2019  Alonzo Nogueira DO, saw and evaluated the patient  I have discussed the patient with the resident/non-physician practitioner and agree with the resident's/non-physician practitioner's findings, Plan of Care, and MDM as documented in the resident's/non-physician practitioner's note, except where noted  All available labs and Radiology studies were reviewed  I was present for key portions of any procedure(s) performed by the resident/non-physician practitioner and I was immediately available to provide assistance  At this point I agree with the current assessment done in the Emergency Department  I have conducted an independent evaluation of this patient a history and physical is as follows:    27-year-old male with a history of prior MI presents for evaluation unstable ventricular tachycardia, cardioverted by EMS prior to arrival   Upon my assessment, patient is now asymptomatic, normal sinus rhythm and has no complaints at time  He started with chest pain a few hours prior to arrival   When EMS arrived, patient noted to be a wide complex tachycardia with an on stable blood pressure and was cardioverted with 100 joules successfully  No prior known history of ventricular tachycardia  Patient received full dose of aspirin by EMS  Exam:  No acute distress, regular rate and rhythm  Lungs are clear to auscultation bilaterally  Abdomen soft nontender  Assessment plan:  27-year-old male presenting with unstable ventricular tachycardia, cardioverted  Obtain cardiac evaluation including EKG, labs, coags  EKG with new ST segment depressions in lead 1 and aVL  Discussed case with Cardiology  Admit to Medicine  Will start heparin drip      ED Course         Critical Care Time  Procedures

## 2019-10-20 NOTE — ASSESSMENT & PLAN NOTE
· IV heparin  · Continue beta-blocker  · For cardiac catheterization tomorrow  · Cardiology following

## 2019-10-20 NOTE — ASSESSMENT & PLAN NOTE
· Troponin 2 58 on admission    Although patient was cardioverted cannot fully exclude ischemic event  · Patient is currently chest pain free at this time  · To be initiated on heparin GTT ACS protocol  · Trend 2 further troponin, serial EKGs  · Continue ASA and statin, beta-blocker with hold parameters  · Will appreciate Cardiology consultation as above; may need further ischemic workup however will defer to Cardiology recommendations

## 2019-10-20 NOTE — ASSESSMENT & PLAN NOTE
· POA, baseline appears 1 1-1 2  · Patient did have apparent episode of hypotension associated with V-tach episode  · Will hold nephrotoxins  · Measure PVR, bladder scan  Check UA with microscopic  · Will recheck creatinine with a m  Labs      · Aim to avoid further hypotension as able

## 2019-10-20 NOTE — SOCIAL WORK
CM met with Pt with an introduction and explanation of role  Pt reported residing alone at [address] [address] in a first floor apt with no use of DME and elevator access  Pt reported being independent with ADLs, had [facility] in the past but no hx of SNF, mental health or drug/alcohol placements  Pt reported having a living will with his son Amaya Wolfe as [place]  Pt reported the use of [facility] pharmacy on [address] and has [facility] as a PCP  CM reviewed d/c planning process including the following: identifying help at home, patient preference for d/c planning needs, Discharge Lounge, [facility] to [facility] program, availability of treatment team to discuss questions or concerns patient and/or family may have regarding understanding medications and recognizing signs and symptoms once discharged  CM also encouraged patient to follow up with all recommended appointments after discharge  Patient advised of importance for patient and family to participate in managing patients medical well being

## 2019-10-20 NOTE — ED NOTES
Heparin order released at this time, baseline PTT drawn at 2210        Kaylin Pisano RN  10/19/19 8722

## 2019-10-20 NOTE — ED NOTES
Spoke with Dr Jono Polk regarding troponin, cardiology contacted at this time        Sudeep Wilder, RN  10/19/19 3132

## 2019-10-20 NOTE — ASSESSMENT & PLAN NOTE
· Echo 03/06/2018 revealing EF 45% with areas of apical inferior, apical septal, mid apical anterior, and mid anteroseptal akinesis  · With some persistent edema of left lower extremity, otherwise without orthopnea, shortness of breath at rest, or weight gain per patient  · Monitor daily weights, repeat echocardiogram in setting of above  · Will appreciate Cardiology consultation

## 2019-10-21 ENCOUNTER — TELEPHONE (OUTPATIENT)
Dept: NON INVASIVE DIAGNOSTICS | Facility: HOSPITAL | Age: 84
End: 2019-10-21

## 2019-10-21 ENCOUNTER — APPOINTMENT (INPATIENT)
Dept: NON INVASIVE DIAGNOSTICS | Facility: HOSPITAL | Age: 84
DRG: 224 | End: 2019-10-21
Payer: COMMERCIAL

## 2019-10-21 LAB
ALBUMIN SERPL BCP-MCNC: 3.7 G/DL (ref 3.5–5)
ALP SERPL-CCNC: 68 U/L (ref 46–116)
ALT SERPL W P-5'-P-CCNC: 37 U/L (ref 12–78)
ANION GAP SERPL CALCULATED.3IONS-SCNC: 10 MMOL/L (ref 4–13)
ANION GAP SERPL CALCULATED.3IONS-SCNC: 11 MMOL/L (ref 4–13)
ANION GAP SERPL CALCULATED.3IONS-SCNC: 8 MMOL/L (ref 4–13)
APTT PPP: 52 SECONDS (ref 23–37)
APTT PPP: 60 SECONDS (ref 23–37)
APTT PPP: 62 SECONDS (ref 23–37)
APTT PPP: 93 SECONDS (ref 23–37)
AST SERPL W P-5'-P-CCNC: 136 U/L (ref 5–45)
BASOPHILS # BLD AUTO: 0.01 THOUSANDS/ΜL (ref 0–0.1)
BASOPHILS NFR BLD AUTO: 0 % (ref 0–1)
BILIRUB SERPL-MCNC: 1.46 MG/DL (ref 0.2–1)
BUN SERPL-MCNC: 23 MG/DL (ref 5–25)
BUN SERPL-MCNC: 24 MG/DL (ref 5–25)
BUN SERPL-MCNC: 28 MG/DL (ref 5–25)
CALCIUM SERPL-MCNC: 9.2 MG/DL (ref 8.3–10.1)
CALCIUM SERPL-MCNC: 9.5 MG/DL (ref 8.3–10.1)
CALCIUM SERPL-MCNC: 9.6 MG/DL (ref 8.3–10.1)
CHLORIDE SERPL-SCNC: 100 MMOL/L (ref 100–108)
CHLORIDE SERPL-SCNC: 101 MMOL/L (ref 100–108)
CHLORIDE SERPL-SCNC: 103 MMOL/L (ref 100–108)
CO2 SERPL-SCNC: 27 MMOL/L (ref 21–32)
CO2 SERPL-SCNC: 27 MMOL/L (ref 21–32)
CO2 SERPL-SCNC: 29 MMOL/L (ref 21–32)
CREAT SERPL-MCNC: 1.16 MG/DL (ref 0.6–1.3)
CREAT SERPL-MCNC: 1.2 MG/DL (ref 0.6–1.3)
CREAT SERPL-MCNC: 1.3 MG/DL (ref 0.6–1.3)
EOSINOPHIL # BLD AUTO: 0.01 THOUSAND/ΜL (ref 0–0.61)
EOSINOPHIL NFR BLD AUTO: 0 % (ref 0–6)
ERYTHROCYTE [DISTWIDTH] IN BLOOD BY AUTOMATED COUNT: 13.4 % (ref 11.6–15.1)
GFR SERPL CREATININE-BSD FRML MDRD: 49 ML/MIN/1.73SQ M
GFR SERPL CREATININE-BSD FRML MDRD: 54 ML/MIN/1.73SQ M
GFR SERPL CREATININE-BSD FRML MDRD: 56 ML/MIN/1.73SQ M
GLUCOSE SERPL-MCNC: 118 MG/DL (ref 65–140)
GLUCOSE SERPL-MCNC: 139 MG/DL (ref 65–140)
GLUCOSE SERPL-MCNC: 151 MG/DL (ref 65–140)
GLUCOSE SERPL-MCNC: 166 MG/DL (ref 65–140)
HCT VFR BLD AUTO: 39.8 % (ref 36.5–49.3)
HGB BLD-MCNC: 13.2 G/DL (ref 12–17)
IMM GRANULOCYTES # BLD AUTO: 0.1 THOUSAND/UL (ref 0–0.2)
IMM GRANULOCYTES NFR BLD AUTO: 1 % (ref 0–2)
INR PPP: 1.29 (ref 0.84–1.19)
INR PPP: 1.31 (ref 0.84–1.19)
LYMPHOCYTES # BLD AUTO: 1.24 THOUSANDS/ΜL (ref 0.6–4.47)
LYMPHOCYTES NFR BLD AUTO: 9 % (ref 14–44)
MAGNESIUM SERPL-MCNC: 1.9 MG/DL (ref 1.6–2.6)
MAGNESIUM SERPL-MCNC: 2 MG/DL (ref 1.6–2.6)
MAGNESIUM SERPL-MCNC: 2.6 MG/DL (ref 1.6–2.6)
MCH RBC QN AUTO: 30.8 PG (ref 26.8–34.3)
MCHC RBC AUTO-ENTMCNC: 33.2 G/DL (ref 31.4–37.4)
MCV RBC AUTO: 93 FL (ref 82–98)
MONOCYTES # BLD AUTO: 1.29 THOUSAND/ΜL (ref 0.17–1.22)
MONOCYTES NFR BLD AUTO: 10 % (ref 4–12)
NEUTROPHILS # BLD AUTO: 10.79 THOUSANDS/ΜL (ref 1.85–7.62)
NEUTS SEG NFR BLD AUTO: 80 % (ref 43–75)
NRBC BLD AUTO-RTO: 0 /100 WBCS
PHOSPHATE SERPL-MCNC: 3.3 MG/DL (ref 2.3–4.1)
PLATELET # BLD AUTO: 196 THOUSANDS/UL (ref 149–390)
PMV BLD AUTO: 11 FL (ref 8.9–12.7)
POTASSIUM SERPL-SCNC: 3.4 MMOL/L (ref 3.5–5.3)
POTASSIUM SERPL-SCNC: 3.5 MMOL/L (ref 3.5–5.3)
POTASSIUM SERPL-SCNC: 3.8 MMOL/L (ref 3.5–5.3)
PROT SERPL-MCNC: 7.5 G/DL (ref 6.4–8.2)
PROTHROMBIN TIME: 15.7 SECONDS (ref 11.6–14.5)
PROTHROMBIN TIME: 15.9 SECONDS (ref 11.6–14.5)
RBC # BLD AUTO: 4.29 MILLION/UL (ref 3.88–5.62)
SODIUM SERPL-SCNC: 137 MMOL/L (ref 136–145)
SODIUM SERPL-SCNC: 139 MMOL/L (ref 136–145)
SODIUM SERPL-SCNC: 140 MMOL/L (ref 136–145)
TROPONIN I SERPL-MCNC: 28.3 NG/ML
TROPONIN I SERPL-MCNC: 34.8 NG/ML
WBC # BLD AUTO: 13.44 THOUSAND/UL (ref 4.31–10.16)

## 2019-10-21 PROCEDURE — B2181ZZ FLUOROSCOPY OF LEFT INTERNAL MAMMARY BYPASS GRAFT USING LOW OSMOLAR CONTRAST: ICD-10-PCS | Performed by: INTERNAL MEDICINE

## 2019-10-21 PROCEDURE — 85610 PROTHROMBIN TIME: CPT | Performed by: PHYSICIAN ASSISTANT

## 2019-10-21 PROCEDURE — B2121ZZ FLUOROSCOPY OF SINGLE CORONARY ARTERY BYPASS GRAFT USING LOW OSMOLAR CONTRAST: ICD-10-PCS | Performed by: INTERNAL MEDICINE

## 2019-10-21 PROCEDURE — C1874 STENT, COATED/COV W/DEL SYS: HCPCS

## 2019-10-21 PROCEDURE — C1894 INTRO/SHEATH, NON-LASER: HCPCS | Performed by: INTERNAL MEDICINE

## 2019-10-21 PROCEDURE — 93455 CORONARY ART/GRFT ANGIO S&I: CPT | Performed by: INTERNAL MEDICINE

## 2019-10-21 PROCEDURE — 85025 COMPLETE CBC W/AUTO DIFF WBC: CPT | Performed by: PHYSICIAN ASSISTANT

## 2019-10-21 PROCEDURE — C1769 GUIDE WIRE: HCPCS | Performed by: INTERNAL MEDICINE

## 2019-10-21 PROCEDURE — 85730 THROMBOPLASTIN TIME PARTIAL: CPT | Performed by: PHYSICIAN ASSISTANT

## 2019-10-21 PROCEDURE — 99292 CRITICAL CARE ADDL 30 MIN: CPT | Performed by: EMERGENCY MEDICINE

## 2019-10-21 PROCEDURE — 80048 BASIC METABOLIC PNL TOTAL CA: CPT | Performed by: INTERNAL MEDICINE

## 2019-10-21 PROCEDURE — 5A2204Z RESTORATION OF CARDIAC RHYTHM, SINGLE: ICD-10-PCS | Performed by: INTERNAL MEDICINE

## 2019-10-21 PROCEDURE — 85730 THROMBOPLASTIN TIME PARTIAL: CPT | Performed by: INTERNAL MEDICINE

## 2019-10-21 PROCEDURE — 84484 ASSAY OF TROPONIN QUANT: CPT | Performed by: INTERNAL MEDICINE

## 2019-10-21 PROCEDURE — 99232 SBSQ HOSP IP/OBS MODERATE 35: CPT | Performed by: INTERNAL MEDICINE

## 2019-10-21 PROCEDURE — 80048 BASIC METABOLIC PNL TOTAL CA: CPT | Performed by: PHYSICIAN ASSISTANT

## 2019-10-21 PROCEDURE — 99152 MOD SED SAME PHYS/QHP 5/>YRS: CPT | Performed by: INTERNAL MEDICINE

## 2019-10-21 PROCEDURE — 99291 CRITICAL CARE FIRST HOUR: CPT | Performed by: EMERGENCY MEDICINE

## 2019-10-21 PROCEDURE — 4A023N7 MEASUREMENT OF CARDIAC SAMPLING AND PRESSURE, LEFT HEART, PERCUTANEOUS APPROACH: ICD-10-PCS | Performed by: INTERNAL MEDICINE

## 2019-10-21 PROCEDURE — C1760 CLOSURE DEV, VASC: HCPCS | Performed by: INTERNAL MEDICINE

## 2019-10-21 PROCEDURE — 83735 ASSAY OF MAGNESIUM: CPT | Performed by: INTERNAL MEDICINE

## 2019-10-21 PROCEDURE — 80053 COMPREHEN METABOLIC PANEL: CPT | Performed by: PHYSICIAN ASSISTANT

## 2019-10-21 PROCEDURE — C1725 CATH, TRANSLUMIN NON-LASER: HCPCS | Performed by: INTERNAL MEDICINE

## 2019-10-21 PROCEDURE — B2111ZZ FLUOROSCOPY OF MULTIPLE CORONARY ARTERIES USING LOW OSMOLAR CONTRAST: ICD-10-PCS | Performed by: INTERNAL MEDICINE

## 2019-10-21 PROCEDURE — 83735 ASSAY OF MAGNESIUM: CPT | Performed by: PHYSICIAN ASSISTANT

## 2019-10-21 PROCEDURE — C1887 CATHETER, GUIDING: HCPCS | Performed by: INTERNAL MEDICINE

## 2019-10-21 PROCEDURE — 92937 PRQ TRLUML REVSC CAB GRF 1: CPT | Performed by: INTERNAL MEDICINE

## 2019-10-21 PROCEDURE — 027034Z DILATION OF CORONARY ARTERY, ONE ARTERY WITH DRUG-ELUTING INTRALUMINAL DEVICE, PERCUTANEOUS APPROACH: ICD-10-PCS | Performed by: INTERNAL MEDICINE

## 2019-10-21 PROCEDURE — 99153 MOD SED SAME PHYS/QHP EA: CPT | Performed by: INTERNAL MEDICINE

## 2019-10-21 PROCEDURE — 84100 ASSAY OF PHOSPHORUS: CPT | Performed by: PHYSICIAN ASSISTANT

## 2019-10-21 PROCEDURE — C9600 PERC DRUG-EL COR STENT SING: HCPCS | Performed by: INTERNAL MEDICINE

## 2019-10-21 PROCEDURE — 99222 1ST HOSP IP/OBS MODERATE 55: CPT | Performed by: INTERNAL MEDICINE

## 2019-10-21 PROCEDURE — 82948 REAGENT STRIP/BLOOD GLUCOSE: CPT

## 2019-10-21 PROCEDURE — 93005 ELECTROCARDIOGRAM TRACING: CPT

## 2019-10-21 RX ORDER — POTASSIUM CHLORIDE 14.9 MG/ML
20 INJECTION INTRAVENOUS ONCE
Status: COMPLETED | OUTPATIENT
Start: 2019-10-21 | End: 2019-10-21

## 2019-10-21 RX ORDER — MIDAZOLAM HYDROCHLORIDE 1 MG/ML
INJECTION INTRAMUSCULAR; INTRAVENOUS CODE/TRAUMA/SEDATION MEDICATION
Status: COMPLETED | OUTPATIENT
Start: 2019-10-21 | End: 2019-10-21

## 2019-10-21 RX ORDER — HEPARIN SODIUM 10000 [USP'U]/100ML
3-20 INJECTION, SOLUTION INTRAVENOUS
Status: DISCONTINUED | OUTPATIENT
Start: 2019-10-21 | End: 2019-10-22 | Stop reason: ALTCHOICE

## 2019-10-21 RX ORDER — PHENYLEPHRINE HYDROCHLORIDE 10 MG/ML
INJECTION INTRAVENOUS
Status: COMPLETED
Start: 2019-10-21 | End: 2019-10-21

## 2019-10-21 RX ORDER — FENTANYL CITRATE 50 UG/ML
INJECTION, SOLUTION INTRAMUSCULAR; INTRAVENOUS
Status: COMPLETED
Start: 2019-10-21 | End: 2019-10-21

## 2019-10-21 RX ORDER — LIDOCAINE HYDROCHLORIDE 10 MG/ML
INJECTION, SOLUTION EPIDURAL; INFILTRATION; INTRACAUDAL; PERINEURAL
Status: DISPENSED
Start: 2019-10-21 | End: 2019-10-21

## 2019-10-21 RX ORDER — POTASSIUM CHLORIDE 20 MEQ/1
40 TABLET, EXTENDED RELEASE ORAL ONCE
Status: DISCONTINUED | OUTPATIENT
Start: 2019-10-21 | End: 2019-10-21

## 2019-10-21 RX ORDER — METOPROLOL TARTRATE 5 MG/5ML
5 INJECTION INTRAVENOUS EVERY 6 HOURS PRN
Status: DISCONTINUED | OUTPATIENT
Start: 2019-10-21 | End: 2019-10-25 | Stop reason: HOSPADM

## 2019-10-21 RX ORDER — MIDAZOLAM HYDROCHLORIDE 1 MG/ML
2 INJECTION INTRAMUSCULAR; INTRAVENOUS ONCE
Status: COMPLETED | OUTPATIENT
Start: 2019-10-21 | End: 2019-10-21

## 2019-10-21 RX ORDER — CLOPIDOGREL BISULFATE 75 MG/1
75 TABLET ORAL DAILY
Status: DISCONTINUED | OUTPATIENT
Start: 2019-10-22 | End: 2019-10-25 | Stop reason: HOSPADM

## 2019-10-21 RX ORDER — ONDANSETRON 2 MG/ML
4 INJECTION INTRAMUSCULAR; INTRAVENOUS EVERY 6 HOURS PRN
Status: CANCELLED | OUTPATIENT
Start: 2019-10-21

## 2019-10-21 RX ORDER — FLUMAZENIL 0.1 MG/ML
0.2 INJECTION, SOLUTION INTRAVENOUS ONCE
Status: DISCONTINUED | OUTPATIENT
Start: 2019-10-21 | End: 2019-10-22

## 2019-10-21 RX ORDER — AMIODARONE HYDROCHLORIDE 50 MG/ML
INJECTION, SOLUTION INTRAVENOUS
Status: DISCONTINUED
Start: 2019-10-21 | End: 2019-10-21 | Stop reason: WASHOUT

## 2019-10-21 RX ORDER — SODIUM CHLORIDE, SODIUM GLUCONATE, SODIUM ACETATE, POTASSIUM CHLORIDE, MAGNESIUM CHLORIDE, SODIUM PHOSPHATE, DIBASIC, AND POTASSIUM PHOSPHATE .53; .5; .37; .037; .03; .012; .00082 G/100ML; G/100ML; G/100ML; G/100ML; G/100ML; G/100ML; G/100ML
75 INJECTION, SOLUTION INTRAVENOUS CONTINUOUS
Status: DISPENSED | OUTPATIENT
Start: 2019-10-21 | End: 2019-10-21

## 2019-10-21 RX ORDER — AMIODARONE HYDROCHLORIDE 50 MG/ML
INJECTION, SOLUTION INTRAVENOUS
Status: COMPLETED
Start: 2019-10-21 | End: 2019-10-21

## 2019-10-21 RX ORDER — POTASSIUM CHLORIDE 20 MEQ/1
20 TABLET, EXTENDED RELEASE ORAL ONCE
Status: COMPLETED | OUTPATIENT
Start: 2019-10-21 | End: 2019-10-21

## 2019-10-21 RX ORDER — METOPROLOL TARTRATE 5 MG/5ML
5 INJECTION INTRAVENOUS ONCE
Status: DISCONTINUED | OUTPATIENT
Start: 2019-10-21 | End: 2019-10-21

## 2019-10-21 RX ORDER — HEPARIN SODIUM 1000 [USP'U]/ML
4000 INJECTION, SOLUTION INTRAVENOUS; SUBCUTANEOUS AS NEEDED
Status: DISCONTINUED | OUTPATIENT
Start: 2019-10-21 | End: 2019-10-22 | Stop reason: ALTCHOICE

## 2019-10-21 RX ORDER — CLOPIDOGREL BISULFATE 75 MG/1
600 TABLET ORAL ONCE
Status: COMPLETED | OUTPATIENT
Start: 2019-10-21 | End: 2019-10-21

## 2019-10-21 RX ORDER — MIDAZOLAM HYDROCHLORIDE 1 MG/ML
INJECTION INTRAMUSCULAR; INTRAVENOUS
Status: COMPLETED
Start: 2019-10-21 | End: 2019-10-21

## 2019-10-21 RX ORDER — HEPARIN SODIUM 1000 [USP'U]/ML
INJECTION, SOLUTION INTRAVENOUS; SUBCUTANEOUS CODE/TRAUMA/SEDATION MEDICATION
Status: COMPLETED | OUTPATIENT
Start: 2019-10-21 | End: 2019-10-21

## 2019-10-21 RX ORDER — HEPARIN SODIUM 1000 [USP'U]/ML
2000 INJECTION, SOLUTION INTRAVENOUS; SUBCUTANEOUS AS NEEDED
Status: DISCONTINUED | OUTPATIENT
Start: 2019-10-21 | End: 2019-10-22 | Stop reason: ALTCHOICE

## 2019-10-21 RX ORDER — METOPROLOL TARTRATE 50 MG/1
100 TABLET, FILM COATED ORAL EVERY 12 HOURS SCHEDULED
Status: DISCONTINUED | OUTPATIENT
Start: 2019-10-21 | End: 2019-10-21

## 2019-10-21 RX ORDER — FENTANYL CITRATE 50 UG/ML
100 INJECTION, SOLUTION INTRAMUSCULAR; INTRAVENOUS ONCE
Status: COMPLETED | OUTPATIENT
Start: 2019-10-21 | End: 2019-10-21

## 2019-10-21 RX ORDER — SODIUM CHLORIDE 9 MG/ML
75 INJECTION, SOLUTION INTRAVENOUS CONTINUOUS
Status: CANCELLED | OUTPATIENT
Start: 2019-10-21 | End: 2019-10-21

## 2019-10-21 RX ORDER — MAGNESIUM SULFATE HEPTAHYDRATE 40 MG/ML
2 INJECTION, SOLUTION INTRAVENOUS ONCE
Status: COMPLETED | OUTPATIENT
Start: 2019-10-21 | End: 2019-10-21

## 2019-10-21 RX ORDER — FENTANYL CITRATE 50 UG/ML
INJECTION, SOLUTION INTRAMUSCULAR; INTRAVENOUS CODE/TRAUMA/SEDATION MEDICATION
Status: COMPLETED | OUTPATIENT
Start: 2019-10-21 | End: 2019-10-21

## 2019-10-21 RX ORDER — SODIUM CHLORIDE, SODIUM GLUCONATE, SODIUM ACETATE, POTASSIUM CHLORIDE, MAGNESIUM CHLORIDE, SODIUM PHOSPHATE, DIBASIC, AND POTASSIUM PHOSPHATE .53; .5; .37; .037; .03; .012; .00082 G/100ML; G/100ML; G/100ML; G/100ML; G/100ML; G/100ML; G/100ML
75 INJECTION, SOLUTION INTRAVENOUS CONTINUOUS
Status: DISCONTINUED | OUTPATIENT
Start: 2019-10-21 | End: 2019-10-21

## 2019-10-21 RX ADMIN — MIDAZOLAM 2 MG: 1 INJECTION INTRAMUSCULAR; INTRAVENOUS at 09:29

## 2019-10-21 RX ADMIN — AMIODARONE HYDROCHLORIDE 150 MG: 50 INJECTION, SOLUTION INTRAVENOUS at 09:15

## 2019-10-21 RX ADMIN — CLOPIDOGREL BISULFATE 600 MG: 75 TABLET ORAL at 21:27

## 2019-10-21 RX ADMIN — DEXTROSE 150 MG: 50 INJECTION, SOLUTION INTRAVENOUS at 12:14

## 2019-10-21 RX ADMIN — TICAGRELOR 90 MG: 90 TABLET ORAL at 05:48

## 2019-10-21 RX ADMIN — MAGNESIUM SULFATE HEPTAHYDRATE 2 G: 40 INJECTION, SOLUTION INTRAVENOUS at 09:18

## 2019-10-21 RX ADMIN — FENTANYL CITRATE 100 MCG: 50 INJECTION, SOLUTION INTRAMUSCULAR; INTRAVENOUS at 09:29

## 2019-10-21 RX ADMIN — MIDAZOLAM 2 MG: 1 INJECTION INTRAMUSCULAR; INTRAVENOUS at 14:31

## 2019-10-21 RX ADMIN — Medication 0.04 MG: at 10:45

## 2019-10-21 RX ADMIN — HEPARIN SODIUM 11.8 UNITS/KG/HR: 10000 INJECTION, SOLUTION INTRAVENOUS at 18:47

## 2019-10-21 RX ADMIN — POTASSIUM CHLORIDE 20 MEQ: 14.9 INJECTION, SOLUTION INTRAVENOUS at 19:57

## 2019-10-21 RX ADMIN — SODIUM CHLORIDE, SODIUM GLUCONATE, SODIUM ACETATE, POTASSIUM CHLORIDE AND MAGNESIUM CHLORIDE 75 ML/HR: 526; 502; 368; 37; 30 INJECTION, SOLUTION INTRAVENOUS at 11:54

## 2019-10-21 RX ADMIN — Medication 500 MG: at 12:54

## 2019-10-21 RX ADMIN — MELATONIN 3 MG: 3 TAB ORAL at 21:27

## 2019-10-21 RX ADMIN — DEXTROSE MONOHYDRATE 150 MG: 5 INJECTION INTRAVENOUS at 09:29

## 2019-10-21 RX ADMIN — ATORVASTATIN CALCIUM 40 MG: 40 TABLET, FILM COATED ORAL at 12:50

## 2019-10-21 RX ADMIN — PHENYLEPHRINE HYDROCHLORIDE: 10 INJECTION INTRAVENOUS at 09:45

## 2019-10-21 RX ADMIN — FENTANYL CITRATE 50 MCG: 50 INJECTION, SOLUTION INTRAMUSCULAR; INTRAVENOUS at 14:31

## 2019-10-21 RX ADMIN — HEPARIN SODIUM 2000 UNITS: 1000 INJECTION, SOLUTION INTRAVENOUS; SUBCUTANEOUS at 11:50

## 2019-10-21 RX ADMIN — ASPIRIN 81 MG: 81 TABLET, COATED ORAL at 12:50

## 2019-10-21 RX ADMIN — POTASSIUM CHLORIDE 20 MEQ: 1500 TABLET, EXTENDED RELEASE ORAL at 12:50

## 2019-10-21 RX ADMIN — MAGNESIUM SULFATE HEPTAHYDRATE 2 G: 40 INJECTION, SOLUTION INTRAVENOUS at 12:15

## 2019-10-21 RX ADMIN — HEPARIN SODIUM 5000 UNITS: 1000 INJECTION INTRAVENOUS; SUBCUTANEOUS at 15:00

## 2019-10-21 RX ADMIN — AMIODARONE HYDROCHLORIDE 1 MG/MIN: 50 INJECTION, SOLUTION INTRAVENOUS at 10:00

## 2019-10-21 RX ADMIN — PHENYLEPHRINE HYDROCHLORIDE 100 MCG/MIN: 10 INJECTION INTRAVENOUS at 10:30

## 2019-10-21 RX ADMIN — PHENYLEPHRINE HYDROCHLORIDE: 10 INJECTION INTRAVENOUS at 10:00

## 2019-10-21 RX ADMIN — POTASSIUM CHLORIDE 20 MEQ: 14.9 INJECTION, SOLUTION INTRAVENOUS at 12:15

## 2019-10-21 RX ADMIN — MELATONIN 3 MG: 3 TAB ORAL at 00:26

## 2019-10-21 RX ADMIN — IOHEXOL 130 ML: 350 INJECTION, SOLUTION INTRAVENOUS at 15:30

## 2019-10-21 RX ADMIN — LIDOCAINE HYDROCHLORIDE 100 MG: 20 INJECTION, SOLUTION INTRAVENOUS at 10:30

## 2019-10-21 RX ADMIN — SODIUM CHLORIDE, SODIUM GLUCONATE, SODIUM ACETATE, POTASSIUM CHLORIDE AND MAGNESIUM CHLORIDE 75 ML/HR: 526; 502; 368; 37; 30 INJECTION, SOLUTION INTRAVENOUS at 16:21

## 2019-10-21 RX ADMIN — DOXYLAMINE SUCCINATE 12.5 MG: 25 TABLET ORAL at 00:26

## 2019-10-21 RX ADMIN — Medication 200 MG: at 12:50

## 2019-10-21 RX ADMIN — MIDAZOLAM HYDROCHLORIDE 2 MG: 1 INJECTION INTRAMUSCULAR; INTRAVENOUS at 09:29

## 2019-10-21 NOTE — CONSULTS
Consultation - Electrophysiology - Cardiology  Tad Kidd 80 y o  male MRN: [id number]  Unit/Bed#: [facility] 413-01 Encounter: [id number]      Inpatient consult to Electrophysiology  Consult performed by: Georgette Jane PA-C  Consult ordered by: Omar Edmonds PA-C          History of Present Illness   Physician Requesting Consult: Quentin Schmidt DO  Reason for Consult / Principal Problem:  Ventricular tachycardia    Assessment/Plan   Assessment:  1  Sustained ventricular tachycardia  - requiring defibrillation x2 / once out of hospital and second while inpatient  - has received 2 5 grams of amiodarone thus far  - monomorphic VT - rate of about 200  2  Coronary artery disease  - prior CABG in 1999  - more recently status post MINNIE to SVG to OM on 10/21/2019  3  Ischemic cardiomyopathy  - EF of 35% per echo 10/20/2019 / down from 45% per echo 3/6/2018  - akinetic LAD territory per echo   - maintained on beta blocker and now started on ACEi  4  Paroxysmal atrial fibrillation/flutter  - anticoagulated with Eliquis as outpatient / currently on heparin drip  - rate controlled on metoprolol tartrate   - currently on amiodarone drip  5  Hypertension  - maintained on lasix 20mg daily   6  Hyperlipidemia  - maintained on atorvastatin 40mg daily   7  History of CVA    Plan:  1  Device - Patient has had recurrent ventricular tachycardia that is most likely scar related  He has already received MINNIE to prior graft and is currently being maintained on amiodarone  EP recommendation at this time would be for dual chamber ICD implantation - for secondary prevention and atrial lead as he has been in sinus rhythm recently as an outpatient  I explained the procedure with the patient and his daughter and answered all of their questions  Patient will most likely go for device on Thursday  He is having hematuria with pending urology consult - he is also being maintained on aspirin, Brilinta, and heparin drip currently   Would get this under control prior to device implantation  Will see patient tomorrow to further guide device placement later in the week, most likely Thursday if urology issues are cleared by then  HPI: Makayla Anderson is a 80y o  year old male with a history of coronary artery disease with prior CABG, ischemic cardiomyopathy with depressed EF and akinetic LAD territory, paroxysmal atrial fibrillation/flutter anticoagulated with Eliquis, hypertension, hyperlipidemia, and history of CVA  Patient called an ambulance on 10/19 due to chest pain that he described as substernal pressure  When EMS arrived, he was found to be in ventricular tachycardia with a blood pressure with systolic in the 50W  He was cardioverted and returned to sinus rhythm  When he arrived in the hospital, he was in atrial flutter with heart rates in the 110s  With history of prior CABG and prior echo showing scar distal LAD territory, it was felt that he should go for cardiac catheterization  Yesterday, he was given a stent to graft that was place to the OM  He is currently maintained on amiodarone and heparin drip  Given the fact that the patient had sustained ventricular tachycardia, electrophysiology was asked to see the patient to weigh in on ICD placement  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  His father had CAD with CABG in his [de-identified]  EMS STRIPS:      EKG:         Review of Systems  ROS as noted above, otherwise 12 point review of systems was performed and is negative       Historical Information   Past Medical History:   Diagnosis Date    Acute myocardial infarction ([address] )     Allergic rhinitis     Anxiety     Bimalleolar fracture of left ankle     CAD (coronary artery disease)     Erectile dysfunction of non-organic origin     Hematuria     workup was negative and felt to be benign    Herpes zoster with complication     Hyperlipidemia     Hypertension     Impaired fasting glucose     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    Prostatic hypertrophy     benign    Stroke [facility])      Past Surgical History:   Procedure Laterality Date    ANKLE FRACTURE SURGERY Left     CORONARY ARTERY BYPASS GRAFT       Social History     Substance and Sexual Activity   Alcohol Use Yes    Comment: social- per allscripts     Social History     Substance and Sexual Activity   Drug Use No     Social History     Tobacco Use   Smoking Status Former Smoker   Smokeless Tobacco Never Used     Family History:   Family History   Problem Relation Age of Onset    Cancer Mother     Hypertension Mother         essential    Pancreatic cancer Mother        Meds/Allergies   Hospital Medications:   Current Facility-Administered Medications   Medication Dose Route Frequency    acetaminophen (TYLENOL) tablet 650 mg  650 mg Oral Q6H PRN    amiodarone (CORDARONE) 900 mg in dextrose 5 % 500 mL infusion  1 mg/min Intravenous Continuous    aspirin (ECOTRIN LOW STRENGTH) EC tablet 81 mg  81 mg Oral Daily    atorvastatin (LIPITOR) tablet 40 mg  40 mg Oral Daily    co-enzyme Q-10 capsule 200 mg  200 mg Oral Daily    doxylamine (UNISON) tablet 12 5 mg  12 5 mg Oral HS PRN    flumazenil (ROMAZICON) injection 0 2 mg  0 2 mg Intravenous Once    glucosamine sulfate capsule 500 mg  500 mg Oral Daily    heparin (porcine) 25,000 units in 250 mL infusion (premix)  3-20 Units/kg/hr (Order-Specific) Intravenous Titrated    heparin (porcine) injection 2,000 Units  2,000 Units Intravenous PRN    heparin (porcine) injection 4,000 Units  4,000 Units Intravenous PRN    lidocaine (PF) (XYLOCAINE-MPF) 1 % injection **ADS Override Pull**        magnesium gluconate (MAGONATE) tablet 500 mg  500 mg Oral Daily    magnesium sulfate 2 g/50 mL IVPB (premix) 2 g  2 g Intravenous Once    melatonin tablet 3 mg  3 mg Oral HS    metoprolol (LOPRESSOR) injection 5 mg  5 mg Intravenous Q6H PRN    multi-electrolyte (PLASMALYTE-A/ISOLYTE-S PH 7 4) IV solution  75 mL/hr Intravenous Continuous    nitroglycerin (NITROSTAT) SL tablet 0 4 mg  0 4 mg Sublingual Q5 Min PRN    perflutren lipid microsphere (DEFINITY) injection  0 6 mL/min Intravenous Once in imaging    phenylephrine (CELIA-SYNEPHRINE) 50 mg (STANDARD CONCENTRATION) in sodium chloride 0 9% 250 mL   mcg/min Intravenous Titrated    phenylephrine HCl (VAZCULEP) 10 MG/ML solution **ADS Override Pull**        phenylephrine HCl (VAZCULEP) 10 MG/ML solution **ADS Override Pull**        potassium chloride (K-DUR,KLOR-CON) CR tablet 20 mEq  20 mEq Oral Once    potassium chloride 20 mEq IVPB (premix)  20 mEq Intravenous Once    ticagrelor (BRILINTA) tablet 90 mg  90 mg Oral Q12H TONJA    traMADol (ULTRAM) tablet 50 mg  50 mg Oral Q6H PRN     Home Medications:   Medications Prior to Admission   Medication    apixaban (ELIQUIS) 5 mg    aspirin (ECOTRIN LOW STRENGTH) 81 mg EC tablet    atorvastatin (LIPITOR) 40 mg tablet    Coenzyme Q10 200 MG capsule    furosemide (LASIX) 20 mg tablet    Glucosamine-Chondroit-Vit C-Mn (GLUCOSAMINE CHONDROITIN COMPLX) CAPS    magnesium gluconate (MAGONATE) 500 mg tablet    metoprolol tartrate (LOPRESSOR) 100 mg tablet    nitroglycerin (NITROSTAT) 0 4 mg SL tablet    potassium chloride (KLOR-CON M20) 20 mEq tablet    Probiotic Product (PROBIOTIC COLON SUPPORT) CAPS    selenium 200 mcg    gatifloxacin (ZYMAXID) 0 5 %    prednisoLONE acetate (PRED FORTE) 1 % ophthalmic suspension       Allergies   Allergen Reactions    Penicillins Edema and Rash     Reaction Date: [id number]; Category: Allergy; Annotation - [id number]: Severe reaction with hospitaization at [facility]       Objective   Vitals: Blood pressure 103/73, pulse 92, temperature 98 4 °F (36 9 °C), temperature source Oral, resp  rate 15, height 5' 4" (1 626 m), weight 85 7 kg (188 lb 15 oz), SpO2 98 %    Orthostatic Blood Pressures      Most Recent Value   Blood Pressure  103/73 filed at 10/21/2019 1200   Patient Position - Orthostatic VS  Lying filed at 10/21/2019 0656            Intake/Output Summary (Last 24 hours) at 10/21/2019 1228  Last data filed at 10/21/2019 1200  Gross per 24 hour   Intake 389 24 ml   Output 2050 ml   Net -1660 76 ml       Invasive Devices     Peripheral Intravenous Line            Peripheral IV 10/19/19 Right Hand 1 day    Peripheral IV 10/20/19 Right Forearm 1 day    Peripheral IV 10/21/19 Left Arm less than 1 day    Peripheral IV 10/21/19 Left Forearm less than 1 day          Drain            Urethral Catheter 18 Fr  less than 1 day                Physical Exam   GEN: NAD, alert and oriented, well appearing  SKIN: dry without significant lesions or rashes  HEENT: NCAT, PERRL, EOMs intact  NECK: No JVD or carotid bruits appreciated  CARDIOVASCULAR: irregular, normal S1, S2 without murmurs, rubs, or gallops appreciated  LUNGS: Clear to auscultation bilaterally without wheezes, rhonchi, or rales  ABDOMEN: Soft, nontender, nondistended  EXTREMITIES/VASCULAR: perfused without clubbing, cyanosis, or edema b/l  PSYCH: Normal mood and affect  NEURO: CN ll-Xll grossly intact    Lab Results: I have personally reviewed pertinent lab results  Results from last 7 days   Lab Units 10/21/19  1053 10/20/19  0525 10/19/19  2209   WBC Thousand/uL 13 44* 12 53* 10 67*   HEMOGLOBIN g/dL 13 2 13 2 13 4   HEMATOCRIT % 39 8 41 1 42 1   PLATELETS Thousands/uL 196 172 167     Results from last 7 days   Lab Units 10/21/19  1053 10/21/19  0512 10/20/19  0525   POTASSIUM mmol/L 3 5 3 4* 4 5   CHLORIDE mmol/L 103 101 105   CO2 mmol/L 27 27 26   BUN mg/dL 28* 23 27*   CREATININE mg/dL 1 30 1 16 1 29   CALCIUM mg/dL 9 2 9 6 9 4     Results from last 7 days   Lab Units 10/21/19  1053 10/21/19  0512 10/20/19  2355  10/19/19  2209   INR  1 29*  --   --   --  1 49*   PTT seconds 52* 62* 93*   < > 28    < > = values in this interval not displayed       Results from last 7 days   Lab Units 10/21/19  1053 10/21/19  0512 10/20/19  9285 MAGNESIUM mg/dL 2 0 1 9 2 2       Imaging: I have personally reviewed pertinent reports  ECHO:   Results for orders placed during the hospital encounter of 10/19/19   Echo complete with contrast if indicated    Narrative [address]  [address]el[address]gu[address], [address] [facility]  [phone]    Transthoracic Echocardiogram  2D, M-mode, Doppler, and Color Doppler    Study date:  20-Oct-2019    Patient: Nolan Moss  MR number: [id number]  Account number: [de-identified]  : 01-Sep-1932  Age: 80 years  Gender: Male  Status: Inpatient  Location: Bedside  Height: 64 in  Weight: 188 lb  BP: 124/ 87 mmHg    Indications: Known coronary artery disease  Chest pain  Diagnoses: I25 83 - Coronary atherosclerosis due to lipid rich plaque    Sonographer:  PARVEZ Carlson  Primary Physician:  Yasir Damon MD  Referring Physician:  Kailee Bynum DO  Group:  [id number]  Interpreting Physician:  Gold Sherwood MD    SUMMARY    LEFT VENTRICLE:  Systolic function was moderately reduced  Ejection fraction was estimated to be 35 %  There was akinesis of the mid-apical anterior, mid anteroseptal, mid inferoseptal, apical septal, and apical wall(s)  TRICUSPID VALVE:  Estimated peak PA pressure was 40 mmHg  The findings suggest mild pulmonary hypertension  HISTORY: PRIOR HISTORY: Ischemic cardiomyopathy  Ventricular and supraventricular arrhythmias  Risk factors: hypertension and medication-treated hypercholesterolemia  Remote transient ischemic attack  PRIOR PROCEDURES: Coronary bypass    PROCEDURE: The procedure was performed at the bedside  This was a routine study  The transthoracic approach was used  The study included complete 2D imaging, M-mode, complete spectral Doppler, and color Doppler  The heart rate was 103 bpm,  at the start of the study  Intravenous contrast (  6ml of Definity) was administered   Echocardiographic views were limited due to decreased penetration and lung interference  This was a technically difficult study  LEFT VENTRICLE: Size was normal  Systolic function was moderately reduced  Ejection fraction was estimated to be 35 %  There was akinesis of the mid-apical anterior, mid anteroseptal, mid inferoseptal, apical septal, and apical wall(s)  DOPPLER: Normal sinus rhythm was absent  RIGHT VENTRICLE: The size was normal  Systolic function was normal     LEFT ATRIUM: The atrium was dilated  RIGHT ATRIUM: Size was normal     MITRAL VALVE: Valve structure was normal  There was normal leaflet separation  DOPPLER: There was no evidence for stenosis  There was mild regurgitation  The regurgitant jet was eccentric  AORTIC VALVE: The valve was trileaflet  Leaflets exhibited mildly increased thickness, mild calcification, normal cuspal separation, and sclerosis  DOPPLER: There was no evidence for stenosis  There was no regurgitation  TRICUSPID VALVE: The valve structure was normal  There was normal leaflet separation  DOPPLER: There was no evidence for stenosis  There was mild regurgitation  Estimated peak PA pressure was 40 mmHg  The findings suggest mild pulmonary  hypertension  PULMONIC VALVE: Leaflets exhibited normal thickness, no calcification, and normal cuspal separation  DOPPLER: The transpulmonic velocity was within the normal range  There was trace regurgitation  PERICARDIUM: There was no pericardial effusion  The pericardium was normal in appearance  AORTA: The root exhibited normal size      SYSTEM MEASUREMENT TABLES    2D  Ao Diam: 3 62 cm  LA Area: 29 25 cm2  LA Diam: 4 98 cm  LAAs A4C: 28 77 cm2  LAESV A-L A4C: 105 01 ml  LAESV MOD A4C: 97 21 ml  LALs A4C: 6 69 cm  LVEDV MOD A4C: 126 19 ml  LVEF MOD A4C: 46 86 %  LVESV MOD A4C: 67 06 ml  LVLd A4C: 8 05 cm  LVLs A4C: 7 44 cm  RA Area: 17 34 cm2  RVIDd: 4 12 cm  SV MOD A4C: 59 13 ml    CW  TR Vmax: 3 21 m/s  TR maxP 12 mmHg    MM  TAPSE: 1 43 cm    [employer] Accredited Echocardiography Laboratory    Prepared and electronically signed by    Magnolia Goodrich MD  Signed 20-Oct-2019 13:20:19          Cardiac testing:   ECHO:   Results for orders placed during the hospital encounter of 10/19/19   Echo complete with contrast if indicated    Narrative [address]  [facility], [address] [facility]  [phone]    Transthoracic Echocardiogram  2D, M-mode, Doppler, and Color Doppler    Study date:  20-Oct-2019    Patient: Ministerio Das  MR number: [id number]  Account number: [de-identified]  : 01-Sep-1932  Age: 80 years  Gender: Male  Status: Inpatient  Location: Bedside  Height: 64 in  Weight: 188 lb  BP: 124/ 87 mmHg    Indications: Known coronary artery disease  Chest pain  Diagnoses: I25 83 - Coronary atherosclerosis due to lipid rich plaque    Sonographer:  PARVEZ Schaefer  Primary Physician:  Hannah Del Real MD  Referring Physician:  Chino Nair DO  Group:  [facility]  Interpreting Physician:  Magnolia Goodrich MD    SUMMARY    LEFT VENTRICLE:  Systolic function was moderately reduced  Ejection fraction was estimated to be 35 %  There was akinesis of the mid-apical anterior, mid anteroseptal, mid inferoseptal, apical septal, and apical wall(s)  TRICUSPID VALVE:  Estimated peak PA pressure was 40 mmHg  The findings suggest mild pulmonary hypertension  HISTORY: PRIOR HISTORY: Ischemic cardiomyopathy  Ventricular and supraventricular arrhythmias  Risk factors: hypertension and medication-treated hypercholesterolemia  Remote transient ischemic attack  PRIOR PROCEDURES: Coronary bypass    PROCEDURE: The procedure was performed at the bedside  This was a routine study  The transthoracic approach was used  The study included complete 2D imaging, M-mode, complete spectral Doppler, and color Doppler  The heart rate was 103 bpm,  at the start of the study   Intravenous contrast (  6ml of Definity) was administered  Echocardiographic views were limited due to decreased penetration and lung interference  This was a technically difficult study  LEFT VENTRICLE: Size was normal  Systolic function was moderately reduced  Ejection fraction was estimated to be 35 %  There was akinesis of the mid-apical anterior, mid anteroseptal, mid inferoseptal, apical septal, and apical wall(s)  DOPPLER: Normal sinus rhythm was absent  RIGHT VENTRICLE: The size was normal  Systolic function was normal     LEFT ATRIUM: The atrium was dilated  RIGHT ATRIUM: Size was normal     MITRAL VALVE: Valve structure was normal  There was normal leaflet separation  DOPPLER: There was no evidence for stenosis  There was mild regurgitation  The regurgitant jet was eccentric  AORTIC VALVE: The valve was trileaflet  Leaflets exhibited mildly increased thickness, mild calcification, normal cuspal separation, and sclerosis  DOPPLER: There was no evidence for stenosis  There was no regurgitation  TRICUSPID VALVE: The valve structure was normal  There was normal leaflet separation  DOPPLER: There was no evidence for stenosis  There was mild regurgitation  Estimated peak PA pressure was 40 mmHg  The findings suggest mild pulmonary  hypertension  PULMONIC VALVE: Leaflets exhibited normal thickness, no calcification, and normal cuspal separation  DOPPLER: The transpulmonic velocity was within the normal range  There was trace regurgitation  PERICARDIUM: There was no pericardial effusion  The pericardium was normal in appearance  AORTA: The root exhibited normal size      SYSTEM MEASUREMENT TABLES    2D  Ao Diam: 3 62 cm  LA Area: 29 25 cm2  LA Diam: 4 98 cm  LAAs A4C: 28 77 cm2  LAESV A-L A4C: 105 01 ml  LAESV MOD A4C: 97 21 ml  LALs A4C: 6 69 cm  LVEDV MOD A4C: 126 19 ml  LVEF MOD A4C: 46 86 %  LVESV MOD A4C: 67 06 ml  LVLd A4C: 8 05 cm  LVLs A4C: 7 44 cm  RA Area: 17 34 cm2  RVIDd: 4 12 cm  SV MOD A4C: 59 13 ml    CW  TR Vmax: 3 21 m/s  TR maxP 12 mmHg    MM  TAPSE: 1 43 cm    [facility] Accredited Echocardiography Laboratory    Prepared and electronically signed by    Nilda Banks MD  Signed 20-Oct-2019 13:20:19       No results found for this or any previous visit  CATH:  No results found for this or any previous visit  STRESS TEST:  No results found for this or any previous visit      VTE Prophylaxis: Heparin

## 2019-10-21 NOTE — PROGRESS NOTES
Called by RN for V-tach  Patient noted to have V-tach, hemodynamically unstable with hypotension  Cardiology at bedside, critical care at bedside    Patient now on IV amiodarone, IV heparin  Patient to be transferred to critical care  Discussed with Cardiology at bedside, critical care    Called and updated patient's son Dr Milvia Zavala - he is a family medicine physician now at [facility]

## 2019-10-21 NOTE — PROGRESS NOTES
Post-Procedural Check   Patient returns to the ICU in stable condition following cardiac cath w/ placement of MINNIE and angioplasty of SVG in OM1 via R femoral access  The patient tolerated the procedure well, remainder of vessels including LIMA to LAD and vein grafts appear patnet, RCA stenotic  He denies chest pain, shortness of breath, and offers no complaints at this time  Physical Exam  Gen: WNWD, NAD  Card: a-flutter, no murmur  Pulm: 2L NC, CTAB  Abd: soft, non-tender, non-distended  Ext: R groin puncture site c/d/i no hematoma     A/P  VT: currently in a-flutter, continue heparin drip, likely ischemia related in light of above cath findings  Continue amio infusion and heparin drip   May eventually require AICD vs lifevest   Optimize electrolytes  MVCAD s/p CABG and now cardiac cath: continue ASA/plavix   Mandatory bedrest post-procedure   Plan to re-start heparin drip 2hrs post procedure

## 2019-10-21 NOTE — PROGRESS NOTES
At 0348 this Am pt  Exhibited a 45 sec episode of V-tach  104/67 B/P   Asymptomatic  Julita Francisca with [facility] notified  No new orders at this time

## 2019-10-21 NOTE — PROGRESS NOTES
Progress Note - Cardiology   Ana Cristina Braden 80 y o  male MRN: [id number]  Encounter: [id number]  10/21/19  8:39 AM        Assessment/Plan:    1  Sustained VT requiring shock  Unclear if due to scar mediated VT, versus ischemia  Plan for cath today if creatinine/renal function remains stable  On metoprolol 75 bid, increased from home dose of 50 bid, currently held due to hypotension  On IV amiodarone given recurrent episodes of VT on telemetry, in addition to sustained episode this morning requiring second shock  Keep K>4, Mg>2  Now getting A line, on phenylephrine drip post shock to maintain perfusion  Recheck kidney function/place Kaminski to ensure renal function remains adequate  2  CAD s/p CABG  Cath scheduled for today to ensure no ischemia mediating VT, but can defer if renal function worsens post sustained VT requiring shock earlier this morning  On aspirin, statin, beta blocker, Brilinta, IV heparin  3  ICM with EF 35%  At home on Lasix 20 mg daily  KCL 20 meq qd  Given one dose of IV Lasix yesterday, hold further Lasix for now  Replete potassium  On beta blocker  4  HL  On Atorvastatin 40 at home  Lipid panel showed total cholesterol 136, TG 65, HDL 58, LDL 65      5  Paroxysmal aflutter  On Metoprolol 50 bid, Eliquis 5 bid at home  Metoprolol increased here to 75 bid, currently held due to hypotension post sustained VT requiring shock  Continue IV Amiodarone drip for now  6  Non MI troponin elevation with peak of 40, now trending down  Presumed to be due to VT/shock, but going for cath to evaluate for new lesions  Follows with Dr Sallie Bauman as outpt  Greater than 30 minutes of critical care time spent with patient during sustained VT, and I personally delivered shock to patient  Spoke with SLIM, critical care, and nursing       Subjective/Objective   Chief Complaint:   Chief Complaint   Patient presents with    Chest Pain     Per EMS pt developed chest pain around 8:30pm, upon EMS arrival found to be in [facility], and BP 39/20 was shocked with 100J, HR responded to normal sinus  Upon arrival complaints of no pain  Subjective: 80 y o  man with a history of CAD s/p CABG (1999), ICM with prior EF45%, HTN, CVA, HL, paroxysmal atrial flutter, who was admitted with episode of VT requiring shock  For about a week he had noted some chest discomfort, for which he had called office and ordered outpt stress test  However on Saturday night while sitting he had episode of chest discomfort, EMS was called and found him to be in VT with BP of 40/20 per report  He was shocked x 1 with 100 J and converted to SR  After admission went into aflutter with RVR  Metoprolol was titrated up  Echo shows EF currently 35%, with mid distal LAD territory WMA  PASP 40 mm Hg  Normal RV size and function  Mild eccentric MR      10/21 morning he was having more episodes of VT, sustained, asymptomatic  Then had episode of sustained VT which did not break spontaneously  However, he remained awake and alert and asymptomatic  Despite IV amio bolus, and drip initiated, he remained in sustained VT with HR in 170s  His mental status slowly declined and BP declined while further meds (IV metoprolol, IV lidocaine) were being given, so decision was made to electively shock patient with synchronized 200 J shock, which did convert to aflutter  Patient did receive Fentanyl and Versed pre shock, and mental status remained decreased post shock  He was started on phenylephrine drip and transferred to ICU  Currently getting A line  Kaminski placed         Patient Active Problem List   Diagnosis    Cerebrovascular accident (CVA) due to embolism of left middle cerebral artery (HCC)    Benign essential hypertension    Dyslipidemia    CAD (coronary artery disease)    Typical atrial flutter ([address] )    Hx of CABG    Ischemic cardiomyopathy    Impaired fasting glucose    Psychophysiological insomnia    Medicare annual wellness visit, subsequent    Subacute left lumbar radiculopathy    Obesity (BMI 30 0-34  9)    Chronic anticoagulation    Exertional chest pain    Ventricular tachycardia (HCC)    Elevated troponin, concern NSTEMI    Acute kidney injury [facility])     Past Medical History:   Diagnosis Date    Acute myocardial infarction ([address] )     Allergic rhinitis     Anxiety     Bimalleolar fracture of left ankle     CAD (coronary artery disease)     Erectile dysfunction of non-organic origin     Hematuria     workup was negative and felt to be benign    Herpes zoster with complication     Hyperlipidemia     Hypertension     Impaired fasting glucose     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    Prostatic hypertrophy     benign    Stroke (HCC)        Allergies   Allergen Reactions    Penicillins Edema and Rash     Reaction Date: [id number]; Category: Allergy;  Annotation - [id number]: Severe reaction with hospitaization at [facility]       Current Facility-Administered Medications   Medication Dose Route Frequency Provider Last Rate Last Dose    acetaminophen (TYLENOL) tablet 650 mg  650 mg Oral Q6H PRN Jonna Hadley, DO        aspirin (ECOTRIN LOW STRENGTH) EC tablet 81 mg  81 mg Oral Daily Jonna Hadley, DO   81 mg at 10/20/19 0855    atorvastatin (LIPITOR) tablet 40 mg  40 mg Oral Daily Jonna Hadley, DO   40 mg at 10/20/19 0855    co-enzyme Q-10 capsule 200 mg  200 mg Oral Daily Jonna Hadley, DO   200 mg at 10/20/19 0855    doxylamine (UNISON) tablet 12 5 mg  12 5 mg Oral HS PRN Julita S Francisca, CRNP   12 5 mg at 10/21/19 0026    glucosamine sulfate capsule 500 mg  500 mg Oral Daily Jonna Hadley, DO   500 mg at 10/20/19 0854    heparin (porcine) 25,000 units in 250 mL infusion (premix)  3-20 Units/kg/hr (Order-Specific) Intravenous Titrated Jonna Hadley, DO 8 3 mL/hr at 10/21/19 0103 9 8 Units/kg/hr at 10/21/19 0103    heparin (porcine) injection 2,000 Units  2,000 Units Intravenous PRN Jonna Hadley, DO 2,000 Units at 10/20/19 2106    heparin (porcine) injection 4,000 Units  4,000 Units Intravenous PRN Sharyn Tejada DO        magnesium gluconate (MAGONATE) tablet 500 mg  500 mg Oral Daily Sharyn Tejada DO   500 mg at 10/20/19 0854    melatonin tablet 3 mg  3 mg Oral HS Julita S Francisca, CRNP   3 mg at 10/21/19 0026    metoprolol tartrate (LOPRESSOR) tablet 75 mg  75 mg Oral Q12H [address] Demetria Faria MD   75 mg at 10/20/19 2105    nitroglycerin (NITROSTAT) SL tablet 0 4 mg  0 4 mg Sublingual Q5 Min PRN Sharyn Tejada DO   0 4 mg at 10/20/19 0503    perflutren lipid microsphere (DEFINITY) injection  0 6 mL/min Intravenous Once in imaging Sharyn Tejada DO        ticagrelor [facility]) tablet 90 mg  90 mg Oral Q12H [address] Demetria Faria MD   90 mg at 10/21/19 0548    traMADol (ULTRAM) tablet 50 mg  50 mg Oral Q6H PRN Julita S Francisca, CRNP   50 mg at 10/20/19 0547       Vitals: BP 97/58 (BP Location: Left arm) Comment: [id number]  Pulse 96   Temp 98 4 °F (36 9 °C) (Oral)   Resp 18   Ht 5' 4" (1 626 m)   Wt 85 7 kg (188 lb 15 oz)   SpO2 92%   BMI 32 43 kg/m²     Intake/Output Summary (Last 24 hours) at 10/21/2019 0839  Last data filed at 10/21/2019 0324  Gross per 24 hour   Intake 389 24 ml   Output 1450 ml   Net -1060 76 ml     Wt Readings from Last 3 Encounters:   10/20/19 85 7 kg (188 lb 15 oz)   10/17/19 85 7 kg (189 lb)   06/14/19 88 9 kg (196 lb)       Body mass index is 32 43 kg/m²  ,     Vitals:    10/20/19 2334 10/21/19 0320 10/21/19 0552 10/21/19 0656   BP: 124/89 104/67 (!) 85/64 97/58   Pulse: 101 92  96   Patient Position - Orthostatic VS: Lying Lying Lying Lying       Physical Exam:     GEN: Was awake and alert prior to sustained VT, in no acute distress  HEENT: Sclera anicteric, conjunctivae pink, mucous membranes moist   NECK: Supple, no carotid bruits, no significant JVD  HEART: Regular rhythm, normal S1 and S2, no murmurs, clicks, gallops or rubs     LUNGS: Clear to auscultation bilaterally; no wheezes, rales, or rhonchi   ABDOMEN: Soft, nontender, nondistended, normoactive bowel sounds  EXTREMITIES: Skin warm and well perfused, no clubbing, cyanosis, or edema  NEURO: No focal findings  SKIN: Normal without suspicious lesions on exposed skin  Lab Results:     BMP:  Results from last 7 days   Lab Units 10/21/19  0512 10/20/19  0525 10/19/19  2209   POTASSIUM mmol/L 3 4* 4 5 4 3   CHLORIDE mmol/L 101 105 105   CO2 mmol/L 27 26 24   BUN mg/dL 23 27* 20   CREATININE mg/dL 1 16 1 29 1 67*   CALCIUM mg/dL 9 6 9 4 9 2       CBC:   Results from last 7 days   Lab Units 10/20/19  0525 10/19/19  2209   WBC Thousand/uL 12 53* 10 67*   HEMOGLOBIN g/dL 13 2 13 4   HEMATOCRIT % 41 1 42 1   MCV fL 94 96   PLATELETS Thousands/uL 172 167   MCH pg 30 1 30 4   MCHC g/dL 32 1 31 8   RDW % 13 7 13 6   MPV fL 10 9 10 6   NRBC AUTO /100 WBCs  --  0       Results from last 7 days   Lab Units 10/21/19  0512 10/20/19  2355 10/20/19  1818   TROPONIN I ng/mL 28 30* 34 80* >40 00*                 Results from last 7 days   Lab Units 10/21/19  0512 10/20/19  0525 10/19/19  2209   MAGNESIUM mg/dL 1 9 2 2 2 1       INR:   Results from last 7 days   Lab Units 10/19/19  2209   INR  1 49*       Lipid Profile:   Lab Results   Component Value Date    CHOL 156 08/17/2017    CHOL 166 01/26/2017    CHOL 166 07/19/2016     Lab Results   Component Value Date    HDL 58 10/20/2019    HDL 53 03/28/2019    HDL 54 09/21/2018     Lab Results   Component Value Date    LDLCALC 65 10/20/2019    LDLCALC 54 03/07/2018     Lab Results   Component Value Date    TRIG 65 10/20/2019    TRIG 150 (H) 03/28/2019    TRIG 116 09/21/2018         Hgb A1c:   Results from last 7 days   Lab Units 10/20/19  0525   HEMOGLOBIN A1C % 6 3         Telemetry: personally reviewed, sustained VT with -190s in VT, requiring shock x 1, converted to aflutter with HR in low 100s

## 2019-10-21 NOTE — PROGRESS NOTES
ICU Acceptance Note - Chris [address] 80 y o  male MRN: [id number]  Unit/Bed#: [facility] 413-01 Encounter: [id number]      -------------------------------------------------------------------------------------------------------------  Chief Complaint: Sustained symptomatic ventricular tachycardia     History of Present Illness   HX and PE limited by: nothing  Odessa Bell is a 80 y o  male w/ PMHx CVA, HPTN, HLD, a-flutter on eliquis, ischemic cardiomyopathy with EF 45% and CAD s/p CAG in 1999 who now presents w/ sustained VT  The patient has been experiencing exertional chest pain over the last week  He contacted his PCP and was scheduled for stress testing however on 10/19 he developed sudden onset substernal chest pain prompting EMS activation  On EMS arrival the patient was found to be in VT w/ a pulse and hypotensive  He was cardioverted once with return to sinus rhythm and resolution of hypotension  The patient was asymptomatic and in sinus rhythm on arrival to the ED  There he was found to have an elevated troponin w/o EKG changes and was given ASA and heparin  He was then admitted to the floor for further monitoring awaiting ischemic w/u and possible AICD  The patient underwent ECHO yesterday that was revealing for EF 35% w/ mid-apical and septal akinesis and mild MR  The patient had an JAMEL which was improving  Early this AM he experienced a 45s run of asymptomatic NSVT that terminated spontaneously  At approximately 0930 this AM I was called to the bedside as the patient entered sustained VT w/ a pulse, asymptomatic  An amiodarone bolus and lidocaine bolus were administered w/o improvement in the rhythm  He then became hypotensive and diaphoretic  He was cardioverted once @ 200J with return breifly to sinus rhythm and then a-flutter  The patient was transiently hypotensive requiring a ez drip  He was transferred to the ICU for further management and care   Upon my evaluation the patient is resting comfortably, hemodynamically stable, he offers no complaints at this time      History obtained from chart review and the patient   -------------------------------------------------------------------------------------------------------------  Assessment and Plan:    Neuro:    Analgesia: Prn tylenol   Sedation: none    Delirium PPX: CAM-ICU, sleep hygiene    Encephalopathy: post-sedation for cardioversion, patient was sedated with versed and fentanyl, he received reversal w/ flumazenil and narcan with return to his baseline mental status      CV:    Shock: likely cardiogenic in nature, was requiring ez however this has been titrated off   Sustained symptomatic VT: scar mediated vs ischemic in origin   o Plan for [facility] pending renal function tests to evaluate for ongoing ischemia   o EF 35%, will likely require AICD this admission as well  o Continue heparin and amio gtt   Paroxysmal A-flutter: continue heparin drip  o Holding BB 2/2 recent hypotension, will attempt to re-introduce this afternoon   Ischemic Cardiomyopathy: w/ EF 35%, plan as above   CAD s/p CABG in 1999: plan for [facility] today  o Continue ASA, brilinta, statin   o Hold BB for now 2/2 hypotension, will re-introduce 12 5 lopressor this afternoon if able   HPTN: lisinopril and BB on hold 2/2 above   HLD: statin      Pulm:   Pulmonary insufficiency: requiring 2L by NC, wean to off  o IS      GI:    NPO pending possible cath   Bowel Reg: colace/senna      :    JAMEL: baseline Cr 1-1 2, 1 67 on admission  o Improving, likely pre-renal 2/2 low flow  o Low dose maintenance fluids for hydration in anticipation of dye load this afternoon   o Avoid nephrotoxins  o Kaminski placed for I/O making urine      F/E/N:    Fluids: isolyte 100cc/hr   Electrolytes - Monitor/trend - replete based on deficiencies         Heme/Onc:    DVT PPX: heparin drip, SCDs      Endo:    No active issues      ID:    Leukomoid reaction: expected in clinical setting, trend white count    Afebrile       MSK/Skin:    Remain in bed for now 2/2 sustained VT   PT/OT when appropriate      Disposition: Continue Critical Care   Code Status: Level 1 - Full Code  --------------------------------------------------------------------------------------------------------------  Review of Systems   Constitutional: Positive for fatigue  Respiratory: Negative  Cardiovascular: Negative  Gastrointestinal: Negative  Genitourinary: Negative  A 12-point, complete review of systems was reviewed and negative except as stated above     Physical Exam   Constitutional: He is oriented to person, place, and time  He appears well-developed and well-nourished  HENT:   Head: Normocephalic and atraumatic  Eyes: Pupils are equal, round, and reactive to light  EOM are normal    Neck: Normal range of motion  Neck supple  Cardiovascular: Normal heart sounds  Atrial flutter     Pulmonary/Chest: Effort normal and breath sounds normal  No respiratory distress  Abdominal: Soft  Bowel sounds are normal  He exhibits no distension  Musculoskeletal: Normal range of motion  He exhibits no edema  Neurological: He is alert and oriented to person, place, and time  No cranial nerve deficit  Skin: Skin is warm and dry  Capillary refill takes less than 2 seconds       --------------------------------------------------------------------------------------------------------------  Historical Information   Past Medical History:   Diagnosis Date    Acute [facility] infarction [address] [address] )     Allergic rhinitis     Anxiety     Bimalleolar fracture of left ankle     CAD (coronary artery disease)     Erectile dysfunction of non-organic origin     Hematuria     workup was negative and felt to be benign    Herpes zoster with complication     Hyperlipidemia     Hypertension     Impaired fasting glucose     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    Prostatic hypertrophy     benign    Stroke [facility])      Past Surgical History:   Procedure Laterality Date    ANKLE FRACTURE SURGERY Left     CORONARY ARTERY BYPASS GRAFT       Social History   Social History     Substance and Sexual Activity   Alcohol Use Yes    Comment: social- per allscripts     Social History     Substance and Sexual Activity   Drug Use No     Social History     Tobacco Use   Smoking Status Former Smoker   Smokeless Tobacco Never Used     Exercise History: preforms ADLs  Family History: I have reviewed this patient's family history and commented on sigificant items within the HPI    Vitals:   Vitals:    10/21/19 0400 10/21/19 0552 10/21/19 0656 10/21/19 0953   BP:  (!) 85/64 97/58 136/97   BP Location:  Left arm Left arm    Pulse:   96 87   Resp:  18 18    Temp:   98 4 °F (36 9 °C)    TempSrc:   Oral    SpO2: 93%  92% 97%   Weight:       Height:         Temp  Min: 97 5 °F (36 4 °C)  Max: 98 9 °F (37 2 °C)  IBW: 59 2 kg  Height: 5' 4" (162 6 cm)  Body mass index is 32 43 kg/m²    N/A    Medications:  Current Facility-Administered Medications   Medication Dose Route Frequency    acetaminophen (TYLENOL) tablet 650 mg  650 mg Oral Q6H PRN    amiodarone (CORDARONE) 900 mg in dextrose 5 % 500 mL infusion  1 mg/min Intravenous Continuous    amiodarone 150 mg in dextrose 5 % 100 mL IV bolus  150 mg Intravenous Once    amiodarone 150 mg/3 mL injection **ADS Override Pull**        aspirin (ECOTRIN LOW STRENGTH) EC tablet 81 mg  81 mg Oral Daily    atorvastatin (LIPITOR) tablet 40 mg  40 mg Oral Daily    co-enzyme Q-10 capsule 200 mg  200 mg Oral Daily    doxylamine (UNISON) tablet 12 5 mg  12 5 mg Oral HS PRN    fentanyl citrate (PF) 100 MCG/2ML 100 mcg  100 mcg Intravenous Once    flumazenil (ROMAZICON) injection 0 2 mg  0 2 mg Intravenous Once    glucosamine sulfate capsule 500 mg  500 mg Oral Daily    heparin (porcine) 25,000 units in 250 mL infusion (premix)  3-20 Units/kg/hr (Order-Specific) Intravenous Titrated    heparin (porcine) injection 2,000 Units  2,000 Units Intravenous PRN    heparin (porcine) injection 4,000 Units  4,000 Units Intravenous PRN    lidocaine (cardiac) injection 100 mg  100 mg Intravenous Once    lidocaine (PF) (XYLOCAINE-MPF) 1 % injection **ADS Override Pull**        magnesium gluconate (MAGONATE) tablet 500 mg  500 mg Oral Daily    magnesium sulfate 2 g/50 mL IVPB (premix) 2 g  2 g Intravenous Once    melatonin tablet 3 mg  3 mg Oral HS    metoprolol (LOPRESSOR) injection 5 mg  5 mg Intravenous Q6H PRN    midazolam (VERSED) injection 2 mg  2 mg Intravenous Once    naloxone (NARCAN) 0 04 mg/mL syringe 0 04 mg  0 04 mg Intravenous Once    nitroglycerin (NITROSTAT) SL tablet 0 4 mg  0 4 mg Sublingual Q5 Min PRN    perflutren lipid microsphere (DEFINITY) injection  0 6 mL/min Intravenous Once in imaging    phenylephrine (CELIA-SYNEPHRINE) 50 mg (STANDARD CONCENTRATION) in sodium chloride 0 9% 250 mL   mcg/min Intravenous Titrated    phenylephrine HCl (VAZCULEP) 10 MG/ML solution **ADS Override Pull**        phenylephrine HCl (VAZCULEP) 10 MG/ML solution **ADS Override Pull**        ticagrelor (BRILINTA) tablet 90 mg  90 mg Oral Q12H TONJA    traMADol (ULTRAM) tablet 50 mg  50 mg Oral Q6H PRN     Home medications:  Prior to Admission Medications   Prescriptions Last Dose Informant Patient Reported? Taking?    Coenzyme Q10 200 MG capsule 10/20/2019 at Unknown time Self Yes Yes   Sig: Take 200 mg by mouth daily   Glucosamine-Chondroit-Vit C-Mn (GLUCOSAMINE CHONDROITIN COMPLX) CAPS 10/20/2019 at Unknown time Self Yes Yes   Sig: Take 1 capsule by mouth daily   Probiotic Product (PROBIOTIC COLON SUPPORT) CAPS 10/20/2019 at Unknown time Self Yes Yes   Sig: Take 1 capsule by mouth daily   apixaban (ELIQUIS) 5 mg 10/20/2019 at Unknown time Self No Yes   Sig: Take 1 tablet (5 mg total) by mouth 2 (two) times a day   aspirin (ECOTRIN LOW STRENGTH) 81 mg EC tablet 10/20/2019 at Unknown time Self Yes Yes Sig: Take 81 mg by mouth daily   atorvastatin (LIPITOR) 40 mg tablet 10/20/2019 at Unknown time Self No Yes   Sig: Take 1 tablet (40 mg total) by mouth daily   furosemide (LASIX) 20 mg tablet 10/20/2019 at Unknown time Self No Yes   Sig: Take 1 tablet (20 mg total) by mouth daily   gatifloxacin (ZYMAXID) 0 5 % More than a month at Unknown time Self Yes No   magnesium gluconate (MAGONATE) 500 mg tablet 10/20/2019 at Unknown time Self Yes Yes   Sig: Take 500 mg by mouth daily   metoprolol tartrate (LOPRESSOR) 100 mg tablet 10/20/2019 at Unknown time Self No Yes   Sig: TAKE 1/2 TABLET (50 MG TOTAL) BY MOUTH EVERY 12 (TWELVE) HOURS   nitroglycerin (NITROSTAT) 0 4 mg SL tablet 10/20/2019 at Unknown time  No Yes   Sig: Place 1 tablet (0 4 mg total) under the tongue every 5 (five) minutes as needed for chest pain   potassium chloride (KLOR-CON M20) 20 mEq tablet 10/20/2019 at Unknown time  No Yes   Sig: Take 1 tablet (20 mEq total) by mouth daily   prednisoLONE acetate (PRED FORTE) 1 % ophthalmic suspension More than a month at Unknown time Self Yes No   selenium 200 mcg 10/20/2019 at Unknown time Self Yes Yes   Sig: Take 200 mcg by mouth daily      Facility-Administered Medications: None     Allergies: Allergies   Allergen Reactions    Penicillins Edema and Rash     Reaction Date: [id number]; Category: Allergy;  Annotation - [id number]: Severe reaction with hospitaization at [facility]         Laboratory and Diagnostics:  Results from last 7 days   Lab Units 10/20/19  0525 10/19/19  2209   WBC Thousand/uL 12 53* 10 67*   HEMOGLOBIN g/dL 13 2 13 4   HEMATOCRIT % 41 1 42 1   PLATELETS Thousands/uL 172 167   NEUTROS PCT %  --  80*   MONOS PCT %  --  5     Results from last 7 days   Lab Units 10/21/19  0512 10/20/19  0525 10/19/19  2209   SODIUM mmol/L 139 139 140   POTASSIUM mmol/L 3 4* 4 5 4 3   CHLORIDE mmol/L 101 105 105   CO2 mmol/L 27 26 24   ANION GAP mmol/L 11 8 11   BUN mg/dL 23 27* 20   CREATININE mg/dL 1 16 1 29 1 67* CALCIUM mg/dL 9 6 9 4 9 2   GLUCOSE RANDOM mg/dL 118 119 251*   ALT U/L  --   --  25   AST U/L  --   --  46*   ALK PHOS U/L  --   --  75   ALBUMIN g/dL  --   --  4 2   TOTAL BILIRUBIN mg/dL  --   --  0 83     Results from last 7 days   Lab Units 10/21/19  0512 10/20/19  0525 10/19/19  2209   MAGNESIUM mg/dL 1 9 2 2 2 1      Results from last 7 days   Lab Units 10/21/19  0512 10/20/19  2355 10/20/19  1818 10/20/19  1220 10/20/19  0525 10/19/19  2209   INR   --   --   --   --   --  1 49*   PTT seconds 62* 93* 57* 61* 104* 28      Results from last 7 days   Lab Units 10/21/19  0512 10/20/19  2355 10/20/19  1818 10/20/19  1522 10/20/19  0336 10/20/19  0038 10/19/19  2209   TROPONIN I ng/mL 28 30* 34 80* >40 00* >40 00* >40 00* >40 00* 2 58*         ABG:    VBG:          Micro:          EKG: atrial flutter  Imaging: I have personally reviewed pertinent reports  and I have personally reviewed pertinent films in PACS    ------------------------------------------------------------------------------------------------------------  Advance Directive and Living Will:      Power of :    POLST:    ------------------------------------------------------------------------------------------------------------  Anticipated Length of Stay is > 2 midnights    Counseling / Coordination of Care  Total Critical Care time spent 45 minutes excluding procedures, teaching and family updates  Critical Care time provided between 0930 and [address] [place] [address]        Portions of the record may have been created with voice recognition software  Occasional wrong word or "sound a like" substitutions may have occurred due to the inherent limitations of voice recognition software    Read the chart carefully and recognize, using context, where substitutions have occurred

## 2019-10-21 NOTE — UTILIZATION REVIEW
Initial Clinical Review    Admission: Date/Time/Statement: Inpatient Admission Orders (From admission, onward)     Ordered        10/19/19 2301  Inpatient Admission  Once                   Orders Placed This Encounter   Procedures    Inpatient Admission     Standing Status:   Standing     Number of Occurrences:   1     Order Specific Question:   Admitting Physician     Answer:   Moriah Chatterjee     Order Specific Question:   Level of Care     Answer:   Level 2 Stepdown / HOT [14]     Order Specific Question:   Estimated length of stay     Answer:   More than 2 Midnights     Order Specific Question:   Certification     Answer:   I certify that inpatient services are medically necessary for this patient for a duration of greater than two midnights  See H&P and MD Progress Notes for additional information about the patient's course of treatment  ED Arrival Information     Expected Arrival Acuity Means of Arrival Escorted By Service Admission Type    - 10/19/2019 21:46 Emergent Ambulance [facility] Emergency Medicine Emergency    Arrival Complaint    Chest Pain        Chief Complaint   Patient presents with    Chest Pain     Per EMS pt developed chest pain around 8:30pm, upon EMS arrival found to be in [facility], and BP 39/20 was shocked with 100J, HR responded to normal sinus  Upon arrival complaints of no pain  do not have tracings or vs from ems  Assessment/Plan: 80 yr old male to the ed via ems from his home [address] independent [address] presents to the ed with chest pain   He has had chest pain with long walks over the past week and it resolved after 5-10 minutes after sitting down-- he went to his pcp who planned on obtaining a stress test   This evening had a sudden onset of substernal chest pressure without radiation  When ems arrived at his house he was in [facility] and his bp was 40/20 and was cardioverted with 100 joules to nsr  Unk [place]  He was asymptomatic when he arrived at the ed-- trop was 2 58  He was initiated on a heparin drip  He is admitted as an inpatient with vtach      ekg- Normal sinus rhythm  Possible Left atrial enlargement  Left axis deviation  Possible Anteroseptal infarct , age undetermined  Nonspecific T wave abnormality  -per prior echo 's he has scar of distal lad territory  For cath on 10/21--placed on heparin drip -- load brilinta and will need icd for 2ndary prevention   F/u agustin and continue to trend trops/   ED Triage Vitals   Temperature Pulse Respirations Blood Pressure SpO2   10/19/19 2226 10/19/19 2149 10/19/19 2149 10/19/19 2149 10/19/19 2149   97 8 °F (36 6 °C) 76 18 119/65 95 %      Temp Source Heart Rate Source Patient Position - Orthostatic VS BP Location FiO2 (%)   10/19/19 2226 10/19/19 2149 10/19/19 2149 10/19/19 2149 --   Oral Monitor Lying Right arm       Pain Score       10/19/19 2149       No Pain        Wt Readings from Last 1 Encounters:   10/20/19 85 7 kg (188 lb 15 oz)     Additional Vital Signs:   21/19 1200  --  92  15  103/73  84  98 %  Nasal cannula  --   10/21/19 1000  --  104  33Abnormal   134/92  111  97 %  --  --   10/21/19 0953  --  87  --  136/97  --  97 %  --  --   10/21/19 0656  98 4 °F (36 9 °C)  96  18  97/58   --  92 %  None (Room air)  Lying   BP: Map65 at 10/21/19 0656   10/21/19 0552  --  --  18  85/64Abnormal   --  --  --  Lying   10/21/19 0400  --  --  --  --  --  93 %  None (Room air)  --   10/21/19 0320  98 7 °F (37 1 °C)  92  18  104/67   --  92 %  None (Room air)  Lying   BP: Map 75 at 10/21/19 0320   10/21/19 0000  --  --  --  --  --  96 %  Nasal cannula  --   10/20/19 2334  98 2 °F (36 8 °C)  101  18  124/89   --  95 %  Nasal cannula  Lying   BP: Map 99 at 10/20/19 2334   10/20/19 2105  --  103  --  131/77  --  --  --  --   10/20/19 2000  --  --  --  --  --  96 %  Nasal cannula  --   10/20/19 1940  98 3 °F (36 8 °C)  115Abnormal   17  120/74  88  93 %  Nasal cannula  Lying   10/20/19 1710  --  --  --  --  --  92 %  Nasal cannula  -- 10/20/19 1700  --  --  --  --  --  84 %Abnormal   Nasal cannula  --   10/20/19 1520  98 1 °F (36 7 °C)  92  22  120/81   --  94 %  Nasal cannula  Lying   BP: Map 96 at 10/20/19 1520   10/20/19 1145  97 5 °F (36 4 °C)  96  22  120/102Abnormal    --  90 %  None (Room air)  Lying   BP: Map 111 at 10/20/19 1145   10/20/19 0713  98 8 °F (37 1 °C)  90  19  127/93   --  91 %  None (Room air)  Lying   BP: Map 109 at 10/20/19 0713   10/20/19 0341  98 1 °F (36 7 °C)  89  18  124/87  1047  92 %  None (Room air)  Lying   10/20/19 0142  --  79  --  112/81  --  --  --  --   10/20/19 0031  98 9 °F (37 2 °C)  83  18  100/65  77  93 %  None (Room air)  Lying   10/20/19 0000  --  --  --  --  --  --  None (Room air)  --   10/19/19 2315  --  88  19  103/65  --  94 %  None (Room air)  Lying   10/19/19 2258  --  89  17  97/55  --  94 %  None (Room air)  Lying   10/19/19 2226  97 8 °F (36 6 °C)  --  --  --  --  --  None (Room air)  --   10/19/19 2149  --  76  18  119/65  --  95 %  None (Room air)  Lying        ekg 10/20   Atrial flutter with rapid ventricular response with premature ventricular or aberrantly conducted complexes  Left axis deviation  Echo- ef 35%    Cardiac cath 10/21--Severe CAD with total occlusions of the LAD and circumflex  The LAD is supplied by a patent LIMA  The OM and circumflex are supplied by an SVG that had a 99% stenosis in mid body  This was the culprit for the patient's NSTEMI  Successful PCI of SVG to OM1 and circumflex  Plan: DAPT and anticoagulation, switching to anticoagulation with a NOAC and clopdigrel in 1 month      INDICATIONS:  --  Possible CAD: myocardial infarction without ST elevation (NSTEMI)  --  Congestive heart failure with ischemic cardiomyopathy    --  Cardiac: cardiac arrest   Pertinent Labs/Diagnostic Test Results:   Results from last 7 days   Lab Units 10/21/19  1053 10/20/19  0525 10/19/19  2209   WBC Thousand/uL 13 44* 12 53* 10 67*   HEMOGLOBIN g/dL 13 2 13 2 13 4   HEMATOCRIT % 39 8 41 1 42 1   PLATELETS Thousands/uL 196 172 167   NEUTROS ABS Thousands/µL 10 79*  --  8 56*         Results from last 7 days   Lab Units 10/21/19  1053 10/21/19  0512 10/20/19  0525 10/19/19  2209   SODIUM mmol/L 140 139 139 140   POTASSIUM mmol/L 3 5 3 4* 4 5 4 3   CHLORIDE mmol/L 103 101 105 105   CO2 mmol/L 27 27 26 24   ANION GAP mmol/L 10 11 8 11   BUN mg/dL 28* 23 27* 20   CREATININE mg/dL 1 30 1 16 1 29 1 67*   EGFR ml/min/1 73sq m 49 56 50 36   CALCIUM mg/dL 9 2 9 6 9 4 9 2   MAGNESIUM mg/dL 2 0 1 9 2 2 2 1   PHOSPHORUS mg/dL 3 3  --   --   --      Results from last 7 days   Lab Units 10/21/19  1053 10/19/19  2209   AST U/L 136* 46*   ALT U/L 37 25   ALK PHOS U/L 68 75   TOTAL PROTEIN g/dL 7 5 7 6   ALBUMIN g/dL 3 7 4 2   TOTAL BILIRUBIN mg/dL 1 46* 0 83     Results from last 7 days   Lab Units 10/21/19  1028   POC GLUCOSE mg/dl 166*     Results from last 7 days   Lab Units 10/21/19  1053 10/21/19  0512 10/20/19  0525 10/19/19  2209   GLUCOSE RANDOM mg/dL 151* 118 119 251*         Results from last 7 days   Lab Units 10/20/19  0525   HEMOGLOBIN A1C % 6 3   EAG mg/dl 134     Results from last 7 days   Lab Units 10/21/19  0512 10/20/19  2355 10/20/19  1818 10/20/19  1522 10/20/19  0336 10/20/19  0038 10/19/19  2209   TROPONIN I ng/mL 28 30* 34 80* >40 00* >40 00* >40 00* >40 00* 2 58*         Results from last 7 days   Lab Units 10/21/19  1053 10/21/19  0512 10/20/19  2355  10/19/19  2209   PROTIME seconds 15 7*  --   --   --  17 6*   INR  1 29*  --   --   --  1 49*   PTT seconds 52* 62* 93*   < > 28    < > = values in this interval not displayed       Results from last 7 days   Lab Units 10/19/19  2209   TSH 3RD GENERATON uIU/mL 6 610*       Results from last 7 days   Lab Units 10/20/19  2114   CLARITY UA  Cloudy   COLOR UA  Yellow   SPEC GRAV UA  1 013   PH UA  6 0   GLUCOSE UA mg/dl Negative   KETONES UA mg/dl Negative   BLOOD UA  Large*   PROTEIN UA mg/dl Trace*   NITRITE UA  Negative   BILIRUBIN UA Negative   UROBILINOGEN UA E U /dl 0 2   LEUKOCYTES UA  Negative   WBC UA /hpf 2-4*   RBC UA /hpf Innumerable*   BACTERIA UA /hpf None Seen   EPITHELIAL CELLS WET PREP /hpf None Seen   echo -- ef 35% with mid apical and septal akinesis and mild mr  On 10 /21 went into a run of asymptomatic nsvt that stopped spontaneously  He had a bolus of amiodarone  And lidocaine  Then became hypotensive and diaphoretic -- cardioverted with 200j returned to sr briefly then to aflutter  transiently hypotensive and transferred to icu   Will do cath today 10/21 unless kidney function inadequate   Now has juliet on phenylephrine drip to ensure renal function    ED Treatment:   Medication Administration from 10/19/2019 2146 to 10/20/2019 0020       Date/Time Order Dose Route Action Comments     10/19/2019 2334 heparin (porcine) injection 4,000 Units 4,000 Units Intravenous Given      10/20/2019 0000 heparin (porcine) 25,000 units in 250 mL infusion (premix) 11 8 Units/kg/hr Intravenous Rate/Dose Verify      10/19/2019 2334 heparin (porcine) 25,000 units in 250 mL infusion (premix) 11 8 Units/kg/hr Intravenous New Bag         Past Medical History:   Diagnosis Date    Acute myocardial infarction ([address] )     Allergic rhinitis     Anxiety     Bimalleolar fracture of left ankle     CAD (coronary artery disease)     Erectile dysfunction of non-organic origin     Hematuria     workup was negative and felt to be benign    Herpes zoster with complication     Hyperlipidemia     Hypertension     Impaired fasting glucose     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    Prostatic hypertrophy     benign    Stroke [facility])      Present on Admission:   Ventricular tachycardia ([address] )   CAD (coronary artery disease)   Typical atrial flutter (HCC)   Dyslipidemia   Benign essential hypertension   Ischemic cardiomyopathy   Elevated troponin, concern NSTEMI   Acute kidney injury [facility])      Admitting Diagnosis: Ventricular tachycardia ([facility] ) [I47 2]  Chest pain [R07 9]  Coronary artery disease involving native coronary artery of native heart, angina presence unspecified [I25 10]  Age/Sex: 80 y o  male  Admission Orders:    Medications:  aspirin 81 mg Oral Daily   atorvastatin 40 mg Oral Daily   co-enzyme Q-10 200 mg Oral Daily   flumazenil 0 2 mg Intravenous Once   glucosamine sulfate 500 mg Oral Daily   lidocaine (PF)      magnesium gluconate 500 mg Oral Daily   magnesium sulfate 2 g Intravenous Once   melatonin 3 mg Oral HS   potassium chloride 20 mEq Intravenous Once   ticagrelor 90 mg Oral Q12H [address]       amiodarone 1 mg/min Intravenous Continuous   heparin (porcine) 3-20 Units/kg/hr (Order-Specific) Intravenous Titrated   multi-electrolyte 75 mL/hr Intravenous Continuous   phenylephine  mcg/min Intravenous Titrated       acetaminophen 650 mg Oral Q6H PRN   doxylamine 12 5 mg Oral HS PRN   heparin (porcine) 2,000 Units Intravenous PRN   heparin (porcine) 4,000 Units Intravenous PRN   metoprolol 5 mg Intravenous Q6H PRN   nitroglycerin 0 4 mg Sublingual Q5 Min PRN   perflutren lipid microsphere 0 6 mL/min Intravenous Once in imaging   traMADol 50 mg Oral Q6H PRN       IP CONSULT TO CARDIOLOGY  IP CONSULT TO ELECTROPHYSIOLOGY    Network Utilization Review Department  Phone: [phone]; Fax [phone]  [email or web address]  ATTENTION: Please call with any questions or concerns to [phone]  and carefully listen to the prompts so that you are directed to the right person  Send all requests for admission clinical reviews, approved or denied determinations and any other requests to fax [phone]   All voicemails are confidential

## 2019-10-22 PROBLEM — Z95.5 STATUS POST INSERTION OF DRUG ELUTING CORONARY ARTERY STENT: Status: ACTIVE | Noted: 2019-10-22

## 2019-10-22 LAB
ANION GAP SERPL CALCULATED.3IONS-SCNC: 9 MMOL/L (ref 4–13)
APTT PPP: 53 SECONDS (ref 23–37)
APTT PPP: 64 SECONDS (ref 23–37)
APTT PPP: 67 SECONDS (ref 23–37)
BUN SERPL-MCNC: 20 MG/DL (ref 5–25)
CALCIUM SERPL-MCNC: 8.8 MG/DL (ref 8.3–10.1)
CHLORIDE SERPL-SCNC: 101 MMOL/L (ref 100–108)
CO2 SERPL-SCNC: 26 MMOL/L (ref 21–32)
CREAT SERPL-MCNC: 1.01 MG/DL (ref 0.6–1.3)
ERYTHROCYTE [DISTWIDTH] IN BLOOD BY AUTOMATED COUNT: 13.7 % (ref 11.6–15.1)
GFR SERPL CREATININE-BSD FRML MDRD: 67 ML/MIN/1.73SQ M
GLUCOSE SERPL-MCNC: 134 MG/DL (ref 65–140)
GLUCOSE SERPL-MCNC: 140 MG/DL (ref 65–140)
HCT VFR BLD AUTO: 39.4 % (ref 36.5–49.3)
HGB BLD-MCNC: 13.3 G/DL (ref 12–17)
MAGNESIUM SERPL-MCNC: 2.2 MG/DL (ref 1.6–2.6)
MCH RBC QN AUTO: 30.7 PG (ref 26.8–34.3)
MCHC RBC AUTO-ENTMCNC: 33.8 G/DL (ref 31.4–37.4)
MCV RBC AUTO: 91 FL (ref 82–98)
PHOSPHATE SERPL-MCNC: 2.5 MG/DL (ref 2.3–4.1)
PLATELET # BLD AUTO: 228 THOUSANDS/UL (ref 149–390)
PMV BLD AUTO: 12.5 FL (ref 8.9–12.7)
POTASSIUM SERPL-SCNC: 3.7 MMOL/L (ref 3.5–5.3)
RBC # BLD AUTO: 4.33 MILLION/UL (ref 3.88–5.62)
SODIUM SERPL-SCNC: 136 MMOL/L (ref 136–145)
WBC # BLD AUTO: 12.74 THOUSAND/UL (ref 4.31–10.16)

## 2019-10-22 PROCEDURE — G8978 MOBILITY CURRENT STATUS: HCPCS

## 2019-10-22 PROCEDURE — 85027 COMPLETE CBC AUTOMATED: CPT | Performed by: EMERGENCY MEDICINE

## 2019-10-22 PROCEDURE — 83735 ASSAY OF MAGNESIUM: CPT | Performed by: EMERGENCY MEDICINE

## 2019-10-22 PROCEDURE — G8988 SELF CARE GOAL STATUS: HCPCS

## 2019-10-22 PROCEDURE — 85730 THROMBOPLASTIN TIME PARTIAL: CPT | Performed by: FAMILY MEDICINE

## 2019-10-22 PROCEDURE — G8987 SELF CARE CURRENT STATUS: HCPCS

## 2019-10-22 PROCEDURE — G8979 MOBILITY GOAL STATUS: HCPCS

## 2019-10-22 PROCEDURE — 85730 THROMBOPLASTIN TIME PARTIAL: CPT | Performed by: EMERGENCY MEDICINE

## 2019-10-22 PROCEDURE — 84100 ASSAY OF PHOSPHORUS: CPT | Performed by: EMERGENCY MEDICINE

## 2019-10-22 PROCEDURE — NC001 PR NO CHARGE: Performed by: PHYSICIAN ASSISTANT

## 2019-10-22 PROCEDURE — 97163 PT EVAL HIGH COMPLEX 45 MIN: CPT

## 2019-10-22 PROCEDURE — 97167 OT EVAL HIGH COMPLEX 60 MIN: CPT

## 2019-10-22 PROCEDURE — 93005 ELECTROCARDIOGRAM TRACING: CPT

## 2019-10-22 PROCEDURE — 82948 REAGENT STRIP/BLOOD GLUCOSE: CPT

## 2019-10-22 PROCEDURE — 99232 SBSQ HOSP IP/OBS MODERATE 35: CPT | Performed by: INTERNAL MEDICINE

## 2019-10-22 PROCEDURE — 99291 CRITICAL CARE FIRST HOUR: CPT | Performed by: EMERGENCY MEDICINE

## 2019-10-22 PROCEDURE — 80048 BASIC METABOLIC PNL TOTAL CA: CPT | Performed by: EMERGENCY MEDICINE

## 2019-10-22 PROCEDURE — 85730 THROMBOPLASTIN TIME PARTIAL: CPT | Performed by: PHYSICIAN ASSISTANT

## 2019-10-22 RX ORDER — POTASSIUM CHLORIDE 20 MEQ/1
20 TABLET, EXTENDED RELEASE ORAL ONCE
Status: COMPLETED | OUTPATIENT
Start: 2019-10-22 | End: 2019-10-22

## 2019-10-22 RX ORDER — LISINOPRIL 5 MG/1
5 TABLET ORAL DAILY
Status: DISCONTINUED | OUTPATIENT
Start: 2019-10-22 | End: 2019-10-25

## 2019-10-22 RX ORDER — AMIODARONE HYDROCHLORIDE 200 MG/1
200 TABLET ORAL 2 TIMES DAILY WITH MEALS
Status: DISCONTINUED | OUTPATIENT
Start: 2019-10-22 | End: 2019-10-25 | Stop reason: HOSPADM

## 2019-10-22 RX ADMIN — CLOPIDOGREL BISULFATE 75 MG: 75 TABLET ORAL at 08:11

## 2019-10-22 RX ADMIN — AMIODARONE HYDROCHLORIDE 1 MG/MIN: 50 INJECTION, SOLUTION INTRAVENOUS at 02:41

## 2019-10-22 RX ADMIN — HEPARIN SODIUM 2000 UNITS: 1000 INJECTION, SOLUTION INTRAVENOUS; SUBCUTANEOUS at 02:08

## 2019-10-22 RX ADMIN — POTASSIUM CHLORIDE 20 MEQ: 1500 TABLET, EXTENDED RELEASE ORAL at 10:22

## 2019-10-22 RX ADMIN — Medication 500 MG: at 08:11

## 2019-10-22 RX ADMIN — MELATONIN 3 MG: 3 TAB ORAL at 21:18

## 2019-10-22 RX ADMIN — ENOXAPARIN SODIUM 80 MG: 80 INJECTION SUBCUTANEOUS at 21:18

## 2019-10-22 RX ADMIN — LISINOPRIL 5 MG: 5 TABLET ORAL at 12:13

## 2019-10-22 RX ADMIN — Medication 200 MG: at 08:11

## 2019-10-22 RX ADMIN — AMIODARONE HYDROCHLORIDE 200 MG: 200 TABLET ORAL at 12:12

## 2019-10-22 RX ADMIN — ATORVASTATIN CALCIUM 40 MG: 40 TABLET, FILM COATED ORAL at 08:11

## 2019-10-22 RX ADMIN — ASPIRIN 81 MG: 81 TABLET, COATED ORAL at 08:11

## 2019-10-22 RX ADMIN — HEPARIN SODIUM 13.8 UNITS/KG/HR: 10000 INJECTION, SOLUTION INTRAVENOUS at 16:06

## 2019-10-22 RX ADMIN — AMIODARONE HYDROCHLORIDE 200 MG: 200 TABLET ORAL at 16:05

## 2019-10-22 NOTE — PLAN OF CARE
Problem: PHYSICAL THERAPY ADULT  Goal: Performs mobility at highest level of function for planned discharge setting  See evaluation for individualized goals  Description  Treatment/Interventions: Functional transfer training, LE strengthening/ROM, Therapeutic exercise, Endurance training, Equipment eval/education, Bed mobility, Gait training, Spoke to nursing, Spoke to case management, OT  Equipment Recommended: Walker(at this time; will cont to monitor progress)       See flowsheet documentation for full assessment, interventions and recommendations  Note:   Prognosis: Good  Problem List: Decreased strength, Decreased endurance, Impaired balance, Decreased mobility  Assessment: Pt is 80 y o  male admitted with hx of CP and unstable v-tach and Dx of ventricular tachycardia, NSTEMI, JAMEL; pt underwent cardiac catheterization on 10/21/2019 which revealed acute occlusion of SVG to OM1; a drug-eluting stent was placed with angioplasty  Pt 's comorbidities affecting POC include: anxiety, CAD, HTN, CVA and personal factors of: advanced age  Pt's clinical presentation is currently unstable/unpredictable which is evident in ongoing telem monitoring w/ elevated/irreg HR noted, suppl O2 needs and need for min assist w/ all phases of observed mobility incl amb w/ rw when usually mobilizing independently and w/o AD  Pt presents w/ generalized weakness, incl decreased LE strength, decreased functional endurance and activity tolerance, impaired balance w/ associated gait deviations (rw was introduced) and fall risk  Will cont to follow pt in PT for progressive mobilization to address above functional deficits and to max level of (I), endurance, and safety  Currently, pt would benefit from rehab upon D/C but he declines rehab and wishes to return home to [address] [place] [address] upon D/C (reports facility staff support is available if needed); home PT follow up is then recommended; will follow    Barriers to Discharge: Decreased caregiver support     Recommendation: Home PT(pt declines rehab)          See flowsheet documentation for full assessment

## 2019-10-22 NOTE — PROGRESS NOTES
Progress Note - ICU Transfer to SD/MS tele   Mohamud Swan 80 y o  male MRN: [id number]  [address] [facility]   Unit/Bed#: [address] 070[address]62 Encounter: [id number]    Code Status: Level 1 - Full Code  POA:    POLST:      Reason for ICU admission: Sustained symptomatic VTach    Active problems:   Principal Problem:    Ventricular tachycardia ([facility] [facility] )  Active Problems:    Elevated troponin, concern NSTEMI    Acute kidney injury ([facility] )    Benign essential hypertension    Dyslipidemia    CAD (coronary artery disease)    Typical atrial flutter ([facility] )    Ischemic cardiomyopathy  Resolved Problems:    * No resolved hospital problems  *      Consultants: Cardiology    History of Present Illness: Per RADHA Joseph, "Mohamud Swan is a 80 y o  male w/ PMHx CVA, HPTN, HLD, a-flutter on eliquis, ischemic cardiomyopathy with EF 45% and CAD s/p CAG in 1999 who now presents w/ sustained VT  The patient has been experiencing exertional chest pain over the last week  He contacted his PCP and was scheduled for stress testing however on 10/19 he developed sudden onset substernal chest pain prompting EMS activation  On EMS arrival the patient was found to be in VT w/ a pulse and hypotensive  He was cardioverted once with return to sinus rhythm and resolution of hypotension  The patient was asymptomatic and in sinus rhythm on arrival to the ED  There he was found to have an elevated troponin w/o EKG changes and was given ASA and heparin  He was then admitted to the floor for further monitoring awaiting ischemic w/u and possible AICD       The patient underwent ECHO yesterday that was revealing for EF 35% w/ mid-apical and septal akinesis and mild MR  The patient had an JAMEL which was improving  Early this AM he experienced a 45s run of asymptomatic NSVT that terminated spontaneously  At approximately 0930 this AM I was called to the bedside as the patient entered sustained VT w/ a pulse, asymptomatic   An amiodarone bolus and lidocaine bolus were administered w/o improvement in the rhythm  He then became hypotensive and diaphoretic  He was cardioverted once @ 200J with return breifly to sinus rhythm and then a-flutter  The patient was transiently hypotensive requiring a ez drip  He was transferred to the ICU for further management and care  Upon my evaluation the patient is resting comfortably, hemodynamically stable, he offers no complaints at this time "    Summary of clinical course: Patient went for cardiac catheterization which revealed acute occlusion of SVG to OM1  A drug-eluting stent was placed with angioplasty and patient returned to ICU postoperatively  Patient has remained in AF flutter with heart rate primarily low 100s since return to ICU, with no further episodes of ventricular tachycardia  This a m  On exam, patient asymptomatic except for slight shortness of breath which occurred when transferring from bed to chair  Otherwise denies chest pain or palpitations  Patient stable for transfer out of ICU  Recent or scheduled procedures: 10/21 cardiac cath with MINNIE/angioplasty of SVG in OM1    Outstanding/pending diagnostics: n/a    Cultures:              Mobilization Plan: early mobility    Nutrition Plan:          Diet Orders   (From admission, onward)             Start     Ordered    10/21/19 1615  Diet Cardiac  Diet effective now     Question Answer Comment   Diet Type Cardiac    RD to adjust diet per protocol? Yes        10/21/19 1614                Discharge Plan:   Patient should be ready for discharge pending PT/OT recommendations and cardiology medical clearance      Initial Physical Therapy Recommendations: pending  Initial Occupational Therapy Recommendations: pending  Initial /Plan: pending    Home medications that are not reordered and reason why: eliquis (on heparin), lasix (hypotension), lopressor (defer to cardiology)     Specific Diagnosis Plan:    CAD s/p CABG 1999: POD 1 from MINNIE placement and angioplasty of SVG in OM1  Cardiology following  Continue ASA and plavix  Paroxysmal A-flutter: continue heparin gtt, will need eventual transition to [facility] (on eliquis outpatient), defer to cardiology regarding rate control (restarting lopressor)  Sustained symptomativ VTach: likely related to ischemic event/scarring  No VTach episodes since cardiac cath  Cardiology following  Patient may require AICD vs  [employer]  Remains on amio gtt at 1  HTN: home anti-htn on hold 2/2 hypotension yesterday  HLD: statin  JAMEL: resolved, follow BUN/Cr    Spoke with Dr Mj Vargas  regarding transfer  Please call [phone] with any questions or concerns  Portions of the record may have been created with voice recognition software  Occasional wrong word or "sound a like" substitutions may have occurred due to the inherent limitations of voice recognition software  Read the chart carefully and recognize, using context, where substitutions have occurred      Janelle Sena PA-C

## 2019-10-22 NOTE — PROGRESS NOTES
Progress Note - Critical Care   Mann Henderson 80 y o  male MRN: [id number]  Unit/Bed#: [facility] 413-01 Encounter: [id number]    Assessment:   Principal Problem:    Ventricular tachycardia ([facility] [address] )  Active Problems:    Elevated troponin, concern NSTEMI    Acute kidney injury ([facility] [address] )    Benign essential hypertension    Dyslipidemia    CAD (coronary artery disease)    Typical atrial flutter ([facility] [address] )    Ischemic cardiomyopathy  Resolved Problems:    * No resolved hospital problems   *      Plan  Neuro:   1) No acute issues  · Pain controlled with:  · PRN: tylenol, tramadol  · Delirium Precautions  · CAM ICU per protocol  · Regulate sleep/wake cycle+  · Sleep:  Melatonin 3mg qhs  · Trend neuro exam    CV:   1) Sustained symptomatic VTach, Aflutter, CAD, ICM (35% EF)  · CAD s/p CABG : Now POD 1 from MINNIE placement and angioplasty of SVG in OM1  · Continue ASA and plavix  · Paroxysmal A-flutter: Continue heparin gtt, will need transition to [facility] (on eliquis at home); defer to cardiology regarding rate control  · Sustained symptomatic VTach: Continue amio gtt at 1  · D/w cardiology regarding AICD, lifevest  · HTN: home anti-htn on hold 2/2 previous hypotension  · HLD: statin  · Cardiology following  · Off all pressors (previously on ez)  · MAP goal > 65  · Systolic (26YJN), BTI:166 , Min:101 , Max:137   ·   · Rhythm: A-flutter  · Follow rhythm on telemetry    Lun) Respiratory insufficiency 2/2 cardiac ischemia/ arrhythmia  · Weaned off O2 this AM  · Pulmonary hygiene    GI:   1) No acute issues  · Bowel regimen: colace  and senna    FEN:   · Fluid/Diuretic plan: no IVF  · Nutrition/diet plan: cardiac diet  · Replete electrolytes with goals: K >4 0, Mag >2 0, and Phos >3 0    :   1) JAMEL  · BMP pending this AM  · Indwelling Castillo present: yes   · D/c castillo today  · Trend UOP and BUN/creat  · Strict I and O    Intake/Output Summary (Last 24 hours) at 10/22/2019 0707  Last data filed at 10/22/2019 0600  Gross per 24 hour Intake 2566 89 ml   Output 1530 ml   Net 1036 89 ml   ·     ID:   1) Leukocytosis  · Likely reactive-- improving  · Trend temps and WBC count  · Temp (24hrs), Av 9 °F (36 6 °C), Min:97 5 °F (36 4 °C), Max:98 8 °F (37 1 °C)  ·     Heme:   1) No acute issues  · Trend hgb and plts  · Transfuse as needed for goal hgb >7 0    Endo:   1) No active issues    MSK/Skin:  1) No active issues  · Mobility goal: early mobility as able  · PT consult: yes  · OT consult: yes  · Frequent turning and pressure off-loading    VTE Prophylaxis:  · Pharmacologic Prophylaxis:Heparin Drip  · Mechanical Prophylaxis: sequential compression device    Disposition: Transfer to SD level 2      ______________________________________________________________________  Chief Complaint: "I slept great last night"      HPI/24hr events: Patient now POD 1 following cardiac cath and MINNIE with angioplasty of SVG in OM1  Returned to ICU after procedure yesterday without issues  Denies any chest pain, palpitations, racing heart, orthopnea, PND  Does admit to some SOB this AM, primarily with transfer from bed to chair  Has remained in Aflutter overnight with rate primarily in low 100s  No episodes of Vtach noted on telemetry      Review of Systems   Constitutional: Negative for chills, diaphoresis, fatigue and fever  Eyes: Negative for pain and visual disturbance  Respiratory: Positive for shortness of breath (with movement)  Negative for cough and wheezing  Cardiovascular: Negative for chest pain and palpitations  Gastrointestinal: Negative for abdominal pain, constipation, diarrhea, nausea and vomiting  Musculoskeletal: Negative for arthralgias, neck pain and neck stiffness     Neurological: Negative for dizziness, tremors, weakness, light-headedness, numbness and headaches      ______________________________________________________________________  Temperature:   Temp (24hrs), Av 9 °F (36 6 °C), Min:97 5 °F (36 4 °C), Max:98 8 °F (37 1 °C)    Current Temperature: 98 8 °F (37 1 °C)    Vitals:    10/22/19 0300 10/22/19 0400 10/22/19 0500 10/22/19 0600   BP: 131/86 127/77 137/80 108/66   BP Location:       Pulse: 100 102 (!) 106 (!) 108   Resp: 14 21 22 16   Temp:  98 8 °F (37 1 °C)     TempSrc:  Oral     SpO2: 95% 95% 96% 94%   Weight:    85 kg (187 lb 6 3 oz)   Height:                  Weights:   IBW: 59 2 kg    Body mass index is 32 17 kg/m²  Weight (last 2 days)     Date/Time   Weight    10/22/19 0600   85 (187 39)    10/21/19 1830   --    Comment rows:    OBSERV: Chano Ackerman Fellow at bedside  Slight drainage over groin site  Okay to start heparin gtt per verbal from fellow  Will continue to monitor pt   at 10/21/19 1830    10/20/19 0031   85 7 (188 93)            Height: 5' 4" (162 6 cm)  Hemodynamic Monitoring:  N/A     Noninvasive/Ventilator Settings:  Respiratory    Lab Data (Last 4 hours)    None         O2/Vent Data (Last 4 hours)    None              No results found for: PHART, ZEX6XNN, PO2ART, VFD1RJM, J5VXIDOJ, BEART, SOURCE  SpO2: SpO2: 95 %, SpO2 Activity: SpO2 Activity: At Rest, SpO2 Device: O2 Device: Nasal cannula  ______________________________________________________________________  Physical Exam:     Physical Exam   Constitutional: He is oriented to person, place, and time  He appears well-developed and well-nourished  No distress  HENT:   Head: Normocephalic and atraumatic  Nose: Nose normal    Mouth/Throat: Oropharynx is clear and moist    Eyes: Pupils are equal, round, and reactive to light  Conjunctivae and EOM are normal    Neck: Normal range of motion  Neck supple  Cardiovascular: Normal rate, regular rhythm, normal heart sounds and intact distal pulses  Pulmonary/Chest: Effort normal    Somewhat decreased BS bilaterally, otherwise CTA   Abdominal: Soft  Bowel sounds are normal  He exhibits no distension  There is no tenderness  There is no guarding  Musculoskeletal: Normal range of motion   He exhibits no edema or tenderness  Neurological: He is alert and oriented to person, place, and time  Skin: Skin is warm and dry  Capillary refill takes less than 2 seconds  He is not diaphoretic      ______________________________________________________________________  Intake and Outputs:  I/O       10/20 0701 - 10/21 0700 10/21 0701 - 10/22 0700 10/22 0701 - 10/23 0700    P  O  360 240     I V  (mL/kg) 29 2 (0 3) 1826 9 (21 5)     IV Piggyback  500     Total Intake(mL/kg) 389 2 (4 5) 2566 9 (30 2)     Urine (mL/kg/hr) 1450 (0 7) 1530 (0 8)     Total Output 1450 1530     Net -1060 8 +1036 9                Nutrition:        Diet Orders   (From admission, onward)             Start     Ordered    10/21/19 1615  Diet Cardiac  Diet effective now     Question Answer Comment   Diet Type Cardiac    RD to adjust diet per protocol? Yes        10/21/19 1614              Labs:   Results from last 7 days   Lab Units 10/22/19  0445 10/21/19  1053 10/20/19  0525 10/19/19  2209   WBC Thousand/uL 12 74* 13 44* 12 53* 10 67*   HEMOGLOBIN g/dL 13 3 13 2 13 2 13 4   HEMATOCRIT % 39 4 39 8 41 1 42 1   PLATELETS Thousands/uL 228 196 172 167   NEUTROS PCT %  --  80*  --  80*   MONOS PCT %  --  10  --  5    Results from last 7 days   Lab Units 10/21/19  1827 10/21/19  1053 10/21/19  0512  10/19/19  2209   SODIUM mmol/L 137 140 139   < > 140   POTASSIUM mmol/L 3 8 3 5 3 4*   < > 4 3   CHLORIDE mmol/L 100 103 101   < > 105   CO2 mmol/L 29 27 27   < > 24   BUN mg/dL 24 28* 23   < > 20   CREATININE mg/dL 1 20 1 30 1 16   < > 1 67*   CALCIUM mg/dL 9 5 9 2 9 6   < > 9 2   ALK PHOS U/L  --  68  --   --  75   ALT U/L  --  37  --   --  25   AST U/L  --  136*  --   --  46*    < > = values in this interval not displayed       Results from last 7 days   Lab Units 10/21/19  1827 10/21/19  1053 10/21/19  0512   MAGNESIUM mg/dL 2 6 2 0 1 9     Results from last 7 days   Lab Units 10/21/19  1053   PHOSPHORUS mg/dL 3 3      Results from last 7 days   Lab Units 10/22/19  0131 10/21/19  1827 10/21/19  1053  10/19/19  2209   INR   --  1 31* 1 29*  --  1 49*   PTT seconds 53* 60* 52*   < > 28    < > = values in this interval not displayed  0   Lab Value Date/Time    TROPONINI 28 30 (H) 10/21/2019 0512    TROPONINI 34 80 (H) 10/20/2019 2355    TROPONINI >40 00 (H) 10/20/2019 1818    TROPONINI >40 00 (H) 10/20/2019 1522    TROPONINI >40 00 (H) 10/20/2019 0336    TROPONINI >40 00 (H) 10/20/2019 0038    TROPONINI 2 58 (H) 10/19/2019 2209    TROPONINI <0 02 03/30/2019 0056     Imaging:  I have personally reviewed pertinent reports  and I have personally reviewed pertinent films in PACS  EKG: A-flutter  Micro:  No results found for: Unice [place], SPUTUMCULTUR  Allergies: Allergies   Allergen Reactions    Penicillins Edema and Rash     Reaction Date: [id number]; Category: Allergy;  Annotation - [id number]: Severe reaction with hospitaization at [facility]     Medications:   Scheduled Meds:  Current Facility-Administered Medications:  acetaminophen 650 mg Oral Q6H PRN Dellia Jessica, PA-C    amiodarone 1 mg/min Intravenous Continuous Dellia Jessica, PA-C Last Rate: 1 mg/min (10/22/19 0241)   aspirin 81 mg Oral Daily [place] Radha Gaganon, PA-C    atorvastatin 40 mg Oral Daily Dellia Jessica, PA-C    clopidogrel 75 mg Oral Daily MARJAN Lewis    co-enzyme Q-10 200 mg Oral Daily Dellia Jessica, PA-C    doxylamine 12 5 mg Oral HS PRN Dellia Jessica, PA-C    flumazenil 0 2 mg Intravenous Once Dellia Jessica, PA-C    glucosamine sulfate 500 mg Oral Daily Coni R Rasmuson, PA-C    heparin (porcine) 3-20 Units/kg/hr (Order-Specific) Intravenous Titrated Dellia Jessica, PA-C Last Rate: 13 8 Units/kg/hr (10/22/19 0205)   heparin (porcine) 2,000 Units Intravenous PRN Florrie Radha Rasmuson, PA-C    heparin (porcine) 4,000 Units Intravenous PRN Yaneli Joseph PA-C    magnesium gluconate 500 mg Oral Daily Coni Joseph PA-C    melatonin 3 mg Oral HS Verla Patch Rasmuson, PA-C    metoprolol 5 mg Intravenous Q6H PRN Rapp Morn, PA-C    nitroglycerin 0 4 mg Sublingual Q5 Min PRN Verla Patch Rasmuson, PA-C    perflutren lipid microsphere 0 6 mL/min Intravenous Once in imaging Verla Patch Rasmuson, PA-C    traMADol 50 mg Oral Q6H PRN Rapp Morn, PA-C      Continuous Infusions:  amiodarone 1 mg/min Last Rate: 1 mg/min (10/22/19 0241)   heparin (porcine) 3-20 Units/kg/hr (Order-Specific) Last Rate: 13 8 Units/kg/hr (10/22/19 0205)     PRN Meds:    acetaminophen 650 mg Q6H PRN   doxylamine 12 5 mg HS PRN   heparin (porcine) 2,000 Units PRN   heparin (porcine) 4,000 Units PRN   metoprolol 5 mg Q6H PRN   nitroglycerin 0 4 mg Q5 Min PRN   perflutren lipid microsphere 0 6 mL/min Once in imaging   traMADol 50 mg Q6H PRN       Invasive lines and devices: Invasive Devices     Peripheral Intravenous Line            Peripheral IV 10/19/19 Right Hand 2 days    Peripheral IV 10/20/19 Right Forearm 2 days    Peripheral IV 10/21/19 Left Arm less than 1 day    Peripheral IV 10/21/19 Left Forearm less than 1 day          Drain            Urethral Catheter 18 Fr  less than 1 day                   Counseling / Coordination of Care  Total Critical Care time spent 35 minutes excluding procedures, teaching and family updates  Code Status: Level 1 - Full Code    Portions of the record may have been created with voice recognition software  Occasional wrong word or "sound a like" substitutions may have occurred due to the inherent limitations of voice recognition software  Read the chart carefully and recognize, using context, where substitutions have occurred      Elizabeth Prajapati PA-C

## 2019-10-22 NOTE — OCCUPATIONAL THERAPY NOTE
[address] Evaluation     Patient Name: Chaya Ramos Date: 10/22/2019  Problem List  Principal Problem:    Ventricular tachycardia ([facility] [address] )  Active Problems:    Benign essential hypertension    Dyslipidemia    CAD (coronary artery disease)    Typical atrial flutter (HCC)    Ischemic cardiomyopathy    Elevated troponin, concern NSTEMI    Acute kidney injury ([facility] [address] )    Status post insertion of drug eluting coronary artery stent    Past Medical History  Past Medical History:   Diagnosis Date    Acute myocardial infarction ([facility] [address] )     Allergic rhinitis     Anxiety     Bimalleolar fracture of left ankle     CAD (coronary artery disease)     Erectile dysfunction of non-organic origin     Hematuria     workup was negative and felt to be benign    Herpes zoster with complication     Hyperlipidemia     Hypertension     Impaired fasting glucose     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    Prostatic hypertrophy     benign    Stroke [facility])      Past Surgical History  Past Surgical History:   Procedure Laterality Date    ANKLE FRACTURE SURGERY Left     CORONARY ARTERY BYPASS GRAFT        10/22/19 1202   Note Type   Note type Eval/Treat   Restrictions/Precautions   Weight Bearing Precautions Per Order No   Other Precautions Multiple lines;Telemetry;O2;Fall Risk  (1L O2)   Pain Assessment   Pain Assessment No/denies pain   Home Living   Type of Home Apartment   Home Layout One level   Bathroom Shower/Tub Walk-in shower   Bathroom Toilet Raised   Bathroom Equipment Grab bars around toilet;Grab bars in shower; Shower chair   Additional Comments [address]   Prior Function   Level of [place] Independent with ADLs and functional mobility   Lives With Alone   Receives Help From Family   ADL Assistance Independent   IADLs Needs assistance  (meals; cleaning)   Falls in the last 6 months 0   Vocational Retired   Lifestyle   Autonomy pta pt reports I in ADLs, MV assists w/ IADLs including meals and cleaning; no DM   Reciprocal Relationships supportive family and facility   Service to Others retired [address] [facility] enjoys [address] [place] (W[place]) WDL   Subjective   Subjective "I will do fine at home, they can help me as needed"   ADL   Where Assessed Chair   Eating Assistance 5  Supervision/Setup   Grooming Assistance 5  Supervision/Setup   UB [address] 5  Supervision/Setup   LB [address] 4  Minimal Assistance   UB Dressing Assistance 5  Supervision/Setup   LB Dressing Assistance 4  [address] to Stand 4  Minimal assistance   Additional items Assist x 1; Increased time required;Verbal cues   Stand to Sit 4  Minimal assistance   Additional items Assist x 1; Increased time required;Verbal cues   Functional Mobility   Functional Mobility 4  Minimal assistance   Additional items Rolling walker   Balance   Static Sitting Fair   Static Standing Fair -   Ambulatory Poor +   Activity Tolerance   Activity Tolerance Other (Comment)  (Elevated HR)   Medical Staff Made Aware PT Deni   Nurse Made Aware okay to see per HELEN DE LUNA Assessment   RUE Assessment WFL   LUE Assessment   LUE Assessment WFL   Hand Function   Gross Motor Coordination Functional   Fine Motor Coordination Functional   Vision-Basic Assessment   Current Vision Wears glasses all the time   Cognition   Overall Cognitive Status WFL   Arousal/Participation Cooperative   Attention Within functional limits   Orientation Level Oriented X4   Memory Within functional limits   Following Commands Follows one step commands without difficulty   Comments pt pleasant and cooperative   Assessment   Limitation Decreased ADL status; Decreased Safe judgement during ADL;Decreased endurance;Decreased self-care trans;Decreased high-level ADLs   Prognosis Good   Assessment Pt is a 80 y o  YO  male admitted to [facility] on 10/19/2019 w/ v-tach, NSTEMI, and JAMEL  Pt s/p cardiac cath on 10/21/19 w/ drug eluting stent placement  Comorbidities include a h/o anxiety, CAD, HTN, and CVA   Pt with active OT orders  Pt resides in a [address] [place] [address] independent living  Pt was I w/  ADLS and IADLS (received assist for meals and cleaning), (+) drove, & required no use of DME PTA  Currently pt is Min A for Lb ADLs and functional mobility  Pt is limited at this time 2*: endurance, activity tolerance, functional mobility, balance, decreased I w/ ADLS/IADLS and decreased safety awareness  The following Occupational Performance Areas to address include: bathing/shower, toilet hygiene, dressing and functional mobility  Pt scored overall  45/100 on the Barthel Index  Based on the aforementioned OT evaluation, functional performance deficits, and assessments, pt has been identified as a high complexity evaluation  From OT standpoint, anticipate d/c home OT  Pt to continue to benefit from acute immediate OT services to address the following goals 3-5x/week to  w/in 7-10 days: Darletta Pac Goals   Patient Goals go home   LTG Time Frame 7-10   Plan   Treatment Interventions ADL retraining;Functional transfer training; Endurance training;Patient/family training;Equipment evaluation/education; Compensatory technique education;Continued evaluation; Activityengagement   Goal Expiration Date 19   OT Frequency 3-5x/wk   Recommendation   OT Discharge Recommendation Home OT  (pt refusing STR)   OT - OK to Discharge Yes   Barthel Index   Feeding 10   Bathing 0   Grooming Score 5   Dressing Score 5   Bladder Score 0   Bowels Score 10   Toilet Use Score 5   Transfers (Bed/Chair) Score 10   Mobility (Level Surface) Score 0   Stairs Score 0   Barthel Index Score 45   Modified Rupert Scale   Modified Rupert Scale 4     GOALS    1) Pt will increase activity tolerance to G for 30 min txment sessions    2) Pt will complete UB/LB dressing/self care w/ mod I using adaptive device and DME as needed    3) Pt will complete bathing w/ Mod I w/ use of AE and DME as needed    4) Pt will complete toileting w/ mod I w/ G hygiene/thoroughness using DME as needed    5) Pt will improve functional transfers to Mod I on/off all surfaces using DME as needed w/ G balance/safety     6) Pt will improve functional mobility during ADL/IADL/leisure tasks to Mod I using DME as needed w/ G balance/safety     7) Pt will participate in simulated IADL management task to increase independence to  w/ G safety and endurance    8) Pt will demonstrate G carryover of pt/caregiver education and training as appropriate      9) Pt will demonstrate 100% carryover of energy conservation techniques t/o functional I/ADL/leisure tasks w/o cues s/p skilled education    Will De La Torre MS, OTR/L

## 2019-10-22 NOTE — PROGRESS NOTES
Cardiology Progress Note - Sandy Salmeron 80 y o  male MRN: [id number]    Unit/Bed#: [facility] 410-01 Encounter: [id number]      Assessment/Recommendations:  1  Sustained VT:  Requiring 2 shocks  Status post cardiac catheterization yesterday and now is status post drug-eluting stent to SVG to OM  Continue aspirin, Plavix, beta-blocker, statin  Continue to follow on telemetry for any recurrence of VT now that the coronary lesion has been fixed  Keep K greater than 4 and magnesium greater than 2   2  CAD:  Status post CABG  Now status post drug-eluting stent to SVG to OM  Continue medical management as per above  3  Ischemic cardiomyopathy:  With EF of 35%  Volume status appears stable  Continue beta-blocker and will require initiating on an ACE-inhibitor - which I will start today  4  Hyperlipidemia:  Continue statin  5  Paroxysmal atrial flutter:  Continues in atrial flutter on monitor currently  Resume Eliquis when able  Continued on heparin drip and amiodarone drip currently  Will transition to p  O  Amiodarone for the time being  6  Elevated troponin:  Likely related to NSTEMI type 1 considering significant coronary disease requiring drug-eluting stent placement  Continue medical management now with aspirin, Plavix, beta-blocker, statin  Subjective:   Patient seen and examined  S/p cath overnight   ; pertinent negatives - chest pain, chest pressure/discomfort, dyspnea, irregular heart beat and palpitations  Objective:     Vitals: Blood pressure 114/74, pulse (!) 114, temperature 98 8 °F (37 1 °C), temperature source Oral, resp  rate 18, height 5' 4" (1 626 m), weight 85 kg (187 lb 6 3 oz), SpO2 95 %  , Body mass index is 32 17 kg/m² ,   Orthostatic Blood Pressures      Most Recent Value   Blood Pressure  114/74 filed at 10/22/2019 0700   Patient Position - Orthostatic VS  Lying filed at 10/22/2019 0700            Intake/Output Summary (Last 24 hours) at 10/22/2019 1155  Last data filed at 10/22/2019 0900  Gross per 24 hour   Intake 2617 89 ml   Output 1205 ml   Net 1412 89 ml        TELE: Atrial flutter      Physical Exam:    GEN: Michael Cavazos appears well, alert and oriented x 3, pleasant and cooperative   HEENT: pupils equal, round, and reactive to light; extraocular muscles intact  NECK: supple, no carotid bruits   HEART: regular rhythm, normal S1 and S2, no murmurs, clicks, gallops or rubs   LUNGS: clear to auscultation bilaterally; no wheezes, rales, or rhonchi   ABDOMEN: normal bowel sounds, soft, no tenderness, no distention  EXTREMITIES: peripheral pulses normal; no clubbing, cyanosis, or edema  NEURO: no focal findings   SKIN: normal without suspicious lesions on exposed skin    Medications:      Current Facility-Administered Medications:     acetaminophen (TYLENOL) tablet 650 mg, 650 mg, Oral, Q6H PRN, SHIVANI Talamantes-NEIDA    amiodarone (CORDARONE) 900 mg in dextrose 5 % 500 mL infusion, 1 mg/min, Intravenous, Continuous, Mayank Dickinson PA-C, Last Rate: 33 3 mL/hr at 10/22/19 0241, 1 mg/min at 10/22/19 0241    aspirin (ECOTRIN LOW STRENGTH) EC tablet 81 mg, 81 mg, Oral, Daily, SHIVANI Carney-C, 81 mg at 10/22/19 1320    atorvastatin (LIPITOR) tablet 40 mg, 40 mg, Oral, Daily, Mayank Dickinson PA-C, 40 mg at 10/22/19 5661    clopidogrel (PLAVIX) tablet 75 mg, 75 mg, Oral, Daily, Mayank Dickinson PA-C, 75 mg at 10/22/19 5096    co-enzyme Q-10 capsule 200 mg, 200 mg, Oral, Daily, Mayank Dickinson, PA-C, 200 mg at 10/22/19 3923    doxylamine (UNISON) tablet 12 5 mg, 12 5 mg, Oral, HS PRN, Mayank Dickinson PA-C, 12 5 mg at 10/21/19 0026    glucosamine sulfate capsule 500 mg, 500 mg, Oral, Daily, Mayank Dickinson PA-C, 500 mg at 10/22/19 0977    heparin (porcine) 25,000 units in 250 mL infusion (premix), 3-20 Units/kg/hr (Order-Specific), Intravenous, Titrated, Mayank Dickinson PA-C, Last Rate: 11 7 mL/hr at 10/22/19 0205, 13 8 Units/kg/hr at 10/22/19 0205    heparin (porcine) injection 2,000 Units, 2,000 Units, Intravenous, PRN, Oleksandr Gaw, PA-C, 2,000 Units at 10/22/19 0208    heparin (porcine) injection 4,000 Units, 4,000 Units, Intravenous, PRN, Daralene Le Cross, PA-C    magnesium gluconate (MAGONATE) tablet 500 mg, 500 mg, Oral, Daily, Daralene Le Cross, PA-C, 500 mg at 10/22/19 1387    melatonin tablet 3 mg, 3 mg, Oral, HS, Oleksandr Gaw, PA-C, 3 mg at 10/21/19 2127    metoprolol (LOPRESSOR) injection 5 mg, 5 mg, Intravenous, Q6H PRN, Oleksandr Gaw, PA-C    nitroglycerin (NITROSTAT) SL tablet 0 4 mg, 0 4 mg, Sublingual, Q5 Min PRN, Daralene Le Cross, PA-C, 0 4 mg at 10/20/19 0503    perflutren lipid microsphere (DEFINITY) injection, 0 6 mL/min, Intravenous, Once in imaging, Oleksandr Gaw, PA-C    traMADol [facility]) tablet 50 mg, 50 mg, Oral, Q6H PRN, Daralene Le Cross, PA-C, 50 mg at 10/20/19 0547     Labs & Results:    Results from last 7 days   Lab Units 10/21/19  0512 10/20/19  2355 10/20/19  1818   TROPONIN I ng/mL 28 30* 34 80* >40 00*     Results from last 7 days   Lab Units 10/22/19  0445 10/21/19  1053 10/20/19  0525   WBC Thousand/uL 12 74* 13 44* 12 53*   HEMOGLOBIN g/dL 13 3 13 2 13 2   HEMATOCRIT % 39 4 39 8 41 1   PLATELETS Thousands/uL 228 196 172     Results from last 7 days   Lab Units 10/20/19  0525   TRIGLYCERIDES mg/dL 65   HDL mg/dL 58     Results from last 7 days   Lab Units 10/22/19  0719 10/21/19  1827 10/21/19  1053  10/19/19  2209   POTASSIUM mmol/L 3 7 3 8 3 5   < > 4 3   CHLORIDE mmol/L 101 100 103   < > 105   CO2 mmol/L 26 29 27   < > 24   BUN mg/dL 20 24 28*   < > 20   CREATININE mg/dL 1 01 1 20 1 30   < > 1 67*   CALCIUM mg/dL 8 8 9 5 9 2   < > 9 2   ALK PHOS U/L  --   --  68  --  75   ALT U/L  --   --  37  --  25   AST U/L  --   --  136*  --  46*    < > = values in this interval not displayed       Results from last 7 days   Lab Units 10/22/19  0719 10/22/19  0131 10/21/19  1827 10/21/19  1053  10/19/19  2209   INR   --   --  1 31* 1 29*  --  1 49*   PTT seconds 64* 53* 60* 52*   < > 28    < > = values in this interval not displayed  Results from last 7 days   Lab Units 10/22/19  0719 10/21/19  1827 10/21/19  1053   MAGNESIUM mg/dL 2 2 2 6 2 0       Echo: personally reviewed - EF reduced at 35% with wall motion abnormalities in the LAD distribution    Mild pulmonary hypertension    EKG personally reviewed by Joana Moreno MD

## 2019-10-22 NOTE — PHYSICAL THERAPY NOTE
Physical Therapy Evaluation     Patient's Name: Adriana Pradhan    Admitting Diagnosis  Ventricular tachycardia [facility]) [I47 2]  Chest pain [R07 9]  Coronary artery disease involving native coronary artery of native heart, angina presence unspecified [I25 10]    Problem List  Patient Active Problem List   Diagnosis    Cerebrovascular accident (CVA) due to embolism of left middle cerebral artery ([facility] )    Benign essential hypertension    Dyslipidemia    CAD (coronary artery disease)    Typical atrial flutter ([facility] )    Hx of CABG    Ischemic cardiomyopathy    Impaired fasting glucose    Psychophysiological insomnia    Medicare annual wellness visit, subsequent    Subacute left lumbar radiculopathy    Obesity (BMI 30 0-34  9)    Chronic anticoagulation    Exertional chest pain    Ventricular tachycardia (HCC)    Elevated troponin, concern NSTEMI    Acute kidney injury ([place] [id number] )    Status post insertion of drug eluting coronary artery stent       Past Medical History  Past Medical History:   Diagnosis Date    Acute myocardial infarction ([facility] )     Allergic rhinitis     Anxiety     Bimalleolar fracture of left ankle     CAD (coronary artery disease)     Erectile dysfunction of non-organic origin     Hematuria     workup was negative and felt to be benign    Herpes zoster with complication     Hyperlipidemia     Hypertension     Impaired fasting glucose     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    Prostatic hypertrophy     benign    Stroke [facility])        Past Surgical History  Past Surgical History:   Procedure Laterality Date    ANKLE FRACTURE SURGERY Left     CORONARY ARTERY BYPASS GRAFT            10/22/19 1201   Note Type   Note type Eval only   Pain Assessment   Pain Assessment No/denies pain   Home Living   Type of Home Apartment  ([facility])   Home Layout One level;Elevator  (1st floor; about 150 ft distance to the dining room)   Prior Function   Level of [place] Independent with ADLs and functional mobility  (amb w/o AD)   Lives With Alone; Facility staff   Restrictions/Precautions   Braces or Orthoses   (denies)   Other Precautions Cardiac/sternal;Multiple lines;Telemetry; Fall Risk;O2   General   Additional Pertinent History cleared for assessment (spoke to nsg)   Cognition   Overall Cognitive Status WFL   Arousal/Participation Alert   Orientation Level Oriented to person;Oriented to place;Oriented to situation   Memory Within functional limits   Following Commands Follows one step commands without difficulty   Comments Pt is in the chair; agreeable to demonstrate mobility skills;   RUE Assessment   RUE Assessment WFL  (AROM)   LUE Assessment   LUE Assessment WFL  (AROM)   RLE Assessment   RLE Assessment WFL  (AROM)   Strength RLE   RLE Overall Strength   (fair +/ good - (grossly))   LLE Assessment   LLE Assessment WFL  (AROM)   Strength LLE   LLE Overall Strength   (fair +/ good - (grossly))   Transfers   Sit to Stand 4  Minimal assistance   Additional items Assist x 1;Verbal cues   Stand to Sit 4  Minimal assistance   Additional items Assist x 1;Verbal cues   Ambulation/Elevation   Gait pattern Excessively slow; Short stride; Inconsistent ty   Gait Assistance 4  Minimal assist   Additional items Assist x 1;Verbal cues; Tactile cues  (stand by (A) of 2nd for lines)   Assistive Device Rolling walker   Distance 2 x 25 ft w/ standing rest period in between; elevated HR to 130s bpm noted while standing/resting --> down to 110s bpm while amb back to room;   Balance   Static Sitting Fair   Static Standing Fair -   Ambulatory Poor +   Activity Tolerance   Activity Tolerance Treatment limited secondary to medical complications (Comment)  (elevated/irreg HR)   Nurse Made Aware spoke to HELEN Aguiar   Assessment   Prognosis Good   Problem List Decreased strength;Decreased endurance; Impaired balance;Decreased mobility   Assessment Pt is 80 y o  male admitted with hx of CP and unstable v-tach and Dx of ventricular tachycardia, NSTEMI, JAMEL; pt underwent cardiac catheterization on 10/21/2019 which revealed acute occlusion of SVG to OM1; a drug-eluting stent was placed with angioplasty  Pt 's comorbidities affecting POC include: anxiety, CAD, HTN, CVA and personal factors of: advanced age  Pt's clinical presentation is currently unstable/unpredictable which is evident in ongoing telem monitoring w/ elevated/irreg HR noted, suppl O2 needs and need for min assist w/ all phases of observed mobility incl amb w/ rw when usually mobilizing independently and w/o AD  Pt presents w/ generalized weakness, incl decreased LE strength, decreased functional endurance and activity tolerance, impaired balance w/ associated gait deviations (rw was introduced) and fall risk  Will cont to follow pt in PT for progressive mobilization to address above functional deficits and to max level of (I), endurance, and safety  Currently, pt would benefit from rehab upon D/C but he declines rehab and wishes to return home to [address] [place] [address] upon D/C (reports facility staff support is available if needed); home PT follow up is then recommended; will follow  Barriers to Discharge Decreased caregiver support   Goals   Patient Goals to go home   STG Expiration Date 11/01/19   Short Term Goal #1 7-10 days  Pt will amb 150 ft w/ rw <--> SPC, mod (I) in order to facilitate safe return to premorbid environment and to initiate return to community amb status  Pt will achieve (I) level w/ bed mob in order to facilitate safety with OOB and back to bed transitions in own living environment  Pt will perform transfers w/ mod (I) to assure (I) and safety w/ functional mobility/transitions w/ all aspects of mobility/locomotion  Pt will participate in LE therex and balance activities to max progression w/ mobility skills  PT Treatment Day 0   Plan   Treatment/Interventions Functional transfer training;LE strengthening/ROM; Therapeutic exercise; Endurance training;Equipment eval/education; Bed mobility;Gait training;Spoke to nursing;Spoke to case management;OT   PT Frequency Other (Comment)  (3-5x/wk)   Recommendation   Recommendation Home PT  (pt declines rehab)   Equipment Recommended Walker  (at this time; will cont to monitor progress)   Modified [place] Scale   Modified [place] Scale 4   Barthel Index   Feeding 10   Bathing 0   Grooming Score 5   Dressing Score 5   Bladder Score 0   Bowels Score 10   Toilet Use Score 5   Transfers (Bed/Chair) Score 10   Mobility (Level Surface) Score 0   Stairs Score 0   Barthel Index Score 45         Kevin Narvaez, PT [facility]

## 2019-10-22 NOTE — PLAN OF CARE
Problem: OCCUPATIONAL THERAPY ADULT  Goal: Performs self-care activities at highest level of function for planned discharge setting  See evaluation for individualized goals  Description  Treatment Interventions: ADL retraining, Functional transfer training, Endurance training, Patient/family training, Equipment evaluation/education, Compensatory technique education, Continued evaluation, Activityengagement          See flowsheet documentation for full assessment, interventions and recommendations  Note:   Limitation: Decreased ADL status, Decreased Safe judgement during ADL, Decreased endurance, Decreased self-care trans, Decreased high-level ADLs  Prognosis: Good  Assessment: Pt is a 80 y o  YO  male admitted to [facility] on 10/19/2019 w/ v-tach, NSTEMI, and JAMEL  Pt s/p cardiac cath on 10/21/19 w/ drug eluting stent placement  Comorbidities include a h/o anxiety, CAD, HTN, and CVA   Pt with active OT orders  Pt resides in a [address] [place] [address] living  Pt was I w/  ADLS and IADLS (received assist for meals and cleaning), (+) drove, & required no use of DME PTA  Currently pt is Min A for Lb ADLs and functional mobility  Pt is limited at this time 2*: endurance, activity tolerance, functional mobility, balance, decreased I w/ ADLS/IADLS and decreased safety awareness  The following Occupational Performance Areas to address include: bathing/shower, toilet hygiene, dressing and functional mobility  Pt scored overall  45/100 on the Barthel Index  Based on the aforementioned OT evaluation, functional performance deficits, and assessments, pt has been identified as a high complexity evaluation  From OT standpoint, anticipate d/c home OT  Pt to continue to benefit from acute immediate OT services to address the following goals 3-5x/week to  w/in 7-10 days:   OT Discharge Recommendation: Home OT(pt refusing STR)  OT - OK to Discharge:  Yes

## 2019-10-23 LAB
ANION GAP SERPL CALCULATED.3IONS-SCNC: 8 MMOL/L (ref 4–13)
ATRIAL RATE: 110 BPM
ATRIAL RATE: 298 BPM
ATRIAL RATE: 81 BPM
BUN SERPL-MCNC: 20 MG/DL (ref 5–25)
CALCIUM SERPL-MCNC: 8.6 MG/DL (ref 8.3–10.1)
CHLORIDE SERPL-SCNC: 104 MMOL/L (ref 100–108)
CO2 SERPL-SCNC: 26 MMOL/L (ref 21–32)
CREAT SERPL-MCNC: 1.05 MG/DL (ref 0.6–1.3)
ERYTHROCYTE [DISTWIDTH] IN BLOOD BY AUTOMATED COUNT: 13.2 % (ref 11.6–15.1)
GFR SERPL CREATININE-BSD FRML MDRD: 64 ML/MIN/1.73SQ M
GLUCOSE SERPL-MCNC: 111 MG/DL (ref 65–140)
HCT VFR BLD AUTO: 36.1 % (ref 36.5–49.3)
HGB BLD-MCNC: 11.9 G/DL (ref 12–17)
MAGNESIUM SERPL-MCNC: 2.1 MG/DL (ref 1.6–2.6)
MCH RBC QN AUTO: 30.3 PG (ref 26.8–34.3)
MCHC RBC AUTO-ENTMCNC: 33 G/DL (ref 31.4–37.4)
MCV RBC AUTO: 92 FL (ref 82–98)
PLATELET # BLD AUTO: 178 THOUSANDS/UL (ref 149–390)
PMV BLD AUTO: 10.3 FL (ref 8.9–12.7)
POTASSIUM SERPL-SCNC: 3.7 MMOL/L (ref 3.5–5.3)
QRS AXIS: -11 DEGREES
QRS AXIS: 204 DEGREES
QRS AXIS: 214 DEGREES
QRSD INTERVAL: 104 MS
QRSD INTERVAL: 92 MS
QRSD INTERVAL: 96 MS
QT INTERVAL: 329 MS
QT INTERVAL: 354 MS
QT INTERVAL: 375 MS
QTC INTERVAL: 436 MS
QTC INTERVAL: 437 MS
QTC INTERVAL: 479 MS
RBC # BLD AUTO: 3.93 MILLION/UL (ref 3.88–5.62)
SODIUM SERPL-SCNC: 138 MMOL/L (ref 136–145)
T WAVE AXIS: 1 DEGREES
T WAVE AXIS: 166 DEGREES
T WAVE AXIS: 77 DEGREES
VENTRICULAR RATE: 106 BPM
VENTRICULAR RATE: 110 BPM
VENTRICULAR RATE: 81 BPM
WBC # BLD AUTO: 10.82 THOUSAND/UL (ref 4.31–10.16)

## 2019-10-23 PROCEDURE — 93010 ELECTROCARDIOGRAM REPORT: CPT | Performed by: INTERNAL MEDICINE

## 2019-10-23 PROCEDURE — 85027 COMPLETE CBC AUTOMATED: CPT | Performed by: PHYSICIAN ASSISTANT

## 2019-10-23 PROCEDURE — 99024 POSTOP FOLLOW-UP VISIT: CPT | Performed by: INTERNAL MEDICINE

## 2019-10-23 PROCEDURE — 99232 SBSQ HOSP IP/OBS MODERATE 35: CPT | Performed by: INTERNAL MEDICINE

## 2019-10-23 PROCEDURE — 80048 BASIC METABOLIC PNL TOTAL CA: CPT | Performed by: PHYSICIAN ASSISTANT

## 2019-10-23 PROCEDURE — 97530 THERAPEUTIC ACTIVITIES: CPT

## 2019-10-23 PROCEDURE — 97116 GAIT TRAINING THERAPY: CPT

## 2019-10-23 PROCEDURE — 83735 ASSAY OF MAGNESIUM: CPT | Performed by: PHYSICIAN ASSISTANT

## 2019-10-23 RX ORDER — VANCOMYCIN HYDROCHLORIDE 1 G/200ML
1000 INJECTION, SOLUTION INTRAVENOUS ONCE
Status: COMPLETED | OUTPATIENT
Start: 2019-10-24 | End: 2019-10-24

## 2019-10-23 RX ADMIN — Medication 200 MG: at 08:27

## 2019-10-23 RX ADMIN — ASPIRIN 81 MG: 81 TABLET, COATED ORAL at 08:27

## 2019-10-23 RX ADMIN — CLOPIDOGREL BISULFATE 75 MG: 75 TABLET ORAL at 08:27

## 2019-10-23 RX ADMIN — MELATONIN 3 MG: 3 TAB ORAL at 22:01

## 2019-10-23 RX ADMIN — ATORVASTATIN CALCIUM 40 MG: 40 TABLET, FILM COATED ORAL at 08:27

## 2019-10-23 RX ADMIN — ENOXAPARIN SODIUM 80 MG: 80 INJECTION SUBCUTANEOUS at 08:27

## 2019-10-23 RX ADMIN — LISINOPRIL 5 MG: 5 TABLET ORAL at 08:27

## 2019-10-23 RX ADMIN — Medication 500 MG: at 08:27

## 2019-10-23 RX ADMIN — AMIODARONE HYDROCHLORIDE 200 MG: 200 TABLET ORAL at 08:27

## 2019-10-23 RX ADMIN — Medication 500 MG: at 05:25

## 2019-10-23 RX ADMIN — AMIODARONE HYDROCHLORIDE 200 MG: 200 TABLET ORAL at 15:30

## 2019-10-23 NOTE — ASSESSMENT & PLAN NOTE
Continue with amiodarone  Metoprolol 100 mg daily  Start eliquis when cleared for hematuria  Cardiology recommending, " Will need triple therapy for 1 month, then can stop ASA and continue on Plavix and Eliquis "

## 2019-10-23 NOTE — PHYSICAL THERAPY NOTE
Physical Therapy Progress Note     10/23/19 0955   Pain Assessment   Pain Assessment No/denies pain   Pain Score No Pain   Restrictions/Precautions   Other Precautions Telemetry;Multiple lines; Fall Risk   Subjective   Subjective The pt  is eager to go for a walk  Transfers   Sit to Stand 4  Minimal assistance   Additional items Assist x 1; Increased time required;Verbal cues   Stand to Sit 5  Supervision   Additional items Increased time required;Verbal cues   Ambulation/Elevation   Gait pattern Excessively slow; Short stride; Inconsistent ty   Gait Assistance 4  Minimal assist   Additional items Assist x 1   Assistive Device Rolling walker   Distance 55 feet x 2  (The pt  stopped to pet a therapy dog )   Balance   Static Sitting Good   Dynamic Sitting Fair   Static Standing Fair   Ambulatory Poor +   Activity Tolerance   Activity Tolerance Patient tolerated treatment well   Nurse [address] Александр Sharma RN  Assessment   Prognosis Good   Problem List Decreased strength;Decreased endurance; Impaired balance;Decreased mobility   Assessment The pt  was able to progress his ambulatory distance today, but he continues to fatigue as well as require assistance  He had no loss of balance, and he was able to perform limited reaching activities  He had no noted dyspnea nor other symptoms during the session  Barriers to Discharge Decreased caregiver support   Goals   Patient Goals To go home  STG Expiration Date 11/01/19   PT Treatment Day 1   Plan   Treatment/Interventions Functional transfer training;LE strengthening/ROM; Therapeutic exercise; Endurance training;Patient/family training;Bed mobility;Gait training   Progress Progressing toward goals   PT Frequency   (3-5x a week )   Recommendation   Recommendation Home PT   Equipment Recommended Walker   PT - OK to Discharge Niurka Bach PTA

## 2019-10-23 NOTE — PROGRESS NOTES
Progress Note - Austin Morrissey 9/1/1932, 80 y o  male MRN: [id number]    Unit/Bed#: [facility] 410-01 Encounter: [id number]    Primary Care Provider: Jeanette Lemon MD   Date and time admitted to hospital: 10/19/2019  9:46 PM        Acute kidney injury [facility])  Assessment & Plan  Monitor kidney function closely  Avoid nephrotoxins  Monitor postvoid residuals    Ischemic cardiomyopathy  Assessment & Plan  EF 35%, stable volume status  Continue beta-blocker and ACE inhibitor       Typical atrial flutter (HCC)  Assessment & Plan  Continue with amiodarone  Metoprolol 100 mg daily  Start eliquis when cleared for hematuria  Cardiology recommending, " Will need triple therapy for 1 month, then can stop ASA and continue on Plavix and Eliquis "      CAD (coronary artery disease)  Assessment & Plan  · S/P CABG in 1999  · Continue ASA, statin, beta-blocker    Dyslipidemia  Assessment & Plan  · Continue statin therapy    Benign essential hypertension  Assessment & Plan  · Monitor blood pressures closely  · Avoid hypotension    * Ventricular tachycardia (HCC)  Assessment & Plan  · Had episode of symptomatic V-tach with hypotension pre-hospital   Cardioverted by EMS with resolution as patient was reportedly hypotensive  · Metoprolol dose increased  · For cardiac catheterization showed SVG and OM block, s/p MINNIE placement  · Aspirin, plavix and eliquis plan per cardiology note from 10/23/2019           VTE Pharmacologic Prophylaxis:   Pharmacologic: Pharmacologic VTE Prophylaxis contraindicated due to on hold due to hematuria  Mechanical VTE Prophylaxis in Place: Yes    Patient Centered Rounds: I have performed bedside rounds with nursing staff today  Discussions with Specialists or Other Care Team Provider: cardiology, urology    Education and Discussions with Family / Patient: patient's son at bedside     Time Spent for Care: 45 minutes    More than 50% of total time spent on counseling and coordination of care as described above     Current Length of Stay: 4 day(s)    Current Patient Status: Inpatient   Certification Statement: The patient will continue to require additional inpatient hospital stay due to monitoring on telemetry, physical therapy    Discharge Plan: tomorrow     Code Status: Level 1 - Full Code      Subjective:   No events on telemetry  He has blood from castillo catheter, this might be minor trauma  It has improved  Objective:     Vitals:   Temp (24hrs), Av 4 °F (36 9 °C), Min:97 9 °F (36 6 °C), Max:98 7 °F (37 1 °C)    Temp:  [97 9 °F (36 6 °C)-98 7 °F (37 1 °C)] 98 4 °F (36 9 °C)  HR:  [] 93  Resp:  [18-19] 18  BP: ()/(53-73) 113/70  SpO2:  [92 %-96 %] 96 %  Body mass index is 32 17 kg/m²  Input and Output Summary (last 24 hours): Intake/Output Summary (Last 24 hours) at 10/23/2019 1521  Last data filed at 10/23/2019 1424  Gross per 24 hour   Intake 345 ml   Output 1100 ml   Net -755 ml       Physical Exam:     Physical Exam    Additional Data:     Labs:    Results from last 7 days   Lab Units 10/23/19  0505  10/21/19  1053   WBC Thousand/uL 10 82*   < > 13 44*   HEMOGLOBIN g/dL 11 9*   < > 13 2   HEMATOCRIT % 36 1*   < > 39 8   PLATELETS Thousands/uL 178   < > 196   NEUTROS PCT %  --   --  80*   LYMPHS PCT %  --   --  9*   MONOS PCT %  --   --  10   EOS PCT %  --   --  0    < > = values in this interval not displayed  Results from last 7 days   Lab Units 10/23/19  0505  10/21/19  1053   POTASSIUM mmol/L 3 7   < > 3 5   CHLORIDE mmol/L 104   < > 103   CO2 mmol/L 26   < > 27   BUN mg/dL 20   < > 28*   CREATININE mg/dL 1 05   < > 1 30   CALCIUM mg/dL 8 6   < > 9 2   ALK PHOS U/L  --   --  68   ALT U/L  --   --  37   AST U/L  --   --  136*    < > = values in this interval not displayed  Results from last 7 days   Lab Units 10/21/19  1827   INR  1 31*       * I Have Reviewed All Lab Data Listed Above  * Additional Pertinent Lab Tests Reviewed:  Varun 66 Admission Reviewed    Imaging:    Imaging Reports Reviewed Today Include:    Imaging Personally Reviewed by Myself Includes:       Recent Cultures (last 7 days):           Last 24 Hours Medication List:     Current Facility-Administered Medications:  acetaminophen 650 mg Oral Q6H PRN Vonzella Lesch, PA-C   amiodarone 200 mg Oral BID With Meals MD Augustine Jeronimo Markoherb ON 10/24/2019] apixaban 5 mg Oral BID Oh Wilder MD   aspirin 81 mg Oral Daily Johann  Cross, RADHA   atorvastatin 40 mg Oral Daily Johann  Cross, RADHA   clopidogrel 75 mg Oral Daily Johann  Cross, PA-NEIDA   co-enzyme Q-10 200 mg Oral Daily Johann  Cross, PA-NEIDA   doxylamine 12 5 mg Oral HS PRN Liv Esters Cross, RADHA   enoxaparin 80 mg Subcutaneous Q12H 228 Hang Harrell MD   glucosamine sulfate 500 mg Oral Daily Johann M Cross, RADHA   lisinopril 5 mg Oral Daily Oh Wilder MD   magnesium gluconate 500 mg Oral Daily Johann M Cross, RADHA   melatonin 3 mg Oral HS Johann M Cross, RADHA   metoprolol 5 mg Intravenous Q6H PRN Liv Esters Cross, RADHA   nitroglycerin 0 4 mg Sublingual Q5 Min PRN [place] Lightning  Cross, RADHA   perflutren lipid microsphere 0 6 mL/min Intravenous Once in imaging Vonzella Lesch, PA-C   traMADol 50 mg Oral Q6H PRN Vonzella Lesch, PA-C        Today, Patient Was Seen By: Shaylee Jordan MD    ** Please Note: Dictation voice to text software may have been used in the creation of this document   **

## 2019-10-23 NOTE — PROGRESS NOTES
Pastoral Care Progress Note    10/23/2019  Patient: Destin Lagnua : 1932  Admission Date & Time: 10/19/2019 2146  MRN: [id number] [facility]: [id number]                     Chaplaincy Interventions Utilized:   Empowerment: Clarified, confirmed, or reviewed information from treatment team , Encouraged self-care, Provided anxiety containment and Provided chaplaincy education    Exploration:     Collaboration:     Relationship Building: Listened empathically    Ritual: Provided prayer            Chaplaincy Outcomes Achieved:  Developed chaplaincy care plan, Expressed humor, Expressed peace and Expressed ultimate hope          Spiritual Coping Strategies Utilized:   Spiritual practices and Spiritual comfort

## 2019-10-23 NOTE — PLAN OF CARE
Problem: PHYSICAL THERAPY ADULT  Goal: Performs mobility at highest level of function for planned discharge setting  See evaluation for individualized goals  Description  Treatment/Interventions: Functional transfer training, LE strengthening/ROM, Therapeutic exercise, Endurance training, Equipment eval/education, Bed mobility, Gait training, Spoke to nursing, Spoke to case management, OT  Equipment Recommended: Walker(at this time; will cont to monitor progress)       See flowsheet documentation for full assessment, interventions and recommendations  Outcome: Progressing  Note:   Prognosis: Good  Problem List: Decreased strength, Decreased endurance, Impaired balance, Decreased mobility  Assessment: The pt  was able to progress his ambulatory distance today, but he continues to fatigue as well as require assistance  He had no loss of balance, and he was able to perform limited reaching activities  He had no noted dyspnea nor other symptoms during the session  Barriers to Discharge: Decreased caregiver support     Recommendation: Home PT     PT - OK to Discharge: No    See flowsheet documentation for full assessment

## 2019-10-23 NOTE — PROGRESS NOTES
Cardiology Progress Note - Liv Lindquist 80 y o  male MRN: [id number]    Unit/Bed#: [facility] 410-01 Encounter: [id number]      Assessment/Recommendations:  1  Sustained VT:  Requiring 2 shocks  Status post cardiac catheterization and now is status post drug-eluting stent to SVG to OM  Continue aspirin, Plavix, beta-blocker, statin  Continue to follow on telemetry for any recurrence of VT now that the coronary lesion has been fixed  Keep K greater than 4 and magnesium greater than 2   2  CAD:  Status post CABG  Now status post drug-eluting stent to SVG to OM  Continue medical management as per above  3  Ischemic cardiomyopathy:  With EF of 35%  Volume status appears stable  Continue beta-blocker and started on an ACE-inhibitor, tolerating well  4  Hyperlipidemia:  Continue statin  5  Paroxysmal atrial flutter:  Continues in atrial flutter on monitor currently  Resume Eliquis when able - once hematuria resolves  Will need triple therapy for 1 month, then can stop ASA and continue on Plavix and Eliquis  Transitioned to p  O  Amiodarone for the time being  6  Elevated troponin:  Likely related to NSTEMI type 1 considering significant coronary disease requiring drug-eluting stent placement  Continue medical management now with aspirin, Plavix, beta-blocker, statin  7  Hematuria: heparin gtt has been stopped  Continued on ASA and plavix for recent MINNIE 2 days ago  Would assess to see if this clears as a result of traumatic castillo  Agree with urology assessment  Will continue to watch off heparin/Eliquis today and resume Eliquis in AM to assess if he continues to bleed after allowing urine to flush out today - hopefully with less and less hematuria  Subjective:   Patient seen and examined  Hematuria noted after castillo removal   ; pertinent negatives - chest pain, chest pressure/discomfort, dyspnea, irregular heart beat and palpitations      Objective:     Vitals: Blood pressure 113/70, pulse 93, temperature 98 4 °F (36 9 °C), temperature source Oral, resp  rate 18, height 5' 4" (1 626 m), weight 85 kg (187 lb 6 3 oz), SpO2 96 %  , Body mass index is 32 17 kg/m² ,   Orthostatic Blood Pressures      Most Recent Value   Blood Pressure  113/70 [Map 84] filed at 10/23/2019 0800   Patient Position - Orthostatic VS  Sitting filed at 10/23/2019 0800            Intake/Output Summary (Last 24 hours) at 10/23/2019 1058  Last data filed at 10/23/2019 0803  Gross per 24 hour   Intake 749 84 ml   Output 750 ml   Net -0 16 ml        TELE: Atrial flutter      Physical Exam:    GEN: Brendan Vail appears well, alert and oriented x 3, pleasant and cooperative   HEENT: pupils equal, round, and reactive to light; extraocular muscles intact  NECK: supple, no carotid bruits   HEART: regular rhythm, normal S1 and S2, no murmurs, clicks, gallops or rubs   LUNGS: clear to auscultation bilaterally; no wheezes, rales, or rhonchi   ABDOMEN: normal bowel sounds, soft, no tenderness, no distention  EXTREMITIES: peripheral pulses normal; no clubbing, cyanosis, or edema  NEURO: no focal findings   SKIN: normal without suspicious lesions on exposed skin      Medications:      Current Facility-Administered Medications:     acetaminophen (TYLENOL) tablet 650 mg, 650 mg, Oral, Q6H PRN, Esther Bumpers, PA-C    amiodarone tablet 200 mg, 200 mg, Oral, BID With Meals, Gavi Dash MD, 200 mg at 10/23/19 0827    aspirin (ECOTRIN LOW STRENGTH) EC tablet 81 mg, 81 mg, Oral, Daily, Johann Dickinson PA-C, 81 mg at 10/23/19 0827    atorvastatin (LIPITOR) tablet 40 mg, 40 mg, Oral, Daily, Johann Dickinson PA-C, 40 mg at 10/23/19 0827    clopidogrel (PLAVIX) tablet 75 mg, 75 mg, Oral, Daily, Doni Dickinson PA-C, 75 mg at 10/23/19 0827    co-enzyme Q-10 capsule 200 mg, 200 mg, Oral, Daily, Doni Dickinson PA-C, 200 mg at 10/23/19 0827    doxylamine (UNISON) tablet 12 5 mg, 12 5 mg, Oral, HS PRN, Doni Dickinson PA-C, 12 5 mg at 10/21/19 0026    enoxaparin (LOVENOX) subcutaneous injection 80 mg, 80 mg, Subcutaneous, Q12H [facility], Nuno Chand, DO, 80 mg at 10/23/19 0827    glucosamine sulfate capsule 500 mg, 500 mg, Oral, Daily, Nolvia Dickinson PA-C, 500 mg at 10/23/19 0827    lisinopril (ZESTRIL) tablet 5 mg, 5 mg, Oral, Daily, Lea Lindsey MD, 5 mg at 10/23/19 0827    magnesium gluconate (MAGONATE) tablet 500 mg, 500 mg, Oral, Daily, Nolvia Dickinson PA-C, 500 mg at 10/23/19 8339    melatonin tablet 3 mg, 3 mg, Oral, HS, Nolvia Dickinson PA-C, 3 mg at 10/22/19 2118    metoprolol (LOPRESSOR) injection 5 mg, 5 mg, Intravenous, Q6H PRN, Nolvia Dickinson PA-C    nitroglycerin (NITROSTAT) SL tablet 0 4 mg, 0 4 mg, Sublingual, Q5 Min PRN, Nolvia Dickinson PA-C, 0 4 mg at 10/20/19 0503    perflutren lipid microsphere (DEFINITY) injection, 0 6 mL/min, Intravenous, Once in imaging, Omar Edmonds PA-C    traMADol Kimberly Ocean) tablet 50 mg, 50 mg, Oral, Q6H PRN, Nolvia Dickinson PA-C, 50 mg at 10/20/19 0547     Labs & Results:    Results from last 7 days   Lab Units 10/21/19  0512 10/20/19  2355 10/20/19  1818   TROPONIN I ng/mL 28 30* 34 80* >40 00*     Results from last 7 days   Lab Units 10/23/19  0505 10/22/19  0445 10/21/19  1053   WBC Thousand/uL 10 82* 12 74* 13 44*   HEMOGLOBIN g/dL 11 9* 13 3 13 2   HEMATOCRIT % 36 1* 39 4 39 8   PLATELETS Thousands/uL 178 228 196     Results from last 7 days   Lab Units 10/20/19  0525   TRIGLYCERIDES mg/dL 65   HDL mg/dL 58     Results from last 7 days   Lab Units 10/23/19  0505 10/22/19  0719 10/21/19  1827 10/21/19  1053  10/19/19  2209   POTASSIUM mmol/L 3 7 3 7 3 8 3 5   < > 4 3   CHLORIDE mmol/L 104 101 100 103   < > 105   CO2 mmol/L 26 26 29 27   < > 24   BUN mg/dL 20 20 24 28*   < > 20   CREATININE mg/dL 1 05 1 01 1 20 1 30   < > 1 67*   CALCIUM mg/dL 8 6 8 8 9 5 9 2   < > 9 2   ALK PHOS U/L  --   --   --  68  --  75   ALT U/L  --   --   --  37  --  25   AST U/L  --   --   --  136*  --  46*    < > = values in this interval not displayed  Results from last 7 days   Lab Units 10/22/19  1433 10/22/19  0719 10/22/19  0131 10/21/19  1827 10/21/19  1053  10/19/19  2209   INR   --   --   --  1 31* 1 29*  --  1 49*   PTT seconds 67* 64* 53* 60* 52*   < > 28    < > = values in this interval not displayed  Results from last 7 days   Lab Units 10/23/19  0505 10/22/19  0719 10/21/19  1827   MAGNESIUM mg/dL 2 1 2 2 2 6       Echo: personally reviewed - EF reduced at 35% with wall motion abnormalities in the LAD distribution    Mild pulmonary hypertension    EKG personally reviewed by Alonso Davies MD

## 2019-10-23 NOTE — PROGRESS NOTES
Progress Note - Electrophysiology-Cardiology (EP)   Mark Kidd 80 y o  male MRN: [id number]  Unit/Bed#: [facility] 410-01 Encounter: [id number]      Assessment:  1  Sustained VT status post external shock both as an outpatient and as an inpatient  2  CAD status post MINNIE to SVG to OM 10/21/2019   A ) history of CABG in 1999   B ) currently on aspirin and Plavix  3  Ischemic cardiomyopathy with LVEF 35% per echo 10/20/2019   A ) akinesis of the mid-apical anterior, mid anteroseptal, mid inferoseptal, apical septal, and apical wall   B ) his VT per ECG is likely originating from an area of scar  4  Paroxysmal atrial fibrillation/flutter,    A ) maintained on Eliquis as an outpatient but currently on therapeutic Lovenox   B ) has now been started on amiodarone antiarrhythmic therapy  5  Hypertension  6  Hyperlipidemia  7  History of CVA  8  Hematuria    Plan:  1  His ventricular tachycardia was monomorphic, which is more suggestive of a scar mediated arrhythmia  While he did have a stent placed, he is still at risk for ongoing arrhythmias given his known areas of scar  This was explained to the patient and his family, and an ICD was recommended  The procedure was explained in detail, including possible risks/complications as well as postop restrictions  After all questions were answered, the patient and his family are in agreement to proceed  2  NPO at midnight, please place left-sided IV if not already present, will check a m  Labs  3  His Lovenox will be discontinued this evening, and his Eliquis can be restarted likely tomorrow night following device implantation  4  We discussed that he will be unable to drive for 6 months following his episode of sustained VT, and the patient understands our recommendations          Subjective/Objective   Chief Complaint:  No acute complaints    Subjective:  Patient resting comfortably, denies chest pain or pressure, shortness of breath, dizziness, lightheadedness, or palpitations      Objective:     Vitals: /62 (BP Location: Right arm) Comment: Map 77  Pulse 94   Temp 98 °F (36 7 °C) (Oral)   Resp 17   Ht 5' 4" (1 626 m)   Wt 85 kg (187 lb 6 3 oz)   SpO2 95%   BMI 32 17 kg/m²   Vitals:    10/20/19 0031 10/22/19 0600   Weight: 85 7 kg (188 lb 15 oz) 85 kg (187 lb 6 3 oz)     Orthostatic Blood Pressures      Most Recent Value   Blood Pressure  109/62 [Map 77] filed at 10/23/2019 1630   Patient Position - Orthostatic VS  Lying filed at 10/23/2019 1630            Intake/Output Summary (Last 24 hours) at 10/23/2019 1727  Last data filed at 10/23/2019 1424  Gross per 24 hour   Intake 345 ml   Output 1100 ml   Net -755 ml       Invasive Devices     Peripheral Intravenous Line            Peripheral IV 10/19/19 Right Hand 3 days    Peripheral IV 10/20/19 Right Forearm 3 days    Peripheral IV 10/21/19 Left Arm 2 days    Peripheral IV 10/21/19 Left Forearm 2 days          Drain            Urethral Catheter 18 Fr  2 days                            Scheduled Meds:  Current Facility-Administered Medications:  acetaminophen 650 mg Oral Q6H PRN Vonzella Lesch, PA-C   amiodarone 200 mg Oral BID With Meals Oh Wilder MD   [START ON 10/24/2019] apixaban 5 mg Oral BID Oh Wilder MD   aspirin 81 mg Oral Daily Johann M Cross, PA-C   atorvastatin 40 mg Oral Daily Johann M Cross, PA-C   clopidogrel 75 mg Oral Daily Johann M Cross, PA-C   co-enzyme Q-10 200 mg Oral Daily Johann M Cross, PA-C   doxylamine 12 5 mg Oral HS PRN Liv Esters Cross, PA-C   glucosamine sulfate 500 mg Oral Daily Johann M Cross, RADHA   lisinopril 5 mg Oral Daily Oh Wilder MD   magnesium gluconate 500 mg Oral Daily Johann M Cross, PA-NEIDA   melatonin 3 mg Oral HS Johann M Cross, PA-NEIDA   metoprolol 5 mg Intravenous Q6H PRN Liv Esters Cross, PA-NEIDA   nitroglycerin 0 4 mg Sublingual Q5 Min PRN Liv Esters Cross, RADHA   perflutren lipid microsphere 0 6 mL/min Intravenous Once in imaging Vonzella Lesch, PA-C   traMADol 50 mg Oral Q6H PRN [place] CHEL Dickinson PA-C     Continuous Infusions:   PRN Meds:   acetaminophen    doxylamine    metoprolol    nitroglycerin    perflutren lipid microsphere    traMADol        Physical Exam:   GEN: NAD, alert and oriented, well appearing  SKIN: dry without significant lesions or rashes  HEENT: NCAT, PERRL, EOMs intact  NECK: No JVD appreciated  CARDIOVASCULAR: RRR, normal S1, S2 without murmurs, rubs, or gallops appreciated  LUNGS: Clear to auscultation bilaterally without wheezes, rhonchi, or rales  ABDOMEN: Soft, nontender, nondistended  EXTREMITIES/VASCULAR: perfused without clubbing, cyanosis, or edema b/l  PSYCH: Normal mood and affect  NEURO: CN ll-Xll grossly intact                Lab Results: I have personally reviewed pertinent lab results  Results from last 7 days   Lab Units 10/23/19  0505 10/22/19  0445 10/21/19  1053   WBC Thousand/uL 10 82* 12 74* 13 44*   HEMOGLOBIN g/dL 11 9* 13 3 13 2   HEMATOCRIT % 36 1* 39 4 39 8   PLATELETS Thousands/uL 178 228 196     Results from last 7 days   Lab Units 10/23/19  0505 10/22/19  0719 10/21/19  1827   POTASSIUM mmol/L 3 7 3 7 3 8   CHLORIDE mmol/L 104 101 100   CO2 mmol/L 26 26 29   BUN mg/dL 20 20 24   CREATININE mg/dL 1 05 1 01 1 20   CALCIUM mg/dL 8 6 8 8 9 5     Results from last 7 days   Lab Units 10/22/19  1433 10/22/19  0719 10/22/19  0131 10/21/19  1827 10/21/19  1053  10/19/19  2209   INR   --   --   --  1 31* 1 29*  --  1 49*   PTT seconds 67* 64* 53* 60* 52*   < > 28    < > = values in this interval not displayed  Results from last 7 days   Lab Units 10/23/19  0505 10/22/19  0719 10/21/19  1827   MAGNESIUM mg/dL 2 1 2 2 2 6         Imaging: I have personally reviewed pertinent reports      Results for orders placed during the hospital encounter of 10/19/19   Echo complete with contrast if indicated    Narrative Jonathan [address]  [address]  [phone]    Transthoracic Echocardiogram  2D, M-mode, Doppler, and Color Doppler    Study date:  20-Oct-2019    Patient: Mitch Johnson  MR number: [id number]  Account number: [de-identified]  : 01-Sep-1932  Age: 80 years  Gender: Male  Status: Inpatient  Location: Bedside  Height: 64 in  Weight: 188 lb  BP: 124/ 87 mmHg    Indications: Known coronary artery disease  Chest pain  Diagnoses: I25 83 - Coronary atherosclerosis due to lipid rich plaque    Sonographer:  PARVEZ Orta  Primary Physician:  Heriberto Sotelo MD  Referring Physician:  Dev Ross DO  Group:  [id number]  Interpreting Physician:  Siria Mcgregor MD    SUMMARY    LEFT VENTRICLE:  Systolic function was moderately reduced  Ejection fraction was estimated to be 35 %  There was akinesis of the mid-apical anterior, mid anteroseptal, mid inferoseptal, apical septal, and apical wall(s)  TRICUSPID VALVE:  Estimated peak PA pressure was 40 mmHg  The findings suggest mild pulmonary hypertension  HISTORY: PRIOR HISTORY: Ischemic cardiomyopathy  Ventricular and supraventricular arrhythmias  Risk factors: hypertension and medication-treated hypercholesterolemia  Remote transient ischemic attack  PRIOR PROCEDURES: Coronary bypass    PROCEDURE: The procedure was performed at the bedside  This was a routine study  The transthoracic approach was used  The study included complete 2D imaging, M-mode, complete spectral Doppler, and color Doppler  The heart rate was 103 bpm,  at the start of the study  Intravenous contrast (  6ml of Definity) was administered  Echocardiographic views were limited due to decreased penetration and lung interference  This was a technically difficult study  LEFT VENTRICLE: Size was normal  Systolic function was moderately reduced  Ejection fraction was estimated to be 35 %  There was akinesis of the mid-apical anterior, mid anteroseptal, mid inferoseptal, apical septal, and apical wall(s)  DOPPLER: Normal sinus rhythm was absent      RIGHT VENTRICLE: The size was normal  Systolic function was normal     LEFT ATRIUM: The atrium was dilated  RIGHT ATRIUM: Size was normal     MITRAL VALVE: Valve structure was normal  There was normal leaflet separation  DOPPLER: There was no evidence for stenosis  There was mild regurgitation  The regurgitant jet was eccentric  AORTIC VALVE: The valve was trileaflet  Leaflets exhibited mildly increased thickness, mild calcification, normal cuspal separation, and sclerosis  DOPPLER: There was no evidence for stenosis  There was no regurgitation  TRICUSPID VALVE: The valve structure was normal  There was normal leaflet separation  DOPPLER: There was no evidence for stenosis  There was mild regurgitation  Estimated peak PA pressure was 40 mmHg  The findings suggest mild pulmonary  hypertension  PULMONIC VALVE: Leaflets exhibited normal thickness, no calcification, and normal cuspal separation  DOPPLER: The transpulmonic velocity was within the normal range  There was trace regurgitation  PERICARDIUM: There was no pericardial effusion  The pericardium was normal in appearance  AORTA: The root exhibited normal size      SYSTEM MEASUREMENT TABLES    2D  Ao Diam: 3 62 cm  LA Area: 29 25 cm2  LA Diam: 4 98 cm  LAAs A4C: 28 77 cm2  LAESV A-L A4C: 105 01 ml  LAESV MOD A4C: 97 21 ml  LALs A4C: 6 69 cm  LVEDV MOD A4C: 126 19 ml  LVEF MOD A4C: 46 86 %  LVESV MOD A4C: 67 06 ml  LVLd A4C: 8 05 cm  LVLs A4C: 7 44 cm  RA Area: 17 34 cm2  RVIDd: 4 12 cm  SV MOD A4C: 59 13 ml    CW  TR Vmax: 3 21 m/s  TR maxP 12 mmHg    MM  TAPSE: 1 43 cm    [facility] Accredited Echocardiography Laboratory    Prepared and electronically signed by    Kath Means MD  Signed 20-Oct-2019 13:20:19           VTE Pharmacologic Prophylaxis: Enoxaparin (Lovenox) - therapeutic dosing  VTE Mechanical Prophylaxis: sequential compression device

## 2019-10-23 NOTE — ASSESSMENT & PLAN NOTE
· Had episode of symptomatic V-tach with hypotension pre-hospital   Cardioverted by EMS with resolution as patient was reportedly hypotensive    · Metoprolol dose increased  · For cardiac catheterization showed SVG and OM block, s/p MINNIE placement  · Aspirin, plavix and eliquis plan per cardiology note from 10/23/2019

## 2019-10-24 ENCOUNTER — ANESTHESIA (INPATIENT)
Dept: NON INVASIVE DIAGNOSTICS | Facility: HOSPITAL | Age: 84
DRG: 224 | End: 2019-10-24
Payer: COMMERCIAL

## 2019-10-24 ENCOUNTER — APPOINTMENT (INPATIENT)
Dept: RADIOLOGY | Facility: HOSPITAL | Age: 84
DRG: 224 | End: 2019-10-24
Payer: COMMERCIAL

## 2019-10-24 LAB
ALBUMIN SERPL BCP-MCNC: 3.1 G/DL (ref 3.5–5)
ALP SERPL-CCNC: 74 U/L (ref 46–116)
ALT SERPL W P-5'-P-CCNC: 57 U/L (ref 12–78)
ANION GAP SERPL CALCULATED.3IONS-SCNC: 7 MMOL/L (ref 4–13)
AST SERPL W P-5'-P-CCNC: 46 U/L (ref 5–45)
ATRIAL RATE: 340 BPM
BASOPHILS # BLD AUTO: 0.06 THOUSANDS/ΜL (ref 0–0.1)
BASOPHILS NFR BLD AUTO: 1 % (ref 0–1)
BILIRUB SERPL-MCNC: 0.78 MG/DL (ref 0.2–1)
BUN SERPL-MCNC: 21 MG/DL (ref 5–25)
CALCIUM SERPL-MCNC: 8.8 MG/DL (ref 8.3–10.1)
CHLORIDE SERPL-SCNC: 104 MMOL/L (ref 100–108)
CO2 SERPL-SCNC: 28 MMOL/L (ref 21–32)
CREAT SERPL-MCNC: 1.09 MG/DL (ref 0.6–1.3)
EOSINOPHIL # BLD AUTO: 0.2 THOUSAND/ΜL (ref 0–0.61)
EOSINOPHIL NFR BLD AUTO: 2 % (ref 0–6)
ERYTHROCYTE [DISTWIDTH] IN BLOOD BY AUTOMATED COUNT: 13.2 % (ref 11.6–15.1)
GFR SERPL CREATININE-BSD FRML MDRD: 61 ML/MIN/1.73SQ M
GLUCOSE SERPL-MCNC: 109 MG/DL (ref 65–140)
HCT VFR BLD AUTO: 37.1 % (ref 36.5–49.3)
HGB BLD-MCNC: 12.1 G/DL (ref 12–17)
IMM GRANULOCYTES # BLD AUTO: 0.12 THOUSAND/UL (ref 0–0.2)
IMM GRANULOCYTES NFR BLD AUTO: 1 % (ref 0–2)
LYMPHOCYTES # BLD AUTO: 3.26 THOUSANDS/ΜL (ref 0.6–4.47)
LYMPHOCYTES NFR BLD AUTO: 25 % (ref 14–44)
MCH RBC QN AUTO: 30.3 PG (ref 26.8–34.3)
MCHC RBC AUTO-ENTMCNC: 32.6 G/DL (ref 31.4–37.4)
MCV RBC AUTO: 93 FL (ref 82–98)
MONOCYTES # BLD AUTO: 1.33 THOUSAND/ΜL (ref 0.17–1.22)
MONOCYTES NFR BLD AUTO: 10 % (ref 4–12)
NEUTROPHILS # BLD AUTO: 8.14 THOUSANDS/ΜL (ref 1.85–7.62)
NEUTS SEG NFR BLD AUTO: 61 % (ref 43–75)
NRBC BLD AUTO-RTO: 0 /100 WBCS
PLATELET # BLD AUTO: 206 THOUSANDS/UL (ref 149–390)
PMV BLD AUTO: 10.5 FL (ref 8.9–12.7)
POTASSIUM SERPL-SCNC: 3.9 MMOL/L (ref 3.5–5.3)
PROT SERPL-MCNC: 6.9 G/DL (ref 6.4–8.2)
QRS AXIS: -51 DEGREES
QRSD INTERVAL: 96 MS
QT INTERVAL: 378 MS
QTC INTERVAL: 469 MS
RBC # BLD AUTO: 4 MILLION/UL (ref 3.88–5.62)
SODIUM SERPL-SCNC: 139 MMOL/L (ref 136–145)
T WAVE AXIS: 166 DEGREES
VENTRICULAR RATE: 93 BPM
WBC # BLD AUTO: 13.11 THOUSAND/UL (ref 4.31–10.16)

## 2019-10-24 PROCEDURE — C1721 AICD, DUAL CHAMBER: HCPCS

## 2019-10-24 PROCEDURE — 0JH608Z INSERTION OF DEFIBRILLATOR GENERATOR INTO CHEST SUBCUTANEOUS TISSUE AND FASCIA, OPEN APPROACH: ICD-10-PCS | Performed by: INTERNAL MEDICINE

## 2019-10-24 PROCEDURE — 85025 COMPLETE CBC W/AUTO DIFF WBC: CPT | Performed by: INTERNAL MEDICINE

## 2019-10-24 PROCEDURE — 93010 ELECTROCARDIOGRAM REPORT: CPT | Performed by: INTERNAL MEDICINE

## 2019-10-24 PROCEDURE — 71045 X-RAY EXAM CHEST 1 VIEW: CPT

## 2019-10-24 PROCEDURE — 99222 1ST HOSP IP/OBS MODERATE 55: CPT | Performed by: PHYSICIAN ASSISTANT

## 2019-10-24 PROCEDURE — 99232 SBSQ HOSP IP/OBS MODERATE 35: CPT | Performed by: INTERNAL MEDICINE

## 2019-10-24 PROCEDURE — 02HK3KZ INSERTION OF DEFIBRILLATOR LEAD INTO RIGHT VENTRICLE, PERCUTANEOUS APPROACH: ICD-10-PCS | Performed by: INTERNAL MEDICINE

## 2019-10-24 PROCEDURE — 93005 ELECTROCARDIOGRAM TRACING: CPT

## 2019-10-24 PROCEDURE — C1777 LEAD, AICD, ENDO SINGLE COIL: HCPCS

## 2019-10-24 PROCEDURE — 33249 INSJ/RPLCMT DEFIB W/LEAD(S): CPT | Performed by: INTERNAL MEDICINE

## 2019-10-24 PROCEDURE — 02H63KZ INSERTION OF DEFIBRILLATOR LEAD INTO RIGHT ATRIUM, PERCUTANEOUS APPROACH: ICD-10-PCS | Performed by: INTERNAL MEDICINE

## 2019-10-24 PROCEDURE — C1892 INTRO/SHEATH,FIXED,PEEL-AWAY: HCPCS

## 2019-10-24 PROCEDURE — C1898 LEAD, PMKR, OTHER THAN TRANS: HCPCS

## 2019-10-24 PROCEDURE — 33249 INSJ/RPLCMT DEFIB W/LEAD(S): CPT

## 2019-10-24 PROCEDURE — 80053 COMPREHEN METABOLIC PANEL: CPT | Performed by: INTERNAL MEDICINE

## 2019-10-24 RX ORDER — SODIUM CHLORIDE 9 MG/ML
INJECTION, SOLUTION INTRAVENOUS CONTINUOUS PRN
Status: DISCONTINUED | OUTPATIENT
Start: 2019-10-24 | End: 2019-10-24 | Stop reason: SURG

## 2019-10-24 RX ORDER — LIDOCAINE HYDROCHLORIDE 10 MG/ML
INJECTION, SOLUTION EPIDURAL; INFILTRATION; INTRACAUDAL; PERINEURAL CODE/TRAUMA/SEDATION MEDICATION
Status: COMPLETED | OUTPATIENT
Start: 2019-10-24 | End: 2019-10-24

## 2019-10-24 RX ORDER — PROPOFOL 10 MG/ML
INJECTION, EMULSION INTRAVENOUS CONTINUOUS PRN
Status: DISCONTINUED | OUTPATIENT
Start: 2019-10-24 | End: 2019-10-24 | Stop reason: SURG

## 2019-10-24 RX ORDER — GENTAMICIN SULFATE 40 MG/ML
INJECTION, SOLUTION INTRAMUSCULAR; INTRAVENOUS CODE/TRAUMA/SEDATION MEDICATION
Status: COMPLETED | OUTPATIENT
Start: 2019-10-24 | End: 2019-10-24

## 2019-10-24 RX ORDER — FENTANYL CITRATE 50 UG/ML
INJECTION, SOLUTION INTRAMUSCULAR; INTRAVENOUS AS NEEDED
Status: DISCONTINUED | OUTPATIENT
Start: 2019-10-24 | End: 2019-10-24 | Stop reason: SURG

## 2019-10-24 RX ADMIN — FENTANYL CITRATE 25 MCG: 50 INJECTION, SOLUTION INTRAMUSCULAR; INTRAVENOUS at 10:11

## 2019-10-24 RX ADMIN — VANCOMYCIN HYDROCHLORIDE 1000 MG: 1 INJECTION, SOLUTION INTRAVENOUS at 10:03

## 2019-10-24 RX ADMIN — PHENYLEPHRINE HYDROCHLORIDE 100 MCG: 10 INJECTION INTRAVENOUS at 10:54

## 2019-10-24 RX ADMIN — Medication 500 MG: at 06:00

## 2019-10-24 RX ADMIN — SODIUM CHLORIDE: 0.9 INJECTION, SOLUTION INTRAVENOUS at 10:07

## 2019-10-24 RX ADMIN — PROPOFOL 80 MCG/KG/MIN: 10 INJECTION, EMULSION INTRAVENOUS at 10:15

## 2019-10-24 RX ADMIN — PHENYLEPHRINE HYDROCHLORIDE 200 MCG: 10 INJECTION INTRAVENOUS at 10:45

## 2019-10-24 RX ADMIN — MELATONIN 3 MG: 3 TAB ORAL at 21:20

## 2019-10-24 RX ADMIN — PHENYLEPHRINE HYDROCHLORIDE 200 MCG: 10 INJECTION INTRAVENOUS at 11:00

## 2019-10-24 RX ADMIN — FENTANYL CITRATE 25 MCG: 50 INJECTION, SOLUTION INTRAMUSCULAR; INTRAVENOUS at 10:21

## 2019-10-24 RX ADMIN — PHENYLEPHRINE HYDROCHLORIDE 100 MCG: 10 INJECTION INTRAVENOUS at 10:27

## 2019-10-24 RX ADMIN — AMIODARONE HYDROCHLORIDE 200 MG: 200 TABLET ORAL at 17:22

## 2019-10-24 RX ADMIN — PHENYLEPHRINE HYDROCHLORIDE 50 MCG: 10 INJECTION INTRAVENOUS at 10:24

## 2019-10-24 RX ADMIN — LIDOCAINE HYDROCHLORIDE 20 ML: 10 INJECTION, SOLUTION EPIDURAL; INFILTRATION; INTRACAUDAL; PERINEURAL at 10:41

## 2019-10-24 RX ADMIN — CLOPIDOGREL BISULFATE 75 MG: 75 TABLET ORAL at 17:22

## 2019-10-24 RX ADMIN — PHENYLEPHRINE HYDROCHLORIDE 200 MCG: 10 INJECTION INTRAVENOUS at 11:25

## 2019-10-24 RX ADMIN — AMIODARONE HYDROCHLORIDE 200 MG: 200 TABLET ORAL at 08:56

## 2019-10-24 RX ADMIN — ATORVASTATIN CALCIUM 40 MG: 40 TABLET, FILM COATED ORAL at 08:56

## 2019-10-24 RX ADMIN — GENTAMICIN SULFATE 80 MG: 40 INJECTION, SOLUTION INTRAMUSCULAR; INTRAVENOUS at 11:16

## 2019-10-24 RX ADMIN — ASPIRIN 81 MG: 81 TABLET, COATED ORAL at 17:22

## 2019-10-24 RX ADMIN — Medication 200 MG: at 08:56

## 2019-10-24 RX ADMIN — PHENYLEPHRINE HYDROCHLORIDE 200 MCG: 10 INJECTION INTRAVENOUS at 10:42

## 2019-10-24 RX ADMIN — Medication 500 MG: at 08:57

## 2019-10-24 RX ADMIN — IOHEXOL 20 ML: 350 INJECTION, SOLUTION INTRAVENOUS at 10:48

## 2019-10-24 RX ADMIN — APIXABAN 5 MG: 5 TABLET, FILM COATED ORAL at 17:21

## 2019-10-24 RX ADMIN — LISINOPRIL 5 MG: 5 TABLET ORAL at 08:56

## 2019-10-24 NOTE — UTILIZATION REVIEW
Continued Stay Review    Date: 10/24/2019                         Current Patient Class: Inpatient  Current Level of Care: level 2 stepdown    HPI:87 y o  male initially admitted on 10/19/2019    Assessment/Plan:   10/24/2019  Progress Note Cardio:  Sustained VT:  Requiring 2 shocks  Status post cardiac catheterization and now is status post drug-eluting stent to SVG to OM  Continue aspirin, Plavix, beta-blocker, statin  Continue to follow on telemetry for any recurrence of VT now that the coronary lesion has been fixed  Keep K greater than 4 and magnesium greater than 2       Date:  10/24/2019  Procedure:  Dual chamber ICD  Anesthesia-  Anesthesia by anaesthesiologist , local lidocaine by Dr MARIANA Howe  Findings-none    10/24/2019  Consult Urology:  Enolia Face hematuria-urine is now clear the Kaminski catheter may be discontinued when no longer needed per the primary service    Pertinent Labs/Diagnostic Results:   Results from last 7 days   Lab Units 10/24/19  0436 10/23/19  0505 10/22/19  0445 10/21/19  1053 10/20/19  0525 10/19/19  2209   WBC Thousand/uL 13 11* 10 82* 12 74* 13 44* 12 53* 10 67*   HEMOGLOBIN g/dL 12 1 11 9* 13 3 13 2 13 2 13 4   HEMATOCRIT % 37 1 36 1* 39 4 39 8 41 1 42 1   PLATELETS Thousands/uL 206 178 228 196 172 167   NEUTROS ABS Thousands/µL 8 14*  --   --  10 79*  --  8 56*     Results from last 7 days   Lab Units 10/24/19  0436 10/23/19  0505 10/22/19  0719 10/21/19  1827 10/21/19  1053 10/21/19  0512   SODIUM mmol/L 139 138 136 137 140 139   POTASSIUM mmol/L 3 9 3 7 3 7 3 8 3 5 3 4*   CHLORIDE mmol/L 104 104 101 100 103 101   CO2 mmol/L 28 26 26 29 27 27   ANION GAP mmol/L 7 8 9 8 10 11   BUN mg/dL 21 20 20 24 28* 23   CREATININE mg/dL 1 09 1 05 1 01 1 20 1 30 1 16   EGFR ml/min/1 73sq m 61 64 67 54 49 56   CALCIUM mg/dL 8 8 8 6 8 8 9 5 9 2 9 6   MAGNESIUM mg/dL  --  2 1 2 2 2 6 2 0 1 9   PHOSPHORUS mg/dL  --   --  2 5  --  3 3  --      Results from last 7 days   Lab Units 10/24/19  6412 10/21/19  1053 10/19/19  2209   AST U/L 46* 136* 46*   ALT U/L 57 37 25   ALK PHOS U/L 74 68 75   TOTAL PROTEIN g/dL 6 9 7 5 7 6   ALBUMIN g/dL 3 1* 3 7 4 2   TOTAL BILIRUBIN mg/dL 0 78 1 46* 0 83     Results from last 7 days   Lab Units 10/22/19  1248 10/21/19  1028   POC GLUCOSE mg/dl 140 166*     Results from last 7 days   Lab Units 10/24/19  0436 10/23/19  0505 10/22/19  0719 10/21/19  1827 10/21/19  1053 10/21/19  0512 10/20/19  0525 10/19/19  2209   GLUCOSE RANDOM mg/dL 109 111 134 139 151* 118 119 251*     Results from last 7 days   Lab Units 10/20/19  0525   HEMOGLOBIN A1C % 6 3   EAG mg/dl 134     Results from last 7 days   Lab Units 10/21/19  0512 10/20/19  2355 10/20/19  1818 10/20/19  1522 10/20/19  0336   TROPONIN I ng/mL 28 30* 34 80* >40 00* >40 00* >40 00*     Results from last 7 days   Lab Units 10/22/19  1433 10/22/19  0719 10/22/19  0131 10/21/19  1827 10/21/19  1053  10/19/19  2209   PROTIME seconds  --   --   --  15 9* 15 7*  --  17 6*   INR   --   --   --  1 31* 1 29*  --  1 49*   PTT seconds 67* 64* 53* 60* 52*   < > 28    < > = values in this interval not displayed       Results from last 7 days   Lab Units 10/19/19  2209   TSH 3RD GENERATON uIU/mL 6 610*     Results from last 7 days   Lab Units 10/20/19  2114   CLARITY UA  Cloudy   COLOR UA  Yellow   SPEC GRAV UA  1 013   PH UA  6 0   GLUCOSE UA mg/dl Negative   KETONES UA mg/dl Negative   BLOOD UA  Large*   PROTEIN UA mg/dl Trace*   NITRITE UA  Negative   BILIRUBIN UA  Negative   UROBILINOGEN UA E U /dl 0 2   LEUKOCYTES UA  Negative   WBC UA /hpf 2-4*   RBC UA /hpf Innumerable*   BACTERIA UA /hpf None Seen   EPITHELIAL CELLS WET PREP /hpf None Seen     Vital Signs: /56   Pulse 102   Temp 97 7 °F (36 5 °C) (Oral)   Resp 18   Ht 5' 4" (1 626 m)   Wt 85 kg (187 lb 6 3 oz)   SpO2 96%   BMI 32 17 kg/m²     Medications:   Medications:  amiodarone 200 mg Oral BID With Meals   apixaban 5 mg Oral BID   aspirin 81 mg Oral Daily atorvastatin 40 mg Oral Daily   clopidogrel 75 mg Oral Daily   co-enzyme Q-10 200 mg Oral Daily   glucosamine sulfate 500 mg Oral Daily   lisinopril 5 mg Oral Daily   magnesium gluconate 500 mg Oral Daily   melatonin 3 mg Oral HS     acetaminophen 650 mg Oral Q6H PRN   doxylamine 12 5 mg Oral HS PRN   metoprolol 5 mg Intravenous Q6H PRN   nitroglycerin 0 4 mg Sublingual Q5 Min PRN   perflutren lipid microsphere 0 6 mL/min Intravenous Once in imaging   traMADol 50 mg Oral Q6H PRN       Discharge Plan: TBD    Network Utilization Review Department  [email or web address]  ATTENTION: Please call with any questions or concerns to [phone] and carefully listen to the prompts so that you are directed to the right person  All voicemails are confidential   Vernell Jackson all requests for admission clinical reviews, approved or denied determinations and any other requests to dedicated fax number below belonging to the campus where the patient is receiving treatment    FACILITY NAME UR FAX NUMBER   ADMISSION DENIALS (Administrative/Medical Necessity) [address] [facility] (Maternity/NICU/Pediatrics) [phone]   [facility] [address] [place] [address] [address] [facility] [place] [phone]   [facility] [address] [facility] [phone]   [facility] [address] [phone]

## 2019-10-24 NOTE — PROGRESS NOTES
Progress Note - Mann Henderson 9/1/1932, 80 y o  male MRN: [id number]    Unit/Bed#: [facility] 410-01 Encounter: [id number]    Primary Care Provider: Britney Almodovar MD   Date and time admitted to hospital: 10/19/2019  9:46 PM        Acute kidney injury [facility])  Assessment & Plan  Monitor kidney function closely  Avoid nephrotoxins  Monitor postvoid residuals    Elevated troponin, concern NSTEMI  Assessment & Plan  · IV heparin  · Continue beta-blocker  · For cardiac catheterization tomorrow  · Cardiology following    Ischemic cardiomyopathy  Assessment & Plan  EF 35%, stable volume status  Continue beta-blocker and ACE inhibitor       Typical atrial flutter (HCC)  Assessment & Plan  Continue with amiodarone  Metoprolol 100 mg daily  Start eliquis when cleared for hematuria  Cardiology recommending, " Will need triple therapy for 1 month, then can stop ASA and continue on Plavix and Eliquis "      CAD (coronary artery disease)  Assessment & Plan  · S/P CABG in 1999  · Continue ASA, statin, beta-blocker    Dyslipidemia  Assessment & Plan  · Continue statin therapy    Benign essential hypertension  Assessment & Plan  · Monitor blood pressures closely  · Avoid hypotension    * Ventricular tachycardia (HCC)  Assessment & Plan  · Had episode of symptomatic V-tach with hypotension pre-hospital   Cardioverted by EMS with resolution as patient was reportedly hypotensive  · Metoprolol dose increased  · For cardiac catheterization showed SVG and OM block, s/p MINNIE placement  · Aspirin, plavix and eliquis plan per cardiology note from 10/23/2019        VTE Pharmacologic Prophylaxis:   Pharmacologic: Apixaban (Eliquis)  Mechanical VTE Prophylaxis in Place: Yes    Patient Centered Rounds: I have performed bedside rounds with nursing staff today  Discussions with Specialists or Other Care Team Provider:     Education and Discussions with Family / Patient: patient     Time Spent for Care: 30 minutes    More than 50% of total time spent on counseling and coordination of care as described above  Current Length of Stay: 5 day(s)    Current Patient Status: Inpatient   Certification Statement: The patient will continue to require additional inpatient hospital stay due to  ICD placed, EP recommendations for discharge after overnigh observation    Discharge Plan: possible to rehab    Code Status: Level 1 - Full Code      Subjective:   Patient has no complaints today  Objective:     Vitals:   Temp (24hrs), Av 9 °F (36 6 °C), Min:97 6 °F (36 4 °C), Max:98 4 °F (36 9 °C)    Temp:  [97 6 °F (36 4 °C)-98 4 °F (36 9 °C)] 97 6 °F (36 4 °C)  HR:  [] 88  Resp:  [18] 18  BP: (106-148)/(56-82) 131/77  SpO2:  [93 %-96 %] 93 %  Body mass index is 32 17 kg/m²  Input and Output Summary (last 24 hours): Intake/Output Summary (Last 24 hours) at 10/24/2019 1639  Last data filed at 10/24/2019 1132  Gross per 24 hour   Intake 250 ml   Output 475 ml   Net -225 ml       Physical Exam:     Physical Exam   Constitutional: He is oriented to person, place, and time  He appears well-developed and well-nourished  HENT:   Head: Normocephalic and atraumatic  Right Ear: External ear normal    Left Ear: External ear normal    Nose: Nose normal    Mouth/Throat: Oropharynx is clear and moist  No oropharyngeal exudate  Eyes: Pupils are equal, round, and reactive to light  Conjunctivae are normal  Right eye exhibits no discharge  Left eye exhibits no discharge  No scleral icterus  Neck: Normal range of motion  Neck supple  No JVD present  No tracheal deviation present  No thyromegaly present  Cardiovascular: Normal rate, regular rhythm, normal heart sounds and intact distal pulses  Exam reveals no friction rub  No murmur heard  Pulmonary/Chest: Effort normal and breath sounds normal  No stridor  No respiratory distress  He has no wheezes  He has no rales  He exhibits no tenderness  Abdominal: Soft   Bowel sounds are normal  He exhibits no distension and no mass  There is no tenderness  There is no rebound and no guarding  No hernia  Musculoskeletal: He exhibits no edema, tenderness or deformity  Lymphadenopathy:     He has no cervical adenopathy  Neurological: He is alert and oriented to person, place, and time  He displays normal reflexes  No cranial nerve deficit or sensory deficit  He exhibits normal muscle tone  Coordination normal    Skin: Skin is warm and dry  No rash noted  No erythema  No pallor  Psychiatric: He has a normal mood and affect  His behavior is normal  Judgment and thought content normal    Nursing note and vitals reviewed  Additional Data:     Labs:    Results from last 7 days   Lab Units 10/24/19  0436   WBC Thousand/uL 13 11*   HEMOGLOBIN g/dL 12 1   HEMATOCRIT % 37 1   PLATELETS Thousands/uL 206   NEUTROS PCT % 61   LYMPHS PCT % 25   MONOS PCT % 10   EOS PCT % 2     Results from last 7 days   Lab Units 10/24/19  0436   POTASSIUM mmol/L 3 9   CHLORIDE mmol/L 104   CO2 mmol/L 28   BUN mg/dL 21   CREATININE mg/dL 1 09   CALCIUM mg/dL 8 8   ALK PHOS U/L 74   ALT U/L 57   AST U/L 46*     Results from last 7 days   Lab Units 10/21/19  1827   INR  1 31*       * I Have Reviewed All Lab Data Listed Above  * Additional Pertinent Lab Tests Reviewed:  Varun 66 Admission Reviewed    Imaging:    Imaging Reports Reviewed Today Include:    Imaging Personally Reviewed by Myself Includes:       Recent Cultures (last 7 days):           Last 24 Hours Medication List:     Current Facility-Administered Medications:  acetaminophen 650 mg Oral Q6H PRN Aisha RADHA [place]   amiodarone 200 mg Oral BID With Meals Helen Ventura MD   apixaban 5 mg Oral BID Hurman Fothergill, PA-C   aspirin 81 mg Oral Daily Johann M CrossRADHA   atorvastatin 40 mg Oral Daily [place], [place]   clopidogrel 75 mg Oral Daily [facility] RADHA Dickinson   co-enzyme Q-10 200 mg Oral Daily [facility]   doxylamine 12 5 mg Oral HS PRN [facility] RADHA Dickinson   glucosamine sulfate 500 mg Oral Daily Johann Dickinson PA-C   lisinopril 5 mg Oral Daily Mita Sorenson MD   magnesium gluconate 500 mg Oral Daily Johann Dickinson PA-C   melatonin 3 mg Oral HS Johann Dickinson PA-C   metoprolol 5 mg Intravenous Q6H PRN Marco A Dickinson PA-C   nitroglycerin 0 4 mg Sublingual Q5 Min PRN Marco A Dickinson PA-C   perflutren lipid microsphere 0 6 mL/min Intravenous Once in imaging Nilton Awad PA-C   traMADol 50 mg Oral Q6H PRN Nilton Awad PA-C        Today, Patient Was Seen By: Akash Cabello MD    ** Please Note: Dictation voice to text software may have been used in the creation of this document   **

## 2019-10-24 NOTE — PROCEDURES
DEVICE -  DUAL  CHAMBER ICD    History and physical were reviewed  Patient was examined and history was reviewed  No change in patient's condition Since history and physical has been completed        The pre- operative diagnosis:  Sustained monomorphic VT, post external shock, both outpatient and inpatient  Origin of VT from inferior scar region  Scar related VT cannot be treated by stents at other ischemic sites  Secondary prevention ICD     Coronary artery disease, ischemic cardiomyopathy  LVEF 35%  NYHA class 2-3     On optimal medical therapy-lisinopril, metoprolol for greater than a year     Shared decision making done with patient, primary cardiologist     Sick sinus syndrome  Paroxysmal atrial flutter and fibrillation  History of CVA     Hypertension  Hyperlipidemia  Hematuria          Postoperative diagnosis:  Sustained monomorphic VT, post external shock, both outpatient and inpatient  Origin of VT from inferior scar region  Scar related VT cannot be treated by stents at other ischemic sites  Secondary prevention ICD     Coronary artery disease, ischemic cardiomyopathy  LVEF 35%  NYHA class 2-3     On optimal medical therapy-lisinopril, metoprolol for greater than a year     Shared decision making done with patient, primary cardiologist     Sick sinus syndrome  Paroxysmal atrial flutter and fibrillation  History of CVA     Hypertension  Hyperlipidemia  Hematuria          Procedure:  Dual chamber ICD          Surgeon: Jimi Farley    Assistants -none    Specimens - none    Estimated blood loss- 10 ml    Findings-none    Complications none    Anesthesia-  Anesthesia by anaesthesiologist , local lidocaine by myself      Details of the device                  Description of procedure: The patient was seen before the procedure  The details of the procedure was explained and patient agreed to the same  Appropriate consent was signed  The patient was brought to the electrophysiologic laboratory   Proper time out was done  Sterile dressing and draping was done  Local lidocaine was infiltrated, In the infraclavicular region at the site of device implant  Initial incision was made by a sharp number 10 blade  Thereafter electrocautery and blunt dissection was used to form a prepectoral pocket  Appropriate hemostasis was taken care of      20 mL of radiocontrast, followed by 20 mL of saline, was injected in a peripheral vein on the side of the implant  Under direct visualization, the axillary vein was  Punctured,extra-thoracic  A single guidewire was inserted, and taking all the way to the inferior vena cava to confirm that it is on the right side  We also confirmed by the left anterior oblique view that the wire is in the right side  Thereafter a second access was obtained using the fluoroscopic image of the venogram as a roadmap  Wire was once again taken to the inferior vena cava, and position checked in Arabic views To confirm the appropriate position  A 9 F sheath was passed over the wire  The right ventricular lead was taken through the sheath  In LUONG view the lead was taken to the right ventricular outflow tract  It was then dropped to the apex  Position of the lead was confirmed in both LUONG and Arabic views that it was apical and septal  Appropriate sensing and thresholds were noted  The lead was screwed in  Once again the lead was tested for appropriate sensing, impedance and threshold  The sheath was removed  The lead was sutured to the prepectoral fascia and muscle  A 7 Georgian sheath was passed over the second wire  The dilator and wire were removed  An atrial lead with the curved stylet in it was taken through this sheath into the right atrium  It was maneuvered into the right atrial appendage  Appropriate sensing and thresholds were noted  The lead was screwed in  The sheath was removed  The lead was sutured to the pectoral muscle and fascia    The pocket was washed with large amounts of antibiotic saline  Appropriate hemostasis was taken care of  The lead was connected to the generator  The generator and leads were placed inside the pocket  The generator was tied down  Thereafter the device was interrogated and proper sensing and thresholds through the device were confirmed  Once again made sure there was good hemostasis by electro cautery  Local thrombin was applied  An antibiotic pouch was placed    The pocket was closed in 3 separate layers with intermittent sutures  Dressing was done with Steri-Strips, Telfa and Tegaderm  A modified pressure dressing was placed      Summary of the procedure: The patient came in to the laboratory for dual -chamber ICD device placement   The device has been placed and patient tolerated the procedure well

## 2019-10-24 NOTE — ANESTHESIA PREPROCEDURE EVALUATION
Review of Systems/Medical History          Cardiovascular  Hyperlipidemia, Hypertension , Past MI > 6 months, CAD , History of CABG, Dysrhythmias , atrial flutter and ventricular tachycardia, CHF ,    Pulmonary  Negative pulmonary ROS        GI/Hepatic  Negative GI/hepatic ROS          Prostatic disorder, benign prostatic hyperplasia       Endo/Other    Obesity    GYN  Negative gynecology ROS          Hematology      Comment: Elevated WBC at 13 Musculoskeletal  Negative musculoskeletal ROS Back pain , lumbar pain,        Neurology    CVA , no residual symptoms,    Psychology   Anxiety,              Physical Exam    Airway    Mallampati score: III  TM Distance: >3 FB  Neck ROM: full     Dental   upper dentures,     Cardiovascular      Pulmonary      Other Findings        Anesthesia Plan  ASA Score- 4     Anesthesia Type- IV sedation with anesthesia with ASA Monitors  Additional Monitors:   Airway Plan:     Comment: Pt with elevated WBC in 10-13 range but afebrile  Discussed with Dr Emma Ware and Dr Emma Ware wishes to proceed        Plan Factors-    Induction- intravenous  Postoperative Plan-     Informed Consent- Anesthetic plan and risks discussed with patient  I personally reviewed this patient with the CRNA  Discussed and agreed on the Anesthesia Plan with the CRNA  Keith Fu

## 2019-10-24 NOTE — OCCUPATIONAL THERAPY NOTE
Occupational Therapy Cancellation        Patient Name: Odessa Bell  MNAIY'V Date: 10/24/2019    Chart reviewed  Attempted to see pt  Pt off the floor for ICD  OT will continue to follow to see as able      Mitra March MS, OTR/L

## 2019-10-24 NOTE — PHYSICAL THERAPY NOTE
Physical Therapy Cancellation Note    The pt  Remains off of the floor at the cardiac cath lab  Will continue to follow      Arlin Ceballos PTA

## 2019-10-24 NOTE — PROGRESS NOTES
Cardiology Progress Note - Adriana Pradhan 80 y o  male MRN: [id number]    Unit/Bed#: [facility] 410-01 Encounter: [id number]      Assessment/Recommendations:  1  Sustained VT:  Requiring 2 shocks  Status post cardiac catheterization and now is status post drug-eluting stent to SVG to OM  Continue aspirin, Plavix, beta-blocker, statin  Continue to follow on telemetry for any recurrence of VT now that the coronary lesion has been fixed  Keep K greater than 4 and magnesium greater than 2  ICD today  2  CAD:  Status post CABG  Now status post drug-eluting stent to SVG to OM  Continue medical management as per above  3  Ischemic cardiomyopathy:  With EF of 35%  Volume status appears stable  Continue beta-blocker and started on an ACE-inhibitor, tolerating well  4  Hyperlipidemia:  Continue statin  5  Paroxysmal atrial flutter:  Continues in atrial flutter on monitor  Resume Eliquis when able - hematuria has resolved, to be resumed later this evening after ICD placement  Will need triple therapy for 1 month, then can stop ASA and continue on Plavix and Eliquis  Transitioned to p  O  Amiodarone for the time being  6  Elevated troponin:  Likely related to NSTEMI type 1 considering significant coronary disease requiring drug-eluting stent placement  Continue medical management now with aspirin, Plavix, beta-blocker, statin  7  Hematuria: heparin gtt has been stopped  Continued on ASA and plavix for recent MINNIE 2 days ago  Would assess to see if this clears as a result of traumatic castillo  Agree with urology assessment  Will continue to watch off heparin/Eliquis today and resume Eliquis in PM after ICD placement to assess if he continues to bleed  Subjective:   Patient seen and examined  No significant events overnight   ; pertinent negatives - chest pain, chest pressure/discomfort, dyspnea, irregular heart beat and palpitations      Objective:     Vitals: Blood pressure 148/77, pulse (!) 110, temperature 98 1 °F (36 7 °C), temperature source Oral, resp  rate 18, height 5' 4" (1 626 m), weight 85 kg (187 lb 6 3 oz), SpO2 94 %  , Body mass index is 32 17 kg/m² ,   Orthostatic Blood Pressures      Most Recent Value   Blood Pressure  148/77 filed at 10/24/2019 0715   Patient Position - Orthostatic VS  Sitting filed at 10/24/2019 0715            Intake/Output Summary (Last 24 hours) at 10/24/2019 1101  Last data filed at 10/24/2019 0715  Gross per 24 hour   Intake 100 ml   Output 775 ml   Net -675 ml        TELE: Atrial flutter      Physical Exam:    GEN: Amy Roblero appears well, alert and oriented x 3, pleasant and cooperative   HEENT: pupils equal, round, and reactive to light; extraocular muscles intact  NECK: supple, no carotid bruits   HEART: regular rhythm, normal S1 and S2, no murmurs, clicks, gallops or rubs   LUNGS: clear to auscultation bilaterally; no wheezes, rales, or rhonchi   ABDOMEN: normal bowel sounds, soft, no tenderness, no distention  EXTREMITIES: peripheral pulses normal; no clubbing, cyanosis, or edema  NEURO: no focal findings   SKIN: normal without suspicious lesions on exposed skin      Medications:      Current Facility-Administered Medications:     acetaminophen (TYLENOL) tablet 650 mg, 650 mg, Oral, Q6H PRN, Marcos Giraldo PA-C    amiodarone tablet 200 mg, 200 mg, Oral, BID With Meals, Santo Crockett MD, 200 mg at 10/24/19 0856    aspirin (ECOTRIN LOW STRENGTH) EC tablet 81 mg, 81 mg, Oral, Daily, Jonelle Dickinson PA-C, Stopped at 10/24/19 0855    atorvastatin (LIPITOR) tablet 40 mg, 40 mg, Oral, Daily, Jonelle Dickinson PA-C, 40 mg at 10/24/19 0856    clopidogrel (PLAVIX) tablet 75 mg, 75 mg, Oral, Daily, Otila Nipevelio Dickinson, PA-C, 75 mg at 10/23/19 0827    co-enzyme Q-10 capsule 200 mg, 200 mg, Oral, Daily, Jonelle Dickinson PA-C, 200 mg at 10/24/19 4434    doxylamine (UNISON) tablet 12 5 mg, 12 5 mg, Oral, HS PRN, Jonelle Dickinson PA-C, 12 5 mg at 10/21/19 0026    glucosamine sulfate capsule 500 mg, 500 mg, Oral, Daily, Regi Dickinson PA-C, 500 mg at 10/24/19 0857    iohexol (OMNIPAQUE) 350 MG/ML injection (SINGLE-DOSE), , Intravenous, Code/Trauma/Sedation MedKimi MD, 20 mL at 10/24/19 1048    lidocaine (PF) (XYLOCAINE-MPF) 1 % injection, , , Code/Trauma/Sedation Med, Kimi Evangelista MD, 20 mL at 10/24/19 1041    lisinopril (ZESTRIL) tablet 5 mg, 5 mg, Oral, Daily, Jelani Chambers MD, 5 mg at 10/24/19 0856    magnesium gluconate (MAGONATE) tablet 500 mg, 500 mg, Oral, Daily, Johann Dickinson PA-C, 500 mg at 10/24/19 0600    melatonin tablet 3 mg, 3 mg, Oral, HS, Trini Gee PA-C, 3 mg at 10/23/19 2201    metoprolol (LOPRESSOR) injection 5 mg, 5 mg, Intravenous, Q6H PRN, Trini Gee PA-C    nitroglycerin (NITROSTAT) SL tablet 0 4 mg, 0 4 mg, Sublingual, Q5 Min PRN, Regi Dickinson PA-C, 0 4 mg at 10/20/19 0503    perflutren lipid microsphere (DEFINITY) injection, 0 6 mL/min, Intravenous, Once in imaging, Johann Dickinson PA-C    traMADol Laura Adolph) tablet 50 mg, 50 mg, Oral, Q6H PRN, Regi Dickinson PA-C, 50 mg at 10/20/19 4103    Facility-Administered Medications Ordered in Other Encounters:     fentanyl citrate (PF) 100 MCG/2ML, , Intravenous, PRN, Uri Isidro CRNA, 25 mcg at 10/24/19 1021    lidocaine bolus from bag, , , PRN, Uri Isidro CRNA, 30 mg at 10/24/19 1015    phenylephrine bolus from bag, , , PRN, Uri Isidro CRNA, 100 mcg at 10/24/19 1054    propofol (DIPRIVAN) 1000 mg in 100 mL infusion (premix), , Intravenous, Continuous PRN, Uri Isidro CRNA, Last Rate: 30 6 mL/hr at 10/24/19 1045, 60 mcg/kg/min at 10/24/19 1045    sodium chloride 0 9 % infusion, , , Continuous PRN, Uri Isidro CRNA     Labs & Results:    Results from last 7 days   Lab Units 10/21/19  0512 10/20/19  2355 10/20/19  1818   TROPONIN I ng/mL 28 30* 34 80* >40 00*     Results from last 7 days   Lab Units 10/24/19  0436 10/23/19  0505 10/22/19  0445   WBC Thousand/uL 13 11* 10 82* 12 74*   HEMOGLOBIN g/dL 12 1 11 9* 13 3   HEMATOCRIT % 37 1 36 1* 39 4   PLATELETS Thousands/uL 206 178 228     Results from last 7 days   Lab Units 10/20/19  0525   TRIGLYCERIDES mg/dL 65   HDL mg/dL 58     Results from last 7 days   Lab Units 10/24/19  0436 10/23/19  0505 10/22/19  0719  10/21/19  1053  10/19/19  2209   POTASSIUM mmol/L 3 9 3 7 3 7   < > 3 5   < > 4 3   CHLORIDE mmol/L 104 104 101   < > 103   < > 105   CO2 mmol/L 28 26 26   < > 27   < > 24   BUN mg/dL 21 20 20   < > 28*   < > 20   CREATININE mg/dL 1 09 1 05 1 01   < > 1 30   < > 1 67*   CALCIUM mg/dL 8 8 8 6 8 8   < > 9 2   < > 9 2   ALK PHOS U/L 74  --   --   --  68  --  75   ALT U/L 57  --   --   --  37  --  25   AST U/L 46*  --   --   --  136*  --  46*    < > = values in this interval not displayed  Results from last 7 days   Lab Units 10/22/19  1433 10/22/19  0719 10/22/19  0131 10/21/19  1827 10/21/19  1053  10/19/19  2209   INR   --   --   --  1 31* 1 29*  --  1 49*   PTT seconds 67* 64* 53* 60* 52*   < > 28    < > = values in this interval not displayed  Results from last 7 days   Lab Units 10/23/19  0505 10/22/19  0719 10/21/19  1827   MAGNESIUM mg/dL 2 1 2 2 2 6       Echo: personally reviewed - EF reduced at 35% with wall motion abnormalities in the LAD distribution    Mild pulmonary hypertension    EKG personally reviewed by Kurtis Stiles MD

## 2019-10-24 NOTE — CONSULTS
[address] NOTE   Admission Date: 10/19/2019    Patient Identifiers: Nadege Armando (MRN: [id number])  Service Requesting Consultation: Tiffanie Dickey MD  Service Providing Consultation:  Urology, Deanne Sarabia PA-C  Consults  Date of Service: 10/24/2019    Reason for Consultation: Hematuria    History of Present Illness:     Nadege Armando is a 80 y o  Male who is a Urology patient of Dr Miri Danielle  He has a past medical history of coronary disease coronary bypass surgery and a scheme a cardiomyopathy  He was admitted with intermittent episodes of chest pain and on the evening of admission he presented with sudden onset of substernal chest pressure without radiation  He was found to be hypotensive with blood pressure 40/20 and in ventricular tachycardia  He had a Kaminski catheter inserted on admission at some point developed gross hematuria  His urine has now cleared  He is awake and alert with no complaints  He denies any history of prostate cancer or radiation  And he is not on any medications for BPH      Past Medical, Past Surgical History:     Past Medical History:   Diagnosis Date    Acute myocardial infarction (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    Prostatic hypertrophy     benign    Stroke [facility])    :    Past Surgical History:   Procedure Laterality Date    ANKLE FRACTURE SURGERY Left     CORONARY ARTERY BYPASS GRAFT     :    Medications, Allergies:     Current Facility-Administered Medications:     acetaminophen (TYLENOL) tablet 650 mg, 650 mg, Oral, Q6H PRN, Zoey Pena PA-C   amiodarone tablet 200 mg, 200 mg, Oral, BID With Meals, Gavi Dash MD, 200 mg at 10/24/19 0856    apixaban (ELIQUIS) tablet 5 mg, 5 mg, Oral, BID, Antionette Lee PA-C    aspirin (ECOTRIN LOW STRENGTH) EC tablet 81 mg, 81 mg, Oral, Daily, Johann Dickinson PA-C, 81 mg at 10/23/19 0827    atorvastatin (LIPITOR) tablet 40 mg, 40 mg, Oral, Daily, Johann Dickinson PA-C, 40 mg at 10/24/19 0856    clopidogrel (PLAVIX) tablet 75 mg, 75 mg, Oral, Daily, Johann Dickinson PA-C, 75 mg at 10/23/19 0827    co-enzyme Q-10 capsule 200 mg, 200 mg, Oral, Daily, JESSICA HuntC, 200 mg at 10/24/19 8320    doxylamine (UNISON) tablet 12 5 mg, 12 5 mg, Oral, HS PRN, Doni Dickinson PA-C, 12 5 mg at 10/21/19 0026    glucosamine sulfate capsule 500 mg, 500 mg, Oral, Daily, Doni Dickinson PA-C, 500 mg at 10/24/19 0857    lisinopril (ZESTRIL) tablet 5 mg, 5 mg, Oral, Daily, Gavi Dash MD, 5 mg at 10/24/19 0856    magnesium gluconate (MAGONATE) tablet 500 mg, 500 mg, Oral, Daily, Johann Dickinson PA-C, 500 mg at 10/24/19 0600    melatonin tablet 3 mg, 3 mg, Oral, HS, Esther Bumpers, PA-C, 3 mg at 10/23/19 2201    metoprolol (LOPRESSOR) injection 5 mg, 5 mg, Intravenous, Q6H PRN, Esther Bumpers, PA-C    nitroglycerin (NITROSTAT) SL tablet 0 4 mg, 0 4 mg, Sublingual, Q5 Min PRN, Doni Dickinson PA-C, 0 4 mg at 10/20/19 0503    perflutren lipid microsphere (DEFINITY) injection, 0 6 mL/min, Intravenous, Once in imaging, Bell Bumpers, PA-C    traMADol [place]) tablet 50 mg, 50 mg, Oral, Q6H PRN, Doni Dickinson PA-C, 50 mg at 10/20/19 0547    Allergies: Allergies   Allergen Reactions    Penicillins Edema and Rash     Reaction Date: [id number]; Category: Allergy;  Annotation - [id number]: Severe reaction with hospitaization at [facility]   :    Social and Family History:   Social History:   Social History     Tobacco Use    Smoking status: Former Smoker    Smokeless tobacco: Never Used   Substance Use Topics    Alcohol use: Yes     Comment: social- per allscripts    Drug use: No        Social History     Tobacco Use   Smoking Status Former Smoker   Smokeless Tobacco Never Used       Family History:  Family History   Problem Relation Age of Onset    Cancer Mother     Hypertension Mother         essential    Pancreatic cancer Mother    :     Review of Systems:     General: Fever, chills, or night sweats: negative  Cardiac: Negative for chest pain  Pulmonary: Negative for shortness of breath  Gastrointestinal: Abdominal pain negative  Nausea, vomiting, or diarrhea negative,  Genitourinary: See HPI above  Patient does not have hematuria  All other systems queried were negative  Physical Exam:   General: Patient is pleasant and in NAD  Awake and alert  /56   Pulse 102   Temp 97 7 °F (36 5 °C) (Oral)   Resp 18   Ht 5' 4" (1 626 m)   Wt 85 kg (187 lb 6 3 oz)   SpO2 96%   BMI 32 17 kg/m²   Cardiac: Peripheral edema: negative  Pulmonary: Non-labored breathing  Abdomen: Soft, non-tender, non-distended  No surgical scars  No masses, tenderness, hernias noted  Genitourinary: Negative CVA tenderness, negative suprapubic tenderness  (Male): Penis uncircumcised, phallus normal, meatus patent  Testicles descended into scrotum bilaterally without masses nor tenderness  No inguinal hernias bilaterally  BARBOZA: in place draining clear yellow urine  no clots and       Labs:     Lab Results   Component Value Date    HGB 12 1 10/24/2019    HCT 37 1 10/24/2019    WBC 13 11 (H) 10/24/2019     10/24/2019   ]    Lab Results   Component Value Date     08/17/2017    K 3 9 10/24/2019     10/24/2019    CO2 28 10/24/2019    BUN 21 10/24/2019    CREATININE 1 09 10/24/2019    CALCIUM 8 8 10/24/2019    GLUCOSE 127 03/06/2018   ]    Imaging:   I personally reviewed the images and report of the following studies, and reviewed them with the patient:    No  imaging this admission      ASSESSMENT:      1  Gross hematuria   2   BPH 3  Ventricular tachycardia- getting ICD today   4  Coronary artery disease status post coronary bypass surgery   5  Ischemic cardiomyopathy    PLAN:   - patient may follow-up with Dr Obie Rodas as an outpatient  - his urine is clear the Kaminski catheter may be discontinued when no longer needed per the primary service  - please feel free to call with any questions or concerns      Thank you for allowing me to participate in this patients care  Please do not hesitate to call with any additional questions    Elias Childers PA-C

## 2019-10-25 ENCOUNTER — APPOINTMENT (INPATIENT)
Dept: RADIOLOGY | Facility: HOSPITAL | Age: 84
DRG: 224 | End: 2019-10-25
Payer: COMMERCIAL

## 2019-10-25 VITALS
TEMPERATURE: 97.9 F | RESPIRATION RATE: 19 BRPM | OXYGEN SATURATION: 96 % | WEIGHT: 187.39 LBS | BODY MASS INDEX: 31.99 KG/M2 | HEIGHT: 64 IN | DIASTOLIC BLOOD PRESSURE: 61 MMHG | HEART RATE: 76 BPM | SYSTOLIC BLOOD PRESSURE: 109 MMHG

## 2019-10-25 LAB
ANION GAP SERPL CALCULATED.3IONS-SCNC: 7 MMOL/L (ref 4–13)
BASOPHILS # BLD AUTO: 0.07 THOUSANDS/ΜL (ref 0–0.1)
BASOPHILS NFR BLD AUTO: 1 % (ref 0–1)
BUN SERPL-MCNC: 19 MG/DL (ref 5–25)
CALCIUM SERPL-MCNC: 8.7 MG/DL (ref 8.3–10.1)
CHLORIDE SERPL-SCNC: 105 MMOL/L (ref 100–108)
CO2 SERPL-SCNC: 25 MMOL/L (ref 21–32)
CREAT SERPL-MCNC: 1.02 MG/DL (ref 0.6–1.3)
EOSINOPHIL # BLD AUTO: 0.28 THOUSAND/ΜL (ref 0–0.61)
EOSINOPHIL NFR BLD AUTO: 3 % (ref 0–6)
ERYTHROCYTE [DISTWIDTH] IN BLOOD BY AUTOMATED COUNT: 13.2 % (ref 11.6–15.1)
GFR SERPL CREATININE-BSD FRML MDRD: 66 ML/MIN/1.73SQ M
GLUCOSE SERPL-MCNC: 119 MG/DL (ref 65–140)
HCT VFR BLD AUTO: 37.3 % (ref 36.5–49.3)
HGB BLD-MCNC: 12.4 G/DL (ref 12–17)
IMM GRANULOCYTES # BLD AUTO: 0.15 THOUSAND/UL (ref 0–0.2)
IMM GRANULOCYTES NFR BLD AUTO: 1 % (ref 0–2)
LYMPHOCYTES # BLD AUTO: 1.83 THOUSANDS/ΜL (ref 0.6–4.47)
LYMPHOCYTES NFR BLD AUTO: 16 % (ref 14–44)
MCH RBC QN AUTO: 30.5 PG (ref 26.8–34.3)
MCHC RBC AUTO-ENTMCNC: 33.2 G/DL (ref 31.4–37.4)
MCV RBC AUTO: 92 FL (ref 82–98)
MONOCYTES # BLD AUTO: 1.21 THOUSAND/ΜL (ref 0.17–1.22)
MONOCYTES NFR BLD AUTO: 11 % (ref 4–12)
NEUTROPHILS # BLD AUTO: 7.59 THOUSANDS/ΜL (ref 1.85–7.62)
NEUTS SEG NFR BLD AUTO: 68 % (ref 43–75)
NRBC BLD AUTO-RTO: 0 /100 WBCS
PLATELET # BLD AUTO: 225 THOUSANDS/UL (ref 149–390)
PMV BLD AUTO: 10.3 FL (ref 8.9–12.7)
POTASSIUM SERPL-SCNC: 3.7 MMOL/L (ref 3.5–5.3)
RBC # BLD AUTO: 4.06 MILLION/UL (ref 3.88–5.62)
SODIUM SERPL-SCNC: 137 MMOL/L (ref 136–145)
WBC # BLD AUTO: 11.13 THOUSAND/UL (ref 4.31–10.16)

## 2019-10-25 PROCEDURE — 71046 X-RAY EXAM CHEST 2 VIEWS: CPT

## 2019-10-25 PROCEDURE — 97535 SELF CARE MNGMENT TRAINING: CPT

## 2019-10-25 PROCEDURE — 80048 BASIC METABOLIC PNL TOTAL CA: CPT | Performed by: PHYSICIAN ASSISTANT

## 2019-10-25 PROCEDURE — 99024 POSTOP FOLLOW-UP VISIT: CPT | Performed by: INTERNAL MEDICINE

## 2019-10-25 PROCEDURE — 97530 THERAPEUTIC ACTIVITIES: CPT

## 2019-10-25 PROCEDURE — 97116 GAIT TRAINING THERAPY: CPT

## 2019-10-25 PROCEDURE — 99239 HOSP IP/OBS DSCHRG MGMT >30: CPT | Performed by: INTERNAL MEDICINE

## 2019-10-25 PROCEDURE — 85025 COMPLETE CBC W/AUTO DIFF WBC: CPT | Performed by: INTERNAL MEDICINE

## 2019-10-25 RX ORDER — LANOLIN ALCOHOL/MO/W.PET/CERES
3 CREAM (GRAM) TOPICAL
Qty: 30 EACH | Refills: 0
Start: 2019-10-25

## 2019-10-25 RX ORDER — LOSARTAN POTASSIUM 25 MG/1
25 TABLET ORAL DAILY
Qty: 30 TABLET | Refills: 0 | Status: SHIPPED | OUTPATIENT
Start: 2019-10-25 | End: 2019-11-06 | Stop reason: HOSPADM

## 2019-10-25 RX ORDER — TRAMADOL HYDROCHLORIDE 50 MG/1
50 TABLET ORAL EVERY 6 HOURS PRN
Qty: 30 TABLET | Refills: 0 | Status: SHIPPED | OUTPATIENT
Start: 2019-10-25 | End: 2019-11-06 | Stop reason: HOSPADM

## 2019-10-25 RX ORDER — CLOPIDOGREL BISULFATE 75 MG/1
75 TABLET ORAL DAILY
Qty: 30 TABLET | Refills: 0 | Status: ON HOLD
Start: 2019-10-26 | End: 2019-11-06 | Stop reason: SDUPTHER

## 2019-10-25 RX ORDER — LISINOPRIL 5 MG/1
5 TABLET ORAL DAILY
Qty: 30 TABLET | Refills: 0 | Status: CANCELLED
Start: 2019-10-26

## 2019-10-25 RX ORDER — AMIODARONE HYDROCHLORIDE 200 MG/1
200 TABLET ORAL 2 TIMES DAILY WITH MEALS
Qty: 30 TABLET | Refills: 1
Start: 2019-10-25 | End: 2019-11-08 | Stop reason: SDUPTHER

## 2019-10-25 RX ADMIN — ATORVASTATIN CALCIUM 40 MG: 40 TABLET, FILM COATED ORAL at 08:23

## 2019-10-25 RX ADMIN — CLOPIDOGREL BISULFATE 75 MG: 75 TABLET ORAL at 08:23

## 2019-10-25 RX ADMIN — APIXABAN 5 MG: 5 TABLET, FILM COATED ORAL at 08:23

## 2019-10-25 RX ADMIN — Medication 500 MG: at 05:32

## 2019-10-25 RX ADMIN — Medication 200 MG: at 08:22

## 2019-10-25 RX ADMIN — Medication 500 MG: at 09:25

## 2019-10-25 RX ADMIN — AMIODARONE HYDROCHLORIDE 200 MG: 200 TABLET ORAL at 08:23

## 2019-10-25 RX ADMIN — LISINOPRIL 5 MG: 5 TABLET ORAL at 08:22

## 2019-10-25 RX ADMIN — ASPIRIN 81 MG: 81 TABLET, COATED ORAL at 08:23

## 2019-10-25 NOTE — PHYSICAL THERAPY NOTE
Physical Therapy Progress Note     10/25/19 0955   Pain Assessment   Pain Assessment No/denies pain   Pain Score No Pain   Restrictions/Precautions   Other Precautions Telemetry; Fall Risk   Subjective   Subjective The pt  states that he is feeling better, and he is ready to go for a walk  Transfers   Sit to Stand 5  Supervision   Stand to Sit 5  Supervision   Ambulation/Elevation   Gait pattern Excessively slow; Inconsistent ty   Gait Assistance 4  Minimal assist  (Min assist first trial, supervision second trial )   Additional items Assist x 1   Assistive Device Rolling walker   Distance 130 feet x 2  Balance   Static Sitting Good   Dynamic Sitting Fair   Static Standing Fair   Ambulatory Fair -   Activity Tolerance   Activity Tolerance Patient tolerated treatment well   Nurse [address], RN  Exercises   Knee AROM Long Arc Quad Sitting;Bilateral;AROM;5 reps   Assessment   Prognosis Good   Problem List Decreased endurance; Impaired balance;Decreased mobility   Assessment The pt  has improving balance and activity tolerance  He initially required minimal assistance for gait during the first trial, but he progressed to supervision with the second trial  He also required increased time to complete turns with the first trial  He does continue to have a slow ty, but this also improved as he ambulated  He does continue to remain below his baseline independence, and he will benefit from continued physical therapy  Barriers to Discharge Decreased caregiver support   Goals   Patient Goals To regain his strength and independence  STG Expiration Date 11/01/19   PT Treatment Day 2   Plan   Treatment/Interventions Functional transfer training;LE strengthening/ROM; Therapeutic exercise; Endurance training;Patient/family training;Bed mobility;Gait training   Progress Progressing toward goals   PT Frequency   (3-5x a week )   Recommendation   Recommendation Post acute IP rehab   Equipment Recommended Carol Ann Brandon Tessie Acuna, PTA

## 2019-10-25 NOTE — PROGRESS NOTES
Progress Note - Electrophysiology-Cardiology (EP)   Coleen Kidd 80 y o  male MRN: [id number]  Unit/Bed#: [facility] 410-01 Encounter: [id number]      Assessment:  1  Sustained VT status post external shock both as an outpatient and as an inpatient, status post [employer] ICD implantation 10/24/2019  2  CAD status post MINNIE to SVG to OM 10/21/2019   A ) history of CABG in 1999   B ) currently on aspirin and Plavix  3  Ischemic cardiomyopathy with LVEF 35% per echo 10/20/2019   A ) akinesis of the mid-apical anterior, mid anteroseptal, mid inferoseptal, apical septal, and apical wall   B ) his VT per ECG is likely originating from an area of scar  4  Paroxysmal atrial fibrillation/flutter,    A ) maintained on Eliquis as an outpatient but currently on therapeutic Lovenox   B ) has now been started on amiodarone antiarrhythmic therapy  5  Hypertension  6  Hyperlipidemia  7  History of CVA  8  Hematuria    Plan:  1  Device interrogation this morning shows appropriate function including lead sensing, thresholds, and impedances  Chest x-ray shows appropriate lead placement without pneumothorax  His incision is clean, dry, intact without swelling, hematoma, redness, bleeding, drainage, or signs of infection  2  All discharge instructions and restrictions were reviewed with the patient in detail  His outer most dressing was coming off, I personally removed it  His underlying Aquacel is still in place, however is coming off in several places  I asked him to keep the entire area dry for 1 week to help with the infection risk  3  Follow-up appointment has been arranged  He will follow up in several months with Dr Pamella Huerta for ongoing monitoring of his ventricular arrhythmias while on amiodarone  4  He is currently on amiodarone, would recommend continuing 200 mg twice daily for the next 2 weeks, then reducing to 200 mg daily    His AST and TSH was slightly elevated this admission, would recommend outpatient testing in 4-6 weeks for ongoing monitoring  He will need baseline PFTs and ophthalmology follow up for ongoing monitoring while on amiodarone  5  His Eliquis has been restarted  Currently he is on triple therapy with aspirin, Plavix, and Eliquis  Per General Cardiology, will remain on triple therapy for 1 month, at which time aspirin can be stopped  6  As previously discussed, he should not drive for 6 months given his episode of significant sustained ventricular tachycardia  Subjective/Objective   Chief Complaint:  No acute complaints    Subjective:  Patient sitting comfortably, denies chest pain or pressure, shortness of breath, dizziness, lightheadedness, or palpitations  No significant events noted overnight, no significant discomfort at device site      Objective:     Vitals: /61 (BP Location: Right arm)   Pulse 76   Temp 97 9 °F (36 6 °C) (Oral)   Resp 19   Ht 5' 4" (1 626 m)   Wt 85 kg (187 lb 6 3 oz)   SpO2 96%   BMI 32 17 kg/m²   Vitals:    10/20/19 0031 10/22/19 0600   Weight: 85 7 kg (188 lb 15 oz) 85 kg (187 lb 6 3 oz)     Orthostatic Blood Pressures      Most Recent Value   Blood Pressure  109/61 filed at 10/25/2019 1121   Patient Position - Orthostatic VS  Sitting filed at 10/25/2019 1121            Intake/Output Summary (Last 24 hours) at 10/25/2019 1150  Last data filed at 10/25/2019 7059  Gross per 24 hour   Intake 825 ml   Output 620 ml   Net 205 ml       Invasive Devices     Peripheral Intravenous Line            Peripheral IV 10/25/19 Distal;Right;Ventral (anterior) Forearm less than 1 day                            Scheduled Meds:    Current Facility-Administered Medications:  acetaminophen 650 mg Oral Q6H PRN Oleksandr Lezama PA-C   amiodarone 200 mg Oral BID With Meals Tyshawn Mccullough MD   apixaban 5 mg Oral BID Zia Tavarez PA-C   aspirin 81 mg Oral Daily Johann Dickinson PA-C   atorvastatin 40 mg Oral Daily Jocelyne Woodward PA-C   clopidogrel 75 mg Oral Daily Oleksandr Lezama, PA-C   co-enzyme Q-10 200 mg Oral Daily Johann  Cross, PA-C   doxylamine 12 5 mg Oral HS PRN Otila Nipper Cross, PA-C   glucosamine sulfate 500 mg Oral Daily Johann M Cross, PA-C   magnesium gluconate 500 mg Oral Daily Johann  Cross, Brian Monique   melatonin 3 mg Oral HS Johann  Cross, PA-C   metoprolol 5 mg Intravenous Q6H PRN Otila Nipper Cross, PA-C   nitroglycerin 0 4 mg Sublingual Q5 Min PRN Otila Nipper Cross, PA-C   perflutren lipid microsphere 0 6 mL/min Intravenous Once in imaging Marcos Giraldo, PA-C   traMADol 50 mg Oral Q6H PRN Otila Nipper Cross, PA-C     Continuous Infusions:   PRN Meds:   acetaminophen    doxylamine    metoprolol    nitroglycerin    perflutren lipid microsphere    traMADol        Physical Exam:   GEN: NAD, alert and oriented, well appearing  SKIN: dry without significant lesions or rashes  HEENT: NCAT, PERRL, EOMs intact  NECK: No JVD appreciated  CARDIOVASCULAR: RRR, normal S1, S2 without murmurs, rubs, or gallops appreciated  LUNGS: Clear to auscultation bilaterally without wheezes, rhonchi, or rales  ABDOMEN: Soft, nontender, nondistended  EXTREMITIES/VASCULAR: perfused without clubbing, cyanosis, or edema b/l  PSYCH: Normal mood and affect  NEURO: CN ll-Xll grossly intact                Lab Results: I have personally reviewed pertinent lab results      Results from last 7 days   Lab Units 10/25/19  0640 10/24/19  0436 10/23/19  0505   WBC Thousand/uL 11 13* 13 11* 10 82*   HEMOGLOBIN g/dL 12 4 12 1 11 9*   HEMATOCRIT % 37 3 37 1 36 1*   PLATELETS Thousands/uL 225 206 178     Results from last 7 days   Lab Units 10/25/19  0640 10/24/19  0436 10/23/19  0505   POTASSIUM mmol/L 3 7 3 9 3 7   CHLORIDE mmol/L 105 104 104   CO2 mmol/L 25 28 26   BUN mg/dL 19 21 20   CREATININE mg/dL 1 02 1 09 1 05   CALCIUM mg/dL 8 7 8 8 8 6     Results from last 7 days   Lab Units 10/22/19  1433 10/22/19  0719 10/22/19  0131 10/21/19  1827 10/21/19  1053  10/19/19  2209   INR   --   --   --  1 31* 1 29*  --  1 49*   PTT seconds 67* 64* 53* 60* 52*   < > 28    < > = values in this interval not displayed  Results from last 7 days   Lab Units 10/23/19  0505 10/22/19  0719 10/21/19  1827   MAGNESIUM mg/dL 2 1 2 2 2 6         Imaging: I have personally reviewed pertinent reports  Results for orders placed during the hospital encounter of 10/19/19   Echo complete with contrast if indicated    Narrative [address]  [facility] [place], [address] [facility]  [phone]    Transthoracic Echocardiogram  2D, M-mode, Doppler, and Color Doppler    Study date:  20-Oct-2019    Patient: Ministerio Das  MR number: [id number]  Account number: [de-identified]  : 01-Sep-1932  Age: 80 years  Gender: Male  Status: Inpatient  Location: Bedside  Height: 64 in  Weight: 188 lb  BP: 124/ 87 mmHg    Indications: Known coronary artery disease  Chest pain  Diagnoses: I25 83 - Coronary atherosclerosis due to lipid rich plaque    Sonographer:  PARVEZ Schaefer  Primary Physician:  Hannah Del Real MD  Referring Physician:  Chino Nair DO  Group:  [id number]  Interpreting Physician:  Magnolia Goodrich MD    SUMMARY    LEFT VENTRICLE:  Systolic function was moderately reduced  Ejection fraction was estimated to be 35 %  There was akinesis of the mid-apical anterior, mid anteroseptal, mid inferoseptal, apical septal, and apical wall(s)  TRICUSPID VALVE:  Estimated peak PA pressure was 40 mmHg  The findings suggest mild pulmonary hypertension  HISTORY: PRIOR HISTORY: Ischemic cardiomyopathy  Ventricular and supraventricular arrhythmias  Risk factors: hypertension and medication-treated hypercholesterolemia  Remote transient ischemic attack  PRIOR PROCEDURES: Coronary bypass    PROCEDURE: The procedure was performed at the bedside  This was a routine study  The transthoracic approach was used  The study included complete 2D imaging, M-mode, complete spectral Doppler, and color Doppler   The heart rate was 103 bpm,  at the start of the study  Intravenous contrast (  6ml of Definity) was administered  Echocardiographic views were limited due to decreased penetration and lung interference  This was a technically difficult study  LEFT VENTRICLE: Size was normal  Systolic function was moderately reduced  Ejection fraction was estimated to be 35 %  There was akinesis of the mid-apical anterior, mid anteroseptal, mid inferoseptal, apical septal, and apical wall(s)  DOPPLER: Normal sinus rhythm was absent  RIGHT VENTRICLE: The size was normal  Systolic function was normal     LEFT ATRIUM: The atrium was dilated  RIGHT ATRIUM: Size was normal     MITRAL VALVE: Valve structure was normal  There was normal leaflet separation  DOPPLER: There was no evidence for stenosis  There was mild regurgitation  The regurgitant jet was eccentric  AORTIC VALVE: The valve was trileaflet  Leaflets exhibited mildly increased thickness, mild calcification, normal cuspal separation, and sclerosis  DOPPLER: There was no evidence for stenosis  There was no regurgitation  TRICUSPID VALVE: The valve structure was normal  There was normal leaflet separation  DOPPLER: There was no evidence for stenosis  There was mild regurgitation  Estimated peak PA pressure was 40 mmHg  The findings suggest mild pulmonary  hypertension  PULMONIC VALVE: Leaflets exhibited normal thickness, no calcification, and normal cuspal separation  DOPPLER: The transpulmonic velocity was within the normal range  There was trace regurgitation  PERICARDIUM: There was no pericardial effusion  The pericardium was normal in appearance  AORTA: The root exhibited normal size      SYSTEM MEASUREMENT TABLES    2D  Ao Diam: 3 62 cm  LA Area: 29 25 cm2  LA Diam: 4 98 cm  LAAs A4C: 28 77 cm2  LAESV A-L A4C: 105 01 ml  LAESV MOD A4C: 97 21 ml  LALs A4C: 6 69 cm  LVEDV MOD A4C: 126 19 ml  LVEF MOD A4C: 46 86 %  LVESV MOD A4C: 67 06 ml  LVLd A4C: 8 05 cm  LVLs A4C: 7 44 cm  RA Area: 17 34 cm2  RVIDd: 4 12 cm  SV MOD A4C: 59 13 ml    CW  TR Vmax: 3 21 m/s  TR maxP 12 mmHg    MM  TAPSE: 1 43 cm    [facility]    Prepared and electronically signed by    Matilde Mckeon MD  Signed 20-Oct-2019 13:20:19           VTE Pharmacologic Prophylaxis: Eliquis  VTE Mechanical Prophylaxis: sequential compression device

## 2019-10-25 NOTE — PROGRESS NOTES
Cardiology Progress Note - Mann Henderson 80 y o  male MRN: [id number]    Unit/Bed#: [facility] 410-01 Encounter: [id number]      Assessment/Recommendations:  1  Sustained VT:  Requiring 2 shocks  Status post cardiac catheterization and now is status post drug-eluting stent to SVG to OM  Continue aspirin, Plavix, beta-blocker, statin  Continue to follow on telemetry for any recurrence of VT now that the coronary lesion has been fixed  Keep K greater than 4 and magnesium greater than 2  S/p ICD yesterday  2  CAD:  Status post CABG  Now status post drug-eluting stent to SVG to OM  Continue medical management as per above  3  Ischemic cardiomyopathy:  With EF of 35%  Volume status appears stable  Continue beta-blocker and started on an ACE-inhibitor, tolerating well  4  Hyperlipidemia:  Continue statin  5  Paroxysmal atrial flutter:  Continues in atrial flutter on monitor  Resumed Eliquis - hematuria has resolved  Will need triple therapy for 1 month, then can stop ASA and continue on Plavix and Eliquis  Transitioned to p  O  Amiodarone for the time being  6  Elevated troponin:  Likely related to NSTEMI type 1 considering significant coronary disease requiring drug-eluting stent placement  Continue medical management now with aspirin, Plavix, beta-blocker, statin  7  Hematuria: heparin gtt has been stopped  Continued on ASA and plavix for recent MINNIE  This has improved, likely related to traumatic castillo  No bleeding noted with resuming Eliquis  8   ICD check and CXR to be done, once cleared from EPS, stable from cardiac standpoint for discharge later today with follow up as outpatient  Subjective:   Patient seen and examined  No significant events overnight   ; pertinent negatives - chest pain, chest pressure/discomfort, dyspnea, irregular heart beat and palpitations  Objective:     Vitals: Blood pressure 124/91, pulse 102, temperature 98 2 °F (36 8 °C), temperature source Oral, resp   rate 17, height 5' 4" (1 626 m), weight 85 kg (187 lb 6 3 oz), SpO2 93 %  , Body mass index is 32 17 kg/m² ,   Orthostatic Blood Pressures      Most Recent Value   Blood Pressure  124/91 filed at 10/25/2019 0748   Patient Position - Orthostatic VS  Sitting filed at 10/25/2019 0748            Intake/Output Summary (Last 24 hours) at 10/25/2019 1014  Last data filed at 10/25/2019 0819  Gross per 24 hour   Intake 975 ml   Output 670 ml   Net 305 ml        TELE: Atrial flutter      Physical Exam:    GEN: Ana Cristina Braden appears well, alert and oriented x 3, pleasant and cooperative   HEENT: pupils equal, round, and reactive to light; extraocular muscles intact  NECK: supple, no carotid bruits   HEART: regular rhythm, normal S1 and S2, no murmurs, clicks, gallops or rubs   LUNGS: clear to auscultation bilaterally; no wheezes, rales, or rhonchi   ABDOMEN: normal bowel sounds, soft, no tenderness, no distention  EXTREMITIES: peripheral pulses normal; no clubbing, cyanosis, or edema  NEURO: no focal findings   SKIN: normal without suspicious lesions on exposed skin, ICD site clean dry and intact    Medications:      Current Facility-Administered Medications:     acetaminophen (TYLENOL) tablet 650 mg, 650 mg, Oral, Q6H PRN, Jimmy Ness PA-C    amiodarone tablet 200 mg, 200 mg, Oral, BID With Meals, Meme Nuñez MD, 200 mg at 10/25/19 5443    apixaban (ELIQUIS) tablet 5 mg, 5 mg, Oral, BID, Marilee Ramirez PA-C, 5 mg at 10/25/19 1678    aspirin (ECOTRIN LOW STRENGTH) EC tablet 81 mg, 81 mg, Oral, Daily, Desean Dickinson PA-C, 81 mg at 10/25/19 7942    atorvastatin (LIPITOR) tablet 40 mg, 40 mg, Oral, Daily, Desean Dickinson PA-C, 40 mg at 10/25/19 0564    clopidogrel (PLAVIX) tablet 75 mg, 75 mg, Oral, Daily, Desean Dickinosn PA-C, 75 mg at 10/25/19 8446    co-enzyme Q-10 capsule 200 mg, 200 mg, Oral, Daily, Desean Dickinson PA-C, 200 mg at 10/25/19 8560    doxylamine (UNISON) tablet 12 5 mg, 12 5 mg, Oral, HS PRN, Jimmy Nab, RADHA, 12 5 mg at 10/21/19 0026    glucosamine sulfate capsule 500 mg, 500 mg, Oral, Daily, Emilia Dickinson PA-C, 500 mg at 10/25/19 9522    lisinopril (ZESTRIL) tablet 5 mg, 5 mg, Oral, Daily, Genia Sacks, MD, 5 mg at 10/25/19 1011    magnesium gluconate (MAGONATE) tablet 500 mg, 500 mg, Oral, Daily, Emilia Dickinson PA-C, 500 mg at 10/25/19 0532    melatonin tablet 3 mg, 3 mg, Oral, HS, Emilia Dickinson PA-C, 3 mg at 10/24/19 2120    metoprolol (LOPRESSOR) injection 5 mg, 5 mg, Intravenous, Q6H PRN, Emilia Dickinson PA-C    nitroglycerin (NITROSTAT) SL tablet 0 4 mg, 0 4 mg, Sublingual, Q5 Min PRN, Emilia Dickinson PA-C, 0 4 mg at 10/20/19 0503    perflutren lipid microsphere (DEFINITY) injection, 0 6 mL/min, Intravenous, Once in imaging, Donejered Weinstein PA-C    traMADol Olimpia Burt) tablet 50 mg, 50 mg, Oral, Q6H PRN, Emilia Dickinson PA-C, 50 mg at 10/20/19 0547     Labs & Results:    Results from last 7 days   Lab Units 10/21/19  0512 10/20/19  2355 10/20/19  1818   TROPONIN I ng/mL 28 30* 34 80* >40 00*     Results from last 7 days   Lab Units 10/25/19  0640 10/24/19  0436 10/23/19  0505   WBC Thousand/uL 11 13* 13 11* 10 82*   HEMOGLOBIN g/dL 12 4 12 1 11 9*   HEMATOCRIT % 37 3 37 1 36 1*   PLATELETS Thousands/uL 225 206 178     Results from last 7 days   Lab Units 10/20/19  0525   TRIGLYCERIDES mg/dL 65   HDL mg/dL 58     Results from last 7 days   Lab Units 10/25/19  0640 10/24/19  0436 10/23/19  0505  10/21/19  1053  10/19/19  2209   POTASSIUM mmol/L 3 7 3 9 3 7   < > 3 5   < > 4 3   CHLORIDE mmol/L 105 104 104   < > 103   < > 105   CO2 mmol/L 25 28 26   < > 27   < > 24   BUN mg/dL 19 21 20   < > 28*   < > 20   CREATININE mg/dL 1 02 1 09 1 05   < > 1 30   < > 1 67*   CALCIUM mg/dL 8 7 8 8 8 6   < > 9 2   < > 9 2   ALK PHOS U/L  --  74  --   --  68  --  75   ALT U/L  --  57  --   --  37  --  25   AST U/L  --  46*  --   --  136*  --  46*    < > = values in this interval not displayed       Results from last 7 days   Lab Units 10/22/19  1433 10/22/19  0719 10/22/19  0131 10/21/19  1827 10/21/19  1053  10/19/19  2209   INR   --   --   --  1 31* 1 29*  --  1 49*   PTT seconds 67* 64* 53* 60* 52*   < > 28    < > = values in this interval not displayed  Results from last 7 days   Lab Units 10/23/19  0505 10/22/19  0719 10/21/19  1827   MAGNESIUM mg/dL 2 1 2 2 2 6       Echo: personally reviewed - EF reduced at 35% with wall motion abnormalities in the LAD distribution    Mild pulmonary hypertension    EKG personally reviewed by Dinora Caicedo MD

## 2019-10-25 NOTE — PLAN OF CARE
Problem: PHYSICAL THERAPY ADULT  Goal: Performs mobility at highest level of function for planned discharge setting  See evaluation for individualized goals  Description  Treatment/Interventions: Functional transfer training, LE strengthening/ROM, Therapeutic exercise, Endurance training, Equipment eval/education, Bed mobility, Gait training, Spoke to nursing, Spoke to case management, OT  Equipment Recommended: Walker(at this time; will cont to monitor progress)       See flowsheet documentation for full assessment, interventions and recommendations  Outcome: Progressing  Note:   Prognosis: Good  Problem List: Decreased endurance, Impaired balance, Decreased mobility  Assessment: The pt  has improving balance and activity tolerance  He initially required minimal assistance for gait during the first trial, but he progressed to supervision with the second trial  He also required increased time to complete turns with the first trial  He does continue to have a slow ty, but this also improved as he ambulated  He does continue to remain below his baseline independence, and he will benefit from continued physical therapy  Barriers to Discharge: Decreased caregiver support     Recommendation: Post acute IP rehab     PT - OK to Discharge: No    See flowsheet documentation for full assessment

## 2019-10-25 NOTE — DISCHARGE INSTRUCTIONS
Please refer to post ICD implantation discharge instructions and restrictions below and your ICD booklet/temporary card  Keep incision dry for one week  Do not use lotions/powders/creams on incision  Remove tan bandage in 1 week by pulling all edges away from the center of the bandage  Please keep device site dry for 1 week  No overhead reaching/pushing/pulling/lifting greater than 5-10lbs with left arm for one month  Please call the office if you notice redness, swelling, bleeding, or drainage from incision or if you develop fevers    Implantable Cardioverter Defibrillator      If you have any questions, please call [phone] to speak with a nurse (8:30am-4pm, or [phone] after hours)  For appointments, please call [phone]  WHAT YOU SHOULD KNOW:    An implantable cardioverter defibrillator (ICD) is a small device that monitors your heart rate and rhythm  It is commonly placed inside your upper chest region  It may be used if you have a ventricular arrhythmia, which is an irregular, dangerous rhythm from the bottom chamber of your heart  Some arrhythmias may cause your heart to suddenly stop beating  An ICD can give a shock to your heart to make it start beating again, or it can give pacing therapy (also known as pain-free therapy) to return your heart to normal rhythm  It is also a pacemaker, so it will pace your heart if needed to prevent it from beating too slowly            AFTER YOU LEAVE:      Follow up with your primary healthcare provider or cardiologist as directed: You will need to follow-up to have your ICD checked and make sure you are not having problems  Write down your questions so you remember to ask them during your visits  Self-care:   · Ask about activity: Ask if you need to avoid moving your shoulder or arm, and for how long  Ask if you should avoid lifting heavy objects   Do not play any contact sports, such as football or wrestling, until your primary healthcare provider (PHP) or cardiologist tells you it is okay  You may only be able to drive for a certain amount of time per day, or during certain hours  Ask when you can return to work  · Care for your skin over the ICD: Ask your PHP or cardiologist when it is okay for you to bathe  Do not put any lotion or powder on the incision area  Do not rub or wear tight clothing over the ICD area until your PHP or cardiologist tells you it is okay  When you get a shock from your ICD: A shock may feel like someone has hit you, or you may feel a thump in the chest  If someone is touching you when you get a shock, they may feel a tingling feeling  The first time you feel a shock, it may scare you  Sit or lie down and stay calm  Ask someone to stay with you if possible  Please either call your cardiologist or report to an emergency room  Safety instructions when you have an ICD:   · Carry an ID card for the ICD: This card has important information about your ICD  · Stay away from magnets or machines with electric fields: This includes MRI machines  Avoid leaning into a car engine or doing welding  These things can interfere with how your ICD works  · Tell airport security you have an ICD: You may need to be searched by hand when you go through a security gate  The security gate or handheld wand could harm your ICD  · Keep an ICD diary: Record when you get a shock and what you were doing before you got the shock  Keep track of how you felt before and after the shock, as well as how many shocks you received  Write down the day and time of each shock  Bring the diary with you when you see your PHP or cardiologist    · Carry medical alert identification: Wear medical alert jewelry or carry a card that says you have an ICD  Ask your PHP or cardiologist where to get these items  Contact your primary healthcare provider or cardiologist if:   · You have a fever  · You feel 1 or more shocks from your ICD and feel fine afterwards  · Your feet or ankles swell  · The area around your ICD is painful or tender after surgery  · The skin around your stitches or staples is red, swollen, or draining pus or fluid  · You have chills, a cough, and feel weak or achy  · You are sad or anxious and find it hard to do your usual activities  · You have questions or concerns about your condition or care  Seek care immediately or call 911 if:   · Your stitches or staples come apart  · Blood soaks through your bandage  · You feel more than 3 shocks in a row from your ICD  · You become weak, dizzy, or faint  · You feel your heart skip beats or beat very fast or slow, but you do not feel a shock from your ICD  · You have chest pain that does not go away with rest or medicine  © 2014 [employer] is for End User's use only and may not be sold, redistributed or otherwise used for commercial purposes  All illustrations and images included in CareNotes® are the copyrighted property of [employer] M [employer]  or Maciej Verde  The above information is an  only  It is not intended as medical advice for individual conditions or treatments  Talk to your doctor, nurse or pharmacist before following any medical regimen to see if it is safe and effective for you

## 2019-10-25 NOTE — SOCIAL WORK
Pt  Is cleared for discharge today to [address] [place], [place] [address]ab and authorization was received  Family wishes to transport pt  Phone and fax numbers given to HELEN BO signed yesterday by son

## 2019-10-25 NOTE — PLAN OF CARE
Problem: OCCUPATIONAL THERAPY ADULT  Goal: Performs self-care activities at highest level of function for planned discharge setting  See evaluation for individualized goals  Description  Treatment Interventions: ADL retraining, Functional transfer training, Endurance training, Patient/family training, Equipment evaluation/education, Compensatory technique education, Continued evaluation, Activityengagement          See flowsheet documentation for full assessment, interventions and recommendations  Note:   Limitation: Decreased ADL status, Decreased Safe judgement during ADL, Decreased endurance, Decreased self-care trans, Decreased high-level ADLs  Prognosis: Good  Assessment: pt seen for skilled OT treatment session w focus on ECT, safety w mobility and self care, maintiaing precautions following ICD placement  Pt currently needs min assist for self care, min assist for functional mobility in stance using RW  pt with decreased balance in stance without support of AD, noted to loose balance to his R side when attempting sidestepping for simulated countertop activity ( fixes self breakfast and lunch at home)  At this time, pt presents w a decline in overall functional level and will benefit from STR in order to maximize functional independence and safety for safe return to independent living  OT Discharge Recommendation: Short Term Rehab  OT - OK to Discharge:  Yes

## 2019-10-25 NOTE — OCCUPATIONAL THERAPY NOTE
Occupational Therapy Treatment Note      [place] Screen    10/25/2019    Principal Problem:    Ventricular tachycardia ([facility] [address] )  Active Problems:    Benign essential hypertension    Dyslipidemia    CAD (coronary artery disease)    Typical atrial flutter (HCC)    Ischemic cardiomyopathy    Elevated troponin, concern NSTEMI    Acute kidney injury ([facility] [address] )    Status post insertion of drug eluting coronary artery stent      Past Medical History:   Diagnosis Date    Acute myocardial infarction ([facility] [address] )     Allergic rhinitis     Anxiety     Bimalleolar fracture of left ankle     CAD (coronary artery disease)     Erectile dysfunction of non-organic origin     Hematuria     workup was negative and felt to be benign    Herpes zoster with complication     Hyperlipidemia     Hypertension     Impaired fasting glucose     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    Prostatic hypertrophy     benign    Stroke [facility])        Past Surgical History:   Procedure Laterality Date    ANKLE FRACTURE SURGERY Left     CORONARY ARTERY BYPASS GRAFT          10/25/19 1014   Restrictions/Precautions   Other Precautions Cardiac/sternal  (new ICD 10/24)   General   Response to Previous Treatment Patient with no complaints from previous session   Family/Caregiver Present son present at end of session   Lifestyle   Autonomy normally fully independent living in independent living at [facility]   Reciprocal Relationships supportive family    Pain Assessment   Pain Assessment No/denies pain   Pain Score No Pain   ADL   Where Assessed Chair   Eating Assistance 7  Independent   Grooming Assistance 5  Supervision/Setup   Grooming Deficit Setup; Increased time to complete;Wash/dry hands; Wash/dry face; Teeth care   UB Bathing Assistance 5  Supervision/Setup   UB Bathing Deficit Increased time to complete   LB Bathing Assistance 4  Minimal Assistance   LB Bathing Deficit Increased time to complete;Supervision/safety   UB Dressing Assistance 4  Minimal Assistance   UB Dressing Deficit Verbal cueing; Increased time to complete; Thread LUE;Pull over head   UB Dressing Comments min assist and cues needed due to new pacer precuations   LB Dressing Assistance 4  Minimal Assistance   LB Dressing Deficit Don/doff R sock; Don/doff L sock   Toileting Assistance  5  Supervision/Setup   Functional Standing Tolerance   Time 5 min   Comments cues for energy conservation   Bed Mobility   Supine to Sit 4  Minimal assistance   Sit to Supine 5  Supervision   Transfers   Sit to Stand 5  Supervision   Stand to Sit 5  Supervision   Stand pivot 5  Supervision   Functional Mobility   Functional Mobility 4  Minimal assistance   Additional items Rolling walker   Cognition   [facility]   Arousal/Participation Alert; Cooperative   Attention Within functional limits   Orientation Level Oriented X4   Activity Tolerance   Activity Tolerance Patient tolerated treatment well   Assessment   Assessment pt seen for skilled OT treatment session w focus on ECT, safety w mobility and self care, maintiaing precautions following ICD placement  Pt currently needs min assist for self care, min assist for functional mobility in stance using RW  pt with decreased balance in stance without support of AD, noted to loose balance to his R side when attempting sidestepping for simulated countertop activity ( fixes self breakfast and lunch at home)  At this time, pt presents w a decline in overall functional level and will benefit from STR in order to maximize functional independence and safety for safe return to independent living  Plan   Treatment Interventions ADL retraining;Functional transfer training;Patient/family training; Compensatory technique education; Energy conservation; Activityengagement   Goal Expiration Date 11/01/19   OT Treatment Day 1   OT Frequency 3-5x/wk   Recommendation   OT Discharge Recommendation Short Term Rehab   Equipment Recommended Bedside commode   OT - OK to Discharge Yes

## 2019-10-28 ENCOUNTER — NURSING HOME VISIT (OUTPATIENT)
Dept: GERIATRICS | Facility: OTHER | Age: 84
End: 2019-10-28
Payer: COMMERCIAL

## 2019-10-28 ENCOUNTER — TRANSITIONAL CARE MANAGEMENT (OUTPATIENT)
Dept: FAMILY MEDICINE CLINIC | Facility: CLINIC | Age: 84
End: 2019-10-28

## 2019-10-28 DIAGNOSIS — I48.3 TYPICAL ATRIAL FLUTTER (HCC): ICD-10-CM

## 2019-10-28 DIAGNOSIS — F51.04 PSYCHOPHYSIOLOGICAL INSOMNIA: ICD-10-CM

## 2019-10-28 DIAGNOSIS — Z79.01 CHRONIC ANTICOAGULATION: ICD-10-CM

## 2019-10-28 DIAGNOSIS — I25.5 ISCHEMIC CARDIOMYOPATHY: ICD-10-CM

## 2019-10-28 DIAGNOSIS — I50.22 CHRONIC SYSTOLIC CHF (CONGESTIVE HEART FAILURE) (HCC): ICD-10-CM

## 2019-10-28 DIAGNOSIS — I95.9 HYPOTENSION, UNSPECIFIED HYPOTENSION TYPE: Primary | ICD-10-CM

## 2019-10-28 DIAGNOSIS — I25.10 CORONARY ARTERY DISEASE INVOLVING NATIVE CORONARY ARTERY OF NATIVE HEART WITHOUT ANGINA PECTORIS: Chronic | ICD-10-CM

## 2019-10-28 DIAGNOSIS — K59.01 SLOW TRANSIT CONSTIPATION: ICD-10-CM

## 2019-10-28 DIAGNOSIS — R53.81 PHYSICAL DECONDITIONING: ICD-10-CM

## 2019-10-28 DIAGNOSIS — N18.2 CKD (CHRONIC KIDNEY DISEASE) STAGE 2, GFR 60-89 ML/MIN: ICD-10-CM

## 2019-10-28 DIAGNOSIS — N17.9 ACUTE KIDNEY INJURY (HCC): ICD-10-CM

## 2019-10-28 DIAGNOSIS — D72.829 LEUKOCYTOSIS, UNSPECIFIED TYPE: ICD-10-CM

## 2019-10-28 DIAGNOSIS — I47.2 VENTRICULAR TACHYCARDIA (HCC): ICD-10-CM

## 2019-10-28 PROCEDURE — 99306 1ST NF CARE HIGH MDM 50: CPT | Performed by: FAMILY MEDICINE

## 2019-10-28 NOTE — UTILIZATION REVIEW
Notification of Discharge  This is a Notification of Discharge from our facility [address]  Please be advised that this patient has been discharge from our facility  Below you will find the admission and discharge date and time including the patients disposition  PRESENTATION DATE: 10/19/2019  9:46 PM  OBS ADMISSION DATE:   IP ADMISSION DATE: 10/19/19 2300   DISCHARGE DATE: 10/25/2019  2:00 PM  DISPOSITION: Non [facility] SN[facility]U/SNU Non [facility] SNF/TCU/SNU   Admission Orders listed below:  Admission Orders (From admission, onward)     Ordered        10/19/19 2301  Inpatient Admission  Once                   Please contact the UR Department if additional information is required to close this patient's authorization/case  [address]  Utilization Review Department  Phone: [phone]; Fax [phone]  [email or web address]  ATTENTION: Please call with any questions or concerns to [phone]  and carefully listen to the prompts so that you are directed to the right person  Send all requests for admission clinical reviews, approved or denied determinations and any other requests to fax [phone]   All voicemails are confidential

## 2019-10-28 NOTE — PROGRESS NOTES
[facility] CHILDREN   [address] [facility], [place], [address]  History and Physical  POS: SNF- 31    Records Reviewed include: [facility] records  Unable to obtain from patient due to: N/A; history obtained from patient along with record review  Additional history obtained from sister at bedside    Chief Complaint/ Reason for Admission: CAD, ischemic cardiomyopathy/ chronic systolic CHF, sustained Vtach s/p ICD, SSS, pAfib/Aflutter, deconditioning, constipation, JAMEL    History of Present Illness:            80year old male admitted for SNF rehab following hospitalization at [facility]  He presented with sustained Vtach with defibrillation x2  PMH includes CAD s/p CABG, ischemic cardiomyopathy  He was admitted to critical care; evaluated by cardiology, EP cardiology  Underwent cardiac cath with placement of MINNIE to OM  He is on aspirin, plavix and eliquis x1 month, then change to plavix + eliquis per cardiology recommendations  Underwent placement of ICD on 10/24/19  See ROS; denies pain or discomfort  ACE changed to ARB inpatient with history of intolerance to ACE 2/2 cough  Currently on metoprolol, lasix, amiodarone, losartan  SBP trending low over past 48h, 90s-100s  Patient with good appetite and PO intake  Admits to constipation but no decrease in UOP; no LE edema  Associated 5 2lb weight loss since SNF admission  Patient is on metoprolol and lasix at home, but was not on ACE/ARB or amiodarone  History of Afib; HR trending 70s since SNF admission; he is on metoprolol and eliquis  Patient's sister notes he complains of constipation with "hard stool;" he is not currently on any scheduled medication for this  Since SNF admission, patient complains of trouble sleeping due to his "mind racing " He normally sleeps well when he is at home and in stable health; melatonin 3mg currently ordered at bedtime with minimal effect   Patient is not napping frequently during the day but per sister he does nod off at times  Reviewed goals of care with patient  He plans to return home to his [address] [facility] apartment after SNF rehab  He has advanced directives in place  Previously, he states his code status would be DNR/DNI; however, given recent events and cardiac intervention, he now wishes to be a Full Code  Allergies    Allergies   Allergen Reactions    Penicillins Edema and Rash     Reaction Date: [id number]; Category: Allergy;  Annotation - [id number]: Severe reaction with hospitaization at [facility]       Past Medical History  Past Medical History:   Diagnosis Date    Acute myocardial infarction ([facility] )     Allergic rhinitis     Anxiety     Bimalleolar fracture of left ankle     CAD (coronary artery disease)     Erectile dysfunction of non-organic origin     Hematuria     workup was negative and felt to be benign    Herpes zoster with complication     Hyperlipidemia     Hypertension     Impaired fasting glucose     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    Prostatic hypertrophy     benign    Stroke ([facility] )       CHF baseline EF:45% (3/2018)  CKD: Stage 2-3, baseline creatinine 1-1 1    Past Surgical History:   Procedure Laterality Date    ANKLE FRACTURE SURGERY Left     CORONARY ARTERY BYPASS GRAFT         Family History  Family History   Problem Relation Age of Onset    Cancer Mother     Hypertension Mother         essential    Pancreatic cancer Mother        Social History  Social History     Tobacco Use   Smoking Status Former Smoker   Smokeless Tobacco Never Used      Social History     Substance and Sexual Activity   Alcohol Use Yes    Comment: social- per allscripts      Social History     Substance and Sexual Activity   Drug Use No        Lives: Apartment,- [address] [place] [address]  Social Support: Family  Occupation: Retired clergy   Fall in the past 12 months: None reported  Use of assistance Device: None    Physical Exam    Weight: 184 8lb (185 2<190) Temp:97 5F BP:94/63 (112/62)   Pulse:76 Resp:16 O2 Sat:93% RA  Constitutional: Well-nourished and Normocephalic  Orientation:Person, Place, Day and Month, situation     Physical Exam   Constitutional: He is oriented to person, place, and time  He appears well-developed and well-nourished  No distress  HENT:   Head: Normocephalic and atraumatic  Right Ear: External ear normal    Left Ear: External ear normal    Mouth/Throat: Oropharynx is clear and moist  No oropharyngeal exudate  Eyes: Conjunctivae are normal  Right eye exhibits no discharge  Left eye exhibits no discharge  No scleral icterus  Neck: Neck supple  Cardiovascular: Normal rate and regular rhythm  Pulmonary/Chest: Effort normal and breath sounds normal  No respiratory distress  He has no wheezes  He has no rales  Abdominal: Soft  Bowel sounds are normal  He exhibits no distension  There is no tenderness  Musculoskeletal: He exhibits no edema  Neurological: He is alert and oriented to person, place, and time  No cranial nerve deficit  Skin: Skin is warm and dry  He is not diaphoretic  Left chest wall cardiac device with overlying dressing in place; no surrounding erythema noted   Psychiatric: He has a normal mood and affect  His behavior is normal    Nursing note and vitals reviewed  Review of Systems:  Review of Systems   Constitutional: Negative for appetite change, chills, fever and unexpected weight change  HENT: Negative for trouble swallowing  Eyes: Negative for visual disturbance  Respiratory: Negative for cough, chest tightness and shortness of breath  Cardiovascular: Negative for chest pain, palpitations and leg swelling  Gastrointestinal: Positive for constipation  Genitourinary: Negative for difficulty urinating, dysuria and hematuria  Musculoskeletal: Negative for arthralgias  Skin: Negative for rash  Neurological: Negative for dizziness and light-headedness  Psychiatric/Behavioral: Positive for sleep disturbance  Negative for confusion  The patient is nervous/anxious  All other systems reviewed and are negative  List of Current Medications:  PRN: acetaminophen, MoM, dulcolax, maalox, fleet enema, tramadol, unisom, nitro    Amiodarone 200mg BID  Eliquis 5mg BID  Aspirin 81mg daily  Atorvastatin 40mg daily  Plavix 75mg daily  CoQ10 200mg daily  Lasix 20mg daily  Glucosamine-chondroitin daily  Losartan 25mg daily  Magnesium 500mg daily  Melatonin 3mg QHS  Metoprolol tartrate 50mg BID  KCl 20meq daily  Probiotic daily  Selenium 200mcg daily      Allergies    Allergies   Allergen Reactions    Penicillins Edema and Rash     Reaction Date: ; Category: Allergy; Annotation - [id number]: Severe reaction with hospitaization at [facility]       Labs/Diagnostics (reviewed by this provider): Hospital Paperwork  HbA1c:6 3  TSH:6 610 Free T4: 0 88   Lipid Profile: Cholesterol:136 Triglycerides:65 HDL:58 LDL:65  CBC: Hb:12 4 Hct:37 3 WBC:11 13 (13 11) PLT: 225  CMP: Na:137 K:3 7 Cl:105 CO:25 BUN:19 Cr:1 02 (1 67) Glu:119 Ca:8 7   AST:46 ALT:57  Alk-P:74 Tprotein:6 9 Albumin:3 1   Tbili:0 78 Ma 1 Phos:2 5  Cardiac: Troponin:28 30 (34 80)     Microbiology:  UA: (10/20/19)- large blood, trace protein    Imaging Reviewed:  EKG: (10/24/19)- Afib w/occasional V-paced complexes  CXR: (10/25/19)- imaging personally reviewed in PACS; left sided chest wall cardiac device in place; no consolidations or pleural effusions; no pneumothorax noted; median sternotomy wires noted  Echo: (10/20/19)- EF 35%; LV mid-apical anterior, mid anteroseptal, mid inferoseptal, apical septal and apical walls with akinesis; mild pulmonary hypertension  Other: Cardiac catheterization (10/21/19)- 100% proximal LAD stenosis; 100% proximal circumflex stenosis, 99% stenosis of mid body of graft to 1st OM; 100% stenosis at graft ostium to RCA    MINNIE placed in OM1    Assessment/Plan:  80year old male with:    Hypotension  Suspect iatrogenic component on multiple BP/rate/rhythm control medications  Exclude anemia as contributing factor- F/u CBC and CMP ordered for today; in setting of hypotension, will order CBC and BMP for later this week as well to monitor trend- order for 10/31/19 (sooner if change clinically)  Examines euvolemic on admission exam today  PO intake encouraged  Add hold parameters for BP medications:  Hold lasix for SBP <100  Hold losartan for SBP <110  Hold metoprolol for SBP <100, HR <55  If SBP consistently <105-110, plan to d/c losartan      Ventricular tachycardia (HCC)  S/p ICD placement  S/p defibrillation x2  In setting of CAD  Keep K>4, Mg>2  Continue PO magnesium, KCl supplementation  F/u electrolytes on metabolic panel today  Continue metoprolol, amiodarone- 200mg BID x2 weeks, then reduce to 200mg daily on 11/8/19   Plan to recheck LFTs and TSH, free T4 in 6 weeks on amiodarone tx  Follow up with EP cardiology on 11/7/19 as scheduled    CAD (coronary artery disease)  With Vtach s/p ICD, PCI with MINNIE placed to OM  On aspirin, plavix; start pantoprazole 40mg PO daily for GI ppx as patient also on eliquis for history of Afib/flutter  D/c aspirin on 11/21/19 after one month on triple therapy; continue plavix at that time  Continue atorvastatin  Continue metoprolol  Follow up with cardiology as scheduled     Acute kidney injury [facility])  In setting of above  JAMEL pathway/daily weights  Follow up CMP today; plan to recheck BMP on 10/31 in setting of relative hypotension  Add hold parameters to BP meds as above; avoid hypotension    Leukocytosis  Mild; no signs or symptoms of infection on exam today  F/u CBC results today (CBC w/diff and CMP ordered by myself upon review of admission orders)    Typical atrial flutter (HCC)  Goal HR <100  Continue metoprolol  Continue eliquis    Chronic anticoagulation       Slow transit constipation  Ensure adequate nutrition and hydration  Add miralax 17g daily, docusate 100mg BID with hold parameters    Psychophysiological insomnia  Reviewed sleep hygiene practices with patient and sister at bedside  Increase melatonin to 6mg PO QHS  Avoid sleep aids such as ambien, benadryl, benzodiazepines due to high risk of deliriogenic side effects  D/c PRN unisom    Ischemic cardiomyopathy       Chronic systolic CHF (congestive heart failure) (HCC)  EF 35% per inpatient Echo  Continue metoprolol, lasix with hold parameters  Monitor daily weights  Examines euvolemic at time of admission exam  May need to d/c losartan in setting of relative hypotension/risk for JAMEL    Physical deconditioning  Multifactorial in setting of above  Admit to SNF rehab  PT/OT consult- evaluate and treat  Supportive care, nutritional support, ADL support  Fall precautions    CKD (chronic kidney disease) stage 2, GFR 60-89 ml/min  Baseline creatinine 1-1 1 per record review  Follow up CMP ordered for today      Pain: Present no Origin:  Location:  Rehab Potential:Good  Patient Informed of Medical Condition: yes, If no, Reason:  Patient is Capable of Understanding Their Right: yes, If no, Reason:  Prognosis:Fair  Discharge Plan: STR-> Home to  [place]  Surrogate Decision Maker: Sumi Echols   Advanced Directives: yes: Yes   Code status:Full Code  PCP: Kayode Garduno     Immunization History   Administered Date(s) Administered    Influenza Split High Dose Preservative Free IM 09/12/2009, 11/01/2010, 11/17/2011, 10/05/2014, 09/14/2015, 09/08/2016, 10/26/2017    Influenza TIV (IM) 11/17/2011, 11/15/2012, 12/09/2013    Influenza, high dose seasonal 0 5 mL 09/27/2018, 10/17/2019    Pneumococcal Conjugate 13-Valent 07/13/2015    Pneumococcal Polysaccharide PPV23 01/01/2004    Td (adult), adsorbed 04/13/2009, 04/13/2009, 06/28/2013    Zoster Vaccine Recombinant 04/19/2019, 10/17/2019       Nano Tracey DO  10/28/66028:25 PM

## 2019-10-29 NOTE — ASSESSMENT & PLAN NOTE
Reviewed sleep hygiene practices with patient and sister at bedside  Increase melatonin to 6mg PO QHS  Avoid sleep aids such as ambien, benadryl, benzodiazepines due to high risk of deliriogenic side effects  D/c PRN unisom

## 2019-10-29 NOTE — ASSESSMENT & PLAN NOTE
Multifactorial in setting of above  Admit to SNF rehab  PT/OT consult- evaluate and treat  Supportive care, nutritional support, ADL support  Fall precautions

## 2019-10-29 NOTE — ASSESSMENT & PLAN NOTE
S/p ICD placement  S/p defibrillation x2  In setting of CAD  Keep K>4, Mg>2  Continue PO magnesium, KCl supplementation  F/u electrolytes on metabolic panel today  Continue metoprolol, amiodarone- 200mg BID x2 weeks, then reduce to 200mg daily on 11/8/19   Plan to recheck LFTs and TSH, free T4 in 6 weeks on amiodarone tx  Follow up with EP cardiology on 11/7/19 as scheduled

## 2019-10-29 NOTE — ASSESSMENT & PLAN NOTE
Suspect iatrogenic component on multiple BP/rate/rhythm control medications  Exclude anemia as contributing factor- F/u CBC and CMP ordered for today; in setting of hypotension, will order CBC and BMP for later this week as well to monitor trend- order for 10/31/19 (sooner if change clinically)  Examines euvolemic on admission exam today  PO intake encouraged  Add hold parameters for BP medications:  Hold lasix for SBP <100  Hold losartan for SBP <110  Hold metoprolol for SBP <100, HR <55  If SBP consistently <105-110, plan to d/c losartan

## 2019-10-29 NOTE — ASSESSMENT & PLAN NOTE
Ensure adequate nutrition and hydration  Add miralax 17g daily, docusate 100mg BID with hold parameters

## 2019-10-29 NOTE — ASSESSMENT & PLAN NOTE
Mild; no signs or symptoms of infection on exam today  F/u CBC results today (CBC w/diff and CMP ordered by myself upon review of admission orders)

## 2019-10-29 NOTE — ASSESSMENT & PLAN NOTE
In setting of above  JAMEL pathway/daily weights  Follow up CMP today; plan to recheck BMP on 10/31 in setting of relative hypotension  Add hold parameters to BP meds as above; avoid hypotension

## 2019-10-29 NOTE — ASSESSMENT & PLAN NOTE
With Vtach s/p ICD, PCI with MINNIE placed to OM  On aspirin, plavix; start pantoprazole 40mg PO daily for GI ppx as patient also on eliquis for history of Afib/flutter  D/c aspirin on 11/21/19 after one month on triple therapy; continue plavix at that time  Continue atorvastatin  Continue metoprolol  Follow up with cardiology as scheduled

## 2019-10-29 NOTE — ASSESSMENT & PLAN NOTE
EF 35% per inpatient Echo  Continue metoprolol, lasix with hold parameters  Monitor daily weights  Examines euvolemic at time of admission exam  May need to d/c losartan in setting of relative hypotension/risk for JAMEL

## 2019-10-30 ENCOUNTER — NURSING HOME VISIT (OUTPATIENT)
Dept: GERIATRICS | Facility: OTHER | Age: 84
End: 2019-10-30
Payer: COMMERCIAL

## 2019-10-30 DIAGNOSIS — K59.01 SLOW TRANSIT CONSTIPATION: ICD-10-CM

## 2019-10-30 DIAGNOSIS — N17.9 ACUTE KIDNEY INJURY (HCC): ICD-10-CM

## 2019-10-30 DIAGNOSIS — R53.81 PHYSICAL DECONDITIONING: ICD-10-CM

## 2019-10-30 DIAGNOSIS — I25.5 ISCHEMIC CARDIOMYOPATHY: ICD-10-CM

## 2019-10-30 DIAGNOSIS — F51.04 PSYCHOPHYSIOLOGICAL INSOMNIA: ICD-10-CM

## 2019-10-30 DIAGNOSIS — I95.9 HYPOTENSION, UNSPECIFIED HYPOTENSION TYPE: Primary | ICD-10-CM

## 2019-10-30 DIAGNOSIS — I47.2 VENTRICULAR TACHYCARDIA (HCC): ICD-10-CM

## 2019-10-30 DIAGNOSIS — D72.829 LEUKOCYTOSIS, UNSPECIFIED TYPE: ICD-10-CM

## 2019-10-30 DIAGNOSIS — I48.3 TYPICAL ATRIAL FLUTTER (HCC): ICD-10-CM

## 2019-10-30 DIAGNOSIS — I50.22 CHRONIC SYSTOLIC CHF (CONGESTIVE HEART FAILURE) (HCC): ICD-10-CM

## 2019-10-30 DIAGNOSIS — I25.10 CORONARY ARTERY DISEASE INVOLVING NATIVE CORONARY ARTERY OF NATIVE HEART WITHOUT ANGINA PECTORIS: Chronic | ICD-10-CM

## 2019-10-30 PROCEDURE — 99316 NF DSCHRG MGMT 30 MIN+: CPT | Performed by: NURSE PRACTITIONER

## 2019-10-30 NOTE — ASSESSMENT & PLAN NOTE
-in setting of above  -baseline creatinine 1 0-1 1  -CMP reviewed from 10/28/2019    Creatinine level 1 11 and GFR 59 sodium potassium levels within normal limits  -will provide a script for BMP within 1 week and results to PCP  -will notify PCP of a JAMEL diagnosis

## 2019-10-30 NOTE — ASSESSMENT & PLAN NOTE
-in the setting of CAD  -S/p ICD placement  -goal to maintain potassium >4 and magnesium >2  -continue p o  Magnesium, potassium supplementation  -continue metoprolol, amiodarone 200 mg b i d   For 2 weeks then reduce to 200 mg daily on November 18, 2019  -plan to recheck LFTs and TSH, free T4 within 6 weeks postop on amiodarone treatment  -follow up with EP Cardiology on 11/07/2019 as scheduled  -follow up with Cardiology and PCP  -continue status post ICD placement care post-op precautions

## 2019-10-30 NOTE — PROGRESS NOTES
[facility]  [address]  [phone]  [address] [place] [address]:  [facility]  [address], [address]    NAME: Cassius Rogers  AGE: 80 y o  SEX: male  DATE OF ADMISSION: 10/25/2019 DATE OF DISCHARGE:11/01/2019 DISCHARGE DISPOSITION: Home     Reason for admission: Patient was admitted from [facility] in [place] for rehabilitation after hospitalization for sustained V-tach status post ICD placement, SSS, paroxysmal AFib/a flutter, deconditioning, constipation, and JAMEL  Admission Diagnoses:  CAD, ischemic cardiomyopathy/chronic systolic CHF, sustained V-tach status post ICD placement, SSS, paroxysmal AFib/ Aflutter, deconditioning, constipation, JAMEL  Additional Problems:   Past Medical History:   Diagnosis Date    Acute [facility] infarction ([facility] )     Allergic rhinitis     Anxiety     Bimalleolar fracture of left ankle     CAD (coronary artery disease)     Erectile dysfunction of non-organic origin     Hematuria     workup was negative and felt to be benign    Herpes zoster with complication     Hyperlipidemia     Hypertension     Impaired fasting glucose     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    Prostatic hypertrophy     benign    Stroke [facility])      Discharge Diagnoses: See problem list follow-up recommendations below  Course of stay: Patient was admitted to [facility] for rehabilitation following hospitalization as mentioned above  During resident's stay at [facility], he received skilled nursing care, PT, OT, nutritional support, social service support, and medical management  He is scheduled to return home on 11/01/2019 and will continue outpatient physical therapy and occupational therapy  Labs and testing performed during stay: CMP and CBC with diff    Discharge Medications: See discharge medication list which was reviewed at facility EMR      Status at time of discharge exam: Stable    Today's Visit: 10/30/58097:53 PM    Subjective:  Constitutional:  Negative for chills, fever, pain  HEENT:  Positive for postnasal drip  Respiratory:  Negative for shortness of breath, cough  Cardiovascular:  Negative for chest pain, palpitations, or syncope  Gastrointestinal:  Positive for constipation  Skin:  Left chest covered with dressing  Neurological:  Negative for dizziness or lightheadedness  Psychiatric-behavioral:  The patient is not nervous or anxious  Vitals:  Weight:  181 6 lb BP:  103/62 TPR: 97 3/85/18    Exam: Physical Exam   Constitutional: He is oriented to person, place, and time  He appears well-developed and well-nourished  No distress  Patient is awake alert and oriented x3  He appears his stated age and in no distress  HENT:   Head: Normocephalic and atraumatic  Mouth/Throat: Oropharynx is clear and moist  No oropharyngeal exudate  Eyes: Pupils are equal, round, and reactive to light  EOM are normal  Right eye exhibits no discharge  Left eye exhibits no discharge  Neck: Normal range of motion  Neck supple  No JVD present  No tracheal deviation present  Cardiovascular: Normal rate, regular rhythm and normal heart sounds  Pulmonary/Chest: Effort normal and breath sounds normal  No respiratory distress  He has no wheezes  He has no rales  Abdominal: Soft  Bowel sounds are normal  He exhibits no distension  There is no tenderness  There is no guarding  Musculoskeletal: He exhibits no edema  Limited mobility to LUE due to post-op precautions  Lymphadenopathy:     He has no cervical adenopathy  Neurological: He is alert and oriented to person, place, and time  Skin: Skin is warm and dry  Capillary refill takes less than 2 seconds  He is not diaphoretic  Left chest covered with dressing CDI  Psychiatric: He has a normal mood and affect  His behavior is normal  Thought content normal    Nursing note and vitals reviewed      Discussion with patient/ and further instructions:  -Fall precautions  -Bleeding precautions  -Monitor for signs/symptoms of infection  -Medication list was reviewed at facility EMR  -O/P PT/OT    Follow-up Recommendations: Please follow-up with your primary care physician within 7-10 days of discharge to review medication changes and current status  Problem List Follow-up Recommendations:  72-year-old male with:    Hypotension  -BPs reviewed and trending 90s to 100 consistently  Patient asymptomatic  -on lasix with weight loss  Hypotension may be related to low volume along with BP/rate control medications  -change lasix to 20 mg po every other day  -Report to PCP with weight gain of >2 pounds in one or 2 days or >= 5 pounds in one week   -will discontinue losartan as planned    Ventricular tachycardia (HCC)  -in the setting of CAD  -S/p ICD placement  -goal to maintain potassium >4 and magnesium >2  -continue p o  Magnesium, potassium supplementation  -continue metoprolol, amiodarone 200 mg b i d  For 2 weeks then reduce to 200 mg daily on November 18, 2019  -plan to recheck LFTs and TSH, free T4 within 6 weeks postop on amiodarone treatment  -follow up with EP Cardiology on 11/07/2019 as scheduled  -follow up with Cardiology and PCP  -continue status post ICD placement care post-op precautions    CAD (coronary artery disease)  -with recent hospitalization for V-tach status post ICD placement, PCI with MINNIE placed to OM  -continue aspirin, Plavix  -continue pantoprazole 40 mg daily for GI PPX S patient is on aspirin and Plavix and Eliquis for AFib/flutter  -continue nitroglycerin p r n   -discontinue aspirin on November 21, 2019 after 1 month on triple therapy; continue Plavix at that time  -continue atorvastatin  -continue metoprolol  -follow-up with Cardiology as scheduled    Acute kidney injury ([facility] [address] )  -in setting of above  -baseline creatinine 1 0-1 1  -CMP reviewed from 10/28/2019    Creatinine level 1 11 and GFR 59 sodium potassium levels within normal limits  -will provide a script for BMP within 1 week and results to PCP  -will notify PCP of a JAMEL diagnosis    Leukocytosis  -CBC reviewed from 10/28/2019 with WBC 10 3  -recheck CBC on November 4, 2019 and results to PCP    Typical atrial flutter ([facility] )  -heart rate at goal less than 100  -continue metoprolol with hold orders and Eliquis    Chronic systolic CHF (congestive heart failure) ([facility] )  Wt Readings from Last 3 Encounters:   10/22/19 85 kg (187 lb 6 3 oz)   10/17/19 85 7 kg (189 lb)   06/14/19 88 9 kg (196 lb)   -EF 35% per inpatient Echocardiogram  -weight 10/30 181  6 pounds<----191 2 on 10/25/2019  -appears dry on exam  -discontinue losartan today and change lasix dose to every other day  -continue metoprolol, Lasix with hold parameters  -continue daily weights at home  -follow up with Cardiology as scheduled for duration of Lasix      Slow transit constipation  -continues to complain of hard stools  -will will order senna S 1 p o  B i d  Psychophysiological insomnia  -continue melatonin 6 mg p o  Q h s   -instructed patient to avoid taking over-the-counter medications Benadryl due to anticholinergic affects  -instructed patient to avoid Ambien and benzodiazepines due to deliriogenic side effects    Physical deconditioning  -continue outpatient physical therapy and occupational therapy  -script provided    Ischemic cardiomyopathy  -currently asymptomatic  -follow-up with Cardiology as scheduled    PCP Dr Ricardo Cook made aware of above discharge and in JAMEL diagnosis  I have spent greater than 30 minutes with patient today in which greater than 50% of this time was spent in counseling/coordination of care regarding Intructions for management, Patient and family education and Importance of tx compliance      Simeon Soares, [address]  10/30/10676:53 PM

## 2019-10-30 NOTE — ASSESSMENT & PLAN NOTE
-continue melatonin 6 mg p o  Q h s   -instructed patient to avoid taking over-the-counter medications Benadryl due to anticholinergic affects  -instructed patient to avoid Ambien and benzodiazepines due to deliriogenic side effects

## 2019-10-30 NOTE — ASSESSMENT & PLAN NOTE
Wt Readings from Last 3 Encounters:   10/22/19 85 kg (187 lb 6 3 oz)   10/17/19 85 7 kg (189 lb)   06/14/19 88 9 kg (196 lb)   -EF 35% per inpatient Echocardiogram  -weight 10/30 181  6 pounds<----191 2 on 10/25/2019  -appears dry on exam  -discontinue losartan today and change lasix dose to every other day  -continue metoprolol, Lasix with hold parameters  -continue daily weights at home  -follow up with Cardiology as scheduled for duration of Lasix

## 2019-10-30 NOTE — ASSESSMENT & PLAN NOTE
-BPs reviewed and trending 90s to 100 consistently  Patient asymptomatic  -on lasix with weight loss   Hypotension may be related to low volume along with BP/rate control medications  -change lasix to 20 mg po every other day  -Report to PCP with weight gain of >2 pounds in one or 2 days or >= 5 pounds in one week   -will discontinue losartan as planned

## 2019-10-30 NOTE — ASSESSMENT & PLAN NOTE
-with recent hospitalization for V-tach status post ICD placement, PCI with MINNIE placed to OM  -continue aspirin, Plavix  -continue pantoprazole 40 mg daily for GI PPX S patient is on aspirin and Plavix and Eliquis for AFib/flutter  -continue nitroglycerin p r n   -discontinue aspirin on November 21, 2019 after 1 month on triple therapy; continue Plavix at that time  -continue atorvastatin  -continue metoprolol  -follow-up with Cardiology as scheduled

## 2019-10-31 ENCOUNTER — TELEPHONE (OUTPATIENT)
Dept: FAMILY MEDICINE CLINIC | Facility: CLINIC | Age: 84
End: 2019-10-31

## 2019-10-31 ENCOUNTER — TRANSITIONAL CARE MANAGEMENT (OUTPATIENT)
Dept: FAMILY MEDICINE CLINIC | Facility: CLINIC | Age: 84
End: 2019-10-31

## 2019-11-02 ENCOUNTER — HOSPITAL ENCOUNTER (INPATIENT)
Facility: HOSPITAL | Age: 84
LOS: 4 days | Discharge: HOME | DRG: 377 | End: 2019-11-06
Attending: EMERGENCY MEDICINE | Admitting: INTERNAL MEDICINE
Payer: COMMERCIAL

## 2019-11-02 ENCOUNTER — APPOINTMENT (EMERGENCY)
Dept: RADIOLOGY | Facility: HOSPITAL | Age: 84
DRG: 377 | End: 2019-11-02
Payer: COMMERCIAL

## 2019-11-02 DIAGNOSIS — K92.0 HEMATEMESIS WITHOUT NAUSEA: Primary | ICD-10-CM

## 2019-11-02 DIAGNOSIS — D64.9 ANEMIA: ICD-10-CM

## 2019-11-02 DIAGNOSIS — I25.10 CORONARY ARTERY DISEASE INVOLVING NATIVE CORONARY ARTERY OF NATIVE HEART WITHOUT ANGINA PECTORIS: Chronic | ICD-10-CM

## 2019-11-02 DIAGNOSIS — I25.10 CORONARY ARTERY DISEASE INVOLVING NATIVE CORONARY ARTERY OF NATIVE HEART, ANGINA PRESENCE UNSPECIFIED: Chronic | ICD-10-CM

## 2019-11-02 DIAGNOSIS — N17.9 AKI (ACUTE KIDNEY INJURY) (HCC): ICD-10-CM

## 2019-11-02 DIAGNOSIS — I10 BENIGN ESSENTIAL HYPERTENSION: ICD-10-CM

## 2019-11-02 DIAGNOSIS — I47.2 VENTRICULAR TACHYCARDIA (HCC): ICD-10-CM

## 2019-11-02 PROBLEM — D62 ACUTE BLOOD LOSS ANEMIA: Status: ACTIVE | Noted: 2019-11-02

## 2019-11-02 LAB
ABO GROUP BLD: NORMAL
ALBUMIN SERPL BCP-MCNC: 3.3 G/DL (ref 3.5–5)
ALP SERPL-CCNC: 75 U/L (ref 46–116)
ALT SERPL W P-5'-P-CCNC: 32 U/L (ref 12–78)
ANION GAP SERPL CALCULATED.3IONS-SCNC: 13 MMOL/L (ref 4–13)
APTT PPP: 27 SECONDS (ref 23–37)
AST SERPL W P-5'-P-CCNC: 24 U/L (ref 5–45)
BASOPHILS # BLD AUTO: 0.11 THOUSANDS/ΜL (ref 0–0.1)
BASOPHILS NFR BLD AUTO: 1 % (ref 0–1)
BILIRUB SERPL-MCNC: 0.52 MG/DL (ref 0.2–1)
BLD GP AB SCN SERPL QL: NEGATIVE
BUN SERPL-MCNC: 41 MG/DL (ref 5–25)
CALCIUM SERPL-MCNC: 8.3 MG/DL (ref 8.3–10.1)
CHLORIDE SERPL-SCNC: 102 MMOL/L (ref 100–108)
CO2 SERPL-SCNC: 22 MMOL/L (ref 21–32)
CREAT SERPL-MCNC: 1.44 MG/DL (ref 0.6–1.3)
EOSINOPHIL # BLD AUTO: 0.05 THOUSAND/ΜL (ref 0–0.61)
EOSINOPHIL NFR BLD AUTO: 0 % (ref 0–6)
ERYTHROCYTE [DISTWIDTH] IN BLOOD BY AUTOMATED COUNT: 13.4 % (ref 11.6–15.1)
GFR SERPL CREATININE-BSD FRML MDRD: 43 ML/MIN/1.73SQ M
GLUCOSE SERPL-MCNC: 186 MG/DL (ref 65–140)
HCT VFR BLD AUTO: 25.3 % (ref 36.5–49.3)
HCT VFR BLD AUTO: 30.5 % (ref 36.5–49.3)
HGB BLD-MCNC: 8.2 G/DL (ref 12–17)
HGB BLD-MCNC: 9.8 G/DL (ref 12–17)
IMM GRANULOCYTES # BLD AUTO: 0.17 THOUSAND/UL (ref 0–0.2)
IMM GRANULOCYTES NFR BLD AUTO: 1 % (ref 0–2)
INR PPP: 1.67 (ref 0.84–1.19)
LYMPHOCYTES # BLD AUTO: 2.06 THOUSANDS/ΜL (ref 0.6–4.47)
LYMPHOCYTES NFR BLD AUTO: 14 % (ref 14–44)
MCH RBC QN AUTO: 30.2 PG (ref 26.8–34.3)
MCHC RBC AUTO-ENTMCNC: 32.1 G/DL (ref 31.4–37.4)
MCV RBC AUTO: 94 FL (ref 82–98)
MONOCYTES # BLD AUTO: 0.7 THOUSAND/ΜL (ref 0.17–1.22)
MONOCYTES NFR BLD AUTO: 5 % (ref 4–12)
NEUTROPHILS # BLD AUTO: 11.19 THOUSANDS/ΜL (ref 1.85–7.62)
NEUTS SEG NFR BLD AUTO: 79 % (ref 43–75)
NRBC BLD AUTO-RTO: 0 /100 WBCS
PLATELET # BLD AUTO: 294 THOUSANDS/UL (ref 149–390)
PMV BLD AUTO: 10.2 FL (ref 8.9–12.7)
POTASSIUM SERPL-SCNC: 4.9 MMOL/L (ref 3.5–5.3)
PROT SERPL-MCNC: 6.5 G/DL (ref 6.4–8.2)
PROTHROMBIN TIME: 19.2 SECONDS (ref 11.6–14.5)
RBC # BLD AUTO: 3.24 MILLION/UL (ref 3.88–5.62)
RH BLD: POSITIVE
SODIUM SERPL-SCNC: 137 MMOL/L (ref 136–145)
SPECIMEN EXPIRATION DATE: NORMAL
TROPONIN I SERPL-MCNC: 0.19 NG/ML
WBC # BLD AUTO: 14.28 THOUSAND/UL (ref 4.31–10.16)

## 2019-11-02 PROCEDURE — 86901 BLOOD TYPING SEROLOGIC RH(D): CPT | Performed by: STUDENT IN AN ORGANIZED HEALTH CARE EDUCATION/TRAINING PROGRAM

## 2019-11-02 PROCEDURE — 85610 PROTHROMBIN TIME: CPT | Performed by: STUDENT IN AN ORGANIZED HEALTH CARE EDUCATION/TRAINING PROGRAM

## 2019-11-02 PROCEDURE — 85025 COMPLETE CBC W/AUTO DIFF WBC: CPT | Performed by: STUDENT IN AN ORGANIZED HEALTH CARE EDUCATION/TRAINING PROGRAM

## 2019-11-02 PROCEDURE — 86850 RBC ANTIBODY SCREEN: CPT | Performed by: STUDENT IN AN ORGANIZED HEALTH CARE EDUCATION/TRAINING PROGRAM

## 2019-11-02 PROCEDURE — 99285 EMERGENCY DEPT VISIT HI MDM: CPT | Performed by: EMERGENCY MEDICINE

## 2019-11-02 PROCEDURE — 85730 THROMBOPLASTIN TIME PARTIAL: CPT | Performed by: STUDENT IN AN ORGANIZED HEALTH CARE EDUCATION/TRAINING PROGRAM

## 2019-11-02 PROCEDURE — 84484 ASSAY OF TROPONIN QUANT: CPT | Performed by: INTERNAL MEDICINE

## 2019-11-02 PROCEDURE — 80053 COMPREHEN METABOLIC PANEL: CPT | Performed by: STUDENT IN AN ORGANIZED HEALTH CARE EDUCATION/TRAINING PROGRAM

## 2019-11-02 PROCEDURE — 99285 EMERGENCY DEPT VISIT HI MDM: CPT

## 2019-11-02 PROCEDURE — 85014 HEMATOCRIT: CPT | Performed by: INTERNAL MEDICINE

## 2019-11-02 PROCEDURE — 30233N1 TRANSFUSION OF NONAUTOLOGOUS RED BLOOD CELLS INTO PERIPHERAL VEIN, PERCUTANEOUS APPROACH: ICD-10-PCS | Performed by: INTERNAL MEDICINE

## 2019-11-02 PROCEDURE — 86900 BLOOD TYPING SEROLOGIC ABO: CPT | Performed by: STUDENT IN AN ORGANIZED HEALTH CARE EDUCATION/TRAINING PROGRAM

## 2019-11-02 PROCEDURE — 85018 HEMOGLOBIN: CPT | Performed by: INTERNAL MEDICINE

## 2019-11-02 PROCEDURE — 71046 X-RAY EXAM CHEST 2 VIEWS: CPT

## 2019-11-02 PROCEDURE — 99223 1ST HOSP IP/OBS HIGH 75: CPT | Performed by: INTERNAL MEDICINE

## 2019-11-02 PROCEDURE — 96360 HYDRATION IV INFUSION INIT: CPT

## 2019-11-02 PROCEDURE — 36415 COLL VENOUS BLD VENIPUNCTURE: CPT | Performed by: STUDENT IN AN ORGANIZED HEALTH CARE EDUCATION/TRAINING PROGRAM

## 2019-11-02 PROCEDURE — 86920 COMPATIBILITY TEST SPIN: CPT

## 2019-11-02 PROCEDURE — 84484 ASSAY OF TROPONIN QUANT: CPT | Performed by: STUDENT IN AN ORGANIZED HEALTH CARE EDUCATION/TRAINING PROGRAM

## 2019-11-02 RX ORDER — MAGNESIUM HYDROXIDE 400 MG/5ML
1 SUSPENSION, ORAL (FINAL DOSE FORM) ORAL DAILY
Status: DISCONTINUED | OUTPATIENT
Start: 2019-11-03 | End: 2019-11-02

## 2019-11-02 RX ORDER — PANTOPRAZOLE SODIUM 40 MG/1
40 INJECTION, POWDER, FOR SOLUTION INTRAVENOUS EVERY 12 HOURS SCHEDULED
Status: DISCONTINUED | OUTPATIENT
Start: 2019-11-02 | End: 2019-11-03

## 2019-11-02 RX ORDER — POTASSIUM CHLORIDE 20 MEQ/1
20 TABLET, EXTENDED RELEASE ORAL DAILY
Status: DISCONTINUED | OUTPATIENT
Start: 2019-11-03 | End: 2019-11-06 | Stop reason: HOSPADM

## 2019-11-02 RX ORDER — AMIODARONE HYDROCHLORIDE 200 MG/1
200 TABLET ORAL 2 TIMES DAILY WITH MEALS
Status: DISCONTINUED | OUTPATIENT
Start: 2019-11-03 | End: 2019-11-06 | Stop reason: HOSPADM

## 2019-11-02 RX ORDER — NITROGLYCERIN 0.4 MG/1
0.4 TABLET SUBLINGUAL
Status: DISCONTINUED | OUTPATIENT
Start: 2019-11-02 | End: 2019-11-06 | Stop reason: HOSPADM

## 2019-11-02 RX ORDER — ASPIRIN 81 MG/1
81 TABLET ORAL DAILY
Status: DISCONTINUED | OUTPATIENT
Start: 2019-11-03 | End: 2019-11-04

## 2019-11-02 RX ORDER — CHOLECALCIFEROL (VITAMIN D3) 125 MCG
200 CAPSULE ORAL DAILY
Status: DISCONTINUED | OUTPATIENT
Start: 2019-11-03 | End: 2019-11-06 | Stop reason: HOSPADM

## 2019-11-02 RX ORDER — ONDANSETRON 2 MG/ML
4 INJECTION INTRAMUSCULAR; INTRAVENOUS EVERY 6 HOURS PRN
Status: DISCONTINUED | OUTPATIENT
Start: 2019-11-02 | End: 2019-11-06 | Stop reason: HOSPADM

## 2019-11-02 RX ORDER — ATORVASTATIN CALCIUM 40 MG/1
40 TABLET, FILM COATED ORAL
Status: DISCONTINUED | OUTPATIENT
Start: 2019-11-03 | End: 2019-11-06 | Stop reason: HOSPADM

## 2019-11-02 RX ORDER — PREDNISOLONE ACETATE 10 MG/ML
1 SUSPENSION/ DROPS OPHTHALMIC EVERY 12 HOURS SCHEDULED
Status: DISCONTINUED | OUTPATIENT
Start: 2019-11-02 | End: 2019-11-06 | Stop reason: HOSPADM

## 2019-11-02 RX ORDER — CLOPIDOGREL BISULFATE 75 MG/1
75 TABLET ORAL DAILY
Status: DISCONTINUED | OUTPATIENT
Start: 2019-11-03 | End: 2019-11-06 | Stop reason: HOSPADM

## 2019-11-02 RX ORDER — UREA 10 %
500 LOTION (ML) TOPICAL DAILY
Status: DISCONTINUED | OUTPATIENT
Start: 2019-11-03 | End: 2019-11-06 | Stop reason: HOSPADM

## 2019-11-02 RX ORDER — LANOLIN ALCOHOL/MO/W.PET/CERES
3 CREAM (GRAM) TOPICAL
Status: DISCONTINUED | OUTPATIENT
Start: 2019-11-02 | End: 2019-11-06 | Stop reason: HOSPADM

## 2019-11-02 RX ADMIN — SODIUM CHLORIDE 500 ML: 0.9 INJECTION, SOLUTION INTRAVENOUS at 19:53

## 2019-11-03 LAB
ANION GAP SERPL CALCULATED.3IONS-SCNC: 9 MMOL/L (ref 4–13)
BUN SERPL-MCNC: 64 MG/DL (ref 5–25)
CALCIUM SERPL-MCNC: 8.1 MG/DL (ref 8.3–10.1)
CHLORIDE SERPL-SCNC: 108 MMOL/L (ref 100–108)
CO2 SERPL-SCNC: 22 MMOL/L (ref 21–32)
CREAT SERPL-MCNC: 1.28 MG/DL (ref 0.6–1.3)
FERRITIN SERPL-MCNC: 169 NG/ML (ref 8–388)
FOLATE SERPL-MCNC: >20 NG/ML (ref 3.1–17.5)
GFR SERPL CREATININE-BSD FRML MDRD: 50 ML/MIN/1.73SQ M
GLUCOSE SERPL-MCNC: 154 MG/DL (ref 65–140)
HCT VFR BLD AUTO: 23 % (ref 36.5–49.3)
HCT VFR BLD AUTO: 24.5 % (ref 36.5–49.3)
HCT VFR BLD AUTO: 26.6 % (ref 36.5–49.3)
HCT VFR BLD AUTO: 28.2 % (ref 36.5–49.3)
HGB BLD-MCNC: 7.9 G/DL (ref 12–17)
HGB BLD-MCNC: 8.5 G/DL (ref 12–17)
HGB BLD-MCNC: 8.9 G/DL (ref 12–17)
HGB BLD-MCNC: 9.3 G/DL (ref 12–17)
IRON SATN MFR SERPL: 58 %
IRON SERPL-MCNC: 126 UG/DL (ref 65–175)
MAGNESIUM SERPL-MCNC: 1.8 MG/DL (ref 1.6–2.6)
POTASSIUM SERPL-SCNC: 4.8 MMOL/L (ref 3.5–5.3)
SODIUM SERPL-SCNC: 139 MMOL/L (ref 136–145)
TIBC SERPL-MCNC: 219 UG/DL (ref 250–450)
TROPONIN I SERPL-MCNC: 0.16 NG/ML
TROPONIN I SERPL-MCNC: 0.17 NG/ML
VIT B12 SERPL-MCNC: 568 PG/ML (ref 100–900)

## 2019-11-03 PROCEDURE — 85014 HEMATOCRIT: CPT | Performed by: INTERNAL MEDICINE

## 2019-11-03 PROCEDURE — 99223 1ST HOSP IP/OBS HIGH 75: CPT | Performed by: INTERNAL MEDICINE

## 2019-11-03 PROCEDURE — 85014 HEMATOCRIT: CPT | Performed by: PHYSICIAN ASSISTANT

## 2019-11-03 PROCEDURE — 82746 ASSAY OF FOLIC ACID SERUM: CPT | Performed by: INTERNAL MEDICINE

## 2019-11-03 PROCEDURE — 83550 IRON BINDING TEST: CPT | Performed by: INTERNAL MEDICINE

## 2019-11-03 PROCEDURE — 83540 ASSAY OF IRON: CPT | Performed by: INTERNAL MEDICINE

## 2019-11-03 PROCEDURE — 83735 ASSAY OF MAGNESIUM: CPT | Performed by: INTERNAL MEDICINE

## 2019-11-03 PROCEDURE — 85018 HEMOGLOBIN: CPT | Performed by: PHYSICIAN ASSISTANT

## 2019-11-03 PROCEDURE — 82607 VITAMIN B-12: CPT | Performed by: INTERNAL MEDICINE

## 2019-11-03 PROCEDURE — 82728 ASSAY OF FERRITIN: CPT | Performed by: INTERNAL MEDICINE

## 2019-11-03 PROCEDURE — 85018 HEMOGLOBIN: CPT | Performed by: INTERNAL MEDICINE

## 2019-11-03 PROCEDURE — 99222 1ST HOSP IP/OBS MODERATE 55: CPT | Performed by: INTERNAL MEDICINE

## 2019-11-03 PROCEDURE — 84484 ASSAY OF TROPONIN QUANT: CPT | Performed by: INTERNAL MEDICINE

## 2019-11-03 PROCEDURE — 80048 BASIC METABOLIC PNL TOTAL CA: CPT | Performed by: INTERNAL MEDICINE

## 2019-11-03 PROCEDURE — 99232 SBSQ HOSP IP/OBS MODERATE 35: CPT | Performed by: FAMILY MEDICINE

## 2019-11-03 PROCEDURE — C9113 INJ PANTOPRAZOLE SODIUM, VIA: HCPCS | Performed by: INTERNAL MEDICINE

## 2019-11-03 PROCEDURE — C9113 INJ PANTOPRAZOLE SODIUM, VIA: HCPCS | Performed by: FAMILY MEDICINE

## 2019-11-03 PROCEDURE — P9016 RBC LEUKOCYTES REDUCED: HCPCS

## 2019-11-03 PROCEDURE — 0DJ08ZZ INSPECTION OF UPPER INTESTINAL TRACT, VIA NATURAL OR ARTIFICIAL OPENING ENDOSCOPIC: ICD-10-PCS | Performed by: INTERNAL MEDICINE

## 2019-11-03 RX ORDER — DIPHENHYDRAMINE HCL 25 MG
25 TABLET ORAL EVERY 6 HOURS PRN
Status: DISCONTINUED | OUTPATIENT
Start: 2019-11-03 | End: 2019-11-06 | Stop reason: HOSPADM

## 2019-11-03 RX ADMIN — PANTOPRAZOLE SODIUM 40 MG: 40 INJECTION, POWDER, FOR SOLUTION INTRAVENOUS at 02:23

## 2019-11-03 RX ADMIN — AMIODARONE HYDROCHLORIDE 200 MG: 200 TABLET ORAL at 09:57

## 2019-11-03 RX ADMIN — PANTOPRAZOLE SODIUM 40 MG: 40 INJECTION, POWDER, FOR SOLUTION INTRAVENOUS at 09:57

## 2019-11-03 RX ADMIN — Medication 200 MG: at 09:57

## 2019-11-03 RX ADMIN — DIPHENHYDRAMINE HCL 25 MG: 25 TABLET, COATED ORAL at 21:35

## 2019-11-03 RX ADMIN — CLOPIDOGREL BISULFATE 75 MG: 75 TABLET ORAL at 09:57

## 2019-11-03 RX ADMIN — POTASSIUM CHLORIDE 20 MEQ: 1500 TABLET, EXTENDED RELEASE ORAL at 09:59

## 2019-11-03 RX ADMIN — SODIUM CHLORIDE 8 MG/HR: 9 INJECTION, SOLUTION INTRAVENOUS at 18:29

## 2019-11-03 RX ADMIN — Medication 500 MG: at 09:57

## 2019-11-03 RX ADMIN — ATORVASTATIN CALCIUM 40 MG: 40 TABLET, FILM COATED ORAL at 17:46

## 2019-11-03 RX ADMIN — MELATONIN 3 MG: 3 TAB ORAL at 00:53

## 2019-11-03 RX ADMIN — SODIUM CHLORIDE 500 ML: 0.9 INJECTION, SOLUTION INTRAVENOUS at 00:09

## 2019-11-03 RX ADMIN — AMIODARONE HYDROCHLORIDE 200 MG: 200 TABLET ORAL at 17:46

## 2019-11-03 RX ADMIN — ONDANSETRON 4 MG: 2 INJECTION INTRAMUSCULAR; INTRAVENOUS at 02:18

## 2019-11-03 RX ADMIN — METOPROLOL TARTRATE 25 MG: 25 TABLET, FILM COATED ORAL at 20:06

## 2019-11-03 RX ADMIN — ASPIRIN 81 MG: 81 TABLET, COATED ORAL at 09:57

## 2019-11-03 RX ADMIN — MELATONIN 3 MG: 3 TAB ORAL at 20:06

## 2019-11-03 NOTE — ASSESSMENT & PLAN NOTE
· EF 10/20/2019 35%  · Currently appears euvolemic  · Monitor daily weights, I/O  · Holding diuretic in setting of acute kidney injury

## 2019-11-03 NOTE — H&P
H&P- Marylene Iron 9/1/1932, 80 y o  male MRN: [id number]    Unit/Bed#: -01 Encounter: [id number]    Primary Care Provider: Anny Sinclair MD   Date and time admitted to hospital: 11/2/2019  6:53 PM        * Hematemesis  Assessment & Plan  · Presented after episode of hematemesis this evening  Had hemoglobin drop 12 4 10/25/2019 -> 9 8 this evening  · Currently hemodynamically stable, blood pressure borderline but maintaining MAP>65  · Suspect upper GI source, reports history of ulcers  Also on ASA/Plavix/Eliquis  · Will hold Eliquis; per ED physician discussion with Cardiology should continue ASA and Plavix given recent placement of MINNIE 10/21/2019  · GI consultation for potential endoscopy, keep NPO at midnight  · Trend hemoglobin as below    Acute blood loss anemia  Assessment & Plan  · With hemoglobin drop from 12 4 10/25/2018 to 9 8 this evening, suspect in setting of acute GI bleeding as above  · Will additionally check FOBT, iron panel, vitamin B12, folate  · Trend hemoglobin q 8 hours  · Will transfuse for hemoglobin less than 8 or symptomatic    Acute kidney injury ([facility] [address] )  Assessment & Plan  · POA, baseline appears 1-1 1  · Received 500 cc normal saline in ED, suspect some degree from hypovolemia however patient's blood pressure is also borderline  · Additionally will check PVR and bladder scan  · Recheck BMP in a m    · Avoid nephrotoxins and initiate hold parameters for metoprolol    CAD (coronary artery disease)  Assessment & Plan  · S/P prior CABG and S/P MINNIE to OM1 10/21/2019  · Currently chest pain-free  · Will continue ASA, Plavix in setting of recent placement of MINNIE  · Continue beta-blocker with hold parameters  · Continue statin    Elevated troponin  Assessment & Plan  · Troponin 0 19 on admission, patient without chest pain/pressure in EKG nonischemic  · Suspect type 2 NSTEMI in the setting of recent cardiac procedures and also acute blood loss anemia  · Will continue to trend troponin in the meantime    Typical atrial flutter (HCC)  Assessment & Plan  · S/P dual chamber ICD placement 10/24/2019  · Rate controlled on metoprolol tartrate and will continue with strict hold parameters  · Normally anticoagulated on Eliquis however will hold in setting of acute GI bleeding    Ischemic cardiomyopathy  Assessment & Plan  · EF 10/20/2019 35%  · Currently appears euvolemic  · Monitor daily weights, I/O  · Holding diuretic in setting of acute kidney injury    Physical deconditioning  Assessment & Plan  · Recently discharged from [facility] rehab following recent hospitalization  · Will have PT/OT work with patient while inpatient        VTE Prophylaxis: Pharmacologic VTE Prophylaxis contraindicated due to Acute GI bleeding  / sequential compression device   Code Status: Level 1 - Full Code as discussed with patient at bedside  POLST: POLST form is not discussed and not completed at this time  Anticipated Length of Stay:  Patient will be admitted on an Inpatient basis with an anticipated length of stay of  greater than 2 midnights  Justification for Hospital Stay: Please see detailed plans noted above  Chief Complaint:     Hematemesis  History of Present Illness:  Adriana Pradhan is a 80 y o  male who has a past medical history significant for ischemic cardiomyopathy with EF 45%, atrial flutter anticoagulated on Eliquis and S/P dual-chamber ICD placement 10/24/2019, and CAD S/P CABG in 1999 and with MINNIE to SVG of OM1 10/21/2019  He had recent admission 10/19/2019-10/25/2019 initially with ventricular tachycardia found to have further ischemic disease with procedures as previously listed and discharged to [facility] for acute rehab, discharged from that facility 11/01/2019 to home      This afternoon, patient had episode of vomiting with apparent large amount of blood in vomitus; the patient himself states this was only a modest amount, however patient's sister (who is present at bedside) reports the amount of blood filled the toilet which prompted a call to EMS  He denied any dizziness/lightheadedness, chest pain/pressure, or shortness of breath  Reports his last bowel movement was 10/31/2019, and denies seeing any melena or hematochezia  Initial ED evaluation significant for hemoglobin 9 8, down from hemoglobin 12 4 on 10/25/2019, and patient is admitted for further evaluation/treatment of GI bleeding    Currently, patient is lying in bed comfortably, reports no further episodes of vomiting or hematemesis  Otherwise he has no other acute complaints at this time      Review of Systems:    Constitutional:  Denies fever or chills   Eyes:  Denies change in visual acuity   HENT:  Denies nasal congestion or sore throat   Respiratory:  Denies cough or shortness of breath   Cardiovascular:  Denies chest pain or edema   GI:  Denies abdominal pain, nausea bloody stools or diarrhea but endorses vomiting and hematemesis  :  Denies dysuria   Musculoskeletal:  Denies back pain or joint pain   Integument:  Denies rash   Neurologic:  Denies headache, focal weakness or sensory changes   Endocrine:  Denies polyuria or polydipsia   Lymphatic:  Denies swollen glands   Psychiatric:  Denies depression or anxiety     Past Medical and Surgical History:   Past Medical History:   Diagnosis Date    Acute myocardial infarction ([facility] [address] )     Allergic rhinitis     Anxiety     Bimalleolar fracture of left ankle     CAD (coronary artery disease)     Erectile dysfunction of non-organic origin     Hematuria     workup was negative and felt to be benign    Herpes zoster with complication     Hyperlipidemia     Hypertension     Impaired fasting glucose     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    Prostatic hypertrophy     benign    Stroke [facility])      Past Surgical History:   Procedure Laterality Date    ANKLE FRACTURE SURGERY Left     CARDIAC PACEMAKER PLACEMENT Left     CORONARY ARTERY BYPASS GRAFT Meds/Allergies:  Medications Prior to Admission   Medication    amiodarone 200 mg tablet    apixaban (ELIQUIS) 5 mg    aspirin (ECOTRIN LOW STRENGTH) 81 mg EC tablet    atorvastatin (LIPITOR) 40 mg tablet    clopidogrel (PLAVIX) 75 mg tablet    Coenzyme Q10 200 MG capsule    doxylamine (UNISON) 25 MG tablet    furosemide (LASIX) 20 mg tablet    gatifloxacin (ZYMAXID) 0 5 %    Glucosamine-Chondroit-Vit C-Mn (GLUCOSAMINE CHONDROITIN COMPLX) CAPS    losartan (COZAAR) 25 mg tablet    magnesium gluconate (MAGONATE) 500 mg tablet    melatonin 3 mg    metoprolol tartrate (LOPRESSOR) 100 mg tablet    nitroglycerin (NITROSTAT) 0 4 mg SL tablet    potassium chloride (KLOR-CON M20) 20 mEq tablet    prednisoLONE acetate (PRED FORTE) 1 % ophthalmic suspension    Probiotic Product (PROBIOTIC COLON SUPPORT) CAPS    selenium 200 mcg    traMADol (ULTRAM) 50 mg tablet       Allergies: Allergies   Allergen Reactions    Penicillins Edema and Rash     Reaction Date: [id number]; Category: Allergy; Annotation - [id number]: Severe reaction with hospitaization at [facility]     History:  Marital Status:       Substance Use History:   Social History     Substance and Sexual Activity   Alcohol Use Yes    Comment: social- per allscripts     Social History     Tobacco Use   Smoking Status Former Smoker   Smokeless Tobacco Never Used     Social History     Substance and Sexual Activity   Drug Use No       Family History:  Family History   Problem Relation Age of Onset    Cancer Mother     Hypertension Mother         essential    Pancreatic cancer Mother        Physical Exam:     Vitals:   Blood Pressure: 90/57 (11/02/19 2030)  Pulse: 77 (11/02/19 2030)  Temperature: 97 6 °F (36 4 °C) (11/02/19 1908)  Temp Source: Oral (11/02/19 1908)  Respirations: 18 (11/02/19 2030)  SpO2: 97 % (11/02/19 2030)    Constitutional:  Chronically ill appearing, well nourished, no acute distress, non-toxic appearance   Eyes:  PERRL, conjunctiva normal   HENT:  Atraumatic, external ears normal, nose normal, oropharynx moist, no pharyngeal exudates  Neck- normal range of motion, no tenderness, supple   Respiratory:  No respiratory distress, normal breath sounds, no rales, no wheezing   Cardiovascular:  Normal rate, normal rhythm, no murmurs, no gallops, no rubs   GI:  Soft, nondistended, normal bowel sounds, nontender, no organomegaly, no mass, no rebound, no guarding   :  No costovertebral angle tenderness   Musculoskeletal:  No edema, no tenderness, no deformities  Pacemaker pocket with overlying incision c/d/i, well-healing Back- no tenderness  Integument:  Well hydrated, no rash, pallor   Lymphatic:  No lymphadenopathy noted   Neurologic:  Alert &awake, communicative, CN 2-12 normal, normal motor function, normal sensory function, no focal deficits noted   Psychiatric:  Speech and behavior appropriate       Lab Results: I have personally reviewed pertinent reports  Results from last 7 days   Lab Units 11/02/19  1926   WBC Thousand/uL 14 28*   HEMOGLOBIN g/dL 9 8*   HEMATOCRIT % 30 5*   PLATELETS Thousands/uL 294   NEUTROS PCT % 79*   LYMPHS PCT % 14   MONOS PCT % 5   EOS PCT % 0     Results from last 7 days   Lab Units 11/02/19  1926   POTASSIUM mmol/L 4 9   CHLORIDE mmol/L 102   CO2 mmol/L 22   BUN mg/dL 41*   CREATININE mg/dL 1 44*   CALCIUM mg/dL 8 3   ALK PHOS U/L 75   ALT U/L 32   AST U/L 24     Results from last 7 days   Lab Units 11/02/19  1926   INR  1 67*       EKG:  Normal sinus but paced rhythm    Imaging: I have personally reviewed pertinent films in PACS    Xr Chest PA+Lateral 11/2/2019:  Personally reviewed, no acute cardiopulmonary disease visualized  Final radiologist read is pending  ** Please Note: Dragon 360 Dictation voice to text software was used in the creation of this document   **

## 2019-11-03 NOTE — ASSESSMENT & PLAN NOTE
· Presented after episode of hematemesis this evening  Had hemoglobin drop 12 4 10/25/2019 -> 9 8 at the time of presentation  · Last hemoglobin is 8 5  · Suspect upper GI source, reports history of ulcers  Also vas on ASA/Plavix/Eliquis  · Aspirin and Plavix continued per discussion with the Cardiology  Cardiology consultation appreciated  · GI evaluation appreciated, plan for EGD noted for tomorrow    Currently on PPI drip  · Monitor hemoglobin and transfuse as needed

## 2019-11-03 NOTE — PROGRESS NOTES
Blood transfusion ended VSS, no complaints of SOB, pain, chills  Pt vomited 250ml of dark red blood  Pt given prn luis Shelton with [facility] notified  Will continue to monitor

## 2019-11-03 NOTE — ASSESSMENT & PLAN NOTE
· S/P dual chamber ICD placement 10/24/2019  · Rate controlled on metoprolol tartrate and will continue with strict hold parameters  · Normally anticoagulated on Eliquis however will hold in setting of acute GI bleeding

## 2019-11-03 NOTE — PHYSICAL THERAPY NOTE
Physical Therapy Cancellation Note    PT order received  Chart reviewed; attempted to see pt in AM --> pt is about to be seen by MD; will follow      Donavan March, PT

## 2019-11-03 NOTE — ASSESSMENT & PLAN NOTE
· S/P prior CABG and S/P MINNIE to OM1 10/21/2019  · Currently chest pain-free  · Will continue ASA, Plavix in setting of recent placement of MINNIE  · Continue beta-blocker with hold parameters  · Continue statin

## 2019-11-03 NOTE — PROGRESS NOTES
Pt ripped out IV on accident when arriving to floor  Unable to obtain IV access, called P4 for help putting IV in  Pt experiencing SOB and dizziness on exertion  Manual BP of 78/50  No complaints of dizziness, laid pt down flat and will continue to monitor  Notified Ej Nolasco with [facility]

## 2019-11-03 NOTE — ASSESSMENT & PLAN NOTE
· Presented after episode of hematemesis this evening  Had hemoglobin drop 12 4 10/25/2019 -> 9 8 this evening  · Currently hemodynamically stable, blood pressure borderline but maintaining MAP>65  · Suspect upper GI source, reports history of ulcers    Also on ASA/Plavix/Eliquis  · Will hold Eliquis; per ED physician discussion with Cardiology should continue ASA and Plavix given recent placement of MINNIE 10/21/2019  · GI consultation for potential endoscopy, keep NPO at midnight  · Trend hemoglobin as below

## 2019-11-03 NOTE — ED PROVIDER NOTES
History  Chief Complaint   Patient presents with    Vomiting Blood     recently discharged for pacemaker placement  States he had abd pain following by vomitting bright red blood  Per EMS, Son is a doctor and states "he is taking too many blood thinners"  This is an 80-year-old male with a recent medical history of an AICD placement and drug-eluting stent placement about one week ago who presents to the emergency department this evening with hematemesis  Patient was discharged after his procedure to acute care rehab and was doing well enough that he was discharged to home yesterday  Patient noticed that he was having some nausea this evening and eventually vomited what he described as a cup of bright red blood without any clots in it  Patient denies any recent black or bloody stool  He denies any lightheadedness or dizziness and has not fallen or loss consciousness  Patient is currently taking Eliquis for atrial fibrillation, baby aspirin, and Plavix for his drug eluting stent  Patient denies any fevers, chills, chest pain, shortness of breath, abdominal pain, diarrhea, constipation, dysuria, hematuria, or any numbness, weakness, or tingling  Prior to Admission Medications   Prescriptions Last Dose Informant Patient Reported? Taking?    Coenzyme Q10 200 MG capsule  Self Yes No   Sig: Take 200 mg by mouth daily   Glucosamine-Chondroit-Vit C-Mn (GLUCOSAMINE CHONDROITIN COMPLX) CAPS  Self Yes No   Sig: Take 1 capsule by mouth daily   Probiotic Product (PROBIOTIC COLON SUPPORT) CAPS  Self Yes No   Sig: Take 1 capsule by mouth daily   amiodarone 200 mg tablet   No No   Sig: Take 1 tablet (200 mg total) by mouth 2 (two) times a day with meals   apixaban (ELIQUIS) 5 mg  Self No No   Sig: Take 1 tablet (5 mg total) by mouth 2 (two) times a day   aspirin (ECOTRIN LOW STRENGTH) 81 mg EC tablet  Self Yes No   Sig: Take 81 mg by mouth daily   atorvastatin (LIPITOR) 40 mg tablet  Self No No   Sig: Take 1 tablet (40 mg total) by mouth daily   clopidogrel (PLAVIX) 75 mg tablet   No No   Sig: Take 1 tablet (75 mg total) by mouth daily   doxylamine (UNISON) 25 MG tablet   No No   Sig: Take 0 5 tablets (12 5 mg total) by mouth daily at bedtime as needed for sleep   furosemide (LASIX) 20 mg tablet  Self No No   Sig: Take 1 tablet (20 mg total) by mouth daily   gatifloxacin (ZYMAXID) 0 5 %  Self Yes No   losartan (COZAAR) 25 mg tablet   No No   Sig: Take 1 tablet (25 mg total) by mouth daily   magnesium gluconate (MAGONATE) 500 mg tablet  Self Yes No   Sig: Take 500 mg by mouth daily   melatonin 3 mg   No No   Sig: Take 1 tablet (3 mg total) by mouth daily at bedtime   metoprolol tartrate (LOPRESSOR) 100 mg tablet  Self No No   Sig: TAKE 1/2 TABLET (50 MG TOTAL) BY MOUTH EVERY 12 (TWELVE) HOURS   nitroglycerin (NITROSTAT) 0 4 mg SL tablet   No No   Sig: Place 1 tablet (0 4 mg total) under the tongue every 5 (five) minutes as needed for chest pain   potassium chloride (KLOR-CON M20) 20 mEq tablet   No No   Sig: Take 1 tablet (20 mEq total) by mouth daily   prednisoLONE acetate (PRED FORTE) 1 % ophthalmic suspension  Self Yes No   selenium 200 mcg  Self Yes No   Sig: Take 200 mcg by mouth daily   traMADol (ULTRAM) 50 mg tablet   No No   Sig: Take 1 tablet (50 mg total) by mouth every 6 (six) hours as needed for moderate pain for up to 10 days      Facility-Administered Medications: None       Past Medical History:   Diagnosis Date    Acute myocardial infarction (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    Prostatic hypertrophy     benign    Stroke [facility])        Past Surgical History:   Procedure Laterality Date    ANKLE FRACTURE SURGERY Left     CARDIAC PACEMAKER PLACEMENT Left     CORONARY ARTERY BYPASS GRAFT         Family History   Problem Relation Age of Onset    Cancer Mother     Hypertension Mother         essential    Pancreatic cancer Mother      I have reviewed and agree with the history as documented  Social History     Tobacco Use    Smoking status: Former Smoker    Smokeless tobacco: Never Used   Substance Use Topics    Alcohol use: Yes     Comment: social- per allscripts    Drug use: No        Review of Systems   Constitutional: Negative for chills, diaphoresis and fever  HENT: Negative for congestion, rhinorrhea, sinus pressure and sore throat  Eyes: Negative for visual disturbance  Respiratory: Negative for cough, chest tightness and shortness of breath  Cardiovascular: Negative for chest pain  Gastrointestinal: Positive for vomiting  Negative for abdominal pain, constipation, diarrhea and nausea  Genitourinary: Negative for dysuria, frequency, hematuria and urgency  Musculoskeletal: Negative for arthralgias and myalgias  Skin: Negative for color change and rash  Neurological: Negative for dizziness, numbness and headaches  Physical Exam  ED Triage Vitals [11/02/19 1908]   Temperature Pulse Respirations Blood Pressure SpO2   97 6 °F (36 4 °C) 80 18 95/53 96 %      Temp Source Heart Rate Source Patient Position - Orthostatic VS BP Location FiO2 (%)   Oral Monitor Lying Left arm --      Pain Score       No Pain             Orthostatic Vital Signs  Vitals:    11/02/19 2030 11/02/19 2205 11/02/19 2340 11/02/19 2344   BP: 90/57 94/55 (!) 75/49 (!) 78/50   Pulse: 77 90 89    Patient Position - Orthostatic VS: Lying   Lying       Physical Exam   Constitutional: He is oriented to person, place, and time  He appears well-developed and well-nourished  No distress  HENT:   Head: Normocephalic and atraumatic     Eyes: Pupils are equal, round, and reactive to light  Conjunctivae are normal    Neck: Normal range of motion  Neck supple  No JVD present  Cardiovascular: Normal rate, regular rhythm and normal heart sounds  Exam reveals no gallop and no friction rub  No murmur heard  Pulmonary/Chest: Effort normal and breath sounds normal  No stridor  No respiratory distress  He has no wheezes  He has no rales  Abdominal: Soft  Bowel sounds are normal  He exhibits no distension  There is no tenderness  There is no guarding  Musculoskeletal: Normal range of motion  He exhibits no edema, tenderness or deformity  Neurological: He is alert and oriented to person, place, and time  No cranial nerve deficit or sensory deficit  He exhibits normal muscle tone  Skin: Skin is warm and dry  No rash noted  He is not diaphoretic  No erythema  No pallor  Psychiatric: He has a normal mood and affect  His behavior is normal    Nursing note and vitals reviewed        ED Medications  Medications   aspirin (ECOTRIN LOW STRENGTH) EC tablet 81 mg (has no administration in time range)   amiodarone tablet 200 mg (has no administration in time range)   atorvastatin (LIPITOR) tablet 40 mg (has no administration in time range)   clopidogrel (PLAVIX) tablet 75 mg (has no administration in time range)   co-enzyme Q-10 capsule 200 mg (has no administration in time range)   melatonin tablet 3 mg (has no administration in time range)   magnesium gluconate (MAGONATE) tablet 500 mg (has no administration in time range)   nitroglycerin (NITROSTAT) SL tablet 0 4 mg (has no administration in time range)   potassium chloride (K-DUR,KLOR-CON) CR tablet 20 mEq (has no administration in time range)   prednisoLONE acetate (PRED FORTE) 1 % ophthalmic suspension 1 drop (has no administration in time range)   pantoprazole (PROTONIX) injection 40 mg (has no administration in time range)   ondansetron (ZOFRAN) injection 4 mg (has no administration in time range)   sodium chloride 0 9 % bolus 500 mL (500 mL Intravenous New Bag 11/3/19 0009)   sodium chloride 0 9 % bolus 500 mL (500 mL Intravenous New Bag 11/2/19 1953)       Diagnostic Studies  Results Reviewed     Procedure Component Value Units Date/Time    Protime-INR [[id number]]  (Abnormal) Collected:  11/02/19 1926    Lab Status:  Final result Specimen:  Blood from Arm, Right Updated:  11/02/19 2008     Protime 19 2 seconds      INR 1 67    APTT [[id number]]  (Normal) Collected:  11/02/19 1926    Lab Status:  Final result Specimen:  Blood from Arm, Right Updated:  11/02/19 2008     PTT 27 seconds     Troponin I [[id number]]  (Abnormal) Collected:  11/02/19 1926    Lab Status:  Final result Specimen:  Blood from Arm, Right Updated:  11/02/19 2005     Troponin I 0 19 ng/mL     Comprehensive metabolic panel [[id number]]  (Abnormal) Collected:  11/02/19 1926    Lab Status:  Final result Specimen:  Blood from Arm, Right Updated:  11/02/19 2000     Sodium 137 mmol/L      Potassium 4 9 mmol/L      Chloride 102 mmol/L      CO2 22 mmol/L      ANION GAP 13 mmol/L      BUN 41 mg/dL      Creatinine 1 44 mg/dL      Glucose 186 mg/dL      Calcium 8 3 mg/dL      AST 24 U/L      ALT 32 U/L      Alkaline Phosphatase 75 U/L      Total Protein 6 5 g/dL      Albumin 3 3 g/dL      Total Bilirubin 0 52 mg/dL      eGFR 43 ml/min/1 73sq m     Narrative:       Lizzette guidelines for Chronic Kidney Disease (CKD):     Stage 1 with normal or high GFR (GFR > 90 mL/min/1 73 square meters)    Stage 2 Mild CKD (GFR = 60-89 mL/min/1 73 square meters)    Stage 3A Moderate CKD (GFR = 45-59 mL/min/1 73 square meters)    Stage 3B Moderate CKD (GFR = 30-44 mL/min/1 73 square meters)    Stage 4 Severe CKD (GFR = 15-29 mL/min/1 73 square meters)    Stage 5 End Stage CKD (GFR <15 mL/min/1 73 square meters)  Note: GFR calculation is accurate only with a steady state creatinine    CBC and differential [[id number]]  (Abnormal) Collected:  11/02/19 1926    Lab Status:  Final result Specimen:  Blood from Arm, Right Updated:  11/02/19 1948     WBC 14 28 Thousand/uL      RBC 3 24 Million/uL      Hemoglobin 9 8 g/dL      Hematocrit 30 5 %      MCV 94 fL      MCH 30 2 pg      MCHC 32 1 g/dL      RDW 13 4 %      MPV 10 2 fL      Platelets 266 Thousands/uL      nRBC 0 /100 WBCs      Neutrophils Relative 79 %      Immat GRANS % 1 %      Lymphocytes Relative 14 %      Monocytes Relative 5 %      Eosinophils Relative 0 %      Basophils Relative 1 %      Neutrophils Absolute 11 19 Thousands/µL      Immature Grans Absolute 0 17 Thousand/uL      Lymphocytes Absolute 2 06 Thousands/µL      Monocytes Absolute 0 70 Thousand/µL      Eosinophils Absolute 0 05 Thousand/µL      Basophils Absolute 0 11 Thousands/µL                  XR chest 2 views    (Results Pending)         Procedures  ECG 12 Lead Documentation Only  Date/Time: 11/3/2019 12:13 AM  Performed by: Ashley Nation MD  Authorized by: Ashley Nation MD     Indications / Diagnosis:  Vomiting blood  ECG reviewed by me, the ED Provider: yes    Patient location:  ED  Interpretation:     Interpretation: abnormal    Quality:     Tracing quality:  Limited by artifact  Rate:     ECG rate assessment: normal    Rhythm:     Rhythm: sinus rhythm    Ectopy:     Ectopy: none    QRS:     QRS axis:  Left    QRS intervals:  Normal  Conduction:     Conduction: normal    ST segments:     ST segments:  Normal  T waves:     T waves: normal    Other findings:     Other findings: poor R wave progression              ED Course           Identification of Seniors at Risk      Most Recent Value   (ISAR) Identification of Seniors at Risk   Before the illness or injury that brought you to the Emergency, did you need someone to help you on a regular basis?   0 Filed at: 11/02/2019 1856   In the last 24 hours, have you needed more help than usual?  0 Filed at: 11/02/2019 1856   Have you been hospitalized for one or more nights during the past 6 months? 1 Filed at: 11/02/2019 1856   In general, do you see well?  0 Filed at: 11/02/2019 1856   In general, do you have serious problems with your memory? 0 Filed at: 11/02/2019 1856   Do you take more than three different medications every day?  0 Filed at: 11/02/2019 1856   ISAR Score  1 Filed at: 11/02/2019 1856                          [facility]  Number of Diagnoses or Management Options  [facility] Anemia:   Hematemesis without nausea:   Diagnosis management comments: Patient's hemoglobin dropped from 12 4-9 8 over the last eight days  He has a mild acute kidney injury and was given 500 milliliters of normal saline  Called and spoke with Cardiology who recommended that the patient stop his Eliquis but needs to continue the aspirin and Plavix in the setting of a drug-eluting stent  Spoke with manuel who agreed to admit the patient to their service as an inpatient        Disposition  Final diagnoses:   Hematemesis without nausea   Anemia   JAMEL (acute kidney injury) ([facility] )     Time reflects when diagnosis was documented in both MDM as applicable and the Disposition within this note     Time User Action Codes Description Comment    11/2/2019  9:19 PM Gale Villa Add [K92 0] Hematemesis without nausea     11/2/2019  9:19 PM Pooja Lynch [D64 9] Anemia     11/2/2019  9:19 PM Gale Villa Add [N17 9] JAMEL (acute kidney injury) ([facility] )     11/2/2019  9:46 PM Jayla MEJÍA Add [I25 10] Coronary artery disease involving native coronary artery of native heart without angina pectoris     11/2/2019  9:46 PM Jayla MEJÍA Modify [I25 10] Coronary artery disease involving native coronary artery of native heart without angina pectoris       ED Disposition     ED Disposition Condition Date/Time Comment    Admit Stable Sat Nov 2, 2019  9:19 PM Case was discussed with MANUEL and the patient's admission status was agreed to be Admission Status: inpatient status to the service of   Elaine   Follow-up Information    None         Current Discharge Medication List      CONTINUE these medications which have NOT CHANGED    Details   amiodarone 200 mg tablet Take 1 tablet (200 mg total) by mouth 2 (two) times a day with meals  Qty: 30 tablet, Refills: 1    Associated Diagnoses: Ventricular tachycardia ([facility] ); Coronary artery disease involving native coronary artery of native heart, angina presence unspecified      apixaban (ELIQUIS) 5 mg Take 1 tablet (5 mg total) by mouth 2 (two) times a day  Qty: 84 tablet, Refills: 0    Associated Diagnoses: Typical atrial flutter (HCC)      aspirin (ECOTRIN LOW STRENGTH) 81 mg EC tablet Take 81 mg by mouth daily      atorvastatin (LIPITOR) 40 mg tablet Take 1 tablet (40 mg total) by mouth daily  Qty: 90 tablet, Refills: 3    Associated Diagnoses: Dyslipidemia      clopidogrel (PLAVIX) 75 mg tablet Take 1 tablet (75 mg total) by mouth daily  Qty: 30 tablet, Refills: 0    Associated Diagnoses: Ventricular tachycardia ([facility] ); Coronary artery disease involving native coronary artery of native heart, angina presence unspecified      Coenzyme Q10 200 MG capsule Take 200 mg by mouth daily      doxylamine (UNISON) 25 MG tablet Take 0 5 tablets (12 5 mg total) by mouth daily at bedtime as needed for sleep  Qty: 30 tablet, Refills: 0    Associated Diagnoses: Ventricular tachycardia ([facility] 75 );  Coronary artery disease involving native coronary artery of native heart, angina presence unspecified      furosemide (LASIX) 20 mg tablet Take 1 tablet (20 mg total) by mouth daily  Qty: 90 tablet, Refills: 3    Associated Diagnoses: Benign essential hypertension; Ischemic cardiomyopathy      gatifloxacin (ZYMAXID) 0 5 %       Glucosamine-Chondroit-Vit C-Mn (GLUCOSAMINE CHONDROITIN COMPLX) CAPS Take 1 capsule by mouth daily      losartan (COZAAR) 25 mg tablet Take 1 tablet (25 mg total) by mouth daily  Qty: 30 tablet, Refills: 0    Associated Diagnoses: Ventricular tachycardia ([facility] ); Coronary artery disease involving native coronary artery of native heart, angina presence unspecified      magnesium gluconate (MAGONATE) 500 mg tablet Take 500 mg by mouth daily      melatonin 3 mg Take 1 tablet (3 mg total) by mouth daily at bedtime  Qty: 30 each, Refills: 0    Associated Diagnoses: Ventricular tachycardia ([facility] ); Coronary artery disease involving native coronary artery of native heart, angina presence unspecified      metoprolol tartrate (LOPRESSOR) 100 mg tablet TAKE 1/2 TABLET (50 MG TOTAL) BY MOUTH EVERY 12 (TWELVE) HOURS  Qty: 180 tablet, Refills: 3    Associated Diagnoses: Benign essential hypertension; Coronary artery disease involving native coronary artery of native heart without angina pectoris      nitroglycerin (NITROSTAT) 0 4 mg SL tablet Place 1 tablet (0 4 mg total) under the tongue every 5 (five) minutes as needed for chest pain  Qty: 25 tablet, Refills: 1    Associated Diagnoses: Exertional chest pain; Coronary artery disease involving native coronary artery of native heart without angina pectoris      potassium chloride (KLOR-CON M20) 20 mEq tablet Take 1 tablet (20 mEq total) by mouth daily  Qty: 90 tablet, Refills: 3    Associated Diagnoses: Benign essential hypertension; Ischemic cardiomyopathy      prednisoLONE acetate (PRED FORTE) 1 % ophthalmic suspension       Probiotic Product (PROBIOTIC COLON SUPPORT) CAPS Take 1 capsule by mouth daily      selenium 200 mcg Take 200 mcg by mouth daily      traMADol (ULTRAM) 50 mg tablet Take 1 tablet (50 mg total) by mouth every 6 (six) hours as needed for moderate pain for up to 10 days  Qty: 30 tablet, Refills: 0    Associated Diagnoses: Ventricular tachycardia ([facility] ); Coronary artery disease involving native coronary artery of native heart, angina presence unspecified           No discharge procedures on file  ED Provider  Attending physically available and evaluated Philipsaray Lloyd   I managed the patient along with the ED Attending      Electronically Signed by         Ike Adams MD  11/03/19 9833

## 2019-11-03 NOTE — ED ATTENDING ATTESTATION
11/2/2019  IYuliet DO, saw and evaluated the patient  I have discussed the patient with the resident/non-physician practitioner and agree with the resident's/non-physician practitioner's findings, Plan of Care, and MDM as documented in the resident's/non-physician practitioner's note, except where noted  All available labs and Radiology studies were reviewed  I was present for key portions of any procedure(s) performed by the resident/non-physician practitioner and I was immediately available to provide assistance  At this point I agree with the current assessment done in the Emergency Department  I have conducted an independent evaluation of this patient a history and physical is as follows:    ED Course     Emergency Department Note- Odessa Bell 80 y o  male MRN: [id number]    Unit/Bed#: -01 Encounter: [id number]    Odessa Bell is a 80 y o  male who presents with   Chief Complaint   Patient presents with    Vomiting Blood     recently discharged for pacemaker placement  States he had abd pain following by vomitting bright red blood  Per EMS, Son is a doctor and states "he is taking too many blood thinners"  History of Present Illness   HPI:  Odessa Bell is a 80 y o  male who presents with  episode of vomiting bright red blood  Also complains of epigastric discomfort  Patient recent pacemaker placement along with a stent placed and is on aspirin Plavix and Eliquis  ROS is otherwise unremarkable      Historical Information   Past Medical History:   Diagnosis Date    Acute myocardial infarction ([address] )     Allergic rhinitis     Anxiety     Bimalleolar fracture of left ankle     CAD (coronary artery disease)     Erectile dysfunction of non-organic origin     Hematuria     workup was negative and felt to be benign    Herpes zoster with complication     Hyperlipidemia     Hypertension     Impaired fasting glucose     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    Prostatic hypertrophy     benign    Stroke [facility])      Past Surgical History:   Procedure Laterality Date    ANKLE FRACTURE SURGERY Left     CARDIAC PACEMAKER PLACEMENT Left     CORONARY ARTERY BYPASS GRAFT       Social History   Social History     Substance and Sexual Activity   Alcohol Use Yes    Comment: social- per allscripts     Social History     Substance and Sexual Activity   Drug Use No     Social History     Tobacco Use   Smoking Status Former Smoker   Smokeless Tobacco Never Used       Family History:   Meds/Allergies     Allergies   Allergen Reactions    Penicillins Edema and Rash     Reaction Date: ; Category: Allergy; Annotation - [id number]: Severe reaction with hospitaization at [facility]       Objective   First Vitals:   Blood Pressure: 95/53 (19)  Pulse: 80 (19)  Temperature: 97 6 °F (36 4 °C) (19)  Temp Source: Oral (19)  Respirations: 18 (19)  Height: 5' 5" (165 1 cm) (19)  Weight - Scale: 80 4 kg (177 lb 4 8 oz) (19)  SpO2: 96 % (19)    Current Vitals:   Blood Pressure: 94/55 (19)  Pulse: 90 (19)  Temperature: (!) 97 3 °F (36 3 °C) (19)  Temp Source: Oral (19)  Respirations: 18 (19)  Height: 5' 5" (165 1 cm) (19)  Weight - Scale: 80 4 kg (177 lb 4 8 oz) (19)  SpO2: 100 % (19)      Intake/Output Summary (Last 24 hours) at 2019  Last data filed at 2019  Gross per 24 hour   Intake 500 ml   Output    Net 500 ml       Invasive Devices     Peripheral Intravenous Line            Peripheral IV 19 Right Forearm less than 1 day                      Medical Decision Makin  Discussed with Cardiology, Medicine  No further episodes of hematemesis while emergency department  Will admit for further evaluation  Patient did have drop in hemoglobin since last value      Recent Results (from the past 36 hour(s))   CBC and differential    Collection Time: 11/02/19  7:26 PM   Result Value Ref Range    WBC 14 28 (H) 4 31 - 10 16 Thousand/uL    RBC 3 24 (L) 3 88 - 5 62 Million/uL    Hemoglobin 9 8 (L) 12 0 - 17 0 g/dL    Hematocrit 30 5 (L) 36 5 - 49 3 %    MCV 94 82 - 98 fL    MCH 30 2 26 8 - 34 3 pg    MCHC 32 1 31 4 - 37 4 g/dL    RDW 13 4 11 6 - 15 1 %    MPV 10 2 8 9 - 12 7 fL    Platelets 205 577 - 529 Thousands/uL    nRBC 0 /100 WBCs    Neutrophils Relative 79 (H) 43 - 75 %    Immat GRANS % 1 0 - 2 %    Lymphocytes Relative 14 14 - 44 %    Monocytes Relative 5 4 - 12 %    Eosinophils Relative 0 0 - 6 %    Basophils Relative 1 0 - 1 %    Neutrophils Absolute 11 19 (H) 1 85 - 7 62 Thousands/µL    Immature Grans Absolute 0 17 0 00 - 0 20 Thousand/uL    Lymphocytes Absolute 2 06 0 60 - 4 47 Thousands/µL    Monocytes Absolute 0 70 0 17 - 1 22 Thousand/µL    Eosinophils Absolute 0 05 0 00 - 0 61 Thousand/µL    Basophils Absolute 0 11 (H) 0 00 - 0 10 Thousands/µL   Comprehensive metabolic panel    Collection Time: 11/02/19  7:26 PM   Result Value Ref Range    Sodium 137 136 - 145 mmol/L    Potassium 4 9 3 5 - 5 3 mmol/L    Chloride 102 100 - 108 mmol/L    CO2 22 21 - 32 mmol/L    ANION GAP 13 4 - 13 mmol/L    BUN 41 (H) 5 - 25 mg/dL    Creatinine 1 44 (H) 0 60 - 1 30 mg/dL    Glucose 186 (H) 65 - 140 mg/dL    Calcium 8 3 8 3 - 10 1 mg/dL    AST 24 5 - 45 U/L    ALT 32 12 - 78 U/L    Alkaline Phosphatase 75 46 - 116 U/L    Total Protein 6 5 6 4 - 8 2 g/dL    Albumin 3 3 (L) 3 5 - 5 0 g/dL    Total Bilirubin 0 52 0 20 - 1 00 mg/dL    eGFR 43 ml/min/1 73sq m   Troponin I    Collection Time: 11/02/19  7:26 PM   Result Value Ref Range    Troponin I 0 19 (H) <=0 04 ng/mL   Protime-INR    Collection Time: 11/02/19  7:26 PM   Result Value Ref Range    Protime 19 2 (H) 11 6 - 14 5 seconds    INR 1 67 (H) 0 84 - 1 19   APTT    Collection Time: 11/02/19  7:26 PM   Result Value Ref Range    PTT 27 23 - 37 seconds   Type and screen    Collection Time: 11/02/19  7:26 PM   Result Value Ref Range    ABO Grouping B     Rh Factor Positive     Antibody Screen Negative     Specimen Expiration Date [id number]      XR chest 2 views    (Results Pending)         Portions of the record may have been created with voice recognition software  Occasional wrong word or "sound a like" substitutions may have occurred due to the inherent limitations of voice recognition software          Critical Care Time  Procedures

## 2019-11-03 NOTE — SOCIAL WORK
Met with pt to discuss the role of CM and to discuss any help pt may need prior to dc  Pt lives alone in the [facility] apartments; 1st floor with no EDDIE  Pt performed ADL's indptly pta, pt states he will start uses a RW  He is currently borrowing a RW from a friend  Pt reports no hx of HHC  Pt has a hx of rehab at   No hx of mental health or D&A treatment  Pt's preferred pharmacy is [facility] on [address]  Pt's PCP is Dr Mervat Malloy  Contact: Isa Wahl (son/POA) [phone] or Lorena Thompson (friend) [phone]  Pt states Isa Wahl lives in [place] and Sudarshan Stock is local   Living will on chart  Pt states his sister or friend Sudarshan Stock will transport home at [place]  CM reviewed d/c planning process including the following: identifying help at home, patient preference for d/c planning needs, Discharge Lounge, [facility] Meds to Bed program, availability of treatment team to discuss questions or concerns patient and/or family may have regarding understanding medications and recognizing signs and symptoms once discharged  CM also encouraged patient to follow up with all recommended appointments after discharge  Patient advised of importance for patient and family to participate in managing patients medical well being  Patient/caregiver received discharge checklist  Content reviewed  Patient/caregiver encouraged to participate in discharge plan of care prior to discharge home

## 2019-11-03 NOTE — CONSULTS
Cardiology Consult H&P  Darion Minaya 80 y o  male MRN: [id number]  Unit/Bed#: -01 Encounter: [id number]      Physician Requesting Consult: Brianda Rebolledo MD  Reason for Consult: hematemesis in setting of triple anti-thrombotic therapy with recent MINNIE placement and history of AF    HPI  80 y o  male with PMH of ICM, HFrEF (35%), CAD s/p CABG (1999) and s/p MINNIE to SVG/OM (10/21/19) after an episode of sustained VT (10/20/19),  ICM (EF45%), HTN, CVA, HLD, AF/AFl who presents due to hematemesis  Hb on admission 9 8 down from 12 4 on 10/25/19 s/p 1 unit pRBC  Patient states that he felt nauseous and vomited bright red blood  Was a large amount blood, not food with a little bit extent  Has been taking his medications as prescribed has not has noticed dark stools  Never had a colonoscopy  Denies lightheadedness dizziness chest pain  PCP  Jr Mclain MD    Cardiologist  - Dr Adrianna Merrill    Prior Cardiac Imaging  - TTE (3/16/19): akinesis of midapical anterior, mid anteroseptal, apical inferior, apical septal, and apex  - EKG (10/19/19, 21:53): STD I, II, aVL, more pronounced V6  - EKG (5/30/19): SR, TWI I, aVL, V4-V6, STD in V6    Physical exam  Gen: multiple nevi across his torso and back  Psych: AOx3  Skin: intact  Cardiac: S1, S2, regular rate, no S3 or S4 appreciated  No murmurs  +2 PT, radial pulses  No peripheral edema No carotid bruits  Resp: CTABL  No crackles  MSK: 5/5 strength throughout muscle groups  Neuro: CN grossly intact  Sensory to light touch, pain, proprioception intact BL LE, UE  LN: no cervical LAD  Rheum: no joint deformities in UE or LE    A&P  80 y o  male with PMH of ICM, HFrEF (35%), CAD s/p CABG (1999) and s/p MINNIE to SVG/OM (10/21/19) after an episode of sustained VT (10/20/19) s/p dual champer ICD ([employer] 10/24/19),  ICM (EF45%), HTN, CVA, HLD, AF/AFl who presents due to hematemesis  Hb on admission 9 8 down from 12 4 on 10/25/19 s/p 1 unit pRBC      1 UGIB in setting of ICM s/p CABG (1999) and MINNIE to SVG/OM (10/21/19) and history of AF/AFl  - At home on eliquis for history of AF/AFl  On hold for now given UGIB requiring transfusion  Plan for EGD by GI Monday likely  Can interrogate device in meantime to evaluate for AF burden but he will require anticoagulation for this after deemed clinically appropriate by GI  Given his recent MINNIE placement to SVG/OM would ideally prefer to continue DAPT for minimum of 3 months at which point would continue on Plavix alone  - metop 50mg q12H at home  Would restart 12 5mg q12H if  Hold antihypertensives due to BP  - losartan 25mg daily on hold    2  Paroxysmal AF/AFl  - in SR on admission  - eliquis on hold due to GIB  - c/w amio 200mg  - home metop tartrate 50mg q12H  Would restart 125  mg q12H for now     3  VT due to ICM s/p dcICD ([employer]): c/w amio 200mg  4  HTN: as above  5  CVA: aspirin  6  HLD: statin      Please await attending physician final attestation  Velma Miner MD  - PGY-4 Cardiology Fellow  - Tiger text enabled    ----              Review of Systems  ROS as noted above, otherwise 12 point review of systems was performed and is negative       Historical Information   Past Medical History:   Diagnosis Date    Acute myocardial infarction ([address] )     Allergic rhinitis     Anxiety     Bimalleolar fracture of left ankle     CAD (coronary artery disease)     Erectile dysfunction of non-organic origin     Hematuria     workup was negative and felt to be benign    Herpes zoster with complication     Hyperlipidemia     Hypertension     Impaired fasting glucose     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    Prostatic hypertrophy     benign    Stroke [facility])      Past Surgical History:   Procedure Laterality Date    ANKLE FRACTURE SURGERY Left     CARDIAC PACEMAKER PLACEMENT Left     CORONARY ARTERY BYPASS GRAFT       Social History     Substance and Sexual Activity   Alcohol Use Yes    Comment: social- per allscripts Social History     Substance and Sexual Activity   Drug Use No     Social History     Tobacco Use   Smoking Status Former Smoker   Smokeless Tobacco Never Used     Family History:   Family History   Problem Relation Age of Onset    Cancer Mother     Hypertension Mother         essential    Pancreatic cancer Mother        Meds/Allergies   Hospital Medications:   Current Facility-Administered Medications   Medication Dose Route Frequency    amiodarone tablet 200 mg  200 mg Oral BID With Meals    aspirin (ECOTRIN LOW STRENGTH) EC tablet 81 mg  81 mg Oral Daily    atorvastatin (LIPITOR) tablet 40 mg  40 mg Oral After Dinner    clopidogrel (PLAVIX) tablet 75 mg  75 mg Oral Daily    co-enzyme Q-10 capsule 200 mg  200 mg Oral Daily    magnesium gluconate (MAGONATE) tablet 500 mg  500 mg Oral Daily    melatonin tablet 3 mg  3 mg Oral HS    nitroglycerin (NITROSTAT) SL tablet 0 4 mg  0 4 mg Sublingual Q5 Min PRN    ondansetron (ZOFRAN) injection 4 mg  4 mg Intravenous Q6H PRN    pantoprazole (PROTONIX) injection 40 mg  40 mg Intravenous Q12H [address]    potassium chloride (K-DUR,KLOR-CON) CR tablet 20 mEq  20 mEq Oral Daily    prednisoLONE acetate (PRED FORTE) 1 % ophthalmic suspension 1 drop  1 drop Both Eyes Q12H [address]     Home Medications:   Medications Prior to Admission   Medication    amiodarone 200 mg tablet    apixaban (ELIQUIS) 5 mg    aspirin (ECOTRIN LOW STRENGTH) 81 mg EC tablet    atorvastatin (LIPITOR) 40 mg tablet    clopidogrel (PLAVIX) 75 mg tablet    Coenzyme Q10 200 MG capsule    doxylamine (UNISON) 25 MG tablet    furosemide (LASIX) 20 mg tablet    gatifloxacin (ZYMAXID) 0 5 %    Glucosamine-Chondroit-Vit C-Mn (GLUCOSAMINE CHONDROITIN COMPLX) CAPS    losartan (COZAAR) 25 mg tablet    magnesium gluconate (MAGONATE) 500 mg tablet    melatonin 3 mg    metoprolol tartrate (LOPRESSOR) 100 mg tablet    nitroglycerin (NITROSTAT) 0 4 mg SL tablet    potassium chloride (KLOR-CON M20) 20 mEq tablet    prednisoLONE acetate (PRED FORTE) 1 % ophthalmic suspension    Probiotic Product (PROBIOTIC COLON SUPPORT) CAPS    selenium 200 mcg    traMADol (ULTRAM) 50 mg tablet       Allergies   Allergen Reactions    Penicillins Edema and Rash     Reaction Date: [id number]; Category: Allergy; Annotation - [id number]: Severe reaction with hospitaization at \[facility]\""       Objective   Vitals: Blood pressure 111/68, pulse 103, temperature 98 °F (36 7 °C), resp  rate 18, height 5' 5" (1 651 m), weight 80 3 kg (177 lb), SpO2 98 %  Orthostatic Blood Pressures      Most Recent Value   Blood Pressure  111/68 filed at 11/03/2019 0719   Patient Position - Orthostatic VS  Lying filed at 11/03/2019 0203            Intake/Output Summary (Last 24 hours) at 11/3/2019 0743  Last data filed at 11/3/2019 0600  Gross per 24 hour   Intake 850 ml   Output 310 ml   Net 540 ml       Invasive Devices     Peripheral Intravenous Line            Peripheral IV 11/03/19 Right Forearm less than 1 day                Physical Exam    Lab Results: I have personally reviewed pertinent lab results      Results from last 7 days   Lab Units 11/03/19  0220 11/02/19  2341 11/02/19  1926   WBC Thousand/uL  --   --  14 28*   HEMOGLOBIN g/dL 9 3* 8 2* 9 8*   HEMATOCRIT % 28 2* 25 3* 30 5*   PLATELETS Thousands/uL  --   --  294     Results from last 7 days   Lab Units 11/03/19  0455 11/02/19  1926   POTASSIUM mmol/L 4 8 4 9   CHLORIDE mmol/L 108 102   CO2 mmol/L 22 22   BUN mg/dL 64* 41*   CREATININE mg/dL 1 28 1 44*   CALCIUM mg/dL 8 1* 8 3     Results from last 7 days   Lab Units 11/02/19  1926   INR  1 67*   PTT seconds 27     Results from last 7 days   Lab Units 11/03/19  0455   MAGNESIUM mg/dL 1 8

## 2019-11-03 NOTE — ASSESSMENT & PLAN NOTE
· Troponin 0 19 on admission, patient without chest pain/pressure in EKG nonischemic  · Suspect type 2 NSTEMI in the setting of recent cardiac procedures and also acute blood loss anemia  · Will continue to trend troponin in the meantime

## 2019-11-03 NOTE — ASSESSMENT & PLAN NOTE
· Troponin 0 19 on admission, patient without chest pain/pressure in EKG nonischemic  · Suspect type 2 NSTEMI in the setting of recent cardiac procedures and also acute blood loss anemia  · Troponin 0 19  0 16, 0 17-

## 2019-11-03 NOTE — ASSESSMENT & PLAN NOTE
· S/P prior CABG and S/P MINNIE to OM1 10/21/2019  · Currently chest pain-free  · Will continue ASA, Plavix in setting of recent placement of MINNIE    Cardiology evaluation appreciated  · Continue beta-blocker with hold parameters  · Continue statin  · Patient was on triple therapy as an outpatient

## 2019-11-03 NOTE — ASSESSMENT & PLAN NOTE
· Recently discharged from [facility] rehab following recent hospitalization  · Will have PT/OT work with patient while inpatient

## 2019-11-03 NOTE — ASSESSMENT & PLAN NOTE
· POA, baseline appears 1-1 1  · Received 500 cc normal saline in ED, suspect some degree from hypovolemia however patient's blood pressure is also borderline  · Creatinine is 1 24 today, losartan and diuretics on hold  · Avoid nephrotoxins and initiate hold parameters for metoprolol

## 2019-11-03 NOTE — CONSULTS
Consultation - [address] [facility]  Ayan Porter 80 y o  male MRN: [id number]  Unit/Bed#: -01 Encounter: [id number]              Inpatient consult to gastroenterology     Performed by  Harjeet Dia MD     Authorized by Adriane Trevino DO              Reason for Consult / Principal Problem:     Hematemesis      ASSESSMENT AND PLAN:      Hematemesis  80-year-old male patient on chronic anticoagulation with Eliquis for AFib, recent PCI with a drug-eluting stent placement on aspirin and Plavix presenting with 2 episodes of hematemesis, currently on Plavix and aspirin , Eliquis is on hold  Patient denies any prior history of GI bleeding in the past, denies any recent history of vomiting or retching other than the episode of hematemesis  No recent endoscopic procedures  Last bowel movement was 2 days ago and was normal  BUN on admission was 41 elevated from his baseline around 20  Patient requires upper endoscopy, currently blood pressure stable, mildly tachycardic, will plan for EGD tomorrow unless he develops another episode of hematemesis  Continue PPI b i d  Will start clear liquid diet    ______________________________________________________________________    HPI:  80-year-old male patient with past medical history significant for ischemic cardiomyopathy, chronic AFib on anticoagulation with Eliquis status post recent ICD placement 1 week ago, CAD status post CABG in 1999, recent PCI 2 weeks ago with drug-eluting stent placement currently on aspirin and Plavix    The patient had an episode of vomiting with blood in it yesterday and the patient came to the hospital to be evaluated, while at the hospital the patient had another episode of hematemesis, the patient states his last bowel movement was about 2 days ago and was normal   During admission the patient was found to have decreased hemoglobin from his baseline, after he had a 2nd episode of hematemesis the patient was transfused 1 unit of blood, no more episode of hematemesis since admission, currently he denies nausea or vomiting, no abdominal pain, he is passing gas but no bowel movements      REVIEW OF SYSTEMS:    CONSTITUTIONAL: Denies any fever, chills, rigors, and weight loss  HEENT: No earache or tinnitus  Denies hearing loss or visual disturbances  CARDIOVASCULAR: No chest pain or palpitations  RESPIRATORY: Denies any cough, hemoptysis, shortness of breath or dyspnea on exertion  GASTROINTESTINAL: As noted in the History of Present Illness  GENITOURINARY: No problems with urination  Denies any hematuria or dysuria  NEUROLOGIC: No dizziness or vertigo, denies headaches  MUSCULOSKELETAL: Denies any muscle or joint pain  SKIN: Denies skin rashes or itching  ENDOCRINE: Denies excessive thirst  Denies intolerance to heat or cold  PSYCHOSOCIAL: Denies depression or anxiety  Denies any recent memory loss         Historical Information   Past Medical History:   Diagnosis Date    Acute myocardial infarction ([facility] [address] )     Allergic rhinitis     Anxiety     Bimalleolar fracture of left ankle     CAD (coronary artery disease)     Erectile dysfunction of non-organic origin     Hematuria     workup was negative and felt to be benign    Herpes zoster with complication     Hyperlipidemia     Hypertension     Impaired fasting glucose     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    Prostatic hypertrophy     benign    Stroke [facility])      Past Surgical History:   Procedure Laterality Date    ANKLE FRACTURE SURGERY Left     CARDIAC PACEMAKER PLACEMENT Left     CORONARY ARTERY BYPASS GRAFT       Social History   Social History     Substance and Sexual Activity   Alcohol Use Yes    Comment: social- per allscripts     Social History     Substance and Sexual Activity   Drug Use No     Social History     Tobacco Use   Smoking Status Former Smoker   Smokeless Tobacco Never Used     Family History   Problem Relation Age of Onset    Cancer Mother     Hypertension Mother         essential    Pancreatic cancer Mother        Meds/Allergies     Medications Prior to Admission   Medication    amiodarone 200 mg tablet    apixaban (ELIQUIS) 5 mg    aspirin (ECOTRIN LOW STRENGTH) 81 mg EC tablet    atorvastatin (LIPITOR) 40 mg tablet    clopidogrel (PLAVIX) 75 mg tablet    Coenzyme Q10 200 MG capsule    doxylamine (UNISON) 25 MG tablet    furosemide (LASIX) 20 mg tablet    gatifloxacin (ZYMAXID) 0 5 %    Glucosamine-Chondroit-Vit C-Mn (GLUCOSAMINE CHONDROITIN COMPLX) CAPS    losartan (COZAAR) 25 mg tablet    magnesium gluconate (MAGONATE) 500 mg tablet    melatonin 3 mg    metoprolol tartrate (LOPRESSOR) 100 mg tablet    nitroglycerin (NITROSTAT) 0 4 mg SL tablet    potassium chloride (KLOR-CON M20) 20 mEq tablet    prednisoLONE acetate (PRED FORTE) 1 % ophthalmic suspension    Probiotic Product (PROBIOTIC COLON SUPPORT) CAPS    selenium 200 mcg    traMADol (ULTRAM) 50 mg tablet     Current Facility-Administered Medications   Medication Dose Route Frequency    amiodarone tablet 200 mg  200 mg Oral BID With Meals    aspirin (ECOTRIN LOW STRENGTH) EC tablet 81 mg  81 mg Oral Daily    atorvastatin (LIPITOR) tablet 40 mg  40 mg Oral After Dinner    clopidogrel (PLAVIX) tablet 75 mg  75 mg Oral Daily    co-enzyme Q-10 capsule 200 mg  200 mg Oral Daily    magnesium gluconate (MAGONATE) tablet 500 mg  500 mg Oral Daily    melatonin tablet 3 mg  3 mg Oral HS    nitroglycerin (NITROSTAT) SL tablet 0 4 mg  0 4 mg Sublingual Q5 Min PRN    ondansetron (ZOFRAN) injection 4 mg  4 mg Intravenous Q6H PRN    pantoprazole (PROTONIX) injection 40 mg  40 mg Intravenous Q12H [address]    potassium chloride (K-DUR,KLOR-CON) CR tablet 20 mEq  20 mEq Oral Daily    prednisoLONE acetate (PRED FORTE) 1 % ophthalmic suspension 1 drop  1 drop Both Eyes Q12H [address]       Allergies   Allergen Reactions    Penicillins Edema and Rash     Reaction Date: [id number]; Category: Allergy; Annotation - [id number]: Severe reaction with hospitaization at [facility]           Objective     Blood pressure 111/68, pulse 103, temperature 98 °F (36 7 °C), temperature source Oral, resp  rate 18, height 5' 5" (1 651 m), weight 80 3 kg (177 lb), SpO2 98 %  Body mass index is 29 45 kg/m²  Intake/Output Summary (Last 24 hours) at 11/3/2019 1009  Last data filed at 11/3/2019 0719  Gross per 24 hour   Intake 850 ml   Output 410 ml   Net 440 ml         PHYSICAL EXAM:      General Appearance:   Alert, cooperative, no distress   HEENT:   Normocephalic, atraumatic, anicteric  Neck:  Supple, symmetrical, trachea midline   Lungs:   Clear to auscultation bilaterally; no rales, rhonchi or wheezing; respirations unlabored    Heart[de-identified]   Regular rate and rhythm; no murmur, rub, or gallop     Abdomen:   Soft, non-tender, non-distended; normal bowel sounds; no masses, no organomegaly    Genitalia:   Deferred    Rectal:   Deferred    Extremities:  No cyanosis, clubbing or edema    Skin:  No jaundice, rashes, or lesions    Lymph nodes:  No palpable cervical lymphadenopathy          Lab Results:   Admission on 11/02/2019   Component Date Value    WBC 11/02/2019 14 28*    RBC 11/02/2019 3 24*    Hemoglobin 11/02/2019 9 8*    Hematocrit 11/02/2019 30 5*    MCV 11/02/2019 94     MCH 11/02/2019 30 2     MCHC 11/02/2019 32 1     RDW 11/02/2019 13 4     MPV 11/02/2019 10 2     Platelets 92/94/6931 294     nRBC 11/02/2019 0     Neutrophils Relative 11/02/2019 79*    Immat GRANS % 11/02/2019 1     Lymphocytes Relative 11/02/2019 14     Monocytes Relative 11/02/2019 5     Eosinophils Relative 11/02/2019 0     Basophils Relative 11/02/2019 1     Neutrophils Absolute 11/02/2019 11 19*    Immature Grans Absolute 11/02/2019 0 17     Lymphocytes Absolute 11/02/2019 2 06     Monocytes Absolute 11/02/2019 0 70     Eosinophils Absolute 11/02/2019 0 05     Basophils Absolute 11/02/2019 0 11*  Sodium 11/02/2019 137     Potassium 11/02/2019 4 9     Chloride 11/02/2019 102     CO2 11/02/2019 22     ANION GAP 11/02/2019 13     BUN 11/02/2019 41*    Creatinine 11/02/2019 1 44*    Glucose 11/02/2019 186*    Calcium 11/02/2019 8 3     AST 11/02/2019 24     ALT 11/02/2019 32     Alkaline Phosphatase 11/02/2019 75     Total Protein 11/02/2019 6 5     Albumin 11/02/2019 3 3*    Total Bilirubin 11/02/2019 0 52     eGFR 11/02/2019 43     Troponin I 11/02/2019 0 19*    Protime 11/02/2019 19 2*    INR 11/02/2019 1 67*    PTT 11/02/2019 27     ABO Grouping 11/02/2019 B     Rh Factor 11/02/2019 Positive     Antibody Screen 11/02/2019 Negative     Specimen Expiration Date 11/02/2019 [id number]     Hemoglobin 11/02/2019 8 2*    Hematocrit 11/02/2019 25 3*    Troponin I 11/02/2019 0 16*    Troponin I 11/03/2019 0 17*    Hemoglobin 11/03/2019 9 3*    Hematocrit 11/03/2019 28 2*    Unit Product Code 11/03/2019 [id number]     Unit Number 11/03/2019 [id number]     Unit ABO 11/03/2019 B     Unit RH 11/03/2019 NEG     Crossmatch 11/03/2019 Compatible     Unit Dispense Status 11/03/2019 Presumed Trans     Unit Product Code 11/03/2019 [id number]     Unit Number 11/03/2019 [id number]     Unit ABO 11/03/2019 B     Unit RH 11/03/2019 POS     Crossmatch 11/03/2019 Compatible     Unit Dispense Status 11/03/2019 Crossmatched     Sodium 11/03/2019 139     Potassium 11/03/2019 4 8     Chloride 11/03/2019 108     CO2 11/03/2019 22     ANION GAP 11/03/2019 9     BUN 11/03/2019 64*    Creatinine 11/03/2019 1 28     Glucose 11/03/2019 154*    Calcium 11/03/2019 8 1*    eGFR 11/03/2019 50     Magnesium 11/03/2019 1 8     Vitamin B-12 11/03/2019 568     Folate 11/03/2019 >20 0*    Iron Saturation 11/03/2019 58     TIBC 11/03/2019 219*    Iron 11/03/2019 126     Ferritin 11/03/2019 169        Imaging Studies: I have personally reviewed pertinent imaging studies

## 2019-11-03 NOTE — ASSESSMENT & PLAN NOTE
· POA, baseline appears 1-1 1  · Received 500 cc normal saline in ED, suspect some degree from hypovolemia however patient's blood pressure is also borderline  · Additionally will check PVR and bladder scan  · Recheck BMP in a m    · Avoid nephrotoxins and initiate hold parameters for metoprolol

## 2019-11-03 NOTE — ASSESSMENT & PLAN NOTE
· Recently discharged from [facility] rehab following recent hospitalization  · Will have PT/OT work with patient while inpatient

## 2019-11-03 NOTE — ASSESSMENT & PLAN NOTE
· With hemoglobin drop from 12 4 10/25/2018 to 9 8 this evening, suspect in setting of acute GI bleeding as above  · Will additionally check FOBT, iron panel, vitamin B12, folate  · Trend hemoglobin q 8 hours  · Will transfuse for hemoglobin less than 8 or symptomatic

## 2019-11-03 NOTE — PROGRESS NOTES
Progress Note - Sam Ferreira 9/1/1932, 80 y o  male MRN: [id number]    Unit/Bed#: -01 Encounter: [id number]    Primary Care Provider: Clinton Sanchez MD   Date and time admitted to hospital: 11/2/2019  6:53 PM        * Hematemesis  Assessment & Plan  · Presented after episode of hematemesis this evening  Had hemoglobin drop 12 4 10/25/2019 -> 9 8 at the time of presentation  · Last hemoglobin is 8 5  · Suspect upper GI source, reports history of ulcers  Also vas on ASA/Plavix/Eliquis  · Aspirin and Plavix continued per discussion with the Cardiology  Cardiology consultation appreciated  · GI evaluation appreciated, plan for EGD noted for tomorrow  Currently on PPI drip  · Monitor hemoglobin and transfuse as needed    Acute blood loss anemia  Assessment & Plan  · With hemoglobin drop from 12 4 10/25/2018 to 9 8 the time of presentation, suspect in setting of acute GI bleeding as above  · Will additionally check FOBT, iron panel, vitamin B12, folate  · Trend hemoglobin q 8 hours  · Will transfuse for hemoglobin less than 8 or symptomatic  · GI evaluation appreciated    Plan for EGD noted for tomorrow    Physical deconditioning  Assessment & Plan  · Recently discharged from [address] [place] [address] following recent hospitalization  · Will have PT/OT work with patient while inpatient    Acute kidney injury ([facility] [address] )  Assessment & Plan  · POA, baseline appears 1-1 1  · Received 500 cc normal saline in ED, suspect some degree from hypovolemia however patient's blood pressure is also borderline  · Creatinine is 1 24 today, losartan and diuretics on hold  · Avoid nephrotoxins and initiate hold parameters for metoprolol    Elevated troponin  Assessment & Plan  · Troponin 0 19 on admission, patient without chest pain/pressure in EKG nonischemic  · Suspect type 2 NSTEMI in the setting of recent cardiac procedures and also acute blood loss anemia  · Troponin 0 19  0 16, 0 17-    Ischemic cardiomyopathy  Assessment & Plan  · EF 10/20/2019 35%  · Currently appears euvolemic  · Monitor daily weights, I/O  · Holding diuretic in setting of acute kidney injury    Typical atrial flutter (HCC)  Assessment & Plan  · S/P dual chamber ICD placement 10/24/2019  · Rate controlled on metoprolol tartrate and will continue with strict hold parameters  · Normally anticoagulated on Eliquis however will hold in setting of acute GI bleeding  · Will discussed with Cardiology regarding anticoagulation and also antiplatelet therapy    CAD (coronary artery disease)  Assessment & Plan  · S/P prior CABG and S/P MINNIE to OM1 10/21/2019  · Currently chest pain-free  · Will continue ASA, Plavix in setting of recent placement of MINNIE  Cardiology evaluation appreciated  · Continue beta-blocker with hold parameters  · Continue statin  · Patient was on triple therapy as an outpatient      VTE Pharmacologic Prophylaxis:    Pharmacologic: Pharmacologic VTE Prophylaxis contraindicated due to GI bleed  Mechanical VTE Prophylaxis in Place: Yes    Patient Centered Rounds: I have performed bedside rounds with nursing staff today  Discussions with Specialists or Other Care Team Provider:  Gastroenterology    Education and Discussions with Family / Patient:  Son updated over the phone    Time Spent for Care: 30 minutes  More than 50% of total time spent on counseling and coordination of care as described above  Current Length of Stay: 1 day(s)    Current Patient Status: Inpatient   Certification Statement: The patient will continue to require additional inpatient hospital stay due to Pending EGD    Discharge Plan:     Code Status: Level 1 - Full Code      Subjective:   Patient seen and examined  Since last night no further hematemesis  Evaluated the patient along with the Gastroenterology and plan for EGD for tomorrow noted  Cardiology evaluation appreciated    Currently on aspirin and Plavix given recent stents    Objective:     Vitals:   Temp (24hrs), Av 2 °F (36 8 °C), Min:97 3 °F (36 3 °C), Max:98 9 °F (37 2 °C)    Temp:  [97 3 °F (36 3 °C)-98 9 °F (37 2 °C)] 97 8 °F (36 6 °C)  HR:  [] 89  Resp:  [17-22] 17  BP: ()/() 113/65  SpO2:  [93 %-100 %] 94 %  Body mass index is 29 45 kg/m²  Input and Output Summary (last 24 hours): Intake/Output Summary (Last 24 hours) at 11/3/2019 1618  Last data filed at 11/3/2019 1345  Gross per 24 hour   Intake 1090 ml   Output 510 ml   Net 580 ml       Physical Exam:     Physical Exam   Constitutional: He appears well-developed  No distress  HENT:   Head: Normocephalic and atraumatic  Eyes: Right eye exhibits no discharge  Left eye exhibits no discharge  Neck: No JVD present  Cardiovascular: Normal rate  No murmur heard  Pacemaker insertion site appears C/D/I   Pulmonary/Chest: Effort normal  No respiratory distress  Abdominal: Soft  Bowel sounds are normal  He exhibits no distension  Musculoskeletal: Normal range of motion  He exhibits no edema  Neurological: He is alert  No cranial nerve deficit  Skin: Skin is warm  Additional Data:     Labs:    Results from last 7 days   Lab Units 11/03/19  1129  11/02/19  1926   WBC Thousand/uL  --   --  14 28*   HEMOGLOBIN g/dL 8 5*   < > 9 8*   HEMATOCRIT % 24 5*   < > 30 5*   PLATELETS Thousands/uL  --   --  294   NEUTROS PCT %  --   --  79*   LYMPHS PCT %  --   --  14   MONOS PCT %  --   --  5   EOS PCT %  --   --  0    < > = values in this interval not displayed  Results from last 7 days   Lab Units 11/03/19  0455 11/02/19  1926   POTASSIUM mmol/L 4 8 4 9   CHLORIDE mmol/L 108 102   CO2 mmol/L 22 22   BUN mg/dL 64* 41*   CREATININE mg/dL 1 28 1 44*   CALCIUM mg/dL 8 1* 8 3   ALK PHOS U/L  --  75   ALT U/L  --  32   AST U/L  --  24     Results from last 7 days   Lab Units 11/02/19 1926   INR  1 67*       * I Have Reviewed All Lab Data Listed Above  * Additional Pertinent Lab Tests Reviewed:  [address] Admission Reviewed    Imaging:    Imaging Reports Reviewed Today Include:   Imaging Personally Reviewed by Myself Includes:      Recent Cultures (last 7 days):           Last 24 Hours Medication List:     Current Facility-Administered Medications:  amiodarone 200 mg Oral BID With Meals Chino Nair, DO   aspirin 81 mg Oral Daily Chino Nair, DO   atorvastatin 40 mg Oral After Marella Keys, DO   clopidogrel 75 mg Oral Daily Chino Nair, DO   co-enzyme Q-10 200 mg Oral Daily Chino Nair, DO   magnesium gluconate 500 mg Oral Daily Chino Nair, DO   melatonin 3 mg Oral HS Chino Nair, DO   metoprolol tartrate 25 mg Oral Q12H [facility], DO   nitroglycerin 0 4 mg Sublingual Q5 Min PRN Chino Nair, DO   ondansetron 4 mg Intravenous Q6H PRN Chino Nair, DO   pantoprazole 40 mg Intravenous Q12H [address] Chino Nair, DO   potassium chloride 20 mEq Oral Daily Chino Nair, DO   prednisoLONE acetate 1 drop Both Eyes Q12H [facility] [facility]        Today, Patient Was Seen By: Wesly Horton MD    ** Please Note: Dictation voice to text software may have been used in the creation of this document   **

## 2019-11-03 NOTE — UTILIZATION REVIEW
Initial Clinical Review    Admission: Date/Time/Statement: Inpatient Admission Orders (From admission, onward)     Ordered        11/02/19 2120  Inpatient Admission (expected length of stay for this patient Order details is greater than two midnights)  Once                   Orders Placed This Encounter   Procedures    Inpatient Admission (expected length of stay for this patient Order details is greater than two midnights)     Standing Status:   Standing     Number of Occurrences:   1     Order Specific Question:   Admitting Physician     Answer:   Deloris Greenberg [[id number]]     Order Specific Question:   Level of Care     Answer:   Med Surg [16]     Order Specific Question:   Estimated length of stay     Answer:   More than 2 Midnights     Order Specific Question:   Certification     Answer:   I certify that inpatient services are medically necessary for this patient for a duration of greater than two midnights  See H&P and MD Progress Notes for additional information about the patient's course of treatment  ED Arrival Information     Expected Arrival Acuity Means of Arrival Escorted By Service Admission Type    - 11/2/2019 18:52 Urgent Ambulance [facility] EMS Hospitalist Urgent    Arrival Complaint    pace maker        Chief Complaint   Patient presents with    Vomiting Blood     recently discharged for pacemaker placement  States he had abd pain following by vomitting bright red blood  Per EMS, Son is a doctor and states "he is taking too many blood thinners"  Assessment/Plan: 79 y/o male with Pmhx significant for ischemic cardiomyopathy eith EF 45%, atrial flutter on Eliquis s/p dual chamber ICD placement 10/24/19 and CAD S/P CABG in 1999 and with MINNIE to SVG of OM1 10/21/2019  Recently admitted with V-tach found to have further ischemic disease with procedures  This AM had episode of vomiting with apparent large amount of blood in vomitus    Reports last BM was 10/31 and denies melena or hematochezia  In ED Hgb 9 8, down from 12 4 on 10/25  Admit inpatient with Hematemesis  Tele monitoring, hold Eliquis, npo, trend hemoglobin, continue po meds, consult GI    11/3 -- GI consult --   80-year-old male patient on chronic anticoagulation with Eliquis for AFib, recent PCI with a drug-eluting stent placement on aspirin and Plavix presenting with 2 episodes of hematemesis, currently on Plavix and aspirin , Eliquis is on hold  Patient denies any prior history of GI bleeding in the past, denies any recent history of vomiting or retching other than the episode of hematemesis  No recent endoscopic procedures  Last bowel movement was 2 days ago and was normal  BUN on admission was 41 elevated from his baseline around 20  Patient requires upper endoscopy, currently blood pressure stable, mildly tachycardic, will plan for EGD tomorrow unless he develops another episode of hematemesis  Continue PPI b i d  Will start clear liquid diet    11/3 -- cardiologyy consult -- His dual anti-platelet therapy is being continued given his recent stent  His Eliquis is on hold  He is scheduled to undergo an EGD tomorrow  We will await those results  Moving forward I would suggest discontinuation of aspirin and continuation of Plavix and Eliquis which can be restarted when deemed appropriate by GI after his procedure  Otherwise he continues on oral amiodarone in the setting of his recent ventricular tachycardia  Will resume his oral beta-blocker      ED Triage Vitals [11/02/19 1908]   Temperature Pulse Respirations Blood Pressure SpO2   97 6 °F (36 4 °C) 80 18 95/53 96 %      Temp Source Heart Rate Source Patient Position - Orthostatic VS BP Location FiO2 (%)   Oral Monitor Lying Left arm --      Pain Score       No Pain        Wt Readings from Last 1 Encounters:   11/03/19 80 3 kg (177 lb)     Additional Vital Signs:   Date/Time  Temp  Pulse  Resp  BP  MAP (mmHg)  SpO2  O2 Device   11/03/19 15:15:15  97 8 °F (36 6 °C) 89  17  113/65  81  94 %     11/03/19 07:19:29  98 °F (36 7 °C)  103  18  111/68  82  98 %  None (Room air)   11/03/19 04:28:28  98 1 °F (36 7 °C)  102    115/70  85  96 %     11/03/19 0400              None (Room air)   11/03/19 02:45:41    109Abnormal   20  93/64  74  97 %     11/03/19 02:03:50  98 9 °F (37 2 °C)  95  20  114/78  90  98 %  None (Room air)   11/03/19 01:31:37  98 8 °F (37 1 °C)  95  20  130/104Abnormal   113  96 %  None (Room air)   11/03/19 0130  98 5 °F (36 9 °C)  97  20  90/60     97 %  None (Room air)   11/03/19 0103  98 1 °F (36 7 °C)  95  20  105/64  78  96 %  None (Room air)   11/03/19 00:47:05  98 5 °F (36 9 °C)  90  20  108/64  79  95 %  None (Room air)   11/03/19 00:24:24  98 2 °F (36 8 °C)  89  22  93/60  71  93 %     11/03/19 0000              None (Room air)   11/02/19 2344        78/50Abnormal          11/02/19 23:40:47    89    75/49Abnormal   58  97 %     11/02/19 22:05:08  97 3 °F (36 3 °C)Abnormal   90    94/55  68  100 %     11/02/19 2030    77  18  90/57    97 %  None (Room air)   11/02/19 1908  97 6 °F (36 4 °C)  80  18  95/53    96 %  None (Room air)       Pertinent Labs/Diagnostic Test Results:   Results from last 7 days   Lab Units 11/03/19  1648 11/03/19  1129 11/03/19  0220 11/02/19  2341 11/02/19  1926   WBC Thousand/uL  --   --   --   --  14 28*   HEMOGLOBIN g/dL 8 9* 8 5* 9 3* 8 2* 9 8*   HEMATOCRIT % 26 6* 24 5* 28 2* 25 3* 30 5*   PLATELETS Thousands/uL  --   --   --   --  294   NEUTROS ABS Thousands/µL  --   --   --   --  11 19*     Results from last 7 days   Lab Units 11/03/19  0455 11/02/19  1926   SODIUM mmol/L 139 137   POTASSIUM mmol/L 4 8 4 9   CHLORIDE mmol/L 108 102   CO2 mmol/L 22 22   ANION GAP mmol/L 9 13   BUN mg/dL 64* 41*   CREATININE mg/dL 1 28 1 44*   EGFR ml/min/1 73sq m 50 43   CALCIUM mg/dL 8 1* 8 3   MAGNESIUM mg/dL 1 8  --      Results from last 7 days   Lab Units 11/02/19  1926   AST U/L 24   ALT U/L 32   ALK PHOS U/L 75   TOTAL PROTEIN g/dL 6 5   ALBUMIN g/dL 3 3*   TOTAL BILIRUBIN mg/dL 0 52     Results from last 7 days   Lab Units 11/03/19  0455 11/02/19  1926   GLUCOSE RANDOM mg/dL 154* 186*     Results from last 7 days   Lab Units 11/03/19  0220 11/02/19  2341 11/02/19  1926   TROPONIN I ng/mL 0 17* 0 16* 0 19*     Results from last 7 days   Lab Units 11/02/19  1926   PROTIME seconds 19 2*   INR  1 67*   PTT seconds 27     Results from last 7 days   Lab Units 11/03/19  0455   FERRITIN ng/mL 169     ED Treatment:   Medication Administration from 11/02/2019 1852 to 11/02/2019 2158       Date/Time Order Dose Route Action     11/02/2019 1953 sodium chloride 0 9 % bolus 500 mL 500 mL Intravenous New Bag     Past Medical History:   Diagnosis Date    Acute myocardial infarction ([facility] )     Allergic rhinitis     Anxiety     Bimalleolar fracture of left ankle     CAD (coronary artery disease)     Erectile dysfunction of non-organic origin     Hematuria     workup was negative and felt to be benign    Herpes zoster with complication     Hyperlipidemia     Hypertension     Impaired fasting glucose     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    Prostatic hypertrophy     benign    Stroke [facility])      Present on Admission:   Hematemesis   Acute kidney injury ([facility] )   Elevated troponin   CAD (coronary artery disease)   Ischemic cardiomyopathy   Typical atrial flutter (HCC)   Physical deconditioning   Acute blood loss anemia      Admitting Diagnosis: Vomiting blood [K92 0]  Anemia [D64 9]  JAMEL (acute kidney injury) ([facility] ) [N17 9]  Hematemesis without nausea [K92 0]  Coronary artery disease involving native coronary artery of native heart without angina pectoris [I25 10]  Age/Sex: 80 y o  male  Admission Orders:  Scheduled Medications:    Medications:  amiodarone 200 mg Oral BID With Meals   aspirin 81 mg Oral Daily   atorvastatin 40 mg Oral After Dinner   clopidogrel 75 mg Oral Daily   co-enzyme Q-10 200 mg Oral Daily   magnesium gluconate 500 mg Oral Daily   melatonin 3 mg Oral HS   metoprolol tartrate 25 mg Oral Q12H TONJA   potassium chloride 20 mEq Oral Daily   prednisoLONE acetate 1 drop Both Eyes Q12H [facility]     Continuous IV Infusions:    pantoprozole (PROTONIX) infusion (Continuous) 8 mg/hr Intravenous Continuous     PRN Meds:  nitroglycerin 0 4 mg Sublingual Q5 Min PRN   ondansetron 4 mg Intravenous Q6H PRN       IP CONSULT TO CARDIOLOGY  IP CONSULT TO GASTROENTEROLOGY    Network Utilization Review Department  [email or web address]  ATTENTION: Please call with any questions or concerns to [phone] and carefully listen to the prompts so that you are directed to the right person  All voicemails are confidential   Boris Cortes all requests for admission clinical reviews, approved or denied determinations and any other requests to dedicated fax number below belonging to the campus where the patient is receiving treatment    FACILITY NAME UR FAX NUMBER   ADMISSION DENIALS (Administrative/Medical Necessity) [address] [facility] (Maternity/NICU/Pediatrics) [phone]   [facility] [address] [facility] [phone]   [address] [facility] 15[address] [facility] [phone]   Janann Men [address] [phone]

## 2019-11-03 NOTE — ASSESSMENT & PLAN NOTE
· S/P dual chamber ICD placement 10/24/2019  · Rate controlled on metoprolol tartrate and will continue with strict hold parameters  · Normally anticoagulated on Eliquis however will hold in setting of acute GI bleeding  · Will discussed with Cardiology regarding anticoagulation and also antiplatelet therapy

## 2019-11-03 NOTE — ASSESSMENT & PLAN NOTE
· With hemoglobin drop from 12 4 10/25/2018 to 9 8 the time of presentation, suspect in setting of acute GI bleeding as above  · Will additionally check FOBT, iron panel, vitamin B12, folate  · Trend hemoglobin q 8 hours  · Will transfuse for hemoglobin less than 8 or symptomatic  · GI evaluation appreciated    Plan for EGD noted for tomorrow

## 2019-11-04 ENCOUNTER — PREP FOR PROCEDURE (OUTPATIENT)
Dept: GASTROENTEROLOGY | Facility: CLINIC | Age: 84
End: 2019-11-04

## 2019-11-04 ENCOUNTER — ANESTHESIA EVENT (INPATIENT)
Dept: GASTROENTEROLOGY | Facility: HOSPITAL | Age: 84
DRG: 377 | End: 2019-11-04
Payer: COMMERCIAL

## 2019-11-04 ENCOUNTER — APPOINTMENT (INPATIENT)
Dept: GASTROENTEROLOGY | Facility: HOSPITAL | Age: 84
DRG: 377 | End: 2019-11-04
Payer: COMMERCIAL

## 2019-11-04 ENCOUNTER — ANESTHESIA (INPATIENT)
Dept: GASTROENTEROLOGY | Facility: HOSPITAL | Age: 84
DRG: 377 | End: 2019-11-04
Payer: COMMERCIAL

## 2019-11-04 DIAGNOSIS — K27.9 PUD (PEPTIC ULCER DISEASE): Primary | ICD-10-CM

## 2019-11-04 LAB
HCT VFR BLD AUTO: 23.6 % (ref 36.5–49.3)
HCT VFR BLD AUTO: 23.7 % (ref 36.5–49.3)
HGB BLD-MCNC: 7.6 G/DL (ref 12–17)
HGB BLD-MCNC: 7.9 G/DL (ref 12–17)

## 2019-11-04 PROCEDURE — 30233N1 TRANSFUSION OF NONAUTOLOGOUS RED BLOOD CELLS INTO PERIPHERAL VEIN, PERCUTANEOUS APPROACH: ICD-10-PCS | Performed by: FAMILY MEDICINE

## 2019-11-04 PROCEDURE — 85018 HEMOGLOBIN: CPT | Performed by: FAMILY MEDICINE

## 2019-11-04 PROCEDURE — 43235 EGD DIAGNOSTIC BRUSH WASH: CPT | Performed by: INTERNAL MEDICINE

## 2019-11-04 PROCEDURE — 99024 POSTOP FOLLOW-UP VISIT: CPT | Performed by: INTERNAL MEDICINE

## 2019-11-04 PROCEDURE — 99232 SBSQ HOSP IP/OBS MODERATE 35: CPT | Performed by: FAMILY MEDICINE

## 2019-11-04 PROCEDURE — G8987 SELF CARE CURRENT STATUS: HCPCS

## 2019-11-04 PROCEDURE — 85014 HEMATOCRIT: CPT | Performed by: FAMILY MEDICINE

## 2019-11-04 PROCEDURE — 97163 PT EVAL HIGH COMPLEX 45 MIN: CPT

## 2019-11-04 PROCEDURE — 85014 HEMATOCRIT: CPT | Performed by: INTERNAL MEDICINE

## 2019-11-04 PROCEDURE — 86677 HELICOBACTER PYLORI ANTIBODY: CPT | Performed by: INTERNAL MEDICINE

## 2019-11-04 PROCEDURE — 85018 HEMOGLOBIN: CPT | Performed by: INTERNAL MEDICINE

## 2019-11-04 PROCEDURE — C9113 INJ PANTOPRAZOLE SODIUM, VIA: HCPCS | Performed by: FAMILY MEDICINE

## 2019-11-04 PROCEDURE — 97166 OT EVAL MOD COMPLEX 45 MIN: CPT

## 2019-11-04 PROCEDURE — P9016 RBC LEUKOCYTES REDUCED: HCPCS

## 2019-11-04 PROCEDURE — G8978 MOBILITY CURRENT STATUS: HCPCS

## 2019-11-04 PROCEDURE — G8979 MOBILITY GOAL STATUS: HCPCS

## 2019-11-04 PROCEDURE — G8988 SELF CARE GOAL STATUS: HCPCS

## 2019-11-04 RX ORDER — PROPOFOL 10 MG/ML
INJECTION, EMULSION INTRAVENOUS CONTINUOUS PRN
Status: DISCONTINUED | OUTPATIENT
Start: 2019-11-04 | End: 2019-11-04 | Stop reason: SURG

## 2019-11-04 RX ORDER — PROPOFOL 10 MG/ML
INJECTION, EMULSION INTRAVENOUS AS NEEDED
Status: DISCONTINUED | OUTPATIENT
Start: 2019-11-04 | End: 2019-11-04 | Stop reason: SURG

## 2019-11-04 RX ORDER — FLUTICASONE PROPIONATE 50 MCG
1 SPRAY, SUSPENSION (ML) NASAL DAILY
Status: DISCONTINUED | OUTPATIENT
Start: 2019-11-04 | End: 2019-11-06 | Stop reason: HOSPADM

## 2019-11-04 RX ORDER — FUROSEMIDE 10 MG/ML
20 INJECTION INTRAMUSCULAR; INTRAVENOUS ONCE
Status: COMPLETED | OUTPATIENT
Start: 2019-11-04 | End: 2019-11-04

## 2019-11-04 RX ADMIN — METOPROLOL TARTRATE 25 MG: 25 TABLET, FILM COATED ORAL at 20:22

## 2019-11-04 RX ADMIN — AMIODARONE HYDROCHLORIDE 200 MG: 200 TABLET ORAL at 09:26

## 2019-11-04 RX ADMIN — Medication 200 MG: at 12:42

## 2019-11-04 RX ADMIN — PROPOFOL 100 MCG/KG/MIN: 10 INJECTION, EMULSION INTRAVENOUS at 10:33

## 2019-11-04 RX ADMIN — POTASSIUM CHLORIDE 20 MEQ: 1500 TABLET, EXTENDED RELEASE ORAL at 12:42

## 2019-11-04 RX ADMIN — ATORVASTATIN CALCIUM 40 MG: 40 TABLET, FILM COATED ORAL at 17:13

## 2019-11-04 RX ADMIN — FUROSEMIDE 20 MG: 10 INJECTION, SOLUTION INTRAMUSCULAR; INTRAVENOUS at 22:25

## 2019-11-04 RX ADMIN — PHENYLEPHRINE HYDROCHLORIDE 100 MCG: 10 INJECTION INTRAVENOUS at 10:40

## 2019-11-04 RX ADMIN — MELATONIN 3 MG: 3 TAB ORAL at 20:22

## 2019-11-04 RX ADMIN — PHENYLEPHRINE HYDROCHLORIDE 100 MCG: 10 INJECTION INTRAVENOUS at 10:38

## 2019-11-04 RX ADMIN — FLUTICASONE PROPIONATE 1 SPRAY: 50 SPRAY, METERED NASAL at 17:13

## 2019-11-04 RX ADMIN — SODIUM CHLORIDE 8 MG/HR: 9 INJECTION, SOLUTION INTRAVENOUS at 02:45

## 2019-11-04 RX ADMIN — PROPOFOL 50 MG: 10 INJECTION, EMULSION INTRAVENOUS at 10:33

## 2019-11-04 RX ADMIN — SODIUM CHLORIDE 8 MG/HR: 9 INJECTION, SOLUTION INTRAVENOUS at 14:00

## 2019-11-04 RX ADMIN — CLOPIDOGREL BISULFATE 75 MG: 75 TABLET ORAL at 12:41

## 2019-11-04 RX ADMIN — Medication 500 MG: at 12:41

## 2019-11-04 RX ADMIN — DIPHENHYDRAMINE HCL 25 MG: 25 TABLET, COATED ORAL at 20:22

## 2019-11-04 RX ADMIN — AMIODARONE HYDROCHLORIDE 200 MG: 200 TABLET ORAL at 17:13

## 2019-11-04 RX ADMIN — PHENYLEPHRINE HYDROCHLORIDE 50 MCG/MIN: 10 INJECTION INTRAVENOUS at 10:33

## 2019-11-04 RX ADMIN — ASPIRIN 81 MG: 81 TABLET, COATED ORAL at 12:41

## 2019-11-04 NOTE — PROGRESS NOTES
Progress Note - Regino Valadez 9/1/1932, 80 y o  male MRN: [id number]    Unit/Bed#: -01 Encounter: [id number]    Primary Care Provider: Darrel Stockton MD   Date and time admitted to hospital: 11/2/2019  6:53 PM        * Hematemesis  Assessment & Plan  · Presented after episode of hematemesis this evening  Had hemoglobin drop 12 4 10/25/2019 -> 9 8 at the time of presentation  · Last hemoglobin is 7 9  · Suspect upper GI source, reports history of ulcers  Also vas on ASA/Plavix/Eliquis  · Aspirin and Plavix continued per discussion with the Cardiology  Cardiology consultation appreciated  · GI evaluation appreciated, status post EGD-noted to have nonbleeding gastric ulcer  Currently on PPI drip  · Monitor hemoglobin and transfuse as needed    Acute blood loss anemia  Assessment & Plan  · With hemoglobin drop from 12 4 10/25/2018 to 9 8 the time of presentation, suspect in setting of acute GI bleeding as above  · Trend hemoglobin   · Will transfuse for hemoglobin less than 8 or symptomatic-hemoglobin is 7 9 will recheck  today and if it is dropping,will transfuse 1 unit packed RBCs  · Status post EGD-single ulcer in the stomach with no home        Physical deconditioning  Assessment & Plan  · Recently discharged from [address] [place] [address] rehab following recent hospitalization  · Will have PT/OT work with patient while inpatient    Acute kidney injury ([facility] )  Assessment & Plan  · POA, baseline appears 1-1 1  · Received 500 cc normal saline in ED, suspect some degree from hypovolemia however patient's blood pressure is also borderline  · Creatinine is 1 24 yesterday, losartan and diuretics on hold  · Avoid nephrotoxins and initiate hold parameters for metoprolol  · Recheck in am    Ischemic cardiomyopathy  Assessment & Plan  · EF 10/20/2019 35%  · Currently appears euvolemic  · Monitor daily weights, I/O  · Holding diuretic in setting of acute kidney injury  · Likely restart back on the diuretics tomorrow    Typical atrial flutter (HCC)  Assessment & Plan  · S/P dual chamber ICD placement 10/24/2019  · Rate controlled on metoprolol tartrate and will continue with strict hold parameters  · Normally anticoagulated on Eliquis however will hold in setting of acute GI bleeding  · Restart back on the Eliquis tomorrow  Patient was given aspirin today  Will DC aspirin    CAD (coronary artery disease)  Assessment & Plan  · S/P prior CABG and S/P MINNIE to OM1 10/21/2019  · Currently chest pain-free  · Will continue ASA, Plavix in setting of recent placement of MINNIE  Cardiology evaluation appreciated  · Continue beta-blocker with hold parameters  · Continue statin  · Patient was on triple therapy as an outpatient  · Cardiology progress note appreciated  Plan is to continue with Eliquis and Plavix as an outpatient  · Discontinued aspirin  Discussed with Cardiology  Restart back on the Eliquis tomorrow        VTE Pharmacologic Prophylaxis:   Pharmacologic: Apixaban (Eliquis)  Mechanical VTE Prophylaxis in Place: Yes    Patient Centered Rounds: I have performed bedside rounds with nursing staff today  Discussions with Specialists or Other Care Team Provider:     Education and Discussions with Family / Patient:     Time Spent for Care: 30 minutes  More than 50% of total time spent on counseling and coordination of care as described above  Current Length of Stay: 2 day(s)    Current Patient Status: Inpatient   Certification Statement: The patient will continue to require additional inpatient hospital stay due to Hematemesis    Discharge Plan:     Code Status: Level 1 - Full Code      Subjective:   Patient seen and examined    Status post EGD-nonbleeding gastric ulcer  Denies any pain    Objective:     Vitals:   Temp (24hrs), Av 8 °F (36 6 °C), Min:97 3 °F (36 3 °C), Max:98 3 °F (36 8 °C)    Temp:  [97 3 °F (36 3 °C)-98 3 °F (36 8 °C)] 97 9 °F (36 6 °C)  HR:  [] 65  Resp:  [15-18] 17  BP: ()/(55-65) 99/55  SpO2:  [89 %-99 %] 96 %  Body mass index is 29 04 kg/m²  Input and Output Summary (last 24 hours): Intake/Output Summary (Last 24 hours) at 11/4/2019 1721  Last data filed at 11/4/2019 0915  Gross per 24 hour   Intake 120 ml   Output 700 ml   Net -580 ml       Physical Exam:     Physical Exam   Constitutional: He appears well-developed  No distress  HENT:   Head: Normocephalic and atraumatic  Eyes: Right eye exhibits no discharge  Left eye exhibits no discharge  Neck: No JVD present  Cardiovascular: Normal rate and regular rhythm  No murmur heard  Pulmonary/Chest: Effort normal and breath sounds normal  No stridor  No respiratory distress  Abdominal: Soft  He exhibits no distension  Musculoskeletal: Normal range of motion  He exhibits no edema  Neurological: He is alert  No cranial nerve deficit  Skin: Skin is warm  Additional Data:     Labs:    Results from last 7 days   Lab Units 11/04/19  0919  11/02/19  1926   WBC Thousand/uL  --   --  14 28*   HEMOGLOBIN g/dL 7 9*   < > 9 8*   HEMATOCRIT % 23 7*   < > 30 5*   PLATELETS Thousands/uL  --   --  294   NEUTROS PCT %  --   --  79*   LYMPHS PCT %  --   --  14   MONOS PCT %  --   --  5   EOS PCT %  --   --  0    < > = values in this interval not displayed  Results from last 7 days   Lab Units 11/03/19  0455 11/02/19  1926   POTASSIUM mmol/L 4 8 4 9   CHLORIDE mmol/L 108 102   CO2 mmol/L 22 22   BUN mg/dL 64* 41*   CREATININE mg/dL 1 28 1 44*   CALCIUM mg/dL 8 1* 8 3   ALK PHOS U/L  --  75   ALT U/L  --  32   AST U/L  --  24     Results from last 7 days   Lab Units 11/02/19  1926   INR  1 67*       * I Have Reviewed All Lab Data Listed Above  * Additional Pertinent Lab Tests Reviewed:  Varun Alamo Admission Reviewed    Imaging:    Imaging Reports Reviewed Today Include:   Imaging Personally Reviewed by Myself Includes:     Recent Cultures (last 7 days):           Last 24 Hours Medication List:     Current Facility-Administered Medications:  amiodarone 200 mg Oral BID With Meals Jetty Jubilee, DO    [START ON 11/5/2019] apixaban 5 mg Oral BID Camila Hinds MD    atorvastatin 40 mg Oral After Palmira Maillard, DO    clopidogrel 75 mg Oral Daily Jetty Jubilee, DO    co-enzyme Q-10 200 mg Oral Daily Jetty Jubilee, DO    diphenhydrAMINE 25 mg Oral Q6H PRN Randi Vigil PA-C    fluticasone 1 spray Each Nare Daily Camila Hinds MD    magnesium gluconate 500 mg Oral Daily Jetty Jubilee, DO    melatonin 3 mg Oral HS Jetty Jubilee, DO    metoprolol tartrate 25 mg Oral Q12H [facility], DO    nitroglycerin 0 4 mg Sublingual Q5 Min PRN Jetty Jubilee, DO    ondansetron 4 mg Intravenous Q6H PRN Jetty Jubilee, DO    pantoprozole (PROTONIX) infusion (Continuous) 8 mg/hr Intravenous Continuous Camila Hinds MD Last Rate: 8 mg/hr (11/04/19 1400)   potassium chloride 20 mEq Oral Daily Jetty Jubilee, DO    prednisoLONE acetate 1 drop Both Eyes Q12H [facility]         Today, Patient Was Seen By: Camila Hinds MD    ** Please Note: Dictation voice to text software may have been used in the creation of this document   **

## 2019-11-04 NOTE — OCCUPATIONAL THERAPY NOTE
OccupationalTherapy Evaluation     Patient Name: Chaay Ramos Date: 11/4/2019  Problem List  Principal Problem:    Hematemesis  Active Problems:    CAD (coronary artery disease)    Typical atrial flutter (HCC)    Ischemic cardiomyopathy    Elevated troponin    Acute kidney injury ([facility] [address] )    Physical deconditioning    Acute blood loss anemia    Past Medical History  Past Medical History:   Diagnosis Date    Acute myocardial infarction ([facility] [address] )     Allergic rhinitis     Anxiety     Bimalleolar fracture of left ankle     CAD (coronary artery disease)     Erectile dysfunction of non-organic origin     Hematuria     workup was negative and felt to be benign    Herpes zoster with complication     Hyperlipidemia     Hypertension     Impaired fasting glucose     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    Prostatic hypertrophy     benign    Stroke [facility])      Past Surgical History  Past Surgical History:   Procedure Laterality Date    ANKLE FRACTURE SURGERY Left     CARDIAC PACEMAKER PLACEMENT Left     CORONARY ARTERY BYPASS GRAFT               11/04/19 1415   Note Type   Note type Eval/Treat   Restrictions/Precautions   Weight Bearing Precautions Per Order No   Other Precautions Multiple lines; Fall Risk   Pain Assessment   Pain Assessment No/denies pain   Pain Score No Pain   Home Living   Type of Home Apartment   Home Layout One level;Performs ADLs on one level; Able to live on main level with bedroom/bathroom   Bathroom Shower/Tub Tub/shower unit   Bathroom Toilet Standard   Bathroom Equipment Grab bars in shower;Built-in [address]   Additional Comments Lives in 1st floor apt at [facility]  Pt has tub/shower unit with gbs and built-in seat  Has standard toilet and is planning to install a gb next to toilet  Pt has access to RW from a friend  Prior Function   Level of [place] Independent with ADLs and functional mobility; Needs assistance with IADLs   Lives With Alone   Receives Help From Friend(s); Other (Comment)  (Meal service and cleaning woman comes 2x/wk)   ADL Assistance Independent   IADLs Needs assistance   Falls in the last 6 months 1 to 4   Vocational Retired   Comments Pt lives alone  Reports I with ADLs but receives help with IADLs  Pt reports preparing easy breakfast and lunches for himself but gets meals delivered for dinner through a meal service  Pt has a cleaning woman come 2x/wk  Pt reports he was driving but since receiving pacemaker in 10/19 is unable to drive for 6 mo   Lifestyle   Autonomy I with ADLs, Assistance with IADLs, I with mobility/transfers, I with driving but unable to drive for next 6 mo 2' to pacemaker placement   Reciprocal Relationships has close family friend nearby and a sister in [id number] [employer]    Intrinsic Gratification enjoys volunteering and reading   Krysta Lorenzo 19 (WDL) WDL   Length of Time/Family Visitation 16-30 min  (sister present)   ADL   Eating Assistance 7  Independent   Grooming Assistance 7  Independent   UB Bathing Assistance 4  [address]  [address] 4  Minimal Assistance   LB Dressing Deficit Don/doff R sock; Don/doff L sock; Supervision/safety   Toileting Assistance  4  Minimal Assistance   Toileting Deficit Supervison/safety; Increased time to complete; Bedside commode   Functional Assistance 4  Minimal Assistance   Bed Mobility   Sit to Supine 4  Minimal assistance   Additional items Assist x 1; Increased time required;Verbal cues   Additional Comments Received OOB on BSC  Required Min A x 1 and VC for safety to move from sit to supine into bed  Transfers   Sit to Stand 4  Minimal assistance   Additional items Assist x 1; Increased time required;Verbal cues   Stand to Sit 4  Minimal assistance   Additional items Assist x 1; Increased time required;Verbal cues   Toilet transfer 4  Minimal assistance   Additional items Assist x 1; Increased time required;Verbal cues; Commode   Additional Comments Pt used Min A x 1 and RW to perform sit to stand from EOB and stand to sit back to EOB  Pt required VC for proper handplacement and safety  Pt performed sit to stand to get up from commode with Min A x 1 and RW  Functional Mobility   Functional Mobility 4  Minimal assistance   Additional Comments Required assistance to steady balance and VC for safety  Pt reported slight lightheadedness but denied worsening symptoms during ambulation  Pt demonstrated no losses of balance  Additional items Rolling walker   Balance   Static Sitting Fair   Dynamic Sitting Fair -   Static Standing Fair -   Dynamic Standing Poor +   Ambulatory Poor +   Activity Tolerance   Activity Tolerance Patient limited by fatigue   Nurse Made Aware yes   RUE Assessment   RUE Assessment WFL   LUE Assessment   LUE Assessment WFL   Hand Function   Gross Motor Coordination Functional   Fine Motor Coordination Functional   Sensation   Light Touch No apparent deficits   Vision-Basic Assessment   Current Vision Wears glasses all the time   Visual History Cataracts  (reports h/o cataract sx )   Patient Visual Report   (reports no visual changes)   Cognition   Overall Cognitive Status WFL   Arousal/Participation Alert; Responsive; Cooperative   Attention Within functional limits   Orientation Level Oriented X4   Memory Within functional limits   Following Commands Follows one step commands without difficulty   Comments Pt is pleasant and responsive  Able to recall all necessary home set up/biographical info  Attends to therapy session with no cues for redirection  Assessment   Limitation Decreased ADL status; Decreased UE strength;Decreased endurance;Decreased self-care trans;Decreased high-level ADLs   Prognosis Fair   Assessment Pt is a 79 y/o male   Pt had recent admission to [facility] from 10/19-10/25 with ventricular tachycardia found to have further ischemic disease w/ procedures  ICD placed on 10/24  Pt was d/c to [facility] for acute rehab  Pt d/c home from rehab on 11/1/19  Pt now presenting to [facility] due to an episode of vomiting with apparent large amount of blood in vomitus  Pt admitted for further evaluation and treatment of GI bleed  Pt diagnosed with hematemesis  Pt's PMH includes: acute myocardial infarction, anxiety, stroke, HLD, HTN, bimalleiolar fx of L ankle  Pt seen for OT evaluation on 11/4/19 with active ambulation orders  Pt lives with in 1st floor apartment at [facility] with 0STE  At baseline, pt was I with ADLs and mobility/transfers  Pt was receiving assistance with IADLs  Pt reports having meals delivered for dinner through a meal service and having a cleaning woman come 2x/wk  Pt denies the use of DME but has access to RW if necessary  Currently, pt requires for Min A x 1 UB/LB ADLs, bed mobility, and toileting, and Min A x 1 with use of RW for functional transfers, functional mobility  Pt presents with the following impairments: decreased endurance, decreased sitting/standing balance, decreased sitting/standing tolerance, decreased activity tolerance, and fatigue  These deficits limit his ability to perform dressing, bathing, toileting, bed mobility, functional transfers, functional mobility, community mobility, leisure pursuits, and work-related tasks  Based on the above mentioned deficits and ongoing limitations, would consider pt to be moderate complexity evaluation  Pt scored 55/100 on the Barthel Index  Upon d/c, OT recommends STR pending progress,once medically stable  May progress to be able to d/c home  Pt will continue to be seen for skilled OT 3-5x/wk during LOS to achieve listed goals     Goals   Patient Goals to get stronger and be able to volunteer again   LTG Time Frame 7-10   Long Term Goal #1 see below listed goals   Plan   Treatment Interventions ADL retraining;Functional transfer training;UE strengthening/ROM; Endurance training;Cognitive reorientation;Patient/family training;Equipment evaluation/education; Compensatory technique education;Continued evaluation; Energy conservation; Activityengagement   Goal Expiration Date 11/14/19   OT Frequency 3-5x/wk   Recommendation   OT Discharge Recommendation Short Term Rehab   OT - OK to Discharge Yes  (once medically stable)   Barthel Index   Feeding 10   Bathing 0   Grooming Score 5   Dressing Score 5   Bladder Score 10   Bowels Score 10   Toilet Use Score 5   Transfers (Bed/Chair) Score 10   Mobility (Level Surface) Score 0   Stairs Score 0   Barthel Index Score 55   Modified [place] Scale   Modified [place] Scale 4     GOALS  Pt will perform bed mobility Mod I with use of AE/DME as needed to increase functional independence for engagement in meaningful activities  Pt will perform functional transfers Mod I with use of DME as needed to increase functional independence to promote participation in ADLs/IADLs  Pt will perform functional mobility Mod I with use of AE/DME as needed to increase functional independence for engagement in meaningful activities  Pt will complete all UB ADLs Mod I with use of AE/DME as needed to increase independence with ADL performance  Pt will complete all LB ADLs Mod I with use of AE/DME as needed to increase independence with ADL performance  Pt will complete toileting Mod I with use of AE/DME as needed to increase functional independence  Pt will be AOx4 100% of the time to improve overall engagement in therapeutic activities  Pt will stand for approximately 10 min with Mod I to complete ADL task with use of AE/DME as needed while demonstrating G balance and safety to improve dynamic standing balance in preparation for sink-level ADLs  Pt will tolerate 25-30 min skilled OT session with no rest breaks to improve overall activity tolerance for increased participation in functional activities      Girish Adames Ruben Hilton

## 2019-11-04 NOTE — ASSESSMENT & PLAN NOTE
· S/P dual chamber ICD placement 10/24/2019  · Rate controlled on metoprolol tartrate and will continue with strict hold parameters  · Normally anticoagulated on Eliquis however will hold in setting of acute GI bleeding  · Restart back on the Eliquis tomorrow  Patient was given aspirin today    Will DC aspirin

## 2019-11-04 NOTE — PLAN OF CARE
Problem: OCCUPATIONAL THERAPY ADULT  Goal: Performs self-care activities at highest level of function for planned discharge setting  See evaluation for individualized goals  Description  Treatment Interventions: ADL retraining, Functional transfer training, UE strengthening/ROM, Endurance training, Cognitive reorientation, Patient/family training, Equipment evaluation/education, Compensatory technique education, Continued evaluation, Energy conservation, Activityengagement          See flowsheet documentation for full assessment, interventions and recommendations  Note:   Limitation: Decreased ADL status, Decreased UE strength, Decreased endurance, Decreased self-care trans, Decreased high-level ADLs  Prognosis: Fair  Assessment: Pt is a 81 y/o male  Pt had recent admission to [facility] from 10/19-10/25 with ventricular tachycardia found to have further ischemic disease w/ procedures  ICD placed on 10/24  Pt was d/c to [facility] for acute rehab  Pt d/c home from rehab on 11/1/19  Pt now presenting to [facility] due to an episode of vomiting with apparent large amount of blood in vomitus  Pt admitted for further evaluation and treatment of GI bleed  Pt diagnosed with hematemesis  Pt's PMH includes: acute myocardial infarction, anxiety, stroke, HLD, HTN, bimalleiolar fx of L ankle  Pt seen for OT evaluation on 11/4/19 with active ambulation orders  Pt lives with in 1st floor apartment at [facility] with 0STE  At baseline, pt was I with ADLs and mobility/transfers  Pt was receiving assistance with IADLs  Pt reports having meals delivered for dinner through a meal service and having a cleaning woman come 2x/wk  Pt denies the use of DME but has access to RW if necessary  Currently, pt requires for Min A x 1 UB/LB ADLs, bed mobility, and toileting, and Min A x 1 with use of RW for functional transfers, functional mobility   Pt presents with the following impairments: decreased endurance, decreased sitting/standing balance, decreased sitting/standing tolerance, decreased activity tolerance, and fatigue  These deficits limit his ability to perform dressing, bathing, toileting, bed mobility, functional transfers, functional mobility, community mobility, leisure pursuits, and work-related tasks  Based on the above mentioned deficits and ongoing limitations, would consider pt to be moderate complexity evaluation  Pt scored 55/100 on the Barthel Index  Upon d/c, OT recommends STR pending progress,once medically stable  May progress to be able to d/c home  Pt will continue to be seen for skilled OT 3-5x/wk during LOS to achieve listed goals       OT Discharge Recommendation: Short Term Rehab  OT - OK to Discharge: Yes(once medically stable)    Radha Cesart, OTS

## 2019-11-04 NOTE — MALNUTRITION/BMI
This medical record reflects one or more clinical indicators suggestive of malnutrition     Malnutrition Findings:   Malnutrition type: Chronic illness(Related to medical condition as evidenced by 6% weight loss over the past month and <75% energy intake needs met >1 month treated with pending diet advancement)  Degree of Malnutrition: Other severe protein calorie malnutrition  Malnutrition Characteristics: Inadequate energy, Weight loss        See Nutrition note dated 11/4/19 for additional details  Completed nutrition assessment is viewable in the nutrition documentation

## 2019-11-04 NOTE — PHYSICAL THERAPY NOTE
PHYSICAL THERAPY Evaluation NOTE    Patient Name: Moises SOWU Date: 11/4/2019     AGE:   80 y o  Mrn:   [id number]  ADMIT DX:  Vomiting blood [K92 0]  Anemia [D64 9]  JAMEL (acute kidney injury) ([facility] [address] ) [N17 9]  Hematemesis without nausea [K92 0]  Coronary artery disease involving native coronary artery of native heart without angina pectoris [I25 10]    Past Medical History:   Diagnosis Date    Acute myocardial infarction ([facility] )     Allergic rhinitis     Anxiety     Bimalleolar fracture of left ankle     CAD (coronary artery disease)     Erectile dysfunction of non-organic origin     Hematuria     workup was negative and felt to be benign    Herpes zoster with complication     Hyperlipidemia     Hypertension     Impaired fasting glucose     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    Prostatic hypertrophy     benign    Stroke [facility])      Length Of Stay: 2  PHYSICAL THERAPY EVALUATION :    11/04/19 8166   Note Type   Note type Eval/Treat   Pain Assessment   Pain Assessment No/denies pain   Pain Score No Pain   Home Living   Type of Home Apartment   Home Layout One level;Performs ADLs on one level; Able to live on main level with bedroom/bathroom   Bathroom Shower/Tub Tub/shower unit   Bathroom Toilet Standard   Bathroom Equipment Grab bars in shower;Built-in [address]   Additional Comments Lives in 1st floor apt at [facility]  Pt has tub/shower unit with gbs and built-in seat  Has standard toilet and is planning to install a gb next to toilet  Pt has access to RW from a friend  Prior Function   Level of [place] Independent with ADLs and functional mobility; Needs assistance with IADLs   Lives With Alone   Receives Help From Friend(s); Other (Comment)  (Meal service and cleaning woman comes 2x/wk)   ADL Assistance Independent   IADLs Needs assistance   Falls in the last 6 months 1 to 4   Vocational Retired   Comments Pt lives alone  Reports I with ADLs but receives help with IADLs  Pt reports preparing easy breakfast and lunches for himself but gets meals delivered for dinner through a meal service  Pt has a cleaning woman come 2x/wk  Pt reports he was driving but since receiving pacemaker in 10/19 is unable to drive for 6 mo   Restrictions/Precautions   Weight Bearing Precautions Per Order No   Other Precautions Multiple lines; Fall Risk   General   Additional Pertinent History Pt  is an 79 yo M who presents with Hematemesis  Family/Caregiver Present No   Cognition   Overall Cognitive Status WFL   Attention Within functional limits   Orientation Level Oriented X4   Memory Within functional limits   Following Commands Follows one step commands without difficulty   Comments Pt is pleasant and responsive  Able to recall all necessary home set up/biographical info  Attends to therapy session with no cues for redirection  RLE Assessment   RLE Assessment   (grossly 4-/5)   LLE Assessment   LLE Assessment   (grossly 4-/5)   Coordination   Movements are Fluid and Coordinated 0   Coordination and Movement Description trunk ataxia noted in static sitting   Sensation WFL   Light Touch   RLE Light Touch Grossly intact   LLE Light Touch Grossly intact   Bed Mobility   Sit to Supine 5  Supervision   Additional items Assist x 1; Increased time required;Verbal cues   Additional Comments Pt  in supine 83/54, in sitting 75/49  returned to supine and alerted nursing  Balance   Static Sitting Fair   Dynamic Sitting Fair -   Activity Tolerance   Activity Tolerance Treatment limited secondary to medical complications (Comment)  (hypotensive)   Nurse Made Aware Spoke to RN   Assessment   Prognosis Good   Problem List Decreased strength; Impaired balance;Decreased range of motion;Decreased endurance;Decreased mobility; Decreased safety awareness   Assessment Pt  is an 79 yo M who presents with Hematemesis  order placed for PT eval and tx, w/ activity order of up w/ A  pt presents w/ comorbidities of Recent PM placement, CAD, A Flutter, Ischemic cardiomyopathy, JAMEL, Physical deconditioning, CVA, dyslipidemia, Hx of CABG, radiculopathy, and CKD2, and personal factors of advanced age, mobilizing w/ assistive device, inability to ambulate household distances, inability to navigate community distances, inability to navigate level surfaces w/o external assistance, unable to perform dynamic tasks in community, limited home support, decreased initiation and engagement, unable to perform physical activity, inability to perform IADLs, inability to perform ADLs and inability to live alone  pt presents w/ pain, weakness, decreased endurance, impaired cognition, impaired balance, gait deviations, impaired coordination, decreased safety awareness, orthopedic restrictions and fall risk  these impairments are evident in findings from physical examination (weakness, impaired coordination and impaired skin integrity), mobility assessment (need for Supervision assist w/ all phases of mobility when usually mobilizing independently, tolerance to only 0 feet of ambulation and need for cueing for mobility technique), and Barthel Index: 45/100  pt needed input for task focus and mobility technique  pt is at risk for falls due to physical and safety awareness deficits  pt's clinical presentation is unstable/unpredictable (evident in need for assist w/ all phases of mobility when usually mobilizing independently, tolerance to only 0 feet of ambulation, pain impacting overall mobility status, need for input for mobility technique, need for input for task focus and mobility technique and need for input for mobility technique/safety)  pt needs inpatient PT tx to improve mobility deficits   discharge recommendation is for inpatient rehab vs  home PT pending progression and continued mobility assessment to reduce fall risk and maximize level of functional independence  Goals   Patient Goals to get stronger and be able to volunteer again   STG Expiration Date 11/14/19   Short Term Goal #1 pt will: Increase bilateral LE strength 1/2 grade to facilitate independent mobility, Perform all bed mobility tasks modified independent to decrease fall risk factors, Perform all transfers w/ supervision to improve independence, Increase all balance 1/2 grade to decrease risk for falls and Improve Barthel Index score to 70 or greater to facilitate independence Continue mobility assessment and determine appropriate goals and D/C planning  PT Treatment Day 0   Plan   Treatment/Interventions Functional transfer training;LE strengthening/ROM; Therapeutic exercise; Endurance training;Cognitive reorientation;Patient/family training;Equipment eval/education; Bed mobility;Gait training;Spoke to nursing;Family   PT Frequency   (3-5x/wk)   Recommendation   Recommendation   (rehab vs  Home PT pending progression)   Equipment Recommended Walker   Additional Comments continued mobility assessment needed to attain discharge receommnedation    Barthel Index   Feeding 10   Bathing 0   Grooming Score 5   Dressing Score 5   Bladder Score 10   Bowels Score 10   Toilet Use Score 5   Transfers (Bed/Chair) Score 0   Mobility (Level Surface) Score 0   Stairs Score 0   Barthel Index Score 45     Skilled PT recommended while in hospital and upon DC to progress pt toward treatment goals       Susan Hinds, PT, DPT 11/4/2019

## 2019-11-04 NOTE — ASSESSMENT & PLAN NOTE
· S/P prior CABG and S/P MINNIE to OM1 10/21/2019  · Currently chest pain-free  · Will continue ASA, Plavix in setting of recent placement of MINNIE  Cardiology evaluation appreciated  · Continue beta-blocker with hold parameters  · Continue statin  · Patient was on triple therapy as an outpatient  · Cardiology progress note appreciated  Plan is to continue with Eliquis and Plavix as an outpatient  · Discontinued aspirin  Discussed with Cardiology    Restart back on the Eliquis tomorrow

## 2019-11-04 NOTE — PROGRESS NOTES
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   There is data available on Plavix and anticoagulation without aspirin early post stenting, with reasonable balance between benefit and risk of bleeding and stent patency    Subjective:   Patient seen and examined  He offers me no complaints  NPO for EGD  Denies any chest pain  No new bleeding this morning  Heart rates are normal  Blood pressure low overnight, normal currently    ROS    Objective   Vitals: Blood pressure 108/64, pulse 80, temperature (!) 97 3 °F (36 3 °C), temperature source Oral, resp  rate 16, height 5' 5" (1 651 m), weight 79 2 kg (174 lb 8 oz), SpO2 97 %  , Body mass index is 29 04 kg/m² , I/O last 3 completed shifts: In: 1210 [P O :360; Blood:350; IV Piggyback:500]  Out: 1210 [Urine:960; Emesis/NG output:250]  I/O this shift:  In: -   Out: 200 [Urine:200]  Wt Readings from Last 3 Encounters:   11/04/19 79 2 kg (174 lb 8 oz)   10/22/19 85 kg (187 lb 6 3 oz)   10/17/19 85 7 kg (189 lb)       Intake/Output Summary (Last 24 hours) at 11/4/2019 0849  Last data filed at 11/4/2019 0703  Gross per 24 hour   Intake 360 ml   Output 1000 ml   Net -640 ml     I/O last 3 completed shifts:   In: 1210 [P O :360; Blood:350; IV NUSCOPGMQ:430]  Out: 200 [Urine:960; Emesis/NG output:250]    No significant arrhythmias seen on telemetry review  Physical Exam   Constitutional: He is oriented to person, place, and time  No distress  HENT:   Head: Normocephalic and atraumatic  Neck: Normal range of motion  Neck supple  No JVD present  Cardiovascular: Normal rate, regular rhythm, normal heart sounds and intact distal pulses  No murmur heard  Pulmonary/Chest: Effort normal and breath sounds normal  No respiratory distress  He has no wheezes  He has no rales  Abdominal: Soft  Bowel sounds are normal  He exhibits no distension  There is no tenderness  Musculoskeletal: He exhibits no edema  Neurological: He is alert and oriented to person, place, and time  Skin: Skin is warm and dry  He is not diaphoretic  Meds/Allergies   Allergies   Allergen Reactions    Penicillins Edema and Rash     Reaction Date: [id number]; Category: Allergy;  Annotation - [id number]: Severe reaction with hospitaization at \[facility]\""       Current Facility-Administered Medications:     amiodarone tablet 200 mg, 200 mg, Oral, BID With Meals, Eder Freeman DO, 200 mg at 11/03/19 1746    aspirin (ECOTRIN LOW STRENGTH) EC tablet 81 mg, 81 mg, Oral, Daily, Eder Freeman DO, 81 mg at 11/03/19 0957    atorvastatin (LIPITOR) tablet 40 mg, 40 mg, Oral, After Austin Arteaga, , 40 mg at 11/03/19 1746    clopidogrel (PLAVIX) tablet 75 mg, 75 mg, Oral, Daily, Eder Freeman DO, 75 mg at 11/03/19 0957    co-enzyme Q-10 capsule 200 mg, 200 mg, Oral, Daily, Eder Freeman DO, 200 mg at 11/03/19 0957    diphenhydrAMINE (BENADRYL) tablet 25 mg, 25 mg, Oral, Q6H PRN, Randi Vigil PA-C, 25 mg at 11/03/19 2135    magnesium gluconate (MAGONATE) tablet 500 mg, 500 mg, Oral, Daily, Eder Freeman DO, 500 mg at 11/03/19 0957    melatonin tablet 3 mg, 3 mg, Oral, HS, Eder Freeman DO, 3 mg at 11/03/19 2006    metoprolol tartrate (LOPRESSOR) tablet 25 mg, 25 mg, Oral, Q12H [facility], Iris Arambula, DO, 25 mg at 11/03/19 2006    nitroglycerin (NITROSTAT) SL tablet 0 4 mg, 0 4 mg, Sublingual, Q5 Min PRN, [facility]    [facility]ron [facility]) injection 4 mg, 4 mg, Intravenous, Q6H PRN, [facility], 4 mg at 11/03/19 0218    pantoprazole (PROTONIX) 80 mg in sodium chloride 0 9 % 100 mL infusion, 8 mg/hr, Intravenous, Continuous, Narinder Richmond MD, Last Rate: 10 mL/hr at 11/04/19 0245, 8 mg/hr at 11/04/19 0245    potassium chloride (K-DUR,KLOR-CON) CR tablet 20 mEq, 20 mEq, Oral, Daily, [facility], 20 mEq at 11/03/19 0959    prednisoLONE acetate (PRED FORTE) 1 % ophthalmic suspension 1 drop, 1 drop, Both Eyes, Q12H [facility], [facility]    Laboratory Results:  Results from last 7 days   Lab Units 11/03/19  0220 11/02/19  2341 11/02/19  1926   TROPONIN I ng/mL 0 17* 0 16* 0 19*       CBC with diff:   Results from last 7 days   Lab Units 11/03/19  2345 11/03/19  1648 11/03/19  1129 11/03/19  0220 11/02/19  2341 11/02/19  1926   WBC Thousand/uL  --   --   --   --   --  14 28*   HEMOGLOBIN g/dL 7 9* 8 9* 8 5* 9 3* 8 2* 9 8*   HEMATOCRIT % 23 0* 26 6* 24 5* 28 2* 25 3* 30 5*   MCV fL  --   --   --   --   --  94   PLATELETS Thousands/uL  --   --   --   --   --  294   MCH pg  --   --   --   --   --  30 2   MCHC g/dL  --   --   --   --   --  32 1   RDW %  --   --   --   --   --  13 4   MPV fL  --   --   --   --   --  10 2   NRBC AUTO /100 WBCs  --   --   --   --   --  0       CMP:  Results from last 7 days   Lab Units 11/03/19 0455 11/02/19 1926   POTASSIUM mmol/L 4 8 4 9   CHLORIDE mmol/L 108 102   CO2 mmol/L 22 22   BUN mg/dL 64* 41*   CREATININE mg/dL 1 28 1 44*   CALCIUM mg/dL 8 1* 8 3   AST U/L  --  24   ALT U/L  --  32   ALK PHOS U/L  --  75   EGFR ml/min/1 73sq m 50 43       BMP:  Results from last 7 days   Lab Units 11/03/19 0455 11/02/19 1926   POTASSIUM mmol/L 4 8 4 9   CHLORIDE mmol/L 108 102   CO2 mmol/L 22 22   BUN mg/dL 64* 41*   CREATININE mg/dL 1 28 1 44*   CALCIUM mg/dL 8 1* 8 3       NT-proBNP: No results for input(s): NTBNP in the last 72 hours  Magnesium:   Results from last 7 days   Lab Units 19  0455   MAGNESIUM mg/dL 1 8       Coags:   Results from last 7 days   Lab Units 19  1926   PTT seconds 27   INR  1 67*       TSH:        Hemoglobin A1C )      Lipid Profile:   Lab Results   Component Value Date    CHOL 156 2017    CHOL 166 2017    CHOL 166 2016     Lab Results   Component Value Date    HDL 58 10/20/2019    HDL 53 2019    HDL 54 2018     Lab Results   Component Value Date    LDLCALC 65 10/20/2019    LDLCALC 54 2018     No results found for: LDLDIRECT  Lab Results   Component Value Date    TRIG 65 10/20/2019    TRIG 150 (H) 2019    TRIG 116 2018       Cardiac testing:   EKG personally reviewed by Earlene Lord MD      Results for orders placed during the hospital encounter of 10/19/19   Echo complete with contrast if indicated    [address]  [address] [facility], [address] [place] [address]  [phone]    Transthoracic Echocardiogram  2D, M-mode, Doppler, and Color Doppler    Study date:  20-Oct-2019    Patient: Roman Mcgee  MR number: [id number]  Account number: [de-identified]  : 01-Sep-1932  Age: 80 years  Gender: Male  Status: Inpatient  Location: Bedside  Height: 64 in  Weight: 188 lb  BP: 124/ 87 mmHg    Indications: Known coronary artery disease  Chest pain  Diagnoses: I25 83 - Coronary atherosclerosis due to lipid rich plaque    Sonographer:  PARVEZ Rojas  Primary Physician:  Greg Ly MD  Referring Physician:  Cathy Bolden DO  Group:  [facility]  Interpreting Physician:  Adelina Geronimo MD    SUMMARY    LEFT VENTRICLE:  Systolic function was moderately reduced  Ejection fraction was estimated to be 35 %    There was akinesis of the mid-apical anterior, mid anteroseptal, mid inferoseptal, apical septal, and apical wall(s)  TRICUSPID VALVE:  Estimated peak PA pressure was 40 mmHg  The findings suggest mild pulmonary hypertension  HISTORY: PRIOR HISTORY: Ischemic cardiomyopathy  Ventricular and supraventricular arrhythmias  Risk factors: hypertension and medication-treated hypercholesterolemia  Remote transient ischemic attack  PRIOR PROCEDURES: Coronary bypass  1999  PROCEDURE: The procedure was performed at the bedside  This was a routine study  The transthoracic approach was used  The study included complete 2D imaging, M-mode, complete spectral Doppler, and color Doppler  The heart rate was 103 bpm,  at the start of the study  Intravenous contrast (  6ml of Definity) was administered  Echocardiographic views were limited due to decreased penetration and lung interference  This was a technically difficult study  LEFT VENTRICLE: Size was normal  Systolic function was moderately reduced  Ejection fraction was estimated to be 35 %  There was akinesis of the mid-apical anterior, mid anteroseptal, mid inferoseptal, apical septal, and apical wall(s)  DOPPLER: Normal sinus rhythm was absent  RIGHT VENTRICLE: The size was normal  Systolic function was normal     LEFT ATRIUM: The atrium was dilated  RIGHT ATRIUM: Size was normal     MITRAL VALVE: Valve structure was normal  There was normal leaflet separation  DOPPLER: There was no evidence for stenosis  There was mild regurgitation  The regurgitant jet was eccentric  AORTIC VALVE: The valve was trileaflet  Leaflets exhibited mildly increased thickness, mild calcification, normal cuspal separation, and sclerosis  DOPPLER: There was no evidence for stenosis  There was no regurgitation  TRICUSPID VALVE: The valve structure was normal  There was normal leaflet separation  DOPPLER: There was no evidence for stenosis  There was mild regurgitation  Estimated peak PA pressure was 40 mmHg   The findings suggest mild pulmonary  hypertension  PULMONIC VALVE: Leaflets exhibited normal thickness, no calcification, and normal cuspal separation  DOPPLER: The transpulmonic velocity was within the normal range  There was trace regurgitation  PERICARDIUM: There was no pericardial effusion  The pericardium was normal in appearance  AORTA: The root exhibited normal size  SYSTEM MEASUREMENT TABLES    2D  Ao Diam: 3 62 cm  LA Area: 29 25 cm2  LA Diam: 4 98 cm  LAAs A4C: 28 77 cm2  LAESV A-L A4C: 105 01 ml  LAESV MOD A4C: 97 21 ml  LALs A4C: 6 69 cm  LVEDV MOD A4C: 126 19 ml  LVEF MOD A4C: 46 86 %  LVESV MOD A4C: 67 06 ml  LVLd A4C: 8 05 cm  LVLs A4C: 7 44 cm  RA Area: 17 34 cm2  RVIDd: 4 12 cm  SV MOD A4C: 59 13 ml    CW  TR Vmax: 3 21 m/s  TR maxP 12 mmHg    MM  TAPSE: 1 43 cm    [facility]    Prepared and electronically signed by    Adelina Geronimo MD  Signed 20-Oct-2019 13:20:19       No results found for this or any previous visit  Results for orders placed during the hospital encounter of 10/19/19   Cardiac catheterization    Narrative [facility] [address]  [address]  [phone]    [facility]    Invasive Cardiovascular Lab Complete Report    Patient: Roman Mcgee  MR number: [id number]  Account number: [de-identified]  Study date: 10/21/2019  Gender: Male  : 1932  Height: 64 2 in  Weight: 188 5 lb  BSA: 1 91 mï¾²    Allergies: PENICILLINS    Diagnostic Cardiologist:  Chuckie Bess MD  Primary Physician:  Greg Ly MD    SUMMARY    CORONARY CIRCULATION:  Left main: Normal   LAD: There was a 100 % proximal stenosis  Circumflex: There was a 100 % proxmal stenosis  RCA: The vessel was normal sized and dominant, giving rise to the PDA and a posterolateral branch  there was marked aneurysmal enlargement of the ostial/proxmal RCA  The was moderate diffuse disease, but no obstructive lesions    Graft to the LAD: The graft was a LIMA  The body of the graft and the anastomoses are normal  The grafted mid and distal LAD are free of critical disease and fill well  Graft to the 1st diagonal: The graft was a saphenous vein graft from the ascending aorta  The graft filled non-selectively with an injection of the OM graft  The graft was patent and sent flow to D1  Graft to the 1st obtuse marginal: The graft was a saphenous vein graft from the ascending aorta  There was a 99% stenosis in the mid body of the graft with MEGAN 1 flow into the OM branch and circumflex  Graft to the RCA: There was a 100 % stenosis at the graft ostium  1ST LESION INTERVENTIONS:  Following pre-dilation, a Synergy MR 3 5 x 16mm drug-eluting stent was placed across the 99% lesion in the SVG to OM1 and deployed at a maximum inflation pressure of 16 estelle  After post-dilation with a 4 0mm balloon, there was 0% residual  stenosis  MEGAN flow increased from grade 1 to grade 3  REPORT ELEMENT SELECTION:  Right femoral access  Hemostasis via Angioseal device  Summary:  Severe CAD with total occlusions of the LAD and circumflex  The LAD is supplied by a patent LIMA  The OM and circumflex are supplied by an SVG that had a 99% stenosis in mid body  This was the culprit for the patient's NSTEMI  Successful PCI of SVG to OM1 and circumflex  Plan: DAPT and anticoagulation, switching to anticoagulation with a NOAC and clopdigrel in 1 month  INDICATIONS:  --  Possible CAD: myocardial infarction without ST elevation (NSTEMI)  --  Congestive heart failure with ischemic cardiomyopathy  --  Cardiac: cardiac arrest     PROCEDURES PERFORMED    --  Left coronary angiography  --  Right coronary angiography  --  Saphenous vein graft angiography  --  LIMA graft angiography  --  Inpatient  --  Mod Sedation Same Physician Initial 15min  --  Coronary Catheterization (w/o LHC, w/ Grafts  --  Mod Sedation Same Physician Add 15min    --  Coronary Drug Eluting Stent w/PTCA  --  Intervention on SVG (distal 1/3) from the aorta to OM1: drug-eluting stent  PROCEDURE: The risks and alternatives of the procedures and conscious sedation were explained to the patient and informed consent was obtained  The patient was brought to the cath lab and placed on the table  The planned puncture sites  were prepped and draped in the usual sterile fashion  --  Right femoral artery access  The puncture site was infiltrated with local anesthetic  The vessel was accessed using the modified Seldinger technique, a wire was advanced into the vessel, and a sheath was advanced over the wire into the  vessel  --  Left coronary artery angiography  A catheter was advanced over a guidewire into the aorta and positioned in the left coronary artery ostium under fluoroscopic guidance  Angiography was performed  --  Right coronary artery angiography  A catheter was advanced over a guidewire into the aorta and positioned in the right coronary artery ostium under fluoroscopic guidance  Angiography was performed  --  Saphenous vein graft angiography  A catheter was advanced to the aorta and positioned at the aortic anastomosis of the graft over a guidewire under fluoroscopic guidance  Angiography was performed  --  Left internal mammary graft angiography  A catheter was advanced to the aorta and positioned in the left subclavian artery over a guidewire under fluoroscopic guidance  Angiography was performed  --  Inpatient  --  Mod Sedation Same Physician Initial 15min  --  Coronary Catheterization (w/o LHC, w/ Grafts  --  Mod Sedation Same Physician Add 15min  LESION INTERVENTION: A drug-eluting stent procedure was performed on the lesion in the distal third of the saphenous vein graft from the aorta to the 1st obtuse marginal  MEGAN flow increased from grade 1 to grade 3     --  Vessel setup was performed  A 6Fr   Launcher Lcb guiding catheter was used to cannulate the vessel  --  Vessel setup was performed  A Runthrough NS 300cm wire was used to cross the lesion  --  Balloon angioplasty was performed, using a Trek Rx 3 0 x 15mm balloon, with 2 inflations and a maximum inflation pressure of 10 estelle  --  Following pre-dilation, a Synergy MR 3 5 x 16mm drug-eluting stent was placed across the 99% lesion in the SVG to OM1 and deployed at a maximum inflation pressure of 16 estelle  After post-dilation with a 4 0mm balloon, there was 0%  residual stenosis  MEGAN flow increased from grade 1 to grade 3     --  Balloon angioplasty was performed, using a NC Trek Rx 4 0 x 15mm balloon, with 1 inflations and a maximum inflation pressure of 12 setelle  INTERVENTIONS:  --  Coronary Drug Eluting Stent w/PTCA  PROCEDURE COMPLETION: The patient tolerated the procedure well and was discharged from the cath lab  TIMING: Test started at 14:27  Test concluded at 15:34  HEMOSTASIS: The sheath was removed over a wire and the Angioseal delivery sheath  was inserted into the femoral artery  Hemostasis was obtained using a closure device ( Angioseal) deployed through the delivery sheath  MEDICATIONS GIVEN: 1% Lidocaine, 10 ml, subcutaneously, at 14:33  Fentanyl (1OOmcg/2 ml), 50 mcg, IV,  at 14:36  Versed (2mg/2ml), 2 mg, IV, at 14:36  Heparin 1000 units/ml, 5,000 units, IV, at 15:00  CONTRAST GIVEN: 130 ml Omnipaque (350 mg I /ml)  RADIATION EXPOSURE: Fluoroscopy time: 23 58 min  CORONARY VESSELS:   --  Left main: Normal   --  LAD: There was a 100 % proximal stenosis  --  Circumflex: There was a 100 % proxmal stenosis  --  RCA: The vessel was normal sized and dominant, giving rise to the PDA and a posterolateral branch  there was marked aneurysmal enlargement of the ostial/proxmal RCA  The was moderate diffuse disease, but no obstructive lesions  --  Graft to the LAD: The graft was a LIMA   The body of the graft and the anastomoses are normal  The grafted mid and distal LAD are free of critical disease and fill well  --  Graft to the 1st diagonal: The graft was a saphenous vein graft from the ascending aorta  The graft filled non-selectively with an injection of the OM graft  The graft was patent and sent flow to D1   --  Graft to the 1st obtuse marginal: The graft was a saphenous vein graft from the ascending aorta  There was a 99% stenosis in the mid body of the graft with MEGAN 1 flow into the OM branch and circumflex  --  Graft to the RCA: The graft was a saphenous vein graft from the ascending aorta  There was a 100 % stenosis at the graft ostium  NOTE: Right femoral access  Hemostasis via Angioseal device  Prepared and signed by    Chriss Kuo MD  Signed 10/21/2019 15:52:02    Study diagram    Angiographic findings  Native coronary lesions:  ï¾·LAD: Lesion 1: 100 % stenosis  ï¾·Circumflex: Lesion 1: 100 % stenosis  Coronary graft lesions:  ï¾·Graft to RCA: SVG from ascending aorta  ï¾· 100 % stenosis at graft ostium  Intervention results  Coronary graft lesions:  ï¾· drug-eluting stent of SVG (distal 1/3) from the aorta to OM1  Stent: Synergy MR 3 5 x 16mm drug-eluting  Hemodynamic tables    Pressures:  Baseline  Pressures:  - HR: 91  Pressures:  - Rhythm:  Pressures:  -- Aortic Pressure (S/D/M): 109/68/84    Outputs:  Baseline  Outputs:  -- CALCULATIONS: [age]  Outputs:  -- CALCULATIONS: Body Surface Area: 1 91  Outputs:  -- CALCULATIONS: Height in cm: 163 00  Outputs:  -- CALCULATIONS: Sex: Male  Outputs:  -- CALCULATIONS: Weight in k 70       No results found for this or any previous visit  No results found for this or any previous visit  No results found for this or any previous visit  Shanae Conroy MD    Portions of the record may have been created with voice recognition software  Occasional wrong word or "sound a like" substitutions may have occurred due to the inherent limitations of voice recognition software    Read the chart carefully and recognize, using context, where substitutions have occurred

## 2019-11-04 NOTE — ANESTHESIA PREPROCEDURE EVALUATION
Review of Systems/Medical History  Patient summary reviewed  Chart reviewed      Cardiovascular  Exercise tolerance (METS): <4,  Pacemaker/AICD ([employer] ICD/PPM), Hyperlipidemia, Hypertension , Past MI 0-3 months, CAD , History of CABG, Dysrhythmias (scar-related VT s/p ICD/PPM) , atrial flutter and ventricular tachycardia, CHF ,    Pulmonary  Smoker ex-smoker  ,        GI/Hepatic    GI bleeding ,        Chronic kidney disease stage 2,        Endo/Other  Negative endo/other ROS   Comment: Impaired fasting glucose    GYN       Hematology  Anemia acute blood loss anemia,    Comment: GI bleed   Musculoskeletal  Back pain , lumbar pain,        Neurology    CVA (CVA 2017) , no residual symptoms,    Psychology   Anxiety,          Cardiac Cath 10/21/19:  Severe CAD with total occlusions of the LAD and circumflex  The LAD is supplied by a patent LIMA  The OM and circumflex are supplied by an SVG that had a 99% stenosis in mid body  This was the culprit for the patient's NSTEMI  Successful PCI of SVG to OM1 and circumflex  Plan: DAPT and anticoagulation, switching to anticoagulation with a NOAC and clopdigrel in 1 month  Left main: Normal   LAD: There was a 100 % proximal stenosis  Circumflex: There was a 100 % proxmal stenosis  RCA: The vessel was normal sized and dominant, giving rise to the PDA and a posterolateral branch  there was marked aneurysmal enlargement of the ostial/proxmal RCA  The was moderate diffuse disease, but no obstructive lesions  Graft to the LAD: The graft was a LIMA  The body of the graft and the anastomoses are normal  The grafted mid and distal LAD are free of critical disease and fill well  Graft to the 1st diagonal: The graft was a saphenous vein graft from the ascending aorta  The graft filled non-selectively with an injection of the OM graft  The graft was patent and sent flow to D1  Graft to the 1st obtuse marginal: The graft was a saphenous vein graft from the ascending aorta  There was a 99% stenosis in the mid body of the graft with MEGAN 1 flow into the OM branch and circumflex  Graft to the RCA: There was a 100 % stenosis at the graft ostium  TTE 10/20/19:  LEFT VENTRICLE:  Systolic function was moderately reduced  Ejection fraction was estimated to be 35 %  There was akinesis of the mid-apical anterior, mid anteroseptal, mid inferoseptal, apical septal, and apical wall(s)  RIGHT VENTRICLE: The size was normal  Systolic function was normal      TRICUSPID VALVE:  Estimated peak PA pressure was 40 mmHg  The findings suggest mild pulmonary hypertension  Lab Results   Component Value Date    WBC 14 28 (H) 11/02/2019    HGB 7 9 (L) 11/03/2019    HCT 23 0 (L) 11/03/2019    MCV 94 11/02/2019     11/02/2019     Lab Results   Component Value Date    SODIUM 139 11/03/2019    K 4 8 11/03/2019     11/03/2019    CO2 22 11/03/2019    BUN 64 (H) 11/03/2019    CREATININE 1 28 11/03/2019    GLUC 154 (H) 11/03/2019    CALCIUM 8 1 (L) 11/03/2019     Lab Results   Component Value Date    INR 1 67 (H) 11/02/2019    INR 1 31 (H) 10/21/2019    INR 1 29 (H) 10/21/2019    PROTIME 19 2 (H) 11/02/2019    PROTIME 15 9 (H) 10/21/2019    PROTIME 15 7 (H) 10/21/2019           Physical Exam    Airway    Mallampati score: I  TM Distance: >3 FB  Neck ROM: full     Dental   upper dentures,     Cardiovascular  Rhythm: irregular, Rate: normal,     Pulmonary  Breath sounds clear to auscultation,     Other Findings        Anesthesia Plan  ASA Score- 4 Emergent    Anesthesia Type- general and IV sedation with anesthesia with ASA Monitors  Additional Monitors:   Airway Plan:     Comment: IV sedation, GETA backup  Plan Factors-    Induction- intravenous  Postoperative Plan-   Planned trial extubation    Informed Consent- Anesthetic plan and risks discussed with patient  I personally reviewed this patient with the CRNA  Discussed and agreed on the Anesthesia Plan with the CRNA  Trista Coon

## 2019-11-04 NOTE — ASSESSMENT & PLAN NOTE
· With hemoglobin drop from 12 4 10/25/2018 to 9 8 the time of presentation, suspect in setting of acute GI bleeding as above  · Trend hemoglobin   · Will transfuse for hemoglobin less than 8 or symptomatic-hemoglobin is 7 9 will recheck  today and if it is dropping,will transfuse 1 unit packed RBCs  · Status post EGD-single ulcer in the stomach with no home

## 2019-11-04 NOTE — PLAN OF CARE
Problem: Nutrition/Hydration-ADULT  Goal: Nutrient/Hydration intake appropriate for improving, restoring or maintaining nutritional needs  Description  Monitor and assess patient's nutrition/hydration status for malnutrition  Collaborate with interdisciplinary team and initiate plan and interventions as ordered  Monitor patient's weight and dietary intake as ordered or per policy  Utilize nutrition screening tool and intervene as necessary  Determine patient's food preferences and provide high-protein, high-caloric foods as appropriate  INTERVENTIONS:  - Monitor oral intake, urinary output, labs, and treatment plans  - Assess nutrition and hydration status and recommend course of action  - Evaluate amount of meals eaten  - Assist patient with eating if necessary   - Allow adequate time for meals  - Recommend/ encourage appropriate diets, oral nutritional supplements, and vitamin/mineral supplements  - Order, calculate, and assess calorie counts as needed  - Recommend, monitor, and adjust tube feedings and TPN/PPN based on assessed needs  - Assess need for intravenous fluids  - Provide specific nutrition/hydration education as appropriate  - Include patient/family/caregiver in decisions related to nutrition  Outcome: Not Progressing   NPO for procedure, will monitor diet advancement/tolerance

## 2019-11-04 NOTE — ASSESSMENT & PLAN NOTE
· Recently discharged from [facility] rehab following recent hospitalization  · Will have PT/OT work with patient while inpatient

## 2019-11-04 NOTE — ASSESSMENT & PLAN NOTE
· EF 10/20/2019 35%  · Currently appears euvolemic  · Monitor daily weights, I/O  · Holding diuretic in setting of acute kidney injury  · Likely restart back on the diuretics tomorrow

## 2019-11-04 NOTE — ASSESSMENT & PLAN NOTE
· Presented after episode of hematemesis this evening  Had hemoglobin drop 12 4 10/25/2019 -> 9 8 at the time of presentation  · Last hemoglobin is 7 9  · Suspect upper GI source, reports history of ulcers  Also vas on ASA/Plavix/Eliquis  · Aspirin and Plavix continued per discussion with the Cardiology  Cardiology consultation appreciated  · GI evaluation appreciated, status post EGD-noted to have nonbleeding gastric ulcer    Currently on PPI drip  · Monitor hemoglobin and transfuse as needed

## 2019-11-04 NOTE — ANESTHESIA POSTPROCEDURE EVALUATION
Post-Op Assessment Note    CV Status:  Stable  Pain Score: 0    Pain management: adequate     Mental Status:  Awake and sleepy   Hydration Status:  Stable   PONV Controlled:  None   Airway Patency:  Patent   Post Op Vitals Reviewed: Yes      Staff: CRNA           BP   115/62   Temp      Pulse  82   Resp      SpO2   99% on 4 l/m nasal cannula

## 2019-11-04 NOTE — NUTRITION
11/04/19 1444   Recommendations/Interventions   Malnutrition/BMI Present Yes   Malnutrition type Chronic illness  (Related to medical condition as evidenced by 6% weight loss over the past month and <75% energy intake needs met >1 month treated with pending diet advancement)   Degree of Malnutrition Other severe protein calorie malnutrition   Malnutrition Characteristics Inadequate energy;Weight loss   Nutrition Recommendations Other (specify); Lab - consider order (specify)  (When medically indicated suggest advance diet to clear liquid with ensure clear BID once tolerated increase to Regular with Ensure enlive BID   Monitor electrolytes and weight  )

## 2019-11-04 NOTE — PLAN OF CARE
Problem: PHYSICAL THERAPY ADULT  Goal: Performs mobility at highest level of function for planned discharge setting  See evaluation for individualized goals  Description  Treatment/Interventions: Functional transfer training, LE strengthening/ROM, Therapeutic exercise, Endurance training, Cognitive reorientation, Patient/family training, Equipment eval/education, Bed mobility, Gait training, Spoke to nursing, Family  Equipment Recommended: Eryn Mendez       See flowsheet documentation for full assessment, interventions and recommendations  Outcome: Progressing  Note:   Prognosis: Good  Problem List: Decreased strength, Impaired balance, Decreased range of motion, Decreased endurance, Decreased mobility, Decreased safety awareness  Assessment: Pt  is an 81 yo M who presents with Hematemesis  order placed for PT eval and tx, w/ activity order of up w/ A  pt presents w/ comorbidities of Recent PM placement, CAD, A Flutter, Ischemic cardiomyopathy, JAMEL, Physical deconditioning, CVA, dyslipidemia, Hx of CABG, radiculopathy, and CKD2, and personal factors of advanced age, mobilizing w/ assistive device, inability to ambulate household distances, inability to navigate community distances, inability to navigate level surfaces w/o external assistance, unable to perform dynamic tasks in community, limited home support, decreased initiation and engagement, unable to perform physical activity, inability to perform IADLs, inability to perform ADLs and inability to live alone  pt presents w/ pain, weakness, decreased endurance, impaired cognition, impaired balance, gait deviations, impaired coordination, decreased safety awareness, orthopedic restrictions and fall risk   these impairments are evident in findings from physical examination (weakness, impaired coordination and impaired skin integrity), mobility assessment (need for Supervision assist w/ all phases of mobility when usually mobilizing independently, tolerance to only 0 feet of ambulation and need for cueing for mobility technique), and Barthel Index: 45/100  pt needed input for task focus and mobility technique  pt is at risk for falls due to physical and safety awareness deficits  pt's clinical presentation is unstable/unpredictable (evident in need for assist w/ all phases of mobility when usually mobilizing independently, tolerance to only 0 feet of ambulation, pain impacting overall mobility status, need for input for mobility technique, need for input for task focus and mobility technique and need for input for mobility technique/safety)  pt needs inpatient PT tx to improve mobility deficits  discharge recommendation is for inpatient rehab vs  home PT pending progression and continued mobility assessment to reduce fall risk and maximize level of functional independence  Recommendation: (rehab vs  Home PT pending progression)          See flowsheet documentation for full assessment

## 2019-11-04 NOTE — ASSESSMENT & PLAN NOTE
· POA, baseline appears 1-1 1  · Received 500 cc normal saline in ED, suspect some degree from hypovolemia however patient's blood pressure is also borderline  · Creatinine is 1 24 yesterday, losartan and diuretics on hold  · Avoid nephrotoxins and initiate hold parameters for metoprolol  · Recheck in am

## 2019-11-05 LAB
ABO GROUP BLD BPU: NORMAL
ABO GROUP BLD BPU: NORMAL
ANION GAP SERPL CALCULATED.3IONS-SCNC: 5 MMOL/L (ref 4–13)
BPU ID: NORMAL
BPU ID: NORMAL
BUN SERPL-MCNC: 29 MG/DL (ref 5–25)
CALCIUM SERPL-MCNC: 7.9 MG/DL (ref 8.3–10.1)
CHLORIDE SERPL-SCNC: 107 MMOL/L (ref 100–108)
CO2 SERPL-SCNC: 28 MMOL/L (ref 21–32)
CREAT SERPL-MCNC: 1.08 MG/DL (ref 0.6–1.3)
CROSSMATCH: NORMAL
CROSSMATCH: NORMAL
ERYTHROCYTE [DISTWIDTH] IN BLOOD BY AUTOMATED COUNT: 14.2 % (ref 11.6–15.1)
GFR SERPL CREATININE-BSD FRML MDRD: 61 ML/MIN/1.73SQ M
GLUCOSE SERPL-MCNC: 103 MG/DL (ref 65–140)
H PYLORI IGG SER IA-ACNC: 0.3 INDEX VALUE (ref 0–0.79)
HCT VFR BLD AUTO: 23 % (ref 36.5–49.3)
HGB BLD-MCNC: 7.3 G/DL (ref 12–17)
MCH RBC QN AUTO: 30.4 PG (ref 26.8–34.3)
MCHC RBC AUTO-ENTMCNC: 31.7 G/DL (ref 31.4–37.4)
MCV RBC AUTO: 96 FL (ref 82–98)
PLATELET # BLD AUTO: 198 THOUSANDS/UL (ref 149–390)
PMV BLD AUTO: 9.9 FL (ref 8.9–12.7)
POTASSIUM SERPL-SCNC: 3.7 MMOL/L (ref 3.5–5.3)
RBC # BLD AUTO: 2.4 MILLION/UL (ref 3.88–5.62)
SODIUM SERPL-SCNC: 140 MMOL/L (ref 136–145)
UNIT DISPENSE STATUS: NORMAL
UNIT DISPENSE STATUS: NORMAL
UNIT PRODUCT CODE: NORMAL
UNIT PRODUCT CODE: NORMAL
UNIT RH: NORMAL
UNIT RH: NORMAL
WBC # BLD AUTO: 11.97 THOUSAND/UL (ref 4.31–10.16)

## 2019-11-05 PROCEDURE — 80048 BASIC METABOLIC PNL TOTAL CA: CPT | Performed by: FAMILY MEDICINE

## 2019-11-05 PROCEDURE — C9113 INJ PANTOPRAZOLE SODIUM, VIA: HCPCS | Performed by: FAMILY MEDICINE

## 2019-11-05 PROCEDURE — 99232 SBSQ HOSP IP/OBS MODERATE 35: CPT | Performed by: PHYSICIAN ASSISTANT

## 2019-11-05 PROCEDURE — 85027 COMPLETE CBC AUTOMATED: CPT | Performed by: FAMILY MEDICINE

## 2019-11-05 PROCEDURE — 99232 SBSQ HOSP IP/OBS MODERATE 35: CPT | Performed by: INTERNAL MEDICINE

## 2019-11-05 RX ORDER — PANTOPRAZOLE SODIUM 40 MG/1
40 TABLET, DELAYED RELEASE ORAL
Status: DISCONTINUED | OUTPATIENT
Start: 2019-11-05 | End: 2019-11-06 | Stop reason: HOSPADM

## 2019-11-05 RX ORDER — FUROSEMIDE 20 MG/1
20 TABLET ORAL DAILY
Status: DISCONTINUED | OUTPATIENT
Start: 2019-11-06 | End: 2019-11-06 | Stop reason: HOSPADM

## 2019-11-05 RX ADMIN — PANTOPRAZOLE SODIUM 40 MG: 40 TABLET, DELAYED RELEASE ORAL at 16:05

## 2019-11-05 RX ADMIN — APIXABAN 5 MG: 5 TABLET, FILM COATED ORAL at 09:36

## 2019-11-05 RX ADMIN — MELATONIN 3 MG: 3 TAB ORAL at 20:14

## 2019-11-05 RX ADMIN — Medication 200 MG: at 12:52

## 2019-11-05 RX ADMIN — AMIODARONE HYDROCHLORIDE 200 MG: 200 TABLET ORAL at 16:05

## 2019-11-05 RX ADMIN — METOPROLOL TARTRATE 25 MG: 25 TABLET, FILM COATED ORAL at 20:14

## 2019-11-05 RX ADMIN — POTASSIUM CHLORIDE 20 MEQ: 1500 TABLET, EXTENDED RELEASE ORAL at 09:34

## 2019-11-05 RX ADMIN — CLOPIDOGREL BISULFATE 75 MG: 75 TABLET ORAL at 09:36

## 2019-11-05 RX ADMIN — DIPHENHYDRAMINE HCL 25 MG: 25 TABLET, COATED ORAL at 20:14

## 2019-11-05 RX ADMIN — ATORVASTATIN CALCIUM 40 MG: 40 TABLET, FILM COATED ORAL at 17:38

## 2019-11-05 RX ADMIN — FLUTICASONE PROPIONATE 1 SPRAY: 50 SPRAY, METERED NASAL at 09:38

## 2019-11-05 RX ADMIN — SODIUM CHLORIDE 8 MG/HR: 9 INJECTION, SOLUTION INTRAVENOUS at 03:26

## 2019-11-05 RX ADMIN — APIXABAN 5 MG: 5 TABLET, FILM COATED ORAL at 17:38

## 2019-11-05 RX ADMIN — Medication 500 MG: at 12:52

## 2019-11-05 RX ADMIN — AMIODARONE HYDROCHLORIDE 200 MG: 200 TABLET ORAL at 09:37

## 2019-11-05 NOTE — ASSESSMENT & PLAN NOTE
· With hemoglobin drop from 12 4 10/25/2018 to 9 8 the time of presentation, suspect in setting of acute GI bleeding as above  · Hemoglobin continues to slowly trend down and is 7 3 today however no further episodes of hematemesis and no melena/hematochezia  · S/p 2 units packed RBCs  · Recheck in AM   If hemoglobin < 7 0, will transfuse and request further evaluation from GI

## 2019-11-05 NOTE — ASSESSMENT & PLAN NOTE
· EF 10/20/2019 35%  · Currently appears euvolemic  · Monitor daily weights, I/O  · Will restart Lasix 20 mg daily tomorrow

## 2019-11-05 NOTE — ASSESSMENT & PLAN NOTE
· POA, baseline appears 1 0-1 1  · Creatinine today: 1 08  · Losartan and diuretics are on hold  Resume tomorrow    · Avoid nephrotoxins and initiate hold parameters for metoprolol

## 2019-11-05 NOTE — PROGRESS NOTES
General Cardiology Progress Note   Antelmo Smith 80 y o  male MRN: [id number]  Unit/Bed#: -01 Encounter: [id number]      Assessment:  Principal Problem:    Hematemesis  Active Problems:    Ischemic cardiomyopathy    CAD (coronary artery disease)    Typical atrial flutter (HCC)    Elevated troponin    Acute kidney injury (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, s/p recent stenting, s/p AICD (VT was monomorphic, and scar related, not due to ischemia)   Hypotension - related to Anemia/GI Bleed   o Losartan on hold  o On much smaller BB dose than home  Plan:     Avoid Aspirin especially with ulcer   Plavix and Eliquis from now on only   BP still low  Hold parameters on meds  Only low dose Metoprolol for now  Subjective:   Patient seen and examined  EGD result noted  No bleeding overnight  BP low    Review of Systems   Cardiovascular: Negative for dyspnea on exertion, leg swelling, orthopnea, palpitations and syncope  Respiratory: Negative for cough and shortness of breath  Neurological: Negative for dizziness and light-headedness  Objective   Vitals: Blood pressure 95/54, pulse 96, temperature 97 7 °F (36 5 °C), temperature source Axillary, resp  rate 16, height 5' 5" (1 651 m), weight 82 2 kg (181 lb 2 oz), SpO2 96 %  , Body mass index is 30 14 kg/m² , I/O last 3 completed shifts: In: 679 5 [I V :329 5; Blood:350]  Out: 5460 [Urine:1550]  No intake/output data recorded    Wt Readings from Last 3 Encounters:   11/05/19 82 2 kg (181 lb 2 oz)   10/22/19 85 kg (187 lb 6 3 oz)   10/17/19 85 7 kg (189 lb)       Intake/Output Summary (Last 24 hours) at 11/5/2019 0934  Last data filed at 11/5/2019 0401  Gross per 24 hour   Intake 679 5 ml Output 850 ml   Net -170 5 ml     I/O last 3 completed shifts: In: 679 5 [I V :329 5; Blood:350]  Out: 6569 [Urine:1550]    Tele - rate controlled atrial flutter    Physical Exam   Constitutional: He is oriented to person, place, and time  No distress  HENT:   Head: Normocephalic and atraumatic  Neck: Normal range of motion  Neck supple  No JVD present  Cardiovascular: Normal rate, regular rhythm, normal heart sounds and intact distal pulses  No murmur heard  Pulmonary/Chest: Effort normal and breath sounds normal  No respiratory distress  He has no wheezes  He has no rales  Abdominal: Soft  Bowel sounds are normal  He exhibits no distension  There is no tenderness  Musculoskeletal: He exhibits no edema  Neurological: He is alert and oriented to person, place, and time  Skin: Skin is warm and dry  He is not diaphoretic  Meds/Allergies   Allergies   Allergen Reactions    Penicillins Edema and Rash     Reaction Date: [id number]; Category: Allergy;  Annotation - [id number]: Severe reaction with hospitaization at [facility]       Current Facility-Administered Medications:     amiodarone tablet 200 mg, 200 mg, Oral, BID With Meals, Freeda Flavin, DO, 200 mg at 11/04/19 1713    apixaban (ELIQUIS) tablet 5 mg, 5 mg, Oral, BID, Sumi Butler MD    atorvastatin (LIPITOR) tablet 40 mg, 40 mg, Oral, After Bouchra Khan, DO, 40 mg at 11/04/19 1713    clopidogrel (PLAVIX) tablet 75 mg, 75 mg, Oral, Daily, Freeda Flavin, DO, 75 mg at 11/04/19 1241    co-enzyme Q-10 capsule 200 mg, 200 mg, Oral, Daily, Freeda Flavin, DO, 200 mg at 11/04/19 1242    diphenhydrAMINE (BENADRYL) tablet 25 mg, 25 mg, Oral, Q6H PRN, Randi Vigil PA-C, 25 mg at 11/04/19 2022    fluticasone (FLONASE) 50 mcg/act nasal spray 1 spray, 1 spray, Each Nare, Daily, Sumi Butler MD, 1 spray at 11/04/19 1713    magnesium gluconate (MAGONATE) tablet 500 mg, 500 mg, Oral, Daily, Aydea Flavin, DO, 500 mg at 11/04/19 1241   melatonin tablet 3 mg, 3 mg, Oral, HS, Tatiana Locks, DO, 3 mg at 11/04/19 2022    metoprolol tartrate (LOPRESSOR) tablet 25 mg, 25 mg, Oral, Q12H [facility], Pop Peppers, DO, 25 mg at 11/04/19 2022    nitroglycerin (NITROSTAT) SL tablet 0 4 mg, 0 4 mg, Sublingual, Q5 Min PRN, Tatiana Locks, DO    ondans[facility]ron [facility] injection 4 mg, 4 mg, Intravenous, Q6H PRN, Tatiana Locks, DO, 4 mg at 11/03/19 0218    pantoprazole (PROTONIX) 80 mg in sodium chloride 0 9 % 100 mL infusion, 8 mg/hr, Intravenous, Continuous, Todd Marin MD, Last Rate: 10 mL/hr at 11/05/19 0326, 8 mg/hr at 11/05/19 0326    potassium chloride (K-DUR,KLOR-CON) CR tablet 20 mEq, 20 mEq, Oral, Daily, Tatiana Locks, DO, 20 mEq at 11/04/19 1242    prednisoLONE acetate (PRED FORTE) 1 % ophthalmic suspension 1 drop, 1 drop, Both Eyes, Q12H [facility], Tatiana Locks, DO    Laboratory Results:  Results from last 7 days   Lab Units 11/03/19  0220 11/02/19  2341 11/02/19  1926   TROPONIN I ng/mL 0 17* 0 16* 0 19*       CBC with diff:   Results from last 7 days   Lab Units 11/05/19  0630 11/04/19  1751 11/04/19  0919 11/03/19  2345 11/03/19  1648 11/03/19  1129 11/03/19  0220  11/02/19  1926   WBC Thousand/uL 11 97*  --   --   --   --   --   --   --  14 28*   HEMOGLOBIN g/dL 7 3* 7 6* 7 9* 7 9* 8 9* 8 5* 9 3*   < > 9 8*   HEMATOCRIT % 23 0* 23 6* 23 7* 23 0* 26 6* 24 5* 28 2*   < > 30 5*   MCV fL 96  --   --   --   --   --   --   --  94   PLATELETS Thousands/uL 198  --   --   --   --   --   --   --  294   MCH pg 30 4  --   --   --   --   --   --   --  30 2   MCHC g/dL 31 7  --   --   --   --   --   --   --  32 1   RDW % 14 2  --   --   --   --   --   --   --  13 4   MPV fL 9 9  --   --   --   --   --   --   --  10 2   NRBC AUTO /100 WBCs  --   --   --   --   --   --   --   --  0    < > = values in this interval not displayed         CMP:  Results from last 7 days   Lab Units 11/05/19  0630 11/03/19  6985 11/02/19  1596   POTASSIUM mmol/L 3 7 4 8 4 9   CHLORIDE mmol/L 107 108 102   CO2 mmol/L 28 22 22   BUN mg/dL 29* 64* 41*   CREATININE mg/dL 1 08 1 28 1 44*   CALCIUM mg/dL 7 9* 8 1* 8 3   AST U/L  --   --  24   ALT U/L  --   --  32   ALK PHOS U/L  --   --  75   EGFR ml/min/1 73sq m 61 50 43       BMP:  Results from last 7 days   Lab Units 19  0630 19  0455 19  1926   POTASSIUM mmol/L 3 7 4 8 4 9   CHLORIDE mmol/L 107 108 102   CO2 mmol/L 28 22 22   BUN mg/dL 29* 64* 41*   CREATININE mg/dL 1 08 1 28 1 44*   CALCIUM mg/dL 7 9* 8 1* 8 3       NT-proBNP: No results for input(s): NTBNP in the last 72 hours  Magnesium:   Results from last 7 days   Lab Units 19  0455   MAGNESIUM mg/dL 1 8       Coags:   Results from last 7 days   Lab Units 19  1926   PTT seconds 27   INR  1 67*       TSH:        Hemoglobin A1C )      Lipid Profile:   Lab Results   Component Value Date    CHOL 156 2017    CHOL 166 2017    CHOL 166 2016     Lab Results   Component Value Date    HDL 58 10/20/2019    HDL 53 2019    HDL 54 2018     Lab Results   Component Value Date    LDLCALC 65 10/20/2019    LDLCALC 54 2018     No results found for: LDLDIRECT  Lab Results   Component Value Date    TRIG 65 10/20/2019    TRIG 150 (H) 2019    TRIG 116 2018       Cardiac testing:   EKG personally reviewed by Nadira Samson MD      Results for orders placed during the hospital encounter of 10/19/19   Echo complete with contrast if indicated    Narrative [address]  [address]  [phone]    Transthoracic Echocardiogram  2D, M-mode, Doppler, and Color Doppler    Study date:  20-Oct-2019    Patient: Haile Eddy  MR number: [id number]  Account number: [de-identified]  : 01-Sep-1932  Age: 80 years  Gender: Male  Status: Inpatient  Location: Bedside  Height: 64 in  Weight: 188 lb  BP: 124/ 87 mmHg    Indications: Known coronary artery disease  Chest pain      Diagnoses: I25 83 - Coronary atherosclerosis due to lipid rich plaque    Sonographer:  PARVEZ Orta  Primary Physician:  Heriberto Sotelo MD  Referring Physician:  Dev Ross DO  Group:  [facility]  Interpreting Physician:  Siria Mcgregor MD    SUMMARY    LEFT VENTRICLE:  Systolic function was moderately reduced  Ejection fraction was estimated to be 35 %  There was akinesis of the mid-apical anterior, mid anteroseptal, mid inferoseptal, apical septal, and apical wall(s)  TRICUSPID VALVE:  Estimated peak PA pressure was 40 mmHg  The findings suggest mild pulmonary hypertension  HISTORY: PRIOR HISTORY: Ischemic cardiomyopathy  Ventricular and supraventricular arrhythmias  Risk factors: hypertension and medication-treated hypercholesterolemia  Remote transient ischemic attack  PRIOR PROCEDURES: Coronary bypass  1999  PROCEDURE: The procedure was performed at the bedside  This was a routine study  The transthoracic approach was used  The study included complete 2D imaging, M-mode, complete spectral Doppler, and color Doppler  The heart rate was 103 bpm,  at the start of the study  Intravenous contrast (  6ml of Definity) was administered  Echocardiographic views were limited due to decreased penetration and lung interference  This was a technically difficult study  LEFT VENTRICLE: Size was normal  Systolic function was moderately reduced  Ejection fraction was estimated to be 35 %  There was akinesis of the mid-apical anterior, mid anteroseptal, mid inferoseptal, apical septal, and apical wall(s)  DOPPLER: Normal sinus rhythm was absent  RIGHT VENTRICLE: The size was normal  Systolic function was normal     LEFT ATRIUM: The atrium was dilated  RIGHT ATRIUM: Size was normal     MITRAL VALVE: Valve structure was normal  There was normal leaflet separation  DOPPLER: There was no evidence for stenosis  There was mild regurgitation  The regurgitant jet was eccentric      AORTIC VALVE: The valve was trileaflet  Leaflets exhibited mildly increased thickness, mild calcification, normal cuspal separation, and sclerosis  DOPPLER: There was no evidence for stenosis  There was no regurgitation  TRICUSPID VALVE: The valve structure was normal  There was normal leaflet separation  DOPPLER: There was no evidence for stenosis  There was mild regurgitation  Estimated peak PA pressure was 40 mmHg  The findings suggest mild pulmonary  hypertension  PULMONIC VALVE: Leaflets exhibited normal thickness, no calcification, and normal cuspal separation  DOPPLER: The transpulmonic velocity was within the normal range  There was trace regurgitation  PERICARDIUM: There was no pericardial effusion  The pericardium was normal in appearance  AORTA: The root exhibited normal size  SYSTEM MEASUREMENT TABLES    2D  Ao Diam: 3 62 cm  LA Area: 29 25 cm2  LA Diam: 4 98 cm  LAAs A4C: 28 77 cm2  LAESV A-L A4C: 105 01 ml  LAESV MOD A4C: 97 21 ml  LALs A4C: 6 69 cm  LVEDV MOD A4C: 126 19 ml  LVEF MOD A4C: 46 86 %  LVESV MOD A4C: 67 06 ml  LVLd A4C: 8 05 cm  LVLs A4C: 7 44 cm  RA Area: 17 34 cm2  RVIDd: 4 12 cm  SV MOD A4C: 59 13 ml    CW  TR Vmax: 3 21 m/s  TR maxP 12 mmHg    MM  TAPSE: 1 43 cm    [employer] Accredited Echocardiography Laboratory    Prepared and electronically signed by    Jayden Das MD  Signed 20-Oct-2019 13:20:19       No results found for this or any previous visit    Results for orders placed during the hospital encounter of 10/19/19   Cardiac catheterization    [address]  [phone]    [facility]    Invasive Cardiovascular Lab Complete Report    Patient: Florina Cross  MR number: [id number]  Account number: [de-identified]  Study date: 10/21/2019  Gender: Male  : 1932  Height: 64 2 in  Weight: 188 5 lb  BSA: 1 91 mï¾²    Allergies: PENICILLINS    Diagnostic Cardiologist:  Nilda Doyle MD  Primary Physician:  Nimo Smalls MD    SUMMARY    CORONARY CIRCULATION:  Left main: Normal   LAD: There was a 100 % proximal stenosis  Circumflex: There was a 100 % proxmal stenosis  RCA: The vessel was normal sized and dominant, giving rise to the PDA and a posterolateral branch  there was marked aneurysmal enlargement of the ostial/proxmal RCA  The was moderate diffuse disease, but no obstructive lesions  Graft to the LAD: The graft was a LIMA  The body of the graft and the anastomoses are normal  The grafted mid and distal LAD are free of critical disease and fill well  Graft to the 1st diagonal: The graft was a saphenous vein graft from the ascending aorta  The graft filled non-selectively with an injection of the OM graft  The graft was patent and sent flow to D1  Graft to the 1st obtuse marginal: The graft was a saphenous vein graft from the ascending aorta  There was a 99% stenosis in the mid body of the graft with MEGAN 1 flow into the OM branch and circumflex  Graft to the RCA: There was a 100 % stenosis at the graft ostium  1ST LESION INTERVENTIONS:  Following pre-dilation, a Synergy MR 3 5 x 16mm drug-eluting stent was placed across the 99% lesion in the SVG to OM1 and deployed at a maximum inflation pressure of 16 estelle  After post-dilation with a 4 0mm balloon, there was 0% residual  stenosis  MEGAN flow increased from grade 1 to grade 3  REPORT ELEMENT SELECTION:  Right femoral access  Hemostasis via Angioseal device  Summary:  Severe CAD with total occlusions of the LAD and circumflex  The LAD is supplied by a patent LIMA  The OM and circumflex are supplied by an SVG that had a 99% stenosis in mid body  This was the culprit for the patient's NSTEMI  Successful PCI of SVG to OM1 and circumflex  Plan: DAPT and anticoagulation, switching to anticoagulation with a NOAC and clopdigrel in 1 month  INDICATIONS:  --  Possible CAD: myocardial infarction without ST elevation (NSTEMI)    -- Congestive heart failure with ischemic cardiomyopathy  --  Cardiac: cardiac arrest     PROCEDURES PERFORMED    --  Left coronary angiography  --  Right coronary angiography  --  Saphenous vein graft angiography  --  LIMA graft angiography  --  Inpatient  --  Mod Sedation Same Physician Initial 15min  --  Coronary Catheterization (w/o LHC, w/ Grafts  --  Mod Sedation Same Physician Add 15min  --  Coronary Drug Eluting Stent w/PTCA  --  Intervention on SVG (distal 1/3) from the aorta to OM1: drug-eluting stent  PROCEDURE: The risks and alternatives of the procedures and conscious sedation were explained to the patient and informed consent was obtained  The patient was brought to the cath lab and placed on the table  The planned puncture sites  were prepped and draped in the usual sterile fashion  --  Right femoral artery access  The puncture site was infiltrated with local anesthetic  The vessel was accessed using the modified Seldinger technique, a wire was advanced into the vessel, and a sheath was advanced over the wire into the  vessel  --  Left coronary artery angiography  A catheter was advanced over a guidewire into the aorta and positioned in the left coronary artery ostium under fluoroscopic guidance  Angiography was performed  --  Right coronary artery angiography  A catheter was advanced over a guidewire into the aorta and positioned in the right coronary artery ostium under fluoroscopic guidance  Angiography was performed  --  Saphenous vein graft angiography  A catheter was advanced to the aorta and positioned at the aortic anastomosis of the graft over a guidewire under fluoroscopic guidance  Angiography was performed  --  Left internal mammary graft angiography  A catheter was advanced to the aorta and positioned in the left subclavian artery over a guidewire under fluoroscopic guidance  Angiography was performed  --  Inpatient      --  Mod Sedation Same Physician Initial 15min     --  Coronary Catheterization (w/o LHC, w/ Grafts  --  Mod Sedation Same Physician Add 15min  LESION INTERVENTION: A drug-eluting stent procedure was performed on the lesion in the distal third of the saphenous vein graft from the aorta to the 1st obtuse marginal  MEGAN flow increased from grade 1 to grade 3     --  Vessel setup was performed  A 6Fr  Launcher Lcb guiding catheter was used to cannulate the vessel  --  Vessel setup was performed  A Runthrough NS 300cm wire was used to cross the lesion  --  Balloon angioplasty was performed, using a Trek Rx 3 0 x 15mm balloon, with 2 inflations and a maximum inflation pressure of 10 estelle  --  Following pre-dilation, a Synergy MR 3 5 x 16mm drug-eluting stent was placed across the 99% lesion in the SVG to OM1 and deployed at a maximum inflation pressure of 16 estelle  After post-dilation with a 4 0mm balloon, there was 0%  residual stenosis  MEGAN flow increased from grade 1 to grade 3     --  Balloon angioplasty was performed, using a NC Trek Rx 4 0 x 15mm balloon, with 1 inflations and a maximum inflation pressure of 12 estelle  INTERVENTIONS:  --  Coronary Drug Eluting Stent w/PTCA  PROCEDURE COMPLETION: The patient tolerated the procedure well and was discharged from the cath lab  TIMING: Test started at 14:27  Test concluded at 15:34  HEMOSTASIS: The sheath was removed over a wire and the Angioseal delivery sheath  was inserted into the femoral artery  Hemostasis was obtained using a closure device ( Angioseal) deployed through the delivery sheath  MEDICATIONS GIVEN: 1% Lidocaine, 10 ml, subcutaneously, at 14:33  Fentanyl (1OOmcg/2 ml), 50 mcg, IV,  at 14:36  Versed (2mg/2ml), 2 mg, IV, at 14:36  Heparin 1000 units/ml, 5,000 units, IV, at 15:00  CONTRAST GIVEN: 130 ml Omnipaque (350 mg I /ml)  RADIATION EXPOSURE: Fluoroscopy time: 23 58 min  CORONARY VESSELS:   --  Left main: Normal   --  LAD: There was a 100 % proximal stenosis    -- Circumflex: There was a 100 % proxmal stenosis  --  RCA: The vessel was normal sized and dominant, giving rise to the PDA and a posterolateral branch  there was marked aneurysmal enlargement of the ostial/proxmal RCA  The was moderate diffuse disease, but no obstructive lesions  --  Graft to the LAD: The graft was a LIMA  The body of the graft and the anastomoses are normal  The grafted mid and distal LAD are free of critical disease and fill well  --  Graft to the 1st diagonal: The graft was a saphenous vein graft from the ascending aorta  The graft filled non-selectively with an injection of the OM graft  The graft was patent and sent flow to D1   --  Graft to the 1st obtuse marginal: The graft was a saphenous vein graft from the ascending aorta  There was a 99% stenosis in the mid body of the graft with MEGAN 1 flow into the OM branch and circumflex  --  Graft to the RCA: The graft was a saphenous vein graft from the ascending aorta  There was a 100 % stenosis at the graft ostium  NOTE: Right femoral access  Hemostasis via Angioseal device  Prepared and signed by    Maryam Ocasio MD  Signed 10/21/2019 15:52:02    Study diagram    Angiographic findings  Native coronary lesions:  ï¾·LAD: Lesion 1: 100 % stenosis  ï¾·Circumflex: Lesion 1: 100 % stenosis  Coronary graft lesions:  ï¾·Graft to RCA: SVG from ascending aorta  ï¾· 100 % stenosis at graft ostium  Intervention results  Coronary graft lesions:  ï¾· drug-eluting stent of SVG (distal 1/3) from the aorta to OM1  Stent: Synergy MR 3 5 x 16mm drug-eluting      Hemodynamic tables    Pressures:  Baseline  Pressures:  - HR: 91  Pressures:  - Rhythm:  Pressures:  -- Aortic Pressure (S/D/M): 109/68/84    Outputs:  Baseline  Outputs:  -- CALCULATIONS: [age]  Outputs:  -- CALCULATIONS: Body Surface Area: 1 91  Outputs:  -- CALCULATIONS: Height in cm: 163 00  Outputs:  -- CALCULATIONS: Sex: Male  Outputs:  -- CALCULATIONS: Weight in kg: 85 70       No results found for this or any previous visit  No results found for this or any previous visit  No results found for this or any previous visit  Rosangela Rivera MD    Portions of the record may have been created with voice recognition software  Occasional wrong word or "sound a like" substitutions may have occurred due to the inherent limitations of voice recognition software  Read the chart carefully and recognize, using context, where substitutions have occurred

## 2019-11-05 NOTE — DISCHARGE SUMMARY
Discharge Summary - Dianelys Barron 80 y o  male MRN: [id number]    Unit/Bed#: [facility] 410-01 Encounter: [id number]    Admission Date:   Admission Orders (From admission, onward)     Ordered        10/19/19 2301  Inpatient Admission  Once                     Admitting Diagnosis: Ventricular tachycardia ([address] ) [I47 2]  Chest pain [R07 9]  Coronary artery disease involving native coronary artery of native heart, angina presence unspecified [I25 10]    HPI:  Dianelys Barron is a 80 y o  male who has past medical history significant for CAD S/P CABG in 1999, ischemic cardiomyopathy with EF 45%, atrial flutter anticoagulated on Eliquis, hypertension, hyperlipidemia  He presents after episode of chest pain found to have unstable ventricular tachycardia        Patient reports that for approximately the past 1 week he had intermittent episodes of chest pain associated with exertion on long walks, resolved after approximately 5-10 minutes upon reaching car and sitting down  He was seen by his PCP for this with plan to obtain stress testing  This evening, however, patient had the sudden onset of substernal chest pressure without radiation without associated shortness of breath or dizziness/lightheadedness  On EMS arrival, patient apparently was found to have blood pressure 40/20 and in ventricular tachycardia  Patient was subsequently cardioverted with 100 joules with conversion into normal sinus rhythm and resolution of hypotension; additionally received 325mg ASA  On arrival to ED, patient was asymptomatic; found to have elevated troponin to 2 58; per discussion between ED physician and Cardiology was advised patient be initiated on heparin GTT      At the time my evaluation, patient remains chest pain-free, and currently denies any shortness of breath, lightheadedness, or lower extremity edema    Additionally denies any recent illnesses, fevers/chills, orthopnea, nausea/vomiting, or weight gain        Procedures Performed:   Orders Placed This Encounter   Procedures    Cardiac catheterization    Cardiac eps/icd implant       Summary of Hospital Course: He has V tach with hypotension and cardioverted by EMS with resolution  HE has normal electrolytes  He was in a flutter in 110-120s  Cath was scheduled for 10/21  He was noted to have V tach on 10/21, unstable with hypotension  He was placed on iv amiodarone, iv heparin  Patient was transferred to critical care  Cardiology is following  Patient went for cardiac cath which revealed acute occlusion of SVG to OM1  A drug eluting stent with angioplasty took place  HE was in critical care postoperatively  He was on A flutter with heart rate in 100s since ICU, no ventricular tachycardia noted  Shortness of breath which occurred when transferred from chair to bed  HE has history of CABG  Cardiomyopathy due to ischemia, with EF of 35%  He was continued on betablocker, started on ACE inhibitor  He was on statin for hyperlipidemia  He was transitioned to Amiodarone po  NSTEMI type 1 considered in CAD significant  Again aspirin, Plavix and beta blocker and statin continued at home  V tach is related to inf scar region origin  secondary prevention by ICD per EP  For A flutter he, Will need triple therapy for 1 month, then can stop ASA and continue on Plavix and Eliquis "    He was also monitored for JAMEL, avoid nephrotoxins  Elevated troponin, concern for NSTEMI, IV heparin started initially  Need close follow up with EP, cardiology out patient  Significant Findings, Care, Treatment and Services Provided: as above    Complications: none    Discharge Diagnosis:  Sustained V tach    Resolved Problems  Date Reviewed: 11/4/2019    None          Condition at Discharge: stable         Discharge instructions/Information to patient and family:   See after visit summary for information provided to patient and family        Provisions for Follow-Up Care:  See after visit summary for information related to follow-up care and any pertinent home health orders  PCP: Jhon Coto MD    Disposition: Short-term rehab at [facility] rehab    Planned Readmission: No      Discharge Statement   I spent 35 minutes discharging the patient  This time was spent on the day of discharge  I had direct contact with the patient on the day of discharge  Additional documentation is required if more than 30 minutes were spent on discharge  Discharge Medications:  See after visit summary for reconciled discharge medications provided to patient and family

## 2019-11-05 NOTE — ASSESSMENT & PLAN NOTE
· S/P prior CABG and S/P MINNIE to OM1 10/21/2019  · Currently chest pain-free  · Will continue ASA, Plavix in setting of recent placement of MINNIE  Cardiology evaluation appreciated  · Continue beta-blocker with hold parameters  · Continue statin  · Patient was on triple therapy as an outpatient  · Cardiology progress note appreciated  Plan is to continue with Eliquis and Plavix as an outpatient  · Discontinued aspirin  Discussed with Cardiology    Restarted on Eliquis

## 2019-11-05 NOTE — ASSESSMENT & PLAN NOTE
· Recently discharged from [facility] rehab following recent hospitalization  · PT recommends discharge home with out patient therapy

## 2019-11-05 NOTE — ASSESSMENT & PLAN NOTE
· S/P EGD: nonbleeding gastric ulcer  Currently on PPI drip  Will change to PO Protonix BID x2 weeks    · Monitor hemoglobin and transfuse as needed

## 2019-11-05 NOTE — PROGRESS NOTES
Progress Note - Sandy Salmeron 9/1/1932, 80 y o  male MRN: [id number]  Unit/Bed#: -01 Encounter: [id number]  Primary Care Provider: Camilo Avalos MD   Date and time admitted to hospital: 11/2/2019  6:53 PM    * Hematemesis  Assessment & Plan  · S/P EGD: nonbleeding gastric ulcer  Currently on PPI drip  Will change to PO Protonix BID x2 weeks  · Monitor hemoglobin and transfuse as needed    Acute blood loss anemia  Assessment & Plan  · With hemoglobin drop from 12 4 10/25/2018 to 9 8 the time of presentation, suspect in setting of acute GI bleeding as above  · Hemoglobin continues to slowly trend down and is 7 3 today however no further episodes of hematemesis and no melena/hematochezia  · S/p 2 units packed RBCs  · Recheck in AM   If hemoglobin < 7 0, will transfuse and request further evaluation from GI  Physical deconditioning  Assessment & Plan  · Recently discharged from [facility] rehab following recent hospitalization  · PT recommends discharge home with out patient therapy  Acute kidney injury ([facility] [address] )  Assessment & Plan  · POA, baseline appears 1 0-1 1  · Creatinine today: 1 08  · Losartan and diuretics are on hold  Resume tomorrow  · Avoid nephrotoxins and initiate hold parameters for metoprolol    Ischemic cardiomyopathy  Assessment & Plan  · EF 10/20/2019 35%  · Currently appears euvolemic  · Monitor daily weights, I/O  · Will restart Lasix 20 mg daily tomorrow  Typical atrial flutter (HCC)  Assessment & Plan  · S/P dual chamber ICD placement 10/24/2019  · Rate controlled on metoprolol tartrate and will continue with strict hold parameters  · Eliquis has been restarted  · Hold ASA indefinitely  CAD (coronary artery disease)  Assessment & Plan  · S/P prior CABG and S/P MINNIE to OM1 10/21/2019  · Currently chest pain-free  · Will continue ASA, Plavix in setting of recent placement of MINNIE    Cardiology evaluation appreciated  · Continue beta-blocker with hold parameters  · Continue statin  · Patient was on triple therapy as an outpatient  · Cardiology progress note appreciated  Plan is to continue with Eliquis and Plavix as an outpatient  · Discontinued aspirin  Discussed with Cardiology  Restarted on Eliquis    VTE Pharmacologic Prophylaxis:   Pharmacologic: Pharmacologic VTE Prophylaxis contraindicated due to UGIB  Mechanical: Mechanical VTE prophylaxis in place  Patient Centered Rounds: I have performed bedside rounds with nursing staff today  Discussions with Specialists or Other Care Team Provider: GI  Education and Discussions with Family / Patient: Patient's sister is at the bedside  All questions answered to the best of my ability  Time Spent for Care: 20 minutes  More than 50% of total time spent on counseling and coordination of care as described above  Current Length of Stay: 3 day(s)  Current Patient Status: Inpatient   Certification Statement: The patient will continue to require additional inpatient hospital stay due to hemoglobin monitoring  Discharge Plan: Patient is not yet medically stable for discharge  Anticipate discharge home tomorrow if hemoglobin is stable  Code Status: Level 1 - Full Code    Subjective:   Patient is doing well without complaints of dizziness, lightheadedness, SOB or palpitations  He denies any hematemesis  He has not had a BM since admission  Objective:   Vitals:   Temp (24hrs), Av 1 °F (36 7 °C), Min:97 7 °F (36 5 °C), Max:98 8 °F (37 1 °C)    Temp:  [97 7 °F (36 5 °C)-98 8 °F (37 1 °C)] 97 7 °F (36 5 °C)  HR:  [] 83  Resp:  [16-18] 16  BP: ()/(51-60) 101/51  SpO2:  [93 %-96 %] 96 %  Body mass index is 30 14 kg/m²  Input and Output Summary (last 24 hours):        Intake/Output Summary (Last 24 hours) at 2019 1359  Last data filed at 2019 1056  Gross per 24 hour   Intake 1139 5 ml   Output 1150 ml   Net -10 5 ml       Physical Exam:     Physical Exam   HENT:   Head: Normocephalic and atraumatic  Mouth/Throat: Oropharynx is clear and moist and mucous membranes are normal    Eyes: No scleral icterus  Cardiovascular: Normal rate and regular rhythm  No murmur heard  Pulmonary/Chest: Breath sounds normal  He has no wheezes  He has no rales  He exhibits no tenderness  Abdominal: Soft  Bowel sounds are normal  He exhibits no distension  There is no tenderness  Musculoskeletal: Normal range of motion  He exhibits no edema  Skin: Skin is warm and dry  No rash noted  Psychiatric: He has a normal mood and affect  Vitals reviewed  Additional Data:   Labs:  Results from last 7 days   Lab Units 11/05/19 0630 11/02/19 1926   WBC Thousand/uL 11 97*  --  14 28*   HEMOGLOBIN g/dL 7 3*   < > 9 8*   HEMATOCRIT % 23 0*   < > 30 5*   PLATELETS Thousands/uL 198  --  294   NEUTROS PCT %  --   --  79*   LYMPHS PCT %  --   --  14   MONOS PCT %  --   --  5   EOS PCT %  --   --  0    < > = values in this interval not displayed  Results from last 7 days   Lab Units 11/05/19 0630 11/02/19 1926   POTASSIUM mmol/L 3 7   < > 4 9   CHLORIDE mmol/L 107   < > 102   CO2 mmol/L 28   < > 22   BUN mg/dL 29*   < > 41*   CREATININE mg/dL 1 08   < > 1 44*   CALCIUM mg/dL 7 9*   < > 8 3   ALK PHOS U/L  --   --  75   ALT U/L  --   --  32   AST U/L  --   --  24    < > = values in this interval not displayed  Results from last 7 days   Lab Units 11/02/19 1926   INR  1 67*       * I Have Reviewed All Lab Data Listed Above  * Additional Pertinent Lab Tests Reviewed:  All Labs Within Last 24 Hours Reviewed    Imaging:    Imaging Reports Reviewed Today Include: None new    Cultures:   Blood Culture: No results found for: BLOODCX  Urine Culture: No results found for: URINECX  Sputum Culture: No components found for: SPUTUMCX  Wound Culture: No results found for: WOUNDCULT    Last 24 Hours Medication List:     Current Facility-Administered Medications:  amiodarone 200 mg Oral BID With Meals Sharyn Tejada, DO    apixaban 5 mg Oral BID Berta Marie MD    atorvastatin 40 mg Oral After Vanice Setting, DO    clopidogrel 75 mg Oral Daily Sharyn Tejada, DO    co-enzyme Q-10 200 mg Oral Daily Sharyn Tejada, DO    diphenhydrAMINE 25 mg Oral Q6H PRN Randi Vigil PA-C    fluticasone 1 spray Each Nare Daily Berta Marie MD    magnesium gluconate 500 mg Oral Daily Sharyn Tejada, DO    melatonin 3 mg Oral HS Sharyn Tejada, DO    metoprolol tartrate 25 mg Oral Q12H [address] [facility], DO    nitroglycerin 0 4 mg Sublingual Q5 Min PRN Sharyn Tejada, DO    ondansetron 4 mg Intravenous Q6H PRN Sharyn Tejada, DO    pantoprozole (PROTONIX) infusion (Continuous) 8 mg/hr Intravenous Continuous Berta Marie MD Last Rate: 8 mg/hr (11/05/19 0326)   potassium chloride 20 mEq Oral Daily Sharyn Tejada, DO    prednisoLONE acetate 1 drop Both Eyes Q12H [address] [facility]         Today, Patient Was Seen By: Michael Muse PA-C    ** Please Note: Dragon 360 Dictation voice to text software may have been used in the creation of this document   **

## 2019-11-05 NOTE — ASSESSMENT & PLAN NOTE
· S/P dual chamber ICD placement 10/24/2019  · Rate controlled on metoprolol tartrate and will continue with strict hold parameters  · Eliquis has been restarted  · Hold ASA indefinitely

## 2019-11-06 ENCOUNTER — TRANSITIONAL CARE MANAGEMENT (OUTPATIENT)
Dept: FAMILY MEDICINE CLINIC | Facility: CLINIC | Age: 84
End: 2019-11-06

## 2019-11-06 VITALS
HEIGHT: 65 IN | DIASTOLIC BLOOD PRESSURE: 56 MMHG | WEIGHT: 182.25 LBS | HEART RATE: 82 BPM | TEMPERATURE: 97.7 F | BODY MASS INDEX: 30.37 KG/M2 | OXYGEN SATURATION: 96 % | RESPIRATION RATE: 20 BRPM | SYSTOLIC BLOOD PRESSURE: 101 MMHG

## 2019-11-06 DIAGNOSIS — I25.10 CORONARY ARTERY DISEASE INVOLVING NATIVE CORONARY ARTERY OF NATIVE HEART, ANGINA PRESENCE UNSPECIFIED: Chronic | ICD-10-CM

## 2019-11-06 DIAGNOSIS — I47.2 VENTRICULAR TACHYCARDIA (HCC): ICD-10-CM

## 2019-11-06 PROBLEM — K92.0 HEMATEMESIS: Status: RESOLVED | Noted: 2019-11-02 | Resolved: 2019-11-06

## 2019-11-06 PROBLEM — I21.4 NSTEMI (NON-ST ELEVATED MYOCARDIAL INFARCTION) (HCC): Status: RESOLVED | Noted: 2019-10-19 | Resolved: 2019-11-06

## 2019-11-06 PROBLEM — N17.9 ACUTE KIDNEY INJURY (HCC): Status: RESOLVED | Noted: 2019-10-19 | Resolved: 2019-11-06

## 2019-11-06 LAB
ANION GAP SERPL CALCULATED.3IONS-SCNC: 6 MMOL/L (ref 4–13)
BASOPHILS # BLD AUTO: 0.06 THOUSANDS/ΜL (ref 0–0.1)
BASOPHILS NFR BLD AUTO: 1 % (ref 0–1)
BUN SERPL-MCNC: 20 MG/DL (ref 5–25)
CALCIUM SERPL-MCNC: 8 MG/DL (ref 8.3–10.1)
CHLORIDE SERPL-SCNC: 104 MMOL/L (ref 100–108)
CO2 SERPL-SCNC: 27 MMOL/L (ref 21–32)
CREAT SERPL-MCNC: 1.04 MG/DL (ref 0.6–1.3)
EOSINOPHIL # BLD AUTO: 0.21 THOUSAND/ΜL (ref 0–0.61)
EOSINOPHIL NFR BLD AUTO: 2 % (ref 0–6)
ERYTHROCYTE [DISTWIDTH] IN BLOOD BY AUTOMATED COUNT: 14.7 % (ref 11.6–15.1)
GFR SERPL CREATININE-BSD FRML MDRD: 64 ML/MIN/1.73SQ M
GLUCOSE SERPL-MCNC: 105 MG/DL (ref 65–140)
HCT VFR BLD AUTO: 24 % (ref 36.5–49.3)
HGB BLD-MCNC: 7.7 G/DL (ref 12–17)
IMM GRANULOCYTES # BLD AUTO: 0.16 THOUSAND/UL (ref 0–0.2)
IMM GRANULOCYTES NFR BLD AUTO: 2 % (ref 0–2)
LYMPHOCYTES # BLD AUTO: 1.97 THOUSANDS/ΜL (ref 0.6–4.47)
LYMPHOCYTES NFR BLD AUTO: 23 % (ref 14–44)
MCH RBC QN AUTO: 31.4 PG (ref 26.8–34.3)
MCHC RBC AUTO-ENTMCNC: 32.1 G/DL (ref 31.4–37.4)
MCV RBC AUTO: 98 FL (ref 82–98)
MONOCYTES # BLD AUTO: 0.87 THOUSAND/ΜL (ref 0.17–1.22)
MONOCYTES NFR BLD AUTO: 10 % (ref 4–12)
NEUTROPHILS # BLD AUTO: 5.4 THOUSANDS/ΜL (ref 1.85–7.62)
NEUTS SEG NFR BLD AUTO: 62 % (ref 43–75)
NRBC BLD AUTO-RTO: 1 /100 WBCS
PLATELET # BLD AUTO: 202 THOUSANDS/UL (ref 149–390)
PMV BLD AUTO: 10 FL (ref 8.9–12.7)
POTASSIUM SERPL-SCNC: 3.8 MMOL/L (ref 3.5–5.3)
RBC # BLD AUTO: 2.45 MILLION/UL (ref 3.88–5.62)
SODIUM SERPL-SCNC: 137 MMOL/L (ref 136–145)
WBC # BLD AUTO: 8.67 THOUSAND/UL (ref 4.31–10.16)

## 2019-11-06 PROCEDURE — 97535 SELF CARE MNGMENT TRAINING: CPT

## 2019-11-06 PROCEDURE — 80048 BASIC METABOLIC PNL TOTAL CA: CPT | Performed by: PHYSICIAN ASSISTANT

## 2019-11-06 PROCEDURE — 85025 COMPLETE CBC W/AUTO DIFF WBC: CPT | Performed by: PHYSICIAN ASSISTANT

## 2019-11-06 PROCEDURE — 97116 GAIT TRAINING THERAPY: CPT

## 2019-11-06 PROCEDURE — 99239 HOSP IP/OBS DSCHRG MGMT >30: CPT | Performed by: PHYSICIAN ASSISTANT

## 2019-11-06 RX ORDER — CLOPIDOGREL BISULFATE 75 MG/1
75 TABLET ORAL DAILY
Qty: 30 TABLET | Refills: 0
Start: 2019-11-06 | End: 2019-11-06 | Stop reason: SDUPTHER

## 2019-11-06 RX ORDER — PANTOPRAZOLE SODIUM 40 MG/1
40 TABLET, DELAYED RELEASE ORAL
Qty: 26 TABLET | Refills: 0 | Status: SHIPPED | OUTPATIENT
Start: 2019-11-06 | End: 2019-12-11 | Stop reason: ALTCHOICE

## 2019-11-06 RX ORDER — CLOPIDOGREL BISULFATE 75 MG/1
75 TABLET ORAL DAILY
Qty: 90 TABLET | Refills: 3 | OUTPATIENT
Start: 2019-11-06 | End: 2020-10-07

## 2019-11-06 RX ADMIN — AMIODARONE HYDROCHLORIDE 200 MG: 200 TABLET ORAL at 07:14

## 2019-11-06 RX ADMIN — FUROSEMIDE 20 MG: 20 TABLET ORAL at 09:07

## 2019-11-06 RX ADMIN — Medication 200 MG: at 09:07

## 2019-11-06 RX ADMIN — POTASSIUM CHLORIDE 20 MEQ: 1500 TABLET, EXTENDED RELEASE ORAL at 09:07

## 2019-11-06 RX ADMIN — APIXABAN 5 MG: 5 TABLET, FILM COATED ORAL at 09:07

## 2019-11-06 RX ADMIN — FLUTICASONE PROPIONATE 1 SPRAY: 50 SPRAY, METERED NASAL at 09:07

## 2019-11-06 RX ADMIN — PANTOPRAZOLE SODIUM 40 MG: 40 TABLET, DELAYED RELEASE ORAL at 07:14

## 2019-11-06 RX ADMIN — Medication 500 MG: at 09:08

## 2019-11-06 RX ADMIN — METOPROLOL TARTRATE 25 MG: 25 TABLET, FILM COATED ORAL at 09:07

## 2019-11-06 RX ADMIN — CLOPIDOGREL BISULFATE 75 MG: 75 TABLET ORAL at 09:07

## 2019-11-06 NOTE — ASSESSMENT & PLAN NOTE
· S/P EGD: nonbleeding gastric ulcer  Currently on PPI drip  Continue PO Protonix BID x2 weeks  · Follow up with GI as an out-patient to schedule a routine colonoscopy since patient has never had before

## 2019-11-06 NOTE — ASSESSMENT & PLAN NOTE
· S/P prior CABG and S/P MINNIE to OM1 10/21/2019  · Continue beta-blocker  · Continue statin  · Continue with Eliquis and Plavix as an outpatient  · Discontinued aspirin

## 2019-11-06 NOTE — NURSING NOTE
Reviewed discharge instructions, medication, follow-up appointments with the patient and his sister and gave a CHF booklet to the patient all questions answered

## 2019-11-06 NOTE — ASSESSMENT & PLAN NOTE
· Recently discharged from [facility] rehab following recent hospitalization  · PT recommends discharge home with out patient therapy

## 2019-11-06 NOTE — PLAN OF CARE
Problem: OCCUPATIONAL THERAPY ADULT  Goal: Performs self-care activities at highest level of function for planned discharge setting  See evaluation for individualized goals  Description  Treatment Interventions: ADL retraining, Functional transfer training, UE strengthening/ROM, Endurance training, Cognitive reorientation, Patient/family training, Equipment evaluation/education, Compensatory technique education, Continued evaluation, Energy conservation, Activityengagement          See flowsheet documentation for full assessment, interventions and recommendations  11/6/2019 1600 by Leila Richard OT  Outcome: Progressing  Note:   Limitation: Decreased ADL status, Decreased UE strength, Decreased endurance, Decreased self-care trans, Decreased high-level ADLs  Prognosis: Fair  Assessment: Pt participated in skilled OT session on 11/6/19 with interventions consisting of ADL retraining, functional transfer training, endurance training, patient/family training, compensatory technique education and energy conservation  Pt agreeable to OT treatment session  Upon arrival pt found OOB in bedside chair with sister present  Pt required Sup for UB dressing, LB dressing, transfers, and fx'l mobility with RW during treatment session  Pt required increased VCs and TCs for hand placement and steadying and slowing of pace  Educated pt on steadying and slowing of pace, benefits of deep breathing, safety precautions while in the hospital, safe transfer strategies and increasing family support upon return home  Pt made improvements in ADLs, transfers, fx'l mobility, and overall occupational performance  From OT perspective, recommendation at time of D/C would be return home with increased family support, when medically stable  Pt to benefit from continued skilled OT services while in the hospital to further address goals and increase level of fx'l independence with ADLs, bed mobility, transfers, and fx'l mobility       OT Discharge Recommendation: Home with family support  OT - OK to Discharge: Yes    11/6/2019 1600 by Zayda Harrison, OT  Reactivated

## 2019-11-06 NOTE — PHYSICAL THERAPY NOTE
PHYSICAL THERAPY Treatment NOTE    Patient Name: Destin Laguna  CQQPC'G Date: 11/6/2019 11/06/19 1105   Pain Assessment   Pain Assessment No/denies pain   Pain Score No Pain   Restrictions/Precautions   Weight Bearing Precautions Per Order No   Other Precautions Fall Risk   General   Chart Reviewed Yes   Additional Pertinent History Pt  is an 79 yo M who presents with CAD and generalized weakness  Family/Caregiver Present No   Cognition   Overall Cognitive Status WFL   Arousal/Participation Cooperative   Attention Attends with cues to redirect   Orientation Level Oriented X4   Memory Within functional limits   Following Commands Follows multistep commands with increased time or repetition   Comments Pt  was identified with full name and birthdate   Subjective   Subjective Pt  agreeable to PT session   Bed Mobility   Additional Comments unable to assess Pt  seated OOB in chair prior to PT session   Transfers   Sit to Stand 5  Supervision   Additional items Assist x 1; Increased time required   Stand to Sit 5  Supervision   Additional items Assist x 1; Increased time required   Additional Comments Pt  performed sit<>stand transfers with supervision requiring increased time to stand and to return to sitting  Pt  demonstrated proper hand placement and sequecing   Ambulation/Elevation   Gait pattern Improper Weight shift;Narrow LUISITO; Forward Flexion;Decreased foot clearance;Shuffling; Inconsistent ty; Redundant gait at times; Short stride; Excessively slow; Step to   Gait Assistance 5  Supervision   Additional items Assist x 1;Verbal cues  (for posture and gait mechanics)   Assistive Device Rolling walker   Distance 200'x1   Balance   Static Sitting Fair   Dynamic Sitting Fair   Static Standing Fair -   Dynamic Standing Fair -   Ambulatory Fair -   Endurance Deficit   Endurance Deficit Yes   Endurance Deficit Description postural and gait degradation with fatigue   Activity Tolerance   Activity Tolerance Patient limited by fatigue   Nurse Made Aware spoke to RN   Assessment   Prognosis Good   Problem List Decreased strength;Decreased range of motion;Decreased endurance; Impaired balance   Assessment Pt  is an 79 yo M who presents with CAD and generalized weakness, Pt  was identified with full name and birthdate, Pt  agreeable to PT session  Pt  Demonstrated increased functional independence and increased activity tolerance today as compared to previous session  Pt  Tolerated ambulation of 200'x1 without hands on assistance  Pt  Is progressing towards goals as expected  Pt  Will benefit from continued skilled therapy upon return to [facility] for increased endurance and balance training  D/C recommendation is HHPT upon return    Goals   Patient Goals to get stronger   STG Expiration Date 11/14/19   PT Treatment Day 1   Plan   Treatment/Interventions Functional transfer training;LE strengthening/ROM; Therapeutic exercise; Endurance training;Cognitive reorientation;Patient/family training;Equipment eval/education;Gait training;Bed mobility;Spoke to nursing;Spoke to case management; Family   Progress Progressing toward goals   PT Frequency   (3-5x/wk)   Recommendation   Recommendation Home PT   Equipment Recommended Walker   PT - OK to Discharge Yes   Additional Comments When medically appropriate     Vivi Alcocer, PT, DPT 11/6/2019

## 2019-11-06 NOTE — ASSESSMENT & PLAN NOTE
· EF 10/20/2019 35%  · Currently appears euvolemic  · Monitor daily weights, I/O  · Continue Lasix 20 mg daily

## 2019-11-06 NOTE — ASSESSMENT & PLAN NOTE
· S/P dual chamber ICD placement 10/24/2019  · Rate controlled on metoprolol tartrate  · Eliquis has been restarted  · Hold ASA indefinitely

## 2019-11-06 NOTE — OCCUPATIONAL THERAPY NOTE
OccupationalTherapy Progress Note     Patient Name: Val Brunner Date: 11/6/2019  Problem List  Active Problems:    CAD (coronary artery disease)    Typical atrial flutter (HCC)    Ischemic cardiomyopathy    Physical deconditioning    Acute blood loss anemia       11/06/19 1500   Restrictions/Precautions   Weight Bearing Precautions Per Order No   Other Precautions Fall Risk   General   Family/Caregiver Present Pt's sister present for duration of session   Lifestyle   Autonomy I with ADLs, Assistance with IADLs, I with mobility/transfers, I with driving but unable to drive for next 6 mo 2' to pacemaker placement   Reciprocal Relationships has close family friend nearby and a sister in [address] [employer] retired    Intrinsic Gratification enjoys volunteering and reading   Pain Assessment   Pain Assessment No/denies pain   Pain Score No Pain   ADL   Where Assessed Chair   UB Dressing Assistance 5  Supervision/Setup   UB Dressing Deficit Setup; Thread RUE; Thread LUE;Pull around back; Increased time to complete   UB Dressing Comments Doff and toni hospital robe  Increased time and VCs provided to safely toni/doff due to medical devices on B/L UEs   LB Dressing Assistance 5  Supervision/Setup   LB Dressing Deficit Setup;Verbal cueing;Supervision/safety; Increased time to complete; Don/doff R sock; Don/doff L sock   LB Dressing Comments Doff/toni B/L socks - required set up and increased time/VCs for safety   Bed Mobility   Supine to Sit Unable to assess   Additional Comments Unable to assess - pt seated OOB in bedside chair at start/end of session   Transfers   Sit to Stand 5  Supervision   Additional items Assist x 1; Increased time required;Verbal cues   Stand to Sit 5  Supervision   Additional items Increased time required;Verbal cues   Functional Mobility   Functional Mobility 5  Supervision   Additional Comments Increased time and VCs for safety   Additional items Rolling walker   Cognition Overall Cognitive Status [facility]   Arousal/Participation Alert; Responsive; Cooperative   Attention Attends with cues to redirect   Orientation Level Oriented X4   Memory Within functional limits   Following Commands Follows one step commands without difficulty   Comments Pt cooperative and pleasant t/o session  VCs provided for safety   Activity Tolerance   Activity Tolerance Patient tolerated treatment well   Medical Staff Made Aware Appropriate to see per RN   Assessment   Assessment Pt participated in skilled OT session on 11/6/19 with interventions consisting of ADL retraining, functional transfer training, endurance training, patient/family training, compensatory technique education and energy conservation  Pt agreeable to OT treatment session  Upon arrival pt found OOB in bedside chair with sister present  Pt required Sup for UB dressing, LB dressing, transfers, and fx'l mobility with RW during treatment session  Pt required increased VCs and TCs for hand placement and steadying and slowing of pace  Educated pt on steadying and slowing of pace, benefits of deep breathing, safety precautions while in the hospital, safe transfer strategies and increasing family support upon return home  Pt made improvements in ADLs, transfers, fx'l mobility, and overall occupational performance  From OT perspective, recommendation at time of D/C would be return home with increased family support, when medically stable  Pt to benefit from continued skilled OT services while in the hospital to further address goals and increase level of fx'l independence with ADLs, bed mobility, transfers, and fx'l mobility  Plan   Treatment Interventions ADL retraining;Functional transfer training;UE strengthening/ROM; Endurance training;Cognitive reorientation;Patient/family training;Equipment evaluation/education; Compensatory technique education;Continued evaluation; Energy conservation; Activityengagement   Goal Expiration Date 11/14/19   OT Treatment Day 2   OT Frequency 3-5x/wk   Recommendation   OT Discharge Recommendation Home with family support   OT - OK to Discharge Yes   Barthel Index   Feeding 10   Bathing 0   Grooming Score 5   Dressing Score 5   Bladder Score 10   Bowels Score 10   Toilet Use Score 5   Transfers (Bed/Chair) Score 10   Mobility (Level Surface) Score 10   Stairs Score 0   Barthel Index Score 65   Modified [place] Scale   Modified [place] Scale 4     Documentation completed by Rogers Guerra OTR/L

## 2019-11-06 NOTE — DISCHARGE SUMMARY
Discharge- Austin Morrissey 9/1/1932, 80 y o  male MRN: [id number]  Unit/Bed#: -01 Encounter: [id number]  Primary Care Provider: Jeanette Lemon MD   Date and time admitted to hospital: 11/2/2019  6:53 PM    * Hematemesisresolved as of 11/6/2019  Assessment & Plan  · S/P EGD: nonbleeding gastric ulcer  Currently on PPI drip  Continue PO Protonix BID x2 weeks  · Follow up with GI as an out-patient to schedule a routine colonoscopy since patient has never had before  Acute blood loss anemia  Assessment & Plan  · Hemoglobin appears to be recovering and is 7 7 today  · S/p 2 units packed RBCs    Physical deconditioning  Assessment & Plan  · Recently discharged from [facility] rehab following recent hospitalization  · PT recommends discharge home with out patient therapy  Ischemic cardiomyopathy  Assessment & Plan  · EF 10/20/2019 35%  · Currently appears euvolemic  · Monitor daily weights, I/O  · Continue Lasix 20 mg daily  Typical atrial flutter (HCC)  Assessment & Plan  · S/P dual chamber ICD placement 10/24/2019  · Rate controlled on metoprolol tartrate  · Eliquis has been restarted  · Hold ASA indefinitely  CAD (coronary artery disease)  Assessment & Plan  · S/P prior CABG and S/P MINNIE to OM1 10/21/2019  · Continue beta-blocker  · Continue statin  · Continue with Eliquis and Plavix as an outpatient  · Discontinued aspirin  Acute kidney injury (HCC)resolved as of 11/6/2019  Assessment & Plan  · POA, baseline appears 1 0-1 1  · Creatinine today: 1 04    Elevated troponinresolved as of 11/6/2019  Assessment & Plan  · Troponin 0 19 on admission, patient without chest pain/pressure in EKG nonischemic  · Suspect type 2 NSTEMI in the setting of recent cardiac procedures and also acute blood loss anemia    · Troponin 0 19  0 16, 0 17      Discharging Physician / Practitioner: Katina Damico PA-C  PCP: Jeanette Lemon MD  Admission Date:   Admission Orders (From admission, onward)     Ordered 11/02/19 2120  Inpatient Admission (expected length of stay for this patient Order details is greater than two midnights)  Once                   Discharge Date: 11/06/19    Resolved Problems  Date Reviewed: 11/6/2019          Resolved    Elevated troponin 11/6/2019     Resolved by  Geo Isaacs PA-C    Acute kidney injury ([facility] [address] ) 11/6/2019     Resolved by  Geo Isaacs PA-C    * (Principal) Hematemesis 11/6/2019     Resolved by  Geo Isaacs PA-C        Consultations During Hospital Stay:  · Gastroenterology  · Cardiology    Procedures Performed:   · Chest x-ray (11/02/2019):  No acute cardiopulmonary disease  · EGD:  Normal proximal mid and distal esophagus  Normal fundus  There was a small clean based ulcer in the gastric antrum  Significant Findings / Test Results:   · None    Incidental Findings:   · None     Test Results Pending at Discharge (will require follow up): · None     Outpatient Tests Requested:  · Colonoscopy    Complications:  None    Reason for Admission:  Hematemesis    Hospital Course:     Michael Cavazos is a 80 y o  male patient who originally presented to the hospital on 11/2/2019 due to hematemesis  Patient had an episode of vomiting with an apparent large amount of blood noted in the vomitus  Did have a bowel movement which was dark as well  He was seen in consultation by GI and underwent EGD which revealed a nonbleeding ulcer  His blood counts were monitored closely and he did receive transfusion  His hemoglobin stabilized and started to trend upward prior to his discharge  Therefore, no additional transfusions were warranted  The patient has never had a colonoscopy in likely benefit from this as an outpatient  He was able to ambulate about the unit and therefore was cleared by PT to return to his previous living environment  Please see above list of diagnoses and related plan for additional information       Condition at Discharge: stable     Discharge Day Visit / Exam:     Subjective: On the day of discharge the patient is feeling well overall with no current complaints  He denies any dizziness, lightheadedness, palpitations or shortness of breath  He denies having any further episodes of hematemesis  He did have a bowel movement overnight which was normal in color  Vitals: Blood Pressure: 101/56 (11/06/19 0728)  Pulse: 82 (11/06/19 0728)  Temperature: 97 7 °F (36 5 °C) (11/06/19 0728)  Temp Source: Axillary (11/05/19 0738)  Respirations: 20 (11/05/19 1519)  Height: 5' 5" (165 1 cm) (11/02/19 2205)  Weight - Scale: 82 7 kg (182 lb 4 oz) (11/06/19 0600)  SpO2: 96 % (11/06/19 0728)    Exam:   Physical Exam   HENT:   Head: Normocephalic and atraumatic  Mouth/Throat: Oropharynx is clear and moist and mucous membranes are normal    Eyes: No scleral icterus  Cardiovascular: Normal rate and regular rhythm  No murmur heard  Pulmonary/Chest: Breath sounds normal  He has no wheezes  He has no rales  He exhibits no tenderness  Abdominal: Soft  Bowel sounds are normal  He exhibits no distension  There is no tenderness  Musculoskeletal: Normal range of motion  He exhibits no edema  Skin: Skin is warm and dry  No rash noted  Psychiatric: He has a normal mood and affect  Vitals reviewed  Discussion with Family: Patient's sister    Discharge instructions/Information to patient and family:   See after visit summary for information provided to patient and family  Provisions for Follow-Up Care:  See after visit summary for information related to follow-up care and any pertinent home health orders  Disposition:     Home    For Discharges to [facility] SNF:   · Not Applicable to this Patient - Not Applicable to this Patient    Planned Readmission: No     Discharge Statement:  I spent 45 minutes discharging the patient  This time was spent on the day of discharge  I had direct contact with the patient on the day of discharge   Greater than 50% of the total time was spent examining patient, answering all patient questions, arranging and discussing plan of care with patient as well as directly providing post-discharge instructions  Additional time then spent on discharge activities  Discharge Medications:  See after visit summary for reconciled discharge medications provided to patient and family        ** Please Note: This note has been constructed using a voice recognition system **

## 2019-11-06 NOTE — ASSESSMENT & PLAN NOTE
· Troponin 0 19 on admission, patient without chest pain/pressure in EKG nonischemic  · Suspect type 2 NSTEMI in the setting of recent cardiac procedures and also acute blood loss anemia    · Troponin 0 19  0 16, 0 17

## 2019-11-07 NOTE — UTILIZATION REVIEW
Notification of Discharge  This is a Notification of Discharge from our facility [address]  Please be advised that this patient has been discharge from our facility  Below you will find the admission and discharge date and time including the patients disposition  PRESENTATION DATE: 11/2/2019  6:53 PM  OBS ADMISSION DATE:   IP ADMISSION DATE: 11/2/19 2120   DISCHARGE DATE: 11/6/2019  1:35 PM  DISPOSITION: Home/Self Care Home/Self Care   Admission Orders listed below:  Admission Orders (From admission, onward)     Ordered        11/02/19 2120  Inpatient Admission (expected length of stay for this patient Order details is greater than two midnights)  Once                   Please contact the UR Department if additional information is required to close this patient's authorization/case  [id number] [facility] [facility] Utilization Review Department  Main: [phone] x carefully listen to the prompts  All voicemails are confidential   [email or web address]  Send all requests for admission clinical reviews, approved or denied determinations and any other requests to dedicated fax number below belonging to the campus where the patient is receiving treatment    List of dedicated fax numbers:  [address] DENIALS (Administrative/Medical Necessity) [phone]   [address] (Maternity/NICU/Pediatrics) [phone]   Quinten Lutz [phone]   Colin Rod [phone]   Emeka Rahman [phone]   145 [facility] 15[address] [facility] [phone]   Petra Peeling [address] [place] [address] Efren Natarajan [phone]

## 2019-11-08 ENCOUNTER — TELEPHONE (OUTPATIENT)
Dept: CARDIOLOGY CLINIC | Facility: CLINIC | Age: 84
End: 2019-11-08

## 2019-11-08 RX ORDER — AMIODARONE HYDROCHLORIDE 200 MG/1
200 TABLET ORAL 2 TIMES DAILY WITH MEALS
Qty: 30 TABLET | Refills: 1 | OUTPATIENT
Start: 2019-11-08 | End: 2019-11-11 | Stop reason: DRUGHIGH

## 2019-11-08 NOTE — TELEPHONE ENCOUNTER
I didnt    I am off and needed to confirm the dose  I will sign the 200 mg bid dose but I believe he was supposed to decrease it to 200 mg daily at some point  Please have him decrease it to daily on Monday unless he was instructed otherwise        Rudene Nickel

## 2019-11-08 NOTE — TELEPHONE ENCOUNTER
Thanks, I will look into it  His wife was very adamant about the doses and I did look for a decrease

## 2019-11-08 NOTE — TELEPHONE ENCOUNTER
I saw the note from Dwight on 10/25 to stay on bid for two weeks, and then go down to daily  I spoke with the MA upstairs who will verify with Dwight and call patient  Ryan Erwin will call to schedule follow up with Dr Cindy Alvarez as stated in hospital d/c

## 2019-11-08 NOTE — TELEPHONE ENCOUNTER
Saloni Montiel is wondering if he still needs to be on amiodarone 200 mg twice a day or is he suppose to be once a day now?      Thank you

## 2019-11-08 NOTE — TELEPHONE ENCOUNTER
Dr Franklin Lerma, did you sign off on the amiodarone on this patient? I saw that you signed the Plavix, but the amiodarone is outstanding  I did call it in the other day as wife said he was running out of it  Should he continue on it?

## 2019-11-08 NOTE — TELEPHONE ENCOUNTER
Thanks     I signed the script as BID  I didnt prescribe it and I believe the dosing instructions are in [facility] consult from his hospital stay before this most recent one  Please also make sure he has follow up scheduled  Thanks for your help      Estrellita Cano

## 2019-11-11 ENCOUNTER — TELEPHONE (OUTPATIENT)
Dept: CARDIOLOGY CLINIC | Facility: CLINIC | Age: 84
End: 2019-11-11

## 2019-11-11 RX ORDER — AMIODARONE HYDROCHLORIDE 200 MG/1
200 TABLET ORAL DAILY
COMMUNITY
End: 2020-07-07 | Stop reason: SDUPTHER

## 2019-11-11 NOTE — TELEPHONE ENCOUNTER
Holaide addressed the amiodarone dose and should be 200 mg daily now  Appt scheduled with you on 12/11 which is first available     Please advise if this date is ok

## 2019-11-11 NOTE — TELEPHONE ENCOUNTER
Spoke with Mr Karen Samson sister who is staying with him since hospitalization  Advised of decrease in amiodarone dose to once daily  She verbalized understanding  Also made aware of f/u visit with Dr Leo Pacheco on 12/11

## 2019-11-14 ENCOUNTER — OFFICE VISIT (OUTPATIENT)
Dept: FAMILY MEDICINE CLINIC | Facility: CLINIC | Age: 84
End: 2019-11-14
Payer: COMMERCIAL

## 2019-11-14 VITALS
DIASTOLIC BLOOD PRESSURE: 86 MMHG | WEIGHT: 181.4 LBS | RESPIRATION RATE: 16 BRPM | BODY MASS INDEX: 30.97 KG/M2 | HEIGHT: 64 IN | OXYGEN SATURATION: 98 % | SYSTOLIC BLOOD PRESSURE: 136 MMHG | HEART RATE: 60 BPM | TEMPERATURE: 97.5 F

## 2019-11-14 DIAGNOSIS — I10 BENIGN ESSENTIAL HYPERTENSION: ICD-10-CM

## 2019-11-14 DIAGNOSIS — D62 ACUTE BLOOD LOSS ANEMIA: ICD-10-CM

## 2019-11-14 DIAGNOSIS — I48.3 TYPICAL ATRIAL FLUTTER (HCC): ICD-10-CM

## 2019-11-14 DIAGNOSIS — I25.10 CORONARY ARTERY DISEASE INVOLVING NATIVE CORONARY ARTERY OF NATIVE HEART WITHOUT ANGINA PECTORIS: Chronic | ICD-10-CM

## 2019-11-14 DIAGNOSIS — Z09 HOSPITAL DISCHARGE FOLLOW-UP: Primary | ICD-10-CM

## 2019-11-14 DIAGNOSIS — R09.82 POSTNASAL DRIP: ICD-10-CM

## 2019-11-14 PROCEDURE — 1160F RVW MEDS BY RX/DR IN RCRD: CPT | Performed by: FAMILY MEDICINE

## 2019-11-14 PROCEDURE — 1111F DSCHRG MED/CURRENT MED MERGE: CPT | Performed by: FAMILY MEDICINE

## 2019-11-14 PROCEDURE — 99496 TRANSJ CARE MGMT HIGH F2F 7D: CPT | Performed by: FAMILY MEDICINE

## 2019-11-14 RX ORDER — TRIAMCINOLONE ACETONIDE 55 UG/1
1 SPRAY, METERED NASAL DAILY
Qty: 1 BOTTLE | Refills: 0 | Status: SHIPPED | OUTPATIENT
Start: 2019-11-14 | End: 2020-04-09 | Stop reason: ALTCHOICE

## 2019-11-14 NOTE — PROGRESS NOTES
Chief Complaint   Patient presents with    Transition of Care Management     Discharged 11/01/19  Assessment/Plan:     Benign essential hypertension  Controlled  DASH diet  Continue metoprolol 25mg bid  Change position slowly  Check BP bid and call office if BP always <100/60 and dizzy/lightheaded  FU cardiology as scheduled  CAD (coronary artery disease)  Continue plavix, metoprolol and statin  Typical atrial flutter (HCC)  Continue eliquis 5mg bid  FU cardiology  Acute blood loss anemia  11/8/2019 hg 9 3 improved  Will monitor  Reviewed lab in 11/8/2019  BMP stable  CBC showed hg 9 3 improved  Diagnoses and all orders for this visit:    Hospital discharge follow-up  Comments:  Reviewed hospital records  Benign essential hypertension    Typical atrial flutter (HCC)    Coronary artery disease involving native coronary artery of native heart without angina pectoris    Acute blood loss anemia    Postnasal drip  Comments:  May try OTC claritin  Use nasocort  If no improving, call office, consider singulair  Orders:  -     Triamcinolone Acetonide 55 MCG/ACT AERO; 1 Act (55 mcg total) into each nostril daily         Subjective:     Patient ID: Antelmo Smith is a 80 y o  male  HPI  Pt is here with his friend  Was in hospital 10/19-10/25/2019 for V tach/hypotension  He was admitted in ICU  Cardiac cath showed SVG to OM block, s/p drug eluting stent on 10/21/2019  Had ICD on 10/24/2019  Discharged home with ASA, plavix and eliquis  Was in hospital 11/2-11/6/2019 for hematemesis  EGD showed nonbleeding gastric ulcer  Pt got 2u packed RBCs  ASA discontinued  Discharged home with plavix and eliquis  Plan to see cardiology in 12/2019  Plan to see GI in 3 months  C/o Postnasal drip for a while  Felt throat irritable and causing cough  Occasionally with phlegm  Denies wheezing or SOB  Denies fever, chest pain, n/v/abd pain     11/2/2019 CXR normal    Denies hx of asthma or COPD  He tried flonase and OTC cough medication but no help  HTN---BP at home /60-70  Occasionally feels dizzy if he stands up too quickly  Denies palpitation, chest pain, Sob  Denies n/v/abd pain  He is on metoprolol 25mg bid  CAD---s/p MINNIE 10/21/2019  He is on plavix 75mg QD, metoprolol, statin  Aflutter---s/p ICD 10/24/2019  He is on eliquis 5mg bid  FU cardiology  Cardiomyopathy---10/20/2019 EF 35%  He is on lasix 20mg QD and potassium  Hyperlipidemia---he is on atorvastatin 40mg qhs  Denies side effects  Anemia---Recheck labs on 11/8/2019 showed Hg 9 3 improved  He is on pantoprazole 40mg bid for 2 weeks  FU urology regularly  Review of Systems   Constitutional: Negative for appetite change, chills and fever  HENT: Positive for postnasal drip  Negative for congestion, ear pain, sinus pain and sore throat  Eyes: Negative for discharge and itching  Respiratory: Negative for apnea, cough, chest tightness, shortness of breath and wheezing  Cardiovascular: Negative for chest pain, palpitations and leg swelling  Gastrointestinal: Negative for abdominal pain, anal bleeding, constipation, diarrhea, nausea and vomiting  Endocrine: Negative for cold intolerance, heat intolerance and polyuria  Genitourinary: Negative for difficulty urinating and dysuria  Musculoskeletal: Negative for arthralgias, back pain and myalgias  Skin: Negative for rash  Neurological: Negative for dizziness and headaches  Psychiatric/Behavioral: Negative for agitation  Objective:     Physical Exam   Constitutional: He appears well-developed  HENT:   Head: Normocephalic and atraumatic  Right Ear: External ear normal    Left Ear: External ear normal    Mouth/Throat: Oropharynx is clear and moist    B/l nasal swollen   Eyes: Pupils are equal, round, and reactive to light  Conjunctivae are normal    Neck: Normal range of motion  Neck supple     Cardiovascular: Normal rate, regular rhythm, normal heart sounds and intact distal pulses  Pulmonary/Chest: Effort normal and breath sounds normal  No stridor  No respiratory distress  He has no wheezes  He has no rales  He exhibits no tenderness  Abdominal: Soft  Bowel sounds are normal    Musculoskeletal: Normal range of motion  He exhibits no edema  Neurological: He is alert  Vitals:    11/14/19 1123   BP: 136/86   BP Location: Left arm   Patient Position: Sitting   Cuff Size: Adult   Pulse: 60   Resp: 16   Temp: 97 5 °F (36 4 °C)   TempSrc: Tympanic   SpO2: 98%   Weight: 82 3 kg (181 lb 6 4 oz)   Height: 5' 4" (1 626 m)       Transitional Care Management Review:  Marylene Iron is a 80 y o  male here for TCM follow up  During the TCM phone call patient stated:    TCM Call (since 10/14/2019)     Date and time call was made  11/6/2019  2:23 PM    Hospital care reviewed  Records reviewed    Patient was hospitial[facility] at  [facility]    Date of Admission  11/02/19    Date of discharge  11/06/19    Diagnosis  Hematemesis     Disposition  Home; Home health services    Were the patients medications reviewed and updated  No    Current Symptoms  Fatigue; Weakness    Weakness severity  Moderate    Fatigue severity  Moderate      TCM Call (since 10/14/2019)     Post hospital issues  None    Should patient be enrolled in anticoag monitoring? No    Scheduled for follow up?   Yes    Did you obtain your prescribed medications  Yes    Do you need help managing your prescriptions or medications  No    Is transportation to your appointment needed  No    I have advised the patient to call PCP with any new or worsening symptoms  Remi Anderson, [address] [place] [address]  Family; Friends    The type of support provided  Physical; Emotional    Do you have social support  Yes, as much as I need    Are you recieving any outpatient services  No    Are you recieving home care services  No    Are you using any community resources  No    Current waiver services  No    Have you fallen in the last 12 months  No    Interperter language line needed  No    Counseling  Family    Counseling topics  patient and family education;  Importance of RX compliance; Diagnostic results    Comments  Patient was moved to [facility] for Esteban De Paz MD

## 2019-11-14 NOTE — ASSESSMENT & PLAN NOTE
Controlled  DASH diet  Continue metoprolol 25mg bid  Change position slowly  Check BP bid and call office if BP always <100/60 and dizzy/lightheaded  FU cardiology as scheduled

## 2019-12-04 ENCOUNTER — TELEPHONE (OUTPATIENT)
Dept: GASTROENTEROLOGY | Facility: CLINIC | Age: 84
End: 2019-12-04

## 2019-12-04 NOTE — TELEPHONE ENCOUNTER
----- Message from Mikal Camara MD sent at 11/4/2019 12:33 PM EST -----  Please schedule EGD in 2-3 months  Order placed

## 2019-12-04 NOTE — TELEPHONE ENCOUNTER
EGD scheduled with Dr Daniel at [place] on 2/14/20  I gave patient verbal instructions/mailed  Medication clearance faxed to Palmira Foley

## 2019-12-06 ENCOUNTER — OFFICE VISIT (OUTPATIENT)
Dept: CARDIOLOGY CLINIC | Facility: CLINIC | Age: 84
End: 2019-12-06
Payer: COMMERCIAL

## 2019-12-06 VITALS
HEIGHT: 64 IN | HEART RATE: 60 BPM | BODY MASS INDEX: 31.07 KG/M2 | SYSTOLIC BLOOD PRESSURE: 122 MMHG | WEIGHT: 182 LBS | DIASTOLIC BLOOD PRESSURE: 70 MMHG

## 2019-12-06 DIAGNOSIS — Z79.899 LONG TERM CURRENT USE OF AMIODARONE: ICD-10-CM

## 2019-12-06 DIAGNOSIS — I48.3 TYPICAL ATRIAL FLUTTER (HCC): Primary | ICD-10-CM

## 2019-12-06 DIAGNOSIS — I10 BENIGN ESSENTIAL HYPERTENSION: ICD-10-CM

## 2019-12-06 DIAGNOSIS — I63.412 CEREBROVASCULAR ACCIDENT (CVA) DUE TO EMBOLISM OF LEFT MIDDLE CEREBRAL ARTERY (HCC): ICD-10-CM

## 2019-12-06 DIAGNOSIS — G47.33 OBSTRUCTIVE SLEEP APNEA: ICD-10-CM

## 2019-12-06 DIAGNOSIS — I25.10 CORONARY ARTERY DISEASE INVOLVING NATIVE CORONARY ARTERY OF NATIVE HEART WITHOUT ANGINA PECTORIS: Chronic | ICD-10-CM

## 2019-12-06 DIAGNOSIS — I25.5 ISCHEMIC CARDIOMYOPATHY: ICD-10-CM

## 2019-12-06 DIAGNOSIS — I47.2 VENTRICULAR TACHYCARDIA (HCC): ICD-10-CM

## 2019-12-06 DIAGNOSIS — N18.2 CKD (CHRONIC KIDNEY DISEASE) STAGE 2, GFR 60-89 ML/MIN: ICD-10-CM

## 2019-12-06 DIAGNOSIS — Z79.01 CHRONIC ANTICOAGULATION: ICD-10-CM

## 2019-12-06 DIAGNOSIS — E78.5 DYSLIPIDEMIA: ICD-10-CM

## 2019-12-06 DIAGNOSIS — E78.2 MIXED HYPERLIPIDEMIA: ICD-10-CM

## 2019-12-06 DIAGNOSIS — Z95.5 STATUS POST INSERTION OF DRUG ELUTING CORONARY ARTERY STENT: ICD-10-CM

## 2019-12-06 DIAGNOSIS — E66.9 OBESITY (BMI 30.0-34.9): ICD-10-CM

## 2019-12-06 DIAGNOSIS — I50.22 CHRONIC SYSTOLIC CHF (CONGESTIVE HEART FAILURE) (HCC): ICD-10-CM

## 2019-12-06 PROCEDURE — 93000 ELECTROCARDIOGRAM COMPLETE: CPT | Performed by: INTERNAL MEDICINE

## 2019-12-06 PROCEDURE — 99024 POSTOP FOLLOW-UP VISIT: CPT | Performed by: INTERNAL MEDICINE

## 2019-12-06 NOTE — LETTER
December 7, 2019     MD Cristian Wood [address] [facility]    Patient: Elmira Anderson   YOB: 1932   Date of Visit: 12/6/2019       Dear Dr Serrato Anchors: Thank you for referring Irvin Toney to me for evaluation  Below are my notes for this consultation  If you have questions, please do not hesitate to call me  I look forward to following your patient along with you  Sincerely,        Lyman Koyanagi, MD        CC: Pascal Bihari, DO Lyman Koyanagi, MD  12/7/2019  8:15 AM  Sign at close encounter                                             Cardiology Follow Up    Elmira Anderson  9/1/1932  [id number]  [facility] [address]  [address] [place] [address]    1  Typical atrial flutter (HCC)  POCT ECG   2  Coronary artery disease involving native coronary artery of native heart without angina pectoris     3  Cerebrovascular accident (CVA) due to embolism of left middle cerebral artery ([address] )     4  Benign essential hypertension     5  Ischemic cardiomyopathy     6  Ventricular tachycardia ([address] )     7  Chronic systolic CHF (congestive heart failure) ([address] )     8  CKD (chronic kidney disease) stage 2, GFR 60-89 ml/min     9  Dyslipidemia     10  Obesity (BMI 30 0-34 9)     11  Chronic anticoagulation     12  Status post insertion of drug eluting coronary artery stent     13  Mixed hyperlipidemia     14  Long term current use of amiodarone         HPI:  Elmira Anderson is a 80 y o  male presents to the office today status post ICD implantation in October 2019     Symptomatically he is doing well    There is no history of anginal like chest pain or pressure  There is no worsening orthopnea or PND  There is no new worsening of leg swelling    Patient does not complain of any palpitations  There is no new presyncope or syncope  The patient does have relatively limited activity-he does walk to the letter box, he also walks in the passes way of his home      It is to be noted the patient was significantly anemic when he was seen for the procedure  He has been evaluated for the same and is following up with Dr Ricardo Granda    Patient does have history of snoring, morning fatigue and daytime sleepiness  This he attributes to his age  He is not interested in being evaluated for sleep apnea    He has a long history of coronary artery disease and is post bypass  He does have a recent stent  He is on therapy for ventricular tachycardia, hyperlipidemia, coronary artery disease    He does not smoke or abuse alcohol at the current time    Previous History:   Sustained monomorphic VT, post external shock, both outpatient and inpatient  Origin of VT from inferior scar region  Scar related VT cannot be treated by stents at other ischemic sites  Secondary prevention ICD     Coronary artery disease, ischemic cardiomyopathy  LVEF 35%  NYHA class 2-3     On optimal medical therapy-lisinopril, metoprolol for greater than a year     Shared decision making done with patient, primary cardiologist     Sick sinus syndrome  Paroxysmal atrial flutter and fibrillation  History of CVA     Hypertension  Hyperlipidemia  Hematuria              /70 (BP Location: Left arm, Patient Position: Sitting, Cuff Size: Adult)   Pulse 60   Ht 5' 4" (1 626 m)   Wt 82 6 kg (182 lb)   BMI 31 24 kg/m²        Patient Active Problem List   Diagnosis    Cerebrovascular accident (CVA) due to embolism of left middle cerebral artery ([address] )    Benign essential hypertension    Dyslipidemia    CAD (coronary artery disease)    Typical atrial flutter ([address] )    Hx of CABG    Ischemic cardiomyopathy    Impaired fasting glucose    Psychophysiological insomnia    Medicare annual wellness visit, subsequent    Subacute left lumbar radiculopathy    Obesity (BMI 30 0-34  9)    Chronic anticoagulation    Exertional chest pain    Ventricular tachycardia (HCC)    Status post insertion of drug eluting coronary artery stent    Hypotension    Leukocytosis    Slow transit constipation    Chronic systolic [facility]congestive heart failure) [facility])    Physical deconditioning    CKD (chronic kidney disease) stage 2, GFR 60-89 ml/min    Acute blood loss anemia    Hyperlipidemia    Seborrheic keratosis    Long term current use of amiodarone     Past Medical History:   Diagnosis Date    Acute myocardial infarction ([facility] )     Allergic rhinitis     Anxiety     Bimalleolar fracture of left ankle     CAD (coronary artery disease)     Erectile dysfunction of non-organic origin     Hematuria     workup was negative and felt to be benign    Herpes zoster with complication     Hyperlipidemia     Hypertension     Impaired fasting glucose     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    Prostatic hypertrophy     benign    Stroke [facility])      Social History     Socioeconomic History    Marital status:       Spouse name: moncho    Number of children: Not on file    Years of education: Not on file    Highest education level: Not on file   Occupational History    Occupation: retired     Comment: clergy   Social Needs    Financial resource strain: Not on file    Food insecurity:     Worry: Not on file     Inability: Not on file   Cond[place]i needs:     Medical: Not on file     Non-medical: Not on file   Tobacco Use    Smoking status: Former Smoker     Types: Cigarettes     Last attempt to quit: 1968     Years since quittin 9    Smokeless tobacco: Never Used   Substance and Sexual Activity    Alcohol use: Yes     Comment: social- per allscripts    Drug use: No    Sexual activity: Not on file   Lifestyle    Physical activity:     Days per week: Not on file     Minutes per session: Not on file    Stress: Not on file   Relationships    Social connections:     Talks on phone: Not on file     Gets together: Not on file     Attends [employer] service: Not on file Active member of club or organization: Not on file     Attends meetings of clubs or organizations: Not on file     Relationship status: Not on file    Intimate partner violence:     Fear of current or ex partner: Not on file     Emotionally abused: Not on file     Physically abused: Not on file     Forced sexual activity: Not on file   Other Topics Concern    Not on file   Social History Narrative    Death in the family- spouse, moncho  after a prolonged francis with lymphoma     Exercises regularly      Family History   Problem Relation Age of Onset    Cancer Mother     Hypertension Mother         essential    Pancreatic cancer Mother      Past Surgical History:   Procedure Laterality Date    ANKLE FRACTURE SURGERY Left     CARDIAC PACEMAKER PLACEMENT Left     CORONARY ARTERY BYPASS GRAFT         Current Outpatient Medications:     amiodarone 200 mg tablet, Take 200 mg by mouth daily, Disp: , Rfl:     apixaban (ELIQUIS) 5 mg, Take 1 tablet (5 mg total) by mouth 2 (two) times a day, Disp: 84 tablet, Rfl: 0    atorvastatin (LIPITOR) 40 mg tablet, Take 1 tablet (40 mg total) by mouth daily, Disp: 90 tablet, Rfl: 3    clopidogrel (PLAVIX) 75 mg tablet, Take 1 tablet (75 mg total) by mouth daily Additional refills to be obtained from PCP or cardiologist , Disp: 90 tablet, Rfl: 3    Coenzyme Q10 200 MG capsule, Take 200 mg by mouth daily, Disp: , Rfl:     doxylamine (UNISON) 25 MG tablet, Take 0 5 tablets (12 5 mg total) by mouth daily at bedtime as needed for sleep, Disp: 30 tablet, Rfl: 0    furosemide (LASIX) 20 mg tablet, Take 1 tablet (20 mg total) by mouth daily, Disp: 90 tablet, Rfl: 3    melatonin 3 mg, Take 1 tablet (3 mg total) by mouth daily at bedtime, Disp: 30 each, Rfl: 0    metoprolol tartrate (LOPRESSOR) 25 mg tablet, Take 1 tablet (25 mg total) by mouth every 12 (twelve) hours, Disp: 60 tablet, Rfl: 0    potassium chloride (KLOR-CON M20) 20 mEq tablet, Take 1 tablet (20 mEq total) by mouth daily, Disp: 90 tablet, Rfl: 3    Probiotic Product (PROBIOTIC COLON SUPPORT) CAPS, Take 1 capsule by mouth daily, Disp: , Rfl:     Triamcinolone Acetonide 55 MCG/ACT AERO, 1 Act (55 mcg total) into each nostril daily, Disp: 1 Bottle, Rfl: 0    magnesium gluconate (MAGONATE) 500 mg tablet, Take 500 mg by mouth daily, Disp: , Rfl:     nitroglycerin (NITROSTAT) 0 4 mg SL tablet, Place 1 tablet (0 4 mg total) under the tongue every 5 (five) minutes as needed for chest pain (Patient not taking: Reported on 12/6/2019), Disp: 25 tablet, Rfl: 1    pantoprazole (PROTONIX) 40 mg tablet, Take 1 tablet (40 mg total) by mouth 2 (two) times a day before meals for 26 doses (Patient not taking: Reported on 12/6/2019), Disp: 26 tablet, Rfl: 0  Allergies   Allergen Reactions    Penicillins Edema and Rash     Reaction Date: [id number]; Category: Allergy;  Annotation - [id number]: Severe reaction with hospitaization at [facility]       Labs:  Lab Results   Component Value Date     08/17/2017    K 3 8 11/06/2019    K 4 2 03/28/2019     11/06/2019     03/28/2019    CO2 27 11/06/2019    CO2 32 03/28/2019    BUN 20 11/06/2019    BUN 17 03/28/2019    CREATININE 1 04 11/06/2019    CREATININE 1 14 (H) 08/17/2017    GLUCOSE 127 03/06/2018    GLUCOSE 107 08/01/2014    CALCIUM 8 0 (L) 11/06/2019    CALCIUM 9 4 03/28/2019     Lab Results   Component Value Date    TROPONINI 0 17 (H) 11/03/2019     Lab Results   Component Value Date    WBC 8 67 11/06/2019    WBC 7 7 07/19/2016    WBC NONE SEEN 07/19/2016    HGB 7 7 (L) 11/06/2019    HGB 13 5 07/19/2016    HCT 24 0 (L) 11/06/2019    HCT 41 0 07/19/2016    MCV 98 11/06/2019    MCV 91 3 07/19/2016     11/06/2019     07/19/2016     Lab Results   Component Value Date    CHOL 156 08/17/2017    TRIG 65 10/20/2019    TRIG 150 (H) 03/28/2019    HDL 58 10/20/2019    HDL 53 03/28/2019     Imaging:     Cardiac catheterization 2019  Summary:  Severe CAD with total occlusions of the LAD and circumflex  The LAD is supplied by a patent LIMA  The OM and circumflex are supplied by an SVG that had a 99% stenosis in mid body  This was the culprit for the patient's NSTEMI  Successful PCI of SVG to OM1 and circumflex  Plan: DAPT and anticoagulation, switching to anticoagulation with a NOAC and clopdigrel in 1 month      INDICATIONS:  --  Possible CAD: myocardial infarction without ST elevation (NSTEMI)  --  Congestive heart failure with ischemic cardiomyopathy  --  Cardiac: cardiac arrest                       Complete echo October 2019  SUMMARY     LEFT VENTRICLE:  Systolic function was moderately reduced  Ejection fraction was estimated to be 35 %  There was akinesis of the mid-apical anterior, mid anteroseptal, mid inferoseptal, apical septal, and apical wall(s)      TRICUSPID VALVE:  Estimated peak PA pressure was 40 mmHg  The findings suggest mild pulmonary hypertension          Review of Systems:  Review of Systems   All other systems reviewed and are negative  As described in my history of present illness      Physical Exam:  Physical Exam   Constitutional: He is oriented to person, place, and time  He appears well-developed and well-nourished  No distress  Not in any distress at the current time   HENT:   Head: Normocephalic and atraumatic  Right Ear: External ear normal    Left Ear: External ear normal    Nose: Nose normal    Mouth/Throat: Uvula is midline and mucous membranes are normal    Posterior pharynx is crowded   Eyes: Pupils are equal, round, and reactive to light  Conjunctivae, EOM and lids are normal  No scleral icterus  No pallor  No cyanosis  No icterus   Neck: Trachea normal and normal range of motion  No JVD present  Carotid bruit is not present  No thyromegaly present  No jugular lymphadenopathy   Cardiovascular: Normal rate, regular rhythm, S1 normal, S2 normal, normal heart sounds, intact distal pulses and normal pulses  PMI is not displaced   Exam reveals no gallop, no S3, no S4 and no friction rub  No murmur heard  Pulmonary/Chest: Effort normal and breath sounds normal  No accessory muscle usage  No respiratory distress  He has no decreased breath sounds  He has no wheezes  He has no rhonchi  He has no rales  He exhibits no tenderness  Abdominal: Soft  Normal appearance and bowel sounds are normal  He exhibits no distension and no mass  There is no splenomegaly or hepatomegaly  There is no tenderness  Musculoskeletal: Normal range of motion  He exhibits no tenderness or deformity  Lymphadenopathy:     He has no cervical adenopathy  Neurological: He is alert and oriented to person, place, and time  Facial symmetry is retained  Extraocular movements are retained  Head neck tongue and palate movement are retained and symmetric   Skin: Skin is intact  No abrasion, no lesion and no rash noted  No erythema  Nails show no clubbing  Psychiatric: He has a normal mood and affect  His speech is normal and behavior is normal  Thought content normal    Vitals reviewed        Discussion/Summary:    Sustained monomorphic VT, post external shock, both outpatient and inpatient  Origin of VT from inferior scar region  Scar related VT cannot be treated by stents at other ischemic sites  Secondary prevention ICD     Coronary artery disease, ischemic cardiomyopathy  LVEF 35%  NYHA class 2-3     On optimal medical therapy-lisinopril, metoprolol for greater than a year     Shared decision making done with patient, primary cardiologist     Sick sinus syndrome  Paroxysmal atrial flutter and fibrillation  History of CVA     Hypertension  Hyperlipidemia  Hematuria           - systolic and diastolic heart failure    he is well compensated now  No significant crackles or leg swelling  He is on management with metoprolol succinate  Blood pressure limits the use of Ace inhibitors    -VT post cardioversion  Patient has low EF of 35%  He does have an ICD in place now  He is on metoprolol and amiodarone    -coronary artery disease, post CABG, post PCI  No symptoms of angina at the current time  Patient is on metoprolol, clopidogrel, statin    -sick sinus syndrome  Has a dual-chamber device in place    -paroxysmal atrial flutter and fibrillation  Patient is currently in sinus rhythm  He is on apixaban    -hyperlipidemia  He is on atorvastatin    - hypertension  Patient is on metoprolol tartrate  Blood pressure is well controlled    -long-term anticoagulation  History of paroxysmal atrial fibrillation  Chads Vasc score is 7  Continue with apixaban    -long-term amiodarone  Patient had VT which is currently well controlled  He has a  large scar  He is on amiodarone 200 mg daily  Follow-up is recommended which includes-eye evaluation of cornea and retina along with optic nerve, thyroid function, lung function including PFT with DLCO, liver function with CMP, skin evaluation for discoloration, Neurology evaluation for extrapyramidal symptoms    -history of CVA  Patient has a high chads Vasc score of 7- heart failure, age, prior CVA, vascular disease and post bypass, hypertension  He is on apixaban    -anemia  Suspected from GI bleed  Also has some history of hematuria  Follow-up as per primary      - ICD in place  This was placed in the last admission in the setting of monomorphic ventricular tachycardia  Device parameters a stable    -chronic kidney disease, stage III  There has been improvement in kidney function after atrial pacing and achieving a higher rate  Currently stable    -obesity  BMI 31 2  Stressed the importance of good diet     - obstructive sleep apnea  Patient does have symptoms of the same  He also has the crowded posterior pharynx  However he is not interested in in evaluation of therapy              Summary of my recommendation for the patient  Continue aggressive management of heart failure  Will need evaluation and treatment for his anemia  Will need long-term monitoring for amiodarone  No further electrophysiologic interventions at the current time

## 2019-12-07 PROBLEM — G47.33 OBSTRUCTIVE SLEEP APNEA: Status: ACTIVE | Noted: 2019-12-07

## 2019-12-07 PROBLEM — Z79.899 LONG TERM CURRENT USE OF AMIODARONE: Status: ACTIVE | Noted: 2019-12-07

## 2019-12-11 ENCOUNTER — OFFICE VISIT (OUTPATIENT)
Dept: CARDIOLOGY CLINIC | Facility: CLINIC | Age: 84
End: 2019-12-11

## 2019-12-11 VITALS
HEART RATE: 66 BPM | DIASTOLIC BLOOD PRESSURE: 58 MMHG | BODY MASS INDEX: 30.39 KG/M2 | HEIGHT: 64 IN | SYSTOLIC BLOOD PRESSURE: 110 MMHG | WEIGHT: 178 LBS

## 2019-12-11 DIAGNOSIS — Z95.5 STATUS POST INSERTION OF DRUG ELUTING CORONARY ARTERY STENT: ICD-10-CM

## 2019-12-11 DIAGNOSIS — I47.2 VENTRICULAR TACHYCARDIA (HCC): ICD-10-CM

## 2019-12-11 DIAGNOSIS — I48.3 TYPICAL ATRIAL FLUTTER (HCC): ICD-10-CM

## 2019-12-11 DIAGNOSIS — I25.10 CORONARY ARTERY DISEASE INVOLVING NATIVE CORONARY ARTERY OF NATIVE HEART WITHOUT ANGINA PECTORIS: Primary | Chronic | ICD-10-CM

## 2019-12-11 DIAGNOSIS — I48.3 TYPICAL ATRIAL FLUTTER (HCC): Primary | ICD-10-CM

## 2019-12-11 DIAGNOSIS — I50.22 CHRONIC SYSTOLIC CHF (CONGESTIVE HEART FAILURE) (HCC): ICD-10-CM

## 2019-12-11 DIAGNOSIS — I25.5 ISCHEMIC CARDIOMYOPATHY: ICD-10-CM

## 2019-12-11 DIAGNOSIS — E66.9 OBESITY (BMI 30.0-34.9): ICD-10-CM

## 2019-12-11 DIAGNOSIS — Z95.1 HX OF CABG: ICD-10-CM

## 2019-12-11 DIAGNOSIS — E78.5 DYSLIPIDEMIA: ICD-10-CM

## 2019-12-11 DIAGNOSIS — Z79.899 LONG TERM CURRENT USE OF AMIODARONE: ICD-10-CM

## 2019-12-11 DIAGNOSIS — Z95.810 PRESENCE OF DOUBLE CHAMBER AUTOMATIC CARDIOVERTER/DEFIBRILLATOR (AICD): ICD-10-CM

## 2019-12-11 DIAGNOSIS — I10 BENIGN ESSENTIAL HYPERTENSION: ICD-10-CM

## 2019-12-11 PROBLEM — R07.9 EXERTIONAL CHEST PAIN: Status: RESOLVED | Noted: 2019-10-17 | Resolved: 2019-12-11

## 2019-12-11 PROCEDURE — 99024 POSTOP FOLLOW-UP VISIT: CPT | Performed by: INTERNAL MEDICINE

## 2019-12-11 NOTE — PROGRESS NOTES
Cardiology Follow Up    Destin Laguna  9/1/1932  [id number]  [address] [facility] John Tolentino  [address] [place] [address] [address]    1  Coronary artery disease involving native coronary artery of native heart without angina pectoris     2  Hx of CABG     3  Status post insertion of drug eluting coronary artery stent     4  Typical atrial flutter ([address] )     5  Ventricular tachycardia (HCC)  CBC and differential   6  Long term current use of amiodarone  Complete PFT with post bronchodilator   7  Ischemic cardiomyopathy     8  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)     9  Chronic systolic CHF (congestive heart failure) ([address] )     10  Benign essential hypertension     11  Dyslipidemia     12  Obesity (BMI 30 0-34 9)     13  Coronary artery disease involving native coronary artery of native heart, angina presence unspecified           Discussion/Summary:  Mr Sumeet Grover is a pleasant 71-year-old gentleman who presents to the office today for hospital follow-up  Since his last visit he was admitted with ventricular tachycardia and underwent MINNIE to a SVG to OM1  He also underwent AICD placement  He continues to maintain normal sinus rhythm  He will remain on his current AV elizabeth blocking regimen along with systemic anticoagulation with Eliquis  He will undergo a device check in the near future  Given his ventricular tachycardia he is maintained on oral amiodarone  He had baseline LFTs and TFTs during his hospital stay  He will to undergo baseline PFTs and was reminded of the need for yearly eye exams  His blood pressure is well controlled in the office today  I did review his most recent set of lipids  They are acceptable on current therapy  He appears euvolemic on his current diuretic regimen to which no changes were made  His most recent echo from his hospital stay reveals an EF of 35%    He checks his blood pressure at home with systolic readings in the 55K precluding the addition of an ACE-I to his regimen  I will see him back in the office in six months or sooner if deemed necessary  Interval History:   Mr Columba Santana is a pleasant 80-year-old gentleman who presents to the office today for hospital follow-up  After his last visit with me he was admitted on a few occasions  He initially was admitted with ventricular tachycardia necessitating cardioversion in the field by EMS after he summoned them for chest pressure  He again had recurrence in the hospital necessitating cardioversion  He was maintained on IV amiodarone and underwent a left heart catheterization where he was found to have a lesion in the saphenous vein graft to an OM1 requiring drug-eluting stent  It was felt that his ventricular tachycardia was likely scar mediated  He underwent dual-chamber AICD placement and was placed on oral amiodarone  He was discharged on triple therapy in the form of aspirin, Plavix and Eliquis  He returned a short while later with hematemesis  EGD revealed a non-bleeding gastric ulcer  Upon discharge aspirin was discontinued and he was continued on Plavix and Eliquis alone  Since hospital discharge he has been feeling well  He leads a sedentary lifestyle but with the activity he performs he denies any exertional chest pain or shortness of breath  He denies any signs or symptoms of congestive heart failure including increasing lower extremity edema, paroxysmal nocturnal dyspnea, orthopnea, acute weight gain or increasing abdominal girth  He denies lightheadedness, syncope, presyncope, palpitations or symptoms of claudication  He denies any discharges from his AICD  He denies any bleeding consequences or falls on Eliquis       Problem List     CVA (cerebral vascular accident) ([facility] )    Essential hypertension (Chronic)    HLD (hyperlipidemia) (Chronic)    CAD (coronary artery disease) (Chronic)    Atrial flutter (HCC) (Chronic)        Past Medical History:   Diagnosis Date    Acute myocardial infarction ([facility] )     Allergic rhinitis     Anxiety     Bimalleolar fracture of left ankle     CAD (coronary artery disease)     Erectile dysfunction of non-organic origin     Hematuria     workup was negative and felt to be benign    Herpes zoster with complication     Hyperlipidemia     Hypertension     Impaired fasting glucose     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    Prostatic hypertrophy     benign    Stroke [facility])      Social History     Socioeconomic History    Marital status:       Spouse name: moncho    Number of children: Not on file    Years of education: Not on file    Highest education level: Not on file   Occupational History    Occupation: retired     Comment: clergy   Social Needs    Financial resource strain: Not on file    Food insecurity:     Worry: Not on file     Inability: Not on file   "[employer] needs:     Medical: Not on file     Non-medical: Not on file   Tobacco Use    Smoking status: Former Smoker     Types: Cigarettes     Last attempt to quit:      Years since quittin 9    Smokeless tobacco: Never Used   Substance and Sexual Activity    Alcohol use: Yes     Comment: social- per allscripts    Drug use: No    Sexual activity: Not on file   Lifestyle    Physical activity:     Days per week: Not on file     Minutes per session: Not on file    Stress: Not on file   Relationships    Social connections:     Talks on phone: Not on file     Gets together: Not on file     Attends Sabianist service: Not on file     Active member of club or organization: Not on file     Attends meetings of clubs or organizations: Not on file     Relationship status: Not on file    Intimate partner violence:     Fear of current or ex partner: Not on file     Emotionally abused: Not on file     Physically abused: Not on file     Forced sexual activity: Not on file   Other Topics Concern    Not on file   Social History Narrative    Death in the family- spouse, moncho  after a prolonged francis with lymphoma     Exercises regularly      Family History   Problem Relation Age of Onset    Cancer Mother     Hypertension Mother         essential    Pancreatic cancer Mother      Past Surgical History:   Procedure Laterality Date    ANKLE FRACTURE SURGERY Left     CARDIAC PACEMAKER PLACEMENT Left     CORONARY ARTERY BYPASS GRAFT         Current Outpatient Medications:     amiodarone 200 mg tablet, Take 200 mg by mouth daily, Disp: , Rfl:     apixaban (ELIQUIS) 5 mg, Take 1 tablet (5 mg total) by mouth 2 (two) times a day, Disp: 84 tablet, Rfl: 0    atorvastatin (LIPITOR) 40 mg tablet, Take 1 tablet (40 mg total) by mouth daily, Disp: 90 tablet, Rfl: 3    clopidogrel (PLAVIX) 75 mg tablet, Take 1 tablet (75 mg total) by mouth daily Additional refills to be obtained from PCP or cardiologist , Disp: 90 tablet, Rfl: 3    Coenzyme Q10 200 MG capsule, Take 200 mg by mouth daily, Disp: , Rfl:     doxylamine (UNISON) 25 MG tablet, Take 0 5 tablets (12 5 mg total) by mouth daily at bedtime as needed for sleep, Disp: 30 tablet, Rfl: 0    furosemide (LASIX) 20 mg tablet, Take 1 tablet (20 mg total) by mouth daily, Disp: 90 tablet, Rfl: 3    melatonin 3 mg, Take 1 tablet (3 mg total) by mouth daily at bedtime, Disp: 30 each, Rfl: 0    metoprolol tartrate (LOPRESSOR) 25 mg tablet, Take 1 tablet (25 mg total) by mouth every 12 (twelve) hours, Disp: 60 tablet, Rfl: 0    nitroglycerin (NITROSTAT) 0 4 mg SL tablet, Place 1 tablet (0 4 mg total) under the tongue every 5 (five) minutes as needed for chest pain, Disp: 25 tablet, Rfl: 1    potassium chloride (KLOR-CON M20) 20 mEq tablet, Take 1 tablet (20 mEq total) by mouth daily, Disp: 90 tablet, Rfl: 3    Probiotic Product (PROBIOTIC COLON SUPPORT) CAPS, Take 1 capsule by mouth daily, Disp: , Rfl:    Triamcinolone Acetonide 55 MCG/ACT AERO, 1 Act (55 mcg total) into each nostril daily, Disp: 1 Bottle, Rfl: 0    magnesium gluconate (MAGONATE) 500 mg tablet, Take 500 mg by mouth daily, Disp: , Rfl:   Allergies   Allergen Reactions    Penicillins Edema and Rash     Reaction Date: [id number]; Category: Allergy; Annotation - [id number]: Severe reaction with hospitaization at [facility]       Labs:     Chemistry        Component Value Date/Time     08/17/2017 0705    K 3 8 11/06/2019 0446    K 4 2 03/28/2019 0631     11/06/2019 0446     03/28/2019 0631    CO2 27 11/06/2019 0446    CO2 32 03/28/2019 0631    BUN 20 11/06/2019 0446    BUN 17 03/28/2019 0631    CREATININE 1 04 11/06/2019 0446    CREATININE 1 14 (H) 08/17/2017 0705        Component Value Date/Time    CALCIUM 8 0 (L) 11/06/2019 0446    CALCIUM 9 4 03/28/2019 0631    ALKPHOS 75 11/02/2019 1926    ALKPHOS 78 03/28/2019 0631    AST 24 11/02/2019 1926    AST 14 03/28/2019 0631    ALT 32 11/02/2019 1926    ALT 14 03/28/2019 0631    BILITOT 0 4 07/19/2016 0642            Lab Results   Component Value Date    CHOL 156 08/17/2017    CHOL 166 01/26/2017    CHOL 166 07/19/2016     Lab Results   Component Value Date    HDL 58 10/20/2019    HDL 53 03/28/2019    HDL 54 09/21/2018     Lab Results   Component Value Date    LDLCALC 65 10/20/2019    LDLCALC 54 03/07/2018     Lab Results   Component Value Date    TRIG 65 10/20/2019    TRIG 150 (H) 03/28/2019    TRIG 116 09/21/2018     No results found for: CHOLHDL    Imaging: Mri Brain Wo Contrast    Result Date: 3/6/2018  Narrative: MRI BRAIN WITHOUT CONTRAST INDICATION: STROKE- WO BRAIN  History taken directly from the electronic ordering system  Right-sided weakness  Difficulty with balance  COMPARISON:   3/6/2018 TECHNIQUE:  Sagittal T1, axial T2, axial FLAIR, axial T1, axial [place] and axial diffusion imaging  IMAGE QUALITY:  Diagnostic   FINDINGS: BRAIN PARENCHYMA:  There is a small band of diffusion restriction in the left insular cortex extending into the periventricular white matter of the left frontal lobe images 19 through 23 of series 3 indicative of recent left MCA infarction  There is bandlike associated FLAIR hyperintensity in this region  Elsewhere there is a small to moderate chronic right frontal infarction with cystic encephalomalacia and gliosis  There is somewhat patchy periventricular FLAIR hyperintensity as well  No acute intracranial hemorrhage or extra-axial fluid collection  Cerebellar tonsils are normally positioned  VENTRICLES:  The ventricles are normal in size and contour  SELLA AND PITUITARY GLAND:  Normal  ORBITS:  Normal  PARANASAL SINUSES:  Normal  VASCULATURE:  Evaluation of the major intracranial vasculature demonstrates appropriate flow voids  CALVARIUM AND SKULL BASE:  Normal  EXTRACRANIAL SOFT TISSUES:  Normal      Impression: 1  Small acute/recent left MCA cortical infarction involving a small portion of the insular cortex extending into the periventricular white matter  2   Chronic small to moderate right frontal infarction and mild to moderate, chronic microangiopathy  I personally discussed this study with Dr Aissatou Patino on 3/6/2018 at 11:42 AM  Workstation performed: [id number]     Xr Stroke Alert Portable Chest    Result Date: 3/6/2018  Narrative: CHEST INDICATION:  Right-sided weakness  Stroke COMPARISON:  None EXAM PERFORMED/VIEWS:  XR STROKE ALERT PORTABLE CHEST FINDINGS:  There are median sternotomy wires indicating prior cardiac surgery  Cardiomediastinal silhouette appears unremarkable  The lungs are clear  No pneumothorax or pleural effusion  Osseous structures appear within normal limits for patient age  Impression: No acute cardiopulmonary disease  Workstation performed: [id number]     Ct Stroke Alert Brain    Result Date: 3/6/2018  Narrative: CT BRAIN - STROKE ALERT PROTOCOL INDICATION:  Woke up with right-sided weakness  Pronator drift    Cerebellar symptoms  COMPARISON:  None  TECHNIQUE:  CT examination of the brain was performed  In addition to axial images, coronal reformatted images were created and submitted for interpretation  Radiation dose length product (DLP) for this visit:   This examination, like all CT scans performed in the [facility], was performed utilizing techniques to minimize radiation dose exposure, including the use of iterative reconstruction  and automated exposure control  IMAGE QUALITY:  Diagnostic  FINDINGS:  PARENCHYMA:  Small to moderate-sized area of encephalomalacia and gliosis right frontal lobe indicative of chronic infarction  Elsewhere there are mild periventricular hypodensities  No acute intracranial hemorrhage  Atherosclerotic calcifications noted  VENTRICLES AND EXTRA-AXIAL SPACES:  Age-appropriate volume loss is noted  No hydrocephalus  VISUALIZED ORBITS AND PARANASAL SINUSES:  Orbits appear normal   Mild scattered sinus mucosal thickening is noted  No fluid levels are seen  CALVARIUM AND EXTRACRANIAL SOFT TISSUES:   Normal      Impression: 1  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, chronic microangiopathy  2   No acute intracranial hemorrhage  Findings were directly discussed with Jessica Joiner on 3/6/2018 7:31 AM  Workstation performed: [id number]     Cta Stroke Alert (head/neck)    Result Date: 3/6/2018  Narrative: CTA NECK AND BRAIN WITH AND WITHOUT CONTRAST INDICATION: Left-sided weakness  Stroke alert  Prominent or drift  Pointing  COMPARISON:   None  TECHNIQUE:  Routine CT imaging of the Brain without contrast   Post contrast imaging was performed after administration of iodinated contrast through the neck and brain  Post contrast axial 0 625 mm images timed to opacify the arterial system  3D rendering was performed on an independent workstation  MIP reconstructions performed   Coronal reconstructions were performed of the noncontrast portion of the brain  Radiation dose length product (DLP) for this visit:  526 3 mGy-cm   This examination, like all CT scans performed in the [place], was performed utilizing techniques to minimize radiation dose exposure, including the use of iterative reconstruction and automated exposure control  IV Contrast:  100 mL of iohexol (OMNIPAQUE)  IMAGE QUALITY:   Diagnostic FINDINGS: NONCONTRAST BRAIN: Reported separately CERVICAL VASCULATURE AORTIC ARCH AND GREAT VESSELS:  Mild athersclerotic disease of the arch, proximal great vessels and visualized subclavian vessels  No significant stenosis  Sternotomy wires and mediastinal clips are noted, compatible with prior CABG  RIGHT VERTEBRAL ARTERY CERVICAL SEGMENT:  Mild stenosis at the origin  The vessel is normal in caliber throughout the neck  LEFT VERTEBRAL ARTERY CERVICAL SEGMENT:  Mild stenosis at the origin  The vessel is normal in caliber throughout the neck  Left vertebral artery is congenitally dominant  RIGHT EXTRACRANIAL CAROTID SEGMENT:  Normal caliber common carotid artery  Normal bifurcation and cervical internal carotid artery  No stenosis or dissection  LEFT EXTRACRANIAL CAROTID SEGMENT:  Mild atherosclerotic disease of the distal common carotid artery and proximal cervical internal carotid artery without significant stenosis compared to the more distal ICA  NASCET criteria was used to determine the degree of internal carotid artery diameter stenosis  INTRACRANIAL VASCULATURE INTERNAL CAROTID ARTERIES:  Atherosclerotic calcifications of the cavernous segment of the internal carotid artery are very mild  Normal ophthalmic artery origins  Normal ICA terminus  ANTERIOR CIRCULATION:  Symmetric A1 segments and anterior cerebral arteries with normal enhancement  Normal anterior communicating artery  MIDDLE CEREBRAL ARTERY CIRCULATION:  M1 segment and middle cerebral artery branches demonstrate normal enhancement bilaterally   DISTAL VERTEBRAL ARTERIES:  Normal distal vertebral arteries  Posterior inferior cerebellar artery origins are normal  Normal vertebral basilar junction  BASILAR ARTERY:  Basilar artery is normal in caliber  Normal superior cerebellar arteries  POSTERIOR CEREBRAL ARTERIES: Both posterior cerebral arteries arises from the basilar tip  Both arteries demonstrate normal enhancement  Normal posterior communicating arteries  DURAL VENOUS SINUSES:  Not well visualized on this early arterial phase scan  NON VASCULAR ANATOMY BONY STRUCTURES:  No acute osseous abnormality  SOFT TISSUES OF THE NECK:  Unremarkable  THORACIC INLET:  Unremarkable  Impression: 1  No hemodynamically significant stenosis in the major arteries of the neck  2   No intracranial aneurysm or major intracranial arterial stenosis  I personally discussed this study with Nelly Whatley on 3/6/2018 at 7:31 AM  Workstation performed: [id number]        Review of Systems   Cardiovascular: Negative for chest pain, claudication, cyanosis, dyspnea on exertion, irregular heartbeat and leg swelling  All other systems reviewed and are negative  Vitals:    12/11/19 1421   BP: 110/58   Pulse: 66     Vitals:    12/11/19 1421   Weight: 80 7 kg (178 lb)     Height: 5' 4" (162 6 cm)   Body mass index is 30 55 kg/m²      Physical Exam:  General:  Alert and cooperative, appears stated age  HEENT:  PERRLA, EOMI, no scleral icterus, no conjunctival pallor  Neck:  No lymphadenopathy, no thyromegaly, no carotid bruits, no elevated JVP  Heart:  Regular rate and rhythm, normal S1/S2, no S3/S4, no murmur  Lungs:  Clear to auscultation bilaterally   Abdomen:  Soft, non-tender, positive bowel sounds, no rebound or guarding,   no organomegaly   Extremities:  No clubbing, cyanosis or edema   Vascular:  2+ pedal pulses  Skin:  No rashes or lesions on exposed skin, left infraclavicular AICD site with healed incision  Neurologic:  Cranial nerves II-XII grossly intact without focal deficits

## 2019-12-17 ENCOUNTER — TELEPHONE (OUTPATIENT)
Dept: CARDIOLOGY CLINIC | Facility: CLINIC | Age: 84
End: 2019-12-17

## 2019-12-17 NOTE — TELEPHONE ENCOUNTER
Pt to have EGD on 2/14/20  [facility] would like him to hold Plavix and Eliquis  For hpw long?         Please avise

## 2019-12-17 NOTE — TELEPHONE ENCOUNTER
He cannot hold Plavix - he just had a stent    If absolutely necessary he can hold Eliquis for two days but he has had a stroke in the past      [place]

## 2019-12-18 ENCOUNTER — TELEPHONE (OUTPATIENT)
Dept: CARDIOLOGY CLINIC | Facility: CLINIC | Age: 84
End: 2019-12-18

## 2019-12-18 NOTE — TELEPHONE ENCOUNTER
Call from Maria G at [facility]   Patient requires a dental cleaning and they would like to know if patient requires SBE prophylaxis  His ICD was placed in October 2019  Please  They will require a letter faxed to their office  He will mostly likely be rescheduled for April 2020        Ph [phone]  Fx [phone]

## 2019-12-30 ENCOUNTER — REMOTE DEVICE CLINIC VISIT (OUTPATIENT)
Dept: CARDIOLOGY CLINIC | Facility: CLINIC | Age: 84
End: 2019-12-30

## 2019-12-30 DIAGNOSIS — Z95.810 AICD (AUTOMATIC CARDIOVERTER/DEFIBRILLATOR) PRESENT: Primary | ICD-10-CM

## 2019-12-30 PROCEDURE — 99024 POSTOP FOLLOW-UP VISIT: CPT | Performed by: INTERNAL MEDICINE

## 2019-12-30 NOTE — PROGRESS NOTES
Results for orders placed or performed in visit on 12/30/19   Cardiac EP device report    Narrative    CARELINK TRANSMISSION: BATTERY STATUS "OK"  AP 51%  0%  ALL AVAILABLE LEAD PARAMETERS WITHIN NORMAL LIMITS  30 AT/AF NOTED, 1 6% BURDEN  PT ON AMIO, ELIQUIS, & METO TART  AVAIL EGRAMS PRESENT AS AFIB  OPTI-VOL WITHIN NORMAL LIMITS  NORMAL DEVICE FUNCTION   NC

## 2019-12-30 NOTE — TELEPHONE ENCOUNTER
All depends upon his intrinsic risks  If he has prior lead extraction, would recommend to use it routinely

## 2020-01-02 NOTE — TELEPHONE ENCOUNTER
Can anyone in device help me to know whether he has had previous lead extractions so I know if SBE is lifetime for this patient  His dentist needs to know  Thanks

## 2020-01-07 DIAGNOSIS — I10 BENIGN ESSENTIAL HYPERTENSION: ICD-10-CM

## 2020-01-07 DIAGNOSIS — I25.5 ISCHEMIC CARDIOMYOPATHY: ICD-10-CM

## 2020-01-07 RX ORDER — POTASSIUM CHLORIDE 20 MEQ/1
20 TABLET, EXTENDED RELEASE ORAL DAILY
Qty: 90 TABLET | Refills: 3 | Status: SHIPPED | OUTPATIENT
Start: 2020-01-07 | End: 2020-10-22 | Stop reason: SDUPTHER

## 2020-01-10 NOTE — TELEPHONE ENCOUNTER
I spoke with Edgardo Snow about this  According to the information we have,  10/24/2019 was the date the pt had the generator and leads implanted  Dr Carol Montoya, based on the information in our records that this is the only device the patient had, please advise about antibiotic prophylaxis  Thank you

## 2020-01-28 ENCOUNTER — DOCUMENTATION (OUTPATIENT)
Dept: GASTROENTEROLOGY | Facility: CLINIC | Age: 85
End: 2020-01-28

## 2020-01-28 NOTE — PROGRESS NOTES
Patient had EGD in Nov 2019  With small erosions ans superficial ulceration  Currently he is on anticoagulation and per cardiologist he cannot stop the anticoagulation  This findings are benign and will cancel follow up EGD

## 2020-01-29 NOTE — TELEPHONE ENCOUNTER
Per Bettye Goss patient can not stop blood thinner due to stent placement  Dr María harry'ed to cancel procedure  Patient aware procedure has been cancelled

## 2020-02-04 ENCOUNTER — APPOINTMENT (OUTPATIENT)
Dept: RADIOLOGY | Age: 85
End: 2020-02-04
Payer: COMMERCIAL

## 2020-02-04 ENCOUNTER — TRANSCRIBE ORDERS (OUTPATIENT)
Dept: ADMINISTRATIVE | Age: 85
End: 2020-02-04

## 2020-02-04 DIAGNOSIS — R29.898 DIFFICULTY IN WEIGHT BEARING: ICD-10-CM

## 2020-02-04 DIAGNOSIS — M25.552 LEFT HIP PAIN: Primary | ICD-10-CM

## 2020-02-04 DIAGNOSIS — M25.552 LEFT HIP PAIN: ICD-10-CM

## 2020-02-04 PROCEDURE — 73502 X-RAY EXAM HIP UNI 2-3 VIEWS: CPT

## 2020-02-10 ENCOUNTER — IN-CLINIC DEVICE VISIT (OUTPATIENT)
Dept: CARDIOLOGY CLINIC | Facility: CLINIC | Age: 85
End: 2020-02-10
Payer: COMMERCIAL

## 2020-02-10 DIAGNOSIS — Z95.810 AICD (AUTOMATIC CARDIOVERTER/DEFIBRILLATOR) PRESENT: Primary | ICD-10-CM

## 2020-02-10 PROCEDURE — 93283 PRGRMG EVAL IMPLANTABLE DFB: CPT | Performed by: INTERNAL MEDICINE

## 2020-02-10 NOTE — PROGRESS NOTES
Results for orders placed or performed in visit on 02/10/20   Cardiac EP device report    Narrative    MDT DUAL ICD/ACTIVE SYSTEM IS MRI CONDITIONAL  DEVICE INTERROGATED IN THE [place] OFFICE: BATTERY VOLTAGE ADEQUATE  AP 64%  0 2%  ALL AVAILABLE LEAD PARAMETERS WITHIN NORMAL LIMITS  3 AHRS NOTED, 2% BURDEN  AVAIL EGRAMS SHOW AF  PT ON AMIO, ELIQUIS, METO TART  OPTI-VOL WITHIN NORMAL LIMITS  NO PROGRAMMING CHANGES MADE TO DEVICE PARAMETERS  NORMAL DEVICE FUNCTION   NC

## 2020-02-11 LAB — HBA1C MFR BLD HPLC: 6 %

## 2020-02-12 ENCOUNTER — TRANSCRIBE ORDERS (OUTPATIENT)
Dept: ADMINISTRATIVE | Facility: HOSPITAL | Age: 85
End: 2020-02-12

## 2020-02-12 DIAGNOSIS — M54.16 LUMBAR RADICULOPATHY: Primary | ICD-10-CM

## 2020-02-12 DIAGNOSIS — M47.26 OTHER SPONDYLOSIS WITH RADICULOPATHY, LUMBAR REGION: ICD-10-CM

## 2020-02-24 DIAGNOSIS — I48.3 TYPICAL ATRIAL FLUTTER (HCC): ICD-10-CM

## 2020-02-25 ENCOUNTER — HOSPITAL ENCOUNTER (OUTPATIENT)
Dept: RADIOLOGY | Facility: HOSPITAL | Age: 85
Discharge: HOME | End: 2020-02-25
Payer: COMMERCIAL

## 2020-02-25 DIAGNOSIS — M54.16 LUMBAR RADICULOPATHY: ICD-10-CM

## 2020-02-25 DIAGNOSIS — M47.26 OTHER SPONDYLOSIS WITH RADICULOPATHY, LUMBAR REGION: ICD-10-CM

## 2020-02-25 PROCEDURE — 72148 MRI LUMBAR SPINE W/O DYE: CPT

## 2020-03-02 ENCOUNTER — TELEPHONE (OUTPATIENT)
Dept: FAMILY MEDICINE CLINIC | Facility: CLINIC | Age: 85
End: 2020-03-02

## 2020-03-02 DIAGNOSIS — R09.82 POST-NASAL DRIP: ICD-10-CM

## 2020-03-02 DIAGNOSIS — R09.81 NASAL CONGESTION: Primary | ICD-10-CM

## 2020-03-02 RX ORDER — AZELASTINE 1 MG/ML
1 SPRAY, METERED NASAL 2 TIMES DAILY
Qty: 1 BOTTLE | Refills: 5 | Status: SHIPPED | OUTPATIENT
Start: 2020-03-02 | End: 2021-03-29

## 2020-03-02 NOTE — TELEPHONE ENCOUNTER
Patient complains of sinus drainage which makes him cough  No fever, no other symptoms  He spoke with his son,   Dori Champagne, and he recommended to try Atrovent spray of Alelastine HCI spray if these are okay to take with his other meds       Please advise

## 2020-03-02 NOTE — TELEPHONE ENCOUNTER
Spoke with patient  Has PND and mucous in throat   Makes him cough  Brings up some mucous from back of throat when in private  No runny nose  Some nasal congestion  Unsure about allergies    Has tried Flonase and nasal / sinus rinses but persists  Will try Astelin nasal spray and see if helps (topical antihistamine)  Continue rinses each morning    Atrovent really best for nonallergic / vasomotor / gustatory rhinitis

## 2020-03-16 ENCOUNTER — OFFICE VISIT (OUTPATIENT)
Dept: CARDIOLOGY CLINIC | Facility: CLINIC | Age: 85
End: 2020-03-16
Payer: COMMERCIAL

## 2020-03-16 VITALS
DIASTOLIC BLOOD PRESSURE: 52 MMHG | BODY MASS INDEX: 31.58 KG/M2 | SYSTOLIC BLOOD PRESSURE: 100 MMHG | WEIGHT: 185 LBS | HEIGHT: 64 IN | HEART RATE: 60 BPM

## 2020-03-16 DIAGNOSIS — I25.5 ISCHEMIC CARDIOMYOPATHY: ICD-10-CM

## 2020-03-16 DIAGNOSIS — E78.5 DYSLIPIDEMIA: ICD-10-CM

## 2020-03-16 DIAGNOSIS — Z95.1 HX OF CABG: ICD-10-CM

## 2020-03-16 DIAGNOSIS — I25.10 CORONARY ARTERY DISEASE INVOLVING NATIVE CORONARY ARTERY OF NATIVE HEART WITHOUT ANGINA PECTORIS: Primary | Chronic | ICD-10-CM

## 2020-03-16 DIAGNOSIS — I48.3 TYPICAL ATRIAL FLUTTER (HCC): ICD-10-CM

## 2020-03-16 DIAGNOSIS — Z95.5 STATUS POST INSERTION OF DRUG ELUTING CORONARY ARTERY STENT: ICD-10-CM

## 2020-03-16 DIAGNOSIS — G47.33 OBSTRUCTIVE SLEEP APNEA: ICD-10-CM

## 2020-03-16 DIAGNOSIS — I10 BENIGN ESSENTIAL HYPERTENSION: ICD-10-CM

## 2020-03-16 DIAGNOSIS — I47.2 VENTRICULAR TACHYCARDIA (HCC): ICD-10-CM

## 2020-03-16 DIAGNOSIS — Z95.810 PRESENCE OF DOUBLE CHAMBER AUTOMATIC CARDIOVERTER/DEFIBRILLATOR (AICD): ICD-10-CM

## 2020-03-16 DIAGNOSIS — I50.22 CHRONIC SYSTOLIC CHF (CONGESTIVE HEART FAILURE) (HCC): ICD-10-CM

## 2020-03-16 PROCEDURE — 1036F TOBACCO NON-USER: CPT | Performed by: INTERNAL MEDICINE

## 2020-03-16 PROCEDURE — 1160F RVW MEDS BY RX/DR IN RCRD: CPT | Performed by: INTERNAL MEDICINE

## 2020-03-16 PROCEDURE — 99214 OFFICE O/P EST MOD 30 MIN: CPT | Performed by: INTERNAL MEDICINE

## 2020-03-16 PROCEDURE — 3078F DIAST BP <80 MM HG: CPT | Performed by: INTERNAL MEDICINE

## 2020-03-16 PROCEDURE — 3008F BODY MASS INDEX DOCD: CPT | Performed by: INTERNAL MEDICINE

## 2020-03-16 PROCEDURE — 3074F SYST BP LT 130 MM HG: CPT | Performed by: INTERNAL MEDICINE

## 2020-03-16 PROCEDURE — 4040F PNEUMOC VAC/ADMIN/RCVD: CPT | Performed by: INTERNAL MEDICINE

## 2020-03-16 NOTE — PROGRESS NOTES
3/16/2020     Jeanine Dutta (:  1966) is a 48 y.o. male, here for evaluation of the following medical concerns:    HPI  Patient comes in for chronic back pain. He is taking the tramadol. With the Coronavirus, his Work has decreased. He is working on 2 times per week and is doing very little lifting. He notes improvement in his pain. Review of Systems   Eyes: Negative for pain and redness. Respiratory: Negative for cough and choking. Prior to Visit Medications    Medication Sig Taking? Authorizing Provider   traMADol (ULTRAM) 50 MG tablet Take 1 tablet by mouth every 8 hours as needed for Pain for up to 30 days. Yes Therese Vázquez MD   clonazePAM (KLONOPIN) 1 MG tablet Take 1 tablet by mouth nightly as needed for Anxiety for up to 30 days. Indications: 1-2 as needed Yes Thersee Vázquez MD   tadalafil (CIALIS) 10 MG tablet Take 1 tablet by mouth as needed for Erectile Dysfunction Yes Therese Vázquez MD   metFORMIN (GLUCOPHAGE) 500 MG tablet Take 1 tablet by mouth 2 times daily (with meals) Yes Therese Vázquez MD   levothyroxine (SYNTHROID) 100 MCG tablet Take 1 tablet by mouth daily Yes Therese Vázquez MD   simvastatin (ZOCOR) 40 MG tablet Take 1 tablet by mouth nightly Yes Therese Vázquez MD   clonazePAM (KLONOPIN) 1 MG tablet Take 1 tablet by mouth nightly as needed for Anxiety for up to 30 days. Indications: 1-2 as needed  Patient taking differently: Take 1 mg by mouth nightly as needed for Anxiety.   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        Vitals:    20 1314 20 1321   BP: (!) 148/90 128/80   Site: Left Upper Arm    Position: Sitting    Cuff Size: Large Adult    Pulse: 84    SpO2: 98%    Weight: (!) 301 lb (136.5 kg)      Estimated body General Cardiology   Progress Note -  [employer] [facility]   Iglesia Eddy 80 y o  male MRN: [id number]    [facility] Encounter: [id number]        Subjective: Pt seen for follow up; feels well this AM, no chest pain, palpitations or SOB  Sates he is being discharged today  Review of Systems   Constitution: Negative for decreased appetite, fever and weakness  Cardiovascular: Negative for chest pain, dyspnea on exertion, leg swelling, orthopnea and palpitations  Respiratory: Negative for cough, shortness of breath and wheezing  Musculoskeletal: Negative for back pain  Gastrointestinal: Negative for abdominal pain, nausea and vomiting  Genitourinary: Negative for dysuria  Neurological: Negative for dizziness and light-headedness  Psychiatric/Behavioral: Negative for altered mental status  All other systems reviewed and are negative  Objective:   Vitals: Blood pressure 111/67, pulse 57, temperature 98 5 °F (36 9 °C), temperature source Oral, resp  rate 18, height 5' 8" (1 727 m), weight 91 3 kg (201 lb 4 5 oz), SpO2 94 %  ,     Body mass index is 30 6 kg/m²  ,     Systolic (15EWP), ZPP:699 , Min:111 , CA     Diastolic (59FVR), VGH:48, Min:62, Max:87      Intake/Output Summary (Last 24 hours) at 18 1120  Last data filed at 18 1053   Gross per 24 hour   Intake             1612 ml   Output             2200 ml   Net             -588 ml     Weight (last 2 days)     Date/Time   Weight    18 0708  91 3 (201 28)            Telemetry Review: No significant arrhythmias seen on telemetry review  Sinus rhythm; PAF last evening with HRs 150s  Now back in SR with rates 60s  Physical Exam   Constitutional: He is oriented to person, place, and time  No distress  Pt sitting up in chair in NAD   HENT:   Head: Normocephalic and atraumatic  Neck: Normal range of motion  No JVD present  Cardiovascular: Normal rate, regular rhythm, S1 normal and S2 normal     No murmur heard    No LE edema   Pulmonary/Chest: Effort normal and breath sounds normal  No respiratory distress  He has no wheezes  He has no rales  Abdominal: Soft  Musculoskeletal: He exhibits no edema  Neurological: He is alert and oriented to person, place, and time  Skin: Skin is warm and dry  He is not diaphoretic  Psychiatric: His behavior is normal    Nursing note and vitals reviewed      LABORATORY RESULTS      CBC with diff:   Results from last 7 days  Lab Units 03/07/18  0826 03/06/18 0717 03/06/18  0714   WBC Thousand/uL  --  8 62  --    HEMOGLOBIN g/dL  --  14 8  --    I STAT HEMOGLOBIN g/dl  --   --  15 0   HEMATOCRIT %  --  42 9  --    MCV fL  --  90  --    PLATELETS Thousands/uL 142* 182  --    MCH pg  --  30 9  --    MCHC g/dL  --  34 5  --    RDW %  --  13 2  --    MPV fL 10 7 10 5  --      CMP:  Results from last 7 days  Lab Units 03/06/18 0717 03/06/18  0714   SODIUM mmol/L 140  --    POTASSIUM mmol/L 4 3  --    CHLORIDE mmol/L 104  --    CO2 mmol/L 28  --    ANION GAP mmol/L 8  --    BUN mg/dL 19  --    CREATININE mg/dL 1 15  --    GLUCOSE RANDOM mg/dL 120  --    GLUCOSE, ISTAT mg/dl  --  127   CALCIUM mg/dL 8 8  --    EGFR ml/min/1 73sq m 58 68     BMP:  Results from last 7 days  Lab Units 03/06/18  0717   SODIUM mmol/L 140   POTASSIUM mmol/L 4 3   CHLORIDE mmol/L 104   CO2 mmol/L 28   BUN mg/dL 19   CREATININE mg/dL 1 15   GLUCOSE RANDOM mg/dL 120   CALCIUM mg/dL 8 8        Results from last 7 days  Lab Units 03/07/18  0505   HEMOGLOBIN A1C % 6 1        Results from last 7 days  Lab Units 03/07/18  0505   TSH 3RD GENERATON uIU/mL 3 000       Results from last 7 days  Lab Units 03/06/18  0717   INR  1 01     Lipid Profile:   Lab Results   Component Value Date    CHOL 142 03/07/2018    CHOL 156 08/17/2017    CHOL 166 01/26/2017     Lab Results   Component Value Date    HDL 46 03/07/2018    HDL 57 08/17/2017    HDL 52 01/26/2017     Lab Results   Component Value Date    LDLCALC 54 03/07/2018     Lab Results mass index is 39.71 kg/m² as calculated from the following:    Height as of 12/11/19: 6' 1\" (1.854 m). Weight as of this encounter: 301 lb (136.5 kg). Physical Exam  Constitutional:       Appearance: Normal appearance. HENT:      Right Ear: Tympanic membrane, ear canal and external ear normal. There is no impacted cerumen. Left Ear: Tympanic membrane and ear canal normal. There is no impacted cerumen. Nose: Nose normal. No congestion or rhinorrhea. Neck:      Musculoskeletal: Normal range of motion and neck supple. No neck rigidity or muscular tenderness. Musculoskeletal:      Lumbar back: He exhibits normal range of motion, no tenderness, no bony tenderness, no swelling and no edema. Back:    Neurological:      Mental Status: He is alert. ASSESSMENT/PLAN:  1. Chronic bilateral low back pain without sciatica  stable  - traMADol (ULTRAM) 50 MG tablet; Take 1 tablet by mouth every 8 hours as needed for Pain for up to 30 days. Dispense: 60 tablet; Refill: 0    2. MAC (generalized anxiety disorder)  stable  - clonazePAM (KLONOPIN) 1 MG tablet; Take 1 tablet by mouth nightly as needed for Anxiety for up to 30 days. Indications: 1-2 as needed  Dispense: 30 tablet; Refill: 2    Controlled Substance Monitoring:    Acute and Chronic Pain Monitoring:   RX Monitoring 8/27/2019   Attestation -   Periodic Controlled Substance Monitoring No signs of potential drug abuse or diversion identified. Return in about 3 months (around 6/16/2020) for thyroid / Hyperglycemia 30 min. An electronic signature was used to authenticate this note.     --Andre Ortiz MD on 3/17/2020 at 8:54 PM Component Value Date    TRIG 211 (H) 2018    TRIG 139 2017    TRIG 179 (H) 2017     Cardiac testing:   Results for orders placed during the hospital encounter of 18   Echo complete with contrast if indicated    Narrative [address]  [address] [place] [address]  Marian Patel [facility]  [phone]    Transthoracic Echocardiogram  2D, M-mode, Doppler, and Color Doppler    Study date:  06-Mar-2018    Patient: Lourdes Sarmiento  MR number: [id number]  Account number: [de-identified]  : 01-Sep-1932  Age: 80 years  Gender: Male  Status: Inpatient  Location: Bedside  Height: 67 in  Weight: 200 6 lb  BP: 125/ 71 mmHg    Indications: Stroke    Diagnoses: G45 9 - Transient cerebral ischemic attack, unspecified    Sonographer:  BRANDY Wheeler, RDCS  Primary Physician:  Yoseph Sanders MD  Referring Physician:  Ronald Ontiveros DO  Group:  [facility]  Interpreting Physician:  Lev Lord MD    SUMMARY    PROCEDURE INFORMATION:  This was a technically difficult study  Intravenous contrast ( 1 0 mL/min of Definity in NSS) was administered to opacify the left ventricle  LEFT VENTRICLE:  Size was normal   Systolic function was mildly reduced  Ejection fraction was estimated to be 45 %  There was akinesis and aneurysmal deformity of the mid-apical anterior, mid anteroseptal, apical inferior, apical septal, and apical wall(s)  No evidence of apical thrombus  HISTORY: PRIOR HISTORY: Atrial Fibrillation, CAD, Hyperlipidemia, Obesity    PROCEDURE: The procedure was performed at the bedside  This was a routine study  The transthoracic approach was used  The study included complete 2D imaging, M-mode, complete spectral Doppler, and color Doppler  The heart rate was 104 bpm,  at the start of the study  Images were obtained from the parasternal, apical, subcostal, and suprasternal notch acoustic windows   Intravenous contrast ( 1 0 mL/min of Definity in NSS) was administered to opacify the left ventricle  Echocardiographic views were limited due to poor acoustic window availability and lung interference  This was a technically difficult study  LEFT VENTRICLE: Size was normal  Systolic function was mildly reduced  Ejection fraction was estimated to be 45 %  There was akinesis and aneurysmal deformity of the mid-apical anterior, mid anteroseptal, apical inferior, apical septal,  and apical wall(s)  Wall thickness was normal  No evidence of apical thrombus  DOPPLER: Transmitral flow pattern: atrial fibrillation  VENTRICULAR SEPTUM: There was sigmoid septal appearance  RIGHT VENTRICLE: The size was normal  Systolic function was normal  Not well visualized  LEFT ATRIUM: The atrium was mildly dilated  RIGHT ATRIUM: Size was normal  Not well visualized  MITRAL VALVE: Valve structure was normal  There was normal leaflet separation  DOPPLER: There was no evidence for stenosis  There was trace regurgitation  AORTIC VALVE: The valve was trileaflet  Leaflets exhibited normal thickness, calcification, normal cuspal separation, and sclerosis  DOPPLER: There was no evidence for stenosis  There was no regurgitation  TRICUSPID VALVE: The valve structure was normal  There was normal leaflet separation  DOPPLER: There was no evidence for stenosis  There was no significant regurgitation  PULMONIC VALVE: Leaflets exhibited normal thickness, no calcification, and normal cuspal separation  DOPPLER: The transpulmonic velocity was within the normal range  There was no regurgitation  PERICARDIUM: There was no pericardial effusion  AORTA: The root exhibited normal size  SYSTEMIC VEINS: IVC: The inferior vena cava was normal in size and course      SYSTEM MEASUREMENT TABLES    2D  %FS: 20 44 %  Ao Diam: 3 59 cm  EDV(Teich): 77 54 ml  EF(Teich): 42 12 %  ESV(Teich): 44 88 ml  IVSd: 1 27 cm  LA Diam: 4 57 cm  LVEDV MOD A4C: 110 86 ml  LVEF MOD A4C: 45 23 %  LVESV MOD A4C: 60 71 ml  LVIDd: 4 18 cm  LVIDs: 3 32 cm  LVLd A4C: 7 69 cm  LVLs A4C: 7 22 cm  LVPWd: 1 1 cm  SV MOD A4C: 50 14 ml  SV(Teich): 32 66 ml    MM  TAPSE: 1 29 cm    [facility]    Prepared and electronically signed by    Bishop Sapp MD  Signed 06-Mar-2018 12:00:12       Meds/Allergies   current meds:   Current Facility-Administered Medications   Medication Dose Route Frequency    acetaminophen (TYLENOL) tablet 650 mg  650 mg Oral Q4H PRN    aspirin (ECOTRIN LOW STRENGTH) EC tablet 81 mg  81 mg Oral Daily    atorvastatin (LIPITOR) tablet 40 mg  40 mg Oral Daily With Dinner    co-enzyme Q-10 capsule 200 mg  200 mg Oral Daily    enoxaparin (LOVENOX) subcutaneous injection 40 mg  40 mg Subcutaneous Daily    fish oil capsule 1,000 mg  1,000 mg Oral Daily    glucosamine sulfate capsule 500 mg  500 mg Oral Daily    magnesium gluconate (MAGONATE) tablet 500 mg  500 mg Oral Daily    melatonin tablet 6 mg  6 mg Oral HS    multivitamin-minerals (CENTRUM) tablet 1 tablet  1 tablet Oral Daily    ondansetron (ZOFRAN) injection 4 mg  4 mg Intravenous Q6H PRN    sodium chloride 0 9 % infusion  60 mL/hr Intravenous Continuous     Prescriptions Prior to Admission   Medication    aspirin (ECOTRIN LOW STRENGTH) 81 mg EC tablet    Coenzyme Q10 200 MG capsule    furosemide (LASIX) 20 mg tablet    Glucosamine-Chondroit-Vit C-Mn (GLUCOSAMINE CHONDROITIN COMPLX) CAPS    losartan (COZAAR) 50 mg tablet    magnesium gluconate (MAGONATE) 500 mg tablet    metoprolol tartrate (LOPRESSOR) 100 mg tablet    Multiple Vitamins-Minerals (MULTIVITAMIN ADULT PO)    Omega-3 Fatty Acids (FISH OIL) 1,000 mg    potassium chloride (KLOR-CON M20) 20 mEq tablet    Probiotic Product (PROBIOTIC COLON SUPPORT) CAPS    selenium 200 mcg    simvastatin (ZOCOR) 40 mg tablet    vitamin E, tocopherol, 1,000 units capsule         sodium chloride 60 mL/hr Last Rate: Stopped (03/07/18 2613) Assessment:  Principal Problem:    CVA (cerebral vascular accident) [facility])  Active Problems:    Essential hypertension    HLD (hyperlipidemia)    CAD (coronary artery disease)    Atrial flutter (HCC)    Assessment/ Plan:    1  New onset atrial flutter- reviewed ECG  Patient converted to sinus rhythm; had another episode of afib overnight, approximately 2am to 7am; converted without intervention  Pt was asymptomatic  TSH WNL  Echocardiogram reviewed showing EF 45% with akinesis and aneurysmal deformity of the mid-apical anterior, mid anteroseptal, apical inferior, apical septal and apical walls and no evidence of thrombus  No recent echocardiogram to compare  [id number] VASC score: 7  Being started on Eliquis by primary team  Continue ASA as well  Patient on metoprolol 50 mg PO BID at home; resume today  2  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   MRI of brain showed small acute and recent L MCA cortical infarction involving a small portion of the insular cortex extending into the periventricular white matter  Continue aspirin and statin       3  CAD s/p CABG in 1999  Continue aspirin and statin   Patient on metoprolol at home      4  Hypertension-111/67 current  His home medicationa: losartan, furosemide and metoprolol have been held  Resume metoprolol and lasix on discharge       5  Hyperlipidemia- Continue statin       Counseling / Coordination of Care  Total floor / unit time spent today 30 minutes  Greater than 50% of total time was spent with the patient and / or family counseling and / or coordination of care  ** Please Note: Dragon 360 Dictation voice to text software may have been used in the creation of this document   **

## 2020-03-16 NOTE — PROGRESS NOTES
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)     7  Typical atrial flutter ([address] )     8  Ventricular tachycardia ([address] )     9  Benign essential hypertension     10  Dyslipidemia     11  Obstructive sleep apnea           Discussion/Summary:  Mr Jhon Hendricks is a pleasant 25-year-old gentleman who presents to the office today for routine follow-up  Since his last visit he has been feeling well  He offers no cardiac complaints  He continues to maintain normal sinus rhythm  He will remain on his current AV elizabeth blocking regimen along with systemic anticoagulation with Eliquis  His device check does show some recurrent episodes of atrial fibrillation which are self-limiting and with which he is asymptomatic  His ventricular rates are relatively well controlled  He will remain on his current AV elizabeth blocking regimen  He is also maintained on amiodarone given history of ventricular tachycardia  A recent TSH was elevated although his free T4 was normal   His LFTs are within normal limits  He is scheduled to have pulmonary function tests in the near future  He sees an eye doctor regularly  His blood pressure is well controlled in the office today  I did review his most recent set of lipids  They are acceptable on current therapy  He appears euvolemic on his current diuretic regimen to which no changes were made  His most recent echo from his hospital stay reveals an EF of 35%    He checks his blood pressure at home with systolic readings in the 64A precluding the addition of an ACE-I to his regimen  I will reassess his EF with an echo after his next visit  I will see him back in the office in four months or sooner if deemed necessary  Interval History:   Mr Sydney Kennedy is a pleasant 60-year-old gentleman who presents to the office today for routine follow-up  Since his last visit he has been feeling well  He leads a sedentary lifestyle but with the activity he performs he denies any exertional chest pain or shortness of breath  He denies any signs or symptoms of congestive heart failure including increasing lower extremity edema, paroxysmal nocturnal dyspnea, orthopnea, acute weight gain or increasing abdominal girth  He denies lightheadedness, syncope, presyncope, palpitations or symptoms of claudication  He denies any discharges from his AICD  He denies any bleeding consequences or falls on Eliquis  Problem List     CVA (cerebral vascular accident) ([facility] )    Essential hypertension (Chronic)    HLD (hyperlipidemia) (Chronic)    CAD (coronary artery disease) (Chronic)    Atrial flutter (HCC) (Chronic)        Past Medical History:   Diagnosis Date    Acute myocardial infarction ([facility] )     Allergic rhinitis     Anxiety     Bimalleolar fracture of left ankle     CAD (coronary artery disease)     Erectile dysfunction of non-organic origin     Hematuria     workup was negative and felt to be benign    Herpes zoster with complication     Hyperlipidemia     Hypertension     Impaired fasting glucose     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)      Social History     Socioeconomic History    Marital status:       Spouse name: moncho    Number of children: Not on file    Years of education: Not on file    Highest education level: Not on file   Occupational History    Occupation: retired     Comment: clergy   Social Needs    Financial resource strain: Not on file    Food insecurity:     Worry: Not on file     Inability: Not on file    Transportation needs:     Medical: Not on file     Non-medical: Not on file   Tobacco Use    Smoking status: Former Smoker     Types: Cigarettes     Last attempt to quit: 1968     Years since quittin 2    Smokeless tobacco: Never Used   Substance and Sexual Activity    Alcohol use: Yes     Comment: social- per allscripts    Drug use: No    Sexual activity: Not on file   Lifestyle    Physical activity:     Days per week: Not on file     Minutes per session: Not on file    Stress: Not on file   Relationships    Social connections:     Talks on phone: Not on file     Gets together: Not on file     Attends Mandaeism service: Not on file     Active member of club or organization: Not on file     Attends meetings of clubs or organizations: Not on file     Relationship status: Not on file    Intimate partner violence:     Fear of current or ex partner: Not on file     Emotionally abused: Not on file     Physically abused: Not on file     Forced sexual activity: Not on file   Other Topics Concern    Not on file   Social History Narrative    Death in the family- spouse, moncho  after a prolonged francis with lymphoma     Exercises regularly      Family History   Problem Relation Age of Onset    Cancer Mother     Hypertension Mother         essential    Pancreatic cancer Mother      Past Surgical History:   Procedure Laterality Date    ANKLE FRACTURE SURGERY Left     CARDIAC PACEMAKER PLACEMENT Left     CORONARY ARTERY BYPASS GRAFT         Current Outpatient Medications:     amiodarone 200 mg tablet, Take 200 mg by mouth daily, Disp: , Rfl:     apixaban (ELIQUIS) 5 mg, Take 1 tablet (5 mg total) by mouth 2 (two) times a day, Disp: 60 tablet, Rfl: 5    atorvastatin (LIPITOR) 40 mg tablet, Take 1 tablet (40 mg total) by mouth daily, Disp: 90 tablet, Rfl: 3    azelastine (ASTELIN) 0 1 % nasal spray, 1 spray into each nostril 2 (two) times a day Use in each nostril as directed, Disp: 1 Bottle, Rfl: 5    clopidogrel (PLAVIX) 75 mg tablet, Take 1 tablet (75 mg total) by mouth daily Additional refills to be obtained from PCP or cardiologist , Disp: 90 tablet, Rfl: 3    Coenzyme Q10 200 MG capsule, Take 200 mg by mouth daily, Disp: , Rfl:     doxylamine (UNISON) 25 MG tablet, Take 0 5 tablets (12 5 mg total) by mouth daily at bedtime as needed for sleep, Disp: 30 tablet, Rfl: 0    furosemide (LASIX) 20 mg tablet, Take 1 tablet (20 mg total) by mouth daily, Disp: 90 tablet, Rfl: 3    magnesium gluconate (MAGONATE) 500 mg tablet, Take 500 mg by mouth daily, Disp: , Rfl:     melatonin 3 mg, Take 1 tablet (3 mg total) by mouth daily at bedtime, Disp: 30 each, Rfl: 0    metoprolol tartrate (LOPRESSOR) 25 mg tablet, Take 1 tablet (25 mg total) by mouth every 12 (twelve) hours, Disp: 60 tablet, Rfl: 0    nitroglycerin (NITROSTAT) 0 4 mg SL tablet, Place 1 tablet (0 4 mg total) under the tongue every 5 (five) minutes as needed for chest pain, Disp: 25 tablet, Rfl: 1    potassium chloride (KLOR-CON M20) 20 mEq tablet, Take 1 tablet (20 mEq total) by mouth daily, Disp: 90 tablet, Rfl: 3    Probiotic Product (PROBIOTIC COLON SUPPORT) CAPS, Take 1 capsule by mouth daily, Disp: , Rfl:     Triamcinolone Acetonide 55 MCG/ACT AERO, 1 Act (55 mcg total) into each nostril daily, Disp: 1 Bottle, Rfl: 0  Allergies   Allergen Reactions    Penicillins Edema and Rash     Reaction Date: [id number]; Category: Allergy;  Annotation - [id number]: Severe reaction with hospitaization at [facility] AND [facility]       Labs:     Chemistry        Component Value Date/Time     08/17/2017 0705    K 3 8 11/06/2019 0446    K 4 2 03/28/2019 0631     11/06/2019 0446     03/28/2019 0631    CO2 27 11/06/2019 0446    CO2 32 03/28/2019 0631    BUN 20 11/06/2019 0446    BUN 17 03/28/2019 0631    CREATININE 1 04 11/06/2019 0446 CREATININE 1 14 (H) 08/17/2017 0705        Component Value Date/Time    CALCIUM 8 0 (L) 11/06/2019 0446    CALCIUM 9 4 03/28/2019 0631    ALKPHOS 75 11/02/2019 1926    ALKPHOS 78 03/28/2019 0631    AST 24 11/02/2019 1926    AST 14 03/28/2019 0631    ALT 32 11/02/2019 1926    ALT 14 03/28/2019 0631    BILITOT 0 4 07/19/2016 0642            Lab Results   Component Value Date    CHOL 156 08/17/2017    CHOL 166 01/26/2017    CHOL 166 07/19/2016     Lab Results   Component Value Date    HDL 58 10/20/2019    HDL 53 03/28/2019    HDL 54 09/21/2018     Lab Results   Component Value Date    LDLCALC 65 10/20/2019    LDLCALC 54 03/07/2018     Lab Results   Component Value Date    TRIG 65 10/20/2019    TRIG 150 (H) 03/28/2019    TRIG 116 09/21/2018     No results found for: CHOLHDL    Imaging: Mri Brain Wo Contrast    Result Date: 3/6/2018  Narrative: MRI BRAIN WITHOUT CONTRAST INDICATION: STROKE- WO BRAIN  History taken directly from the electronic ordering system  Right-sided weakness  Difficulty with balance  COMPARISON:   3/6/2018 TECHNIQUE:  Sagittal T1, axial T2, axial FLAIR, axial T1, axial [place] and axial diffusion imaging  IMAGE QUALITY:  Diagnostic  FINDINGS: BRAIN PARENCHYMA:  There is a small band of diffusion restriction in the left insular cortex extending into the periventricular white matter of the left frontal lobe images 19 through 23 of series 3 indicative of recent left MCA infarction  There is bandlike associated FLAIR hyperintensity in this region  Elsewhere there is a small to moderate chronic right frontal infarction with cystic encephalomalacia and gliosis  There is somewhat patchy periventricular FLAIR hyperintensity as well  No acute intracranial hemorrhage or extra-axial fluid collection  Cerebellar tonsils are normally positioned  VENTRICLES:  The ventricles are normal in size and contour   SELLA AND PITUITARY GLAND:  Normal  ORBITS:  Normal  PARANASAL SINUSES:  Normal  VASCULATURE:  Evaluation of the major intracranial vasculature demonstrates appropriate flow voids  CALVARIUM AND SKULL BASE:  Normal  EXTRACRANIAL SOFT TISSUES:  Normal      Impression: 1  Small acute/recent left MCA cortical infarction involving a small portion of the insular cortex extending into the periventricular white matter  2   Chronic small to moderate right frontal infarction and mild to moderate, chronic microangiopathy  I personally discussed this study with Dr Patricia Gordon on 3/6/2018 at 11:42 AM  Workstation performed: [id number]     Xr Stroke Alert Portable Chest    Result Date: 3/6/2018  Narrative: CHEST INDICATION:  Right-sided weakness  Stroke COMPARISON:  None EXAM PERFORMED/VIEWS:  XR STROKE ALERT PORTABLE CHEST FINDINGS:  There are median sternotomy wires indicating prior cardiac surgery  Cardiomediastinal silhouette appears unremarkable  The lungs are clear  No pneumothorax or pleural effusion  Osseous structures appear within normal limits for patient age  Impression: No acute cardiopulmonary disease  Workstation performed: [id number]     Ct Stroke Alert Brain    Result Date: 3/6/2018  Narrative: CT BRAIN - STROKE ALERT PROTOCOL INDICATION:  Woke up with right-sided weakness  Pronator drift  Cerebellar symptoms  COMPARISON:  None  TECHNIQUE:  CT examination of the brain was performed  In addition to axial images, coronal reformatted images were created and submitted for interpretation  Radiation dose length product (DLP) for this visit:   This examination, like all CT scans performed in the [facility], was performed utilizing techniques to minimize radiation dose exposure, including the use of iterative reconstruction  and automated exposure control  IMAGE QUALITY:  Diagnostic  FINDINGS:  PARENCHYMA:  Small to moderate-sized area of encephalomalacia and gliosis right frontal lobe indicative of chronic infarction  Elsewhere there are mild periventricular hypodensities   No acute intracranial hemorrhage  Atherosclerotic calcifications noted  VENTRICLES AND EXTRA-AXIAL SPACES:  Age-appropriate volume loss is noted  No hydrocephalus  VISUALIZED ORBITS AND PARANASAL SINUSES:  Orbits appear normal   Mild scattered sinus mucosal thickening is noted  No fluid levels are seen  CALVARIUM AND EXTRACRANIAL SOFT TISSUES:   Normal      Impression: 1  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, chronic microangiopathy  2   No acute intracranial hemorrhage  Findings were directly discussed with Tyrell Hurst on 3/6/2018 7:31 AM  Workstation performed: [id number]     Cta Stroke Alert (head/neck)    Result Date: 3/6/2018  Narrative: CTA NECK AND BRAIN WITH AND WITHOUT CONTRAST INDICATION: Left-sided weakness  Stroke alert  Prominent or drift  Pointing  COMPARISON:   None  TECHNIQUE:  Routine CT imaging of the Brain without contrast   Post contrast imaging was performed after administration of iodinated contrast through the neck and brain  Post contrast axial 0 625 mm images timed to opacify the arterial system  3D rendering was performed on an independent workstation  MIP reconstructions performed  Coronal reconstructions were performed of the noncontrast portion of the brain  Radiation dose length product (DLP) for this visit:  526 3 mGy-cm   This examination, like all CT scans performed in the [facility], was performed utilizing techniques to minimize radiation dose exposure, including the use of iterative reconstruction and automated exposure control  IV Contrast:  100 mL of iohexol (OMNIPAQUE)  IMAGE QUALITY:   Diagnostic FINDINGS: NONCONTRAST BRAIN: Reported separately CERVICAL VASCULATURE AORTIC ARCH AND GREAT VESSELS:  Mild athersclerotic disease of the arch, proximal great vessels and visualized subclavian vessels  No significant stenosis  Sternotomy wires and mediastinal clips are noted, compatible with prior CABG   RIGHT VERTEBRAL ARTERY CERVICAL SEGMENT:  Mild stenosis at the origin  The vessel is normal in caliber throughout the neck  LEFT VERTEBRAL ARTERY CERVICAL SEGMENT:  Mild stenosis at the origin  The vessel is normal in caliber throughout the neck  Left vertebral artery is congenitally dominant  RIGHT EXTRACRANIAL CAROTID SEGMENT:  Normal caliber common carotid artery  Normal bifurcation and cervical internal carotid artery  No stenosis or dissection  LEFT EXTRACRANIAL CAROTID SEGMENT:  Mild atherosclerotic disease of the distal common carotid artery and proximal cervical internal carotid artery without significant stenosis compared to the more distal ICA  NASCET criteria was used to determine the degree of internal carotid artery diameter stenosis  INTRACRANIAL VASCULATURE INTERNAL CAROTID ARTERIES:  Atherosclerotic calcifications of the cavernous segment of the internal carotid artery are very mild  Normal ophthalmic artery origins  Normal ICA terminus  ANTERIOR CIRCULATION:  Symmetric A1 segments and anterior cerebral arteries with normal enhancement  Normal anterior communicating artery  MIDDLE CEREBRAL ARTERY CIRCULATION:  M1 segment and middle cerebral artery branches demonstrate normal enhancement bilaterally  DISTAL VERTEBRAL ARTERIES:  Normal distal vertebral arteries  Posterior inferior cerebellar artery origins are normal  Normal vertebral basilar junction  BASILAR ARTERY:  Basilar artery is normal in caliber  Normal superior cerebellar arteries  POSTERIOR CEREBRAL ARTERIES: Both posterior cerebral arteries arises from the basilar tip  Both arteries demonstrate normal enhancement  Normal posterior communicating arteries  DURAL VENOUS SINUSES:  Not well visualized on this early arterial phase scan  NON VASCULAR ANATOMY BONY STRUCTURES:  No acute osseous abnormality  SOFT TISSUES OF THE NECK:  Unremarkable  THORACIC INLET:  Unremarkable  Impression: 1    No hemodynamically significant stenosis in the major arteries of the neck  2   No intracranial aneurysm or major intracranial arterial stenosis  I personally discussed this study with Ge Kuo on 3/6/2018 at 7:31 AM  Workstation performed: [id number]        Review of Systems   Cardiovascular: Negative for chest pain, claudication, cyanosis, dyspnea on exertion, irregular heartbeat and leg swelling  All other systems reviewed and are negative  There were no vitals filed for this visit  There were no vitals filed for this visit  There is no height or weight on file to calculate BMI       Physical Exam:  General:  Alert and cooperative, appears stated age  HEENT:  PERRLA, EOMI, no scleral icterus, no conjunctival pallor  Neck:  No lymphadenopathy, no thyromegaly, no carotid bruits, no elevated JVP  Heart:  Regular rate and rhythm, normal S1/S2, no S3/S4, no murmur  Lungs:  Clear to auscultation bilaterally   Abdomen:  Soft, non-tender, positive bowel sounds, no rebound or guarding,   no organomegaly   Extremities:  No clubbing, cyanosis or edema   Vascular:  2+ pedal pulses  Skin:  No rashes or lesions on exposed skin  Neurologic:  Cranial nerves II-XII grossly intact without focal deficits

## 2020-04-05 PROBLEM — D62 ACUTE BLOOD LOSS ANEMIA: Status: RESOLVED | Noted: 2019-11-02 | Resolved: 2020-04-05

## 2020-04-05 PROBLEM — E78.5 DYSLIPIDEMIA: Status: RESOLVED | Noted: 2018-03-06 | Resolved: 2020-04-05

## 2020-04-05 PROBLEM — R53.81 PHYSICAL DECONDITIONING: Status: RESOLVED | Noted: 2019-10-28 | Resolved: 2020-04-05

## 2020-04-09 ENCOUNTER — TELEMEDICINE (OUTPATIENT)
Dept: FAMILY MEDICINE CLINIC | Facility: CLINIC | Age: 85
End: 2020-04-09
Payer: COMMERCIAL

## 2020-04-09 VITALS
HEART RATE: 60 BPM | TEMPERATURE: 96.8 F | HEIGHT: 64 IN | DIASTOLIC BLOOD PRESSURE: 57 MMHG | SYSTOLIC BLOOD PRESSURE: 106 MMHG | WEIGHT: 181 LBS | BODY MASS INDEX: 30.9 KG/M2

## 2020-04-09 DIAGNOSIS — I63.412 CEREBROVASCULAR ACCIDENT (CVA) DUE TO EMBOLISM OF LEFT MIDDLE CEREBRAL ARTERY (HCC): ICD-10-CM

## 2020-04-09 DIAGNOSIS — I10 BENIGN ESSENTIAL HYPERTENSION: Primary | ICD-10-CM

## 2020-04-09 DIAGNOSIS — I50.22 CHRONIC SYSTOLIC CHF (CONGESTIVE HEART FAILURE) (HCC): ICD-10-CM

## 2020-04-09 DIAGNOSIS — E66.9 OBESITY (BMI 30.0-34.9): ICD-10-CM

## 2020-04-09 DIAGNOSIS — Z95.5 STATUS POST INSERTION OF DRUG ELUTING CORONARY ARTERY STENT: ICD-10-CM

## 2020-04-09 DIAGNOSIS — K59.01 SLOW TRANSIT CONSTIPATION: ICD-10-CM

## 2020-04-09 DIAGNOSIS — M54.16 SUBACUTE LEFT LUMBAR RADICULOPATHY: ICD-10-CM

## 2020-04-09 DIAGNOSIS — Z95.1 HX OF CABG: ICD-10-CM

## 2020-04-09 DIAGNOSIS — Z79.899 LONG TERM CURRENT USE OF AMIODARONE: ICD-10-CM

## 2020-04-09 DIAGNOSIS — I48.3 TYPICAL ATRIAL FLUTTER (HCC): ICD-10-CM

## 2020-04-09 DIAGNOSIS — Z79.01 CHRONIC ANTICOAGULATION: ICD-10-CM

## 2020-04-09 DIAGNOSIS — I47.2 VENTRICULAR TACHYCARDIA (HCC): ICD-10-CM

## 2020-04-09 DIAGNOSIS — Z95.810 PRESENCE OF DOUBLE CHAMBER AUTOMATIC CARDIOVERTER/DEFIBRILLATOR (AICD): ICD-10-CM

## 2020-04-09 DIAGNOSIS — I25.5 ISCHEMIC CARDIOMYOPATHY: ICD-10-CM

## 2020-04-09 DIAGNOSIS — N18.2 CKD (CHRONIC KIDNEY DISEASE) STAGE 2, GFR 60-89 ML/MIN: ICD-10-CM

## 2020-04-09 DIAGNOSIS — F51.04 PSYCHOPHYSIOLOGICAL INSOMNIA: ICD-10-CM

## 2020-04-09 DIAGNOSIS — I25.10 CORONARY ARTERY DISEASE INVOLVING NATIVE CORONARY ARTERY OF NATIVE HEART WITHOUT ANGINA PECTORIS: Chronic | ICD-10-CM

## 2020-04-09 DIAGNOSIS — E78.2 MIXED HYPERLIPIDEMIA: ICD-10-CM

## 2020-04-09 PROCEDURE — 1160F RVW MEDS BY RX/DR IN RCRD: CPT | Performed by: FAMILY MEDICINE

## 2020-04-09 PROCEDURE — 99214 OFFICE O/P EST MOD 30 MIN: CPT | Performed by: FAMILY MEDICINE

## 2020-05-08 DIAGNOSIS — I10 BENIGN ESSENTIAL HYPERTENSION: ICD-10-CM

## 2020-05-11 ENCOUNTER — TELEPHONE (OUTPATIENT)
Dept: CARDIOLOGY CLINIC | Facility: CLINIC | Age: 85
End: 2020-05-11

## 2020-05-15 ENCOUNTER — REMOTE DEVICE CLINIC VISIT (OUTPATIENT)
Dept: CARDIOLOGY CLINIC | Facility: CLINIC | Age: 85
End: 2020-05-15
Payer: COMMERCIAL

## 2020-05-15 DIAGNOSIS — Z95.810 AICD (AUTOMATIC CARDIOVERTER/DEFIBRILLATOR) PRESENT: Primary | ICD-10-CM

## 2020-05-15 PROCEDURE — 93296 REM INTERROG EVL PM/IDS: CPT | Performed by: INTERNAL MEDICINE

## 2020-05-15 PROCEDURE — 93295 DEV INTERROG REMOTE 1/2/MLT: CPT | Performed by: INTERNAL MEDICINE

## 2020-05-20 ENCOUNTER — HOSPITAL ENCOUNTER (OUTPATIENT)
Dept: PULMONOLOGY | Facility: HOSPITAL | Age: 85
Discharge: HOME | End: 2020-05-20
Attending: INTERNAL MEDICINE
Payer: COMMERCIAL

## 2020-05-20 DIAGNOSIS — Z79.899 LONG TERM CURRENT USE OF AMIODARONE: ICD-10-CM

## 2020-05-20 PROCEDURE — 94010 BREATHING CAPACITY TEST: CPT

## 2020-05-20 PROCEDURE — 94726 PLETHYSMOGRAPHY LUNG VOLUMES: CPT

## 2020-05-20 PROCEDURE — 94729 DIFFUSING CAPACITY: CPT

## 2020-05-20 PROCEDURE — 94010 BREATHING CAPACITY TEST: CPT | Performed by: INTERNAL MEDICINE

## 2020-05-20 PROCEDURE — 94760 N-INVAS EAR/PLS OXIMETRY 1: CPT

## 2020-05-20 PROCEDURE — 94729 DIFFUSING CAPACITY: CPT | Performed by: INTERNAL MEDICINE

## 2020-05-20 PROCEDURE — 94726 PLETHYSMOGRAPHY LUNG VOLUMES: CPT | Performed by: INTERNAL MEDICINE

## 2020-07-07 DIAGNOSIS — I47.2 VENTRICULAR TACHYCARDIA (HCC): Primary | ICD-10-CM

## 2020-07-07 RX ORDER — AMIODARONE HYDROCHLORIDE 200 MG/1
200 TABLET ORAL DAILY
Qty: 60 TABLET | Refills: 5 | Status: SHIPPED | OUTPATIENT
Start: 2020-07-07 | End: 2020-10-22 | Stop reason: SDUPTHER

## 2020-07-09 ENCOUNTER — OFFICE VISIT (OUTPATIENT)
Dept: CARDIOLOGY CLINIC | Facility: CLINIC | Age: 85
End: 2020-07-09
Payer: COMMERCIAL

## 2020-07-09 VITALS
SYSTOLIC BLOOD PRESSURE: 110 MMHG | HEIGHT: 64 IN | HEART RATE: 60 BPM | DIASTOLIC BLOOD PRESSURE: 60 MMHG | WEIGHT: 184.3 LBS | TEMPERATURE: 98.2 F | BODY MASS INDEX: 31.47 KG/M2

## 2020-07-09 DIAGNOSIS — Z79.899 LONG TERM CURRENT USE OF AMIODARONE: ICD-10-CM

## 2020-07-09 DIAGNOSIS — E78.5 DYSLIPIDEMIA: ICD-10-CM

## 2020-07-09 DIAGNOSIS — Z95.1 HX OF CABG: ICD-10-CM

## 2020-07-09 DIAGNOSIS — I10 BENIGN ESSENTIAL HYPERTENSION: ICD-10-CM

## 2020-07-09 DIAGNOSIS — I48.3 TYPICAL ATRIAL FLUTTER (HCC): ICD-10-CM

## 2020-07-09 DIAGNOSIS — G47.33 OBSTRUCTIVE SLEEP APNEA: ICD-10-CM

## 2020-07-09 DIAGNOSIS — I47.2 VENTRICULAR TACHYCARDIA (HCC): ICD-10-CM

## 2020-07-09 DIAGNOSIS — Z95.5 STATUS POST INSERTION OF DRUG ELUTING CORONARY ARTERY STENT: ICD-10-CM

## 2020-07-09 DIAGNOSIS — Z95.810 PRESENCE OF DOUBLE CHAMBER AUTOMATIC CARDIOVERTER/DEFIBRILLATOR (AICD): ICD-10-CM

## 2020-07-09 DIAGNOSIS — I25.5 ISCHEMIC CARDIOMYOPATHY: ICD-10-CM

## 2020-07-09 DIAGNOSIS — I25.10 CORONARY ARTERY DISEASE INVOLVING NATIVE CORONARY ARTERY OF NATIVE HEART WITHOUT ANGINA PECTORIS: Primary | Chronic | ICD-10-CM

## 2020-07-09 PROCEDURE — 3008F BODY MASS INDEX DOCD: CPT | Performed by: INTERNAL MEDICINE

## 2020-07-09 PROCEDURE — 3074F SYST BP LT 130 MM HG: CPT | Performed by: INTERNAL MEDICINE

## 2020-07-09 PROCEDURE — 1160F RVW MEDS BY RX/DR IN RCRD: CPT | Performed by: INTERNAL MEDICINE

## 2020-07-09 PROCEDURE — 4040F PNEUMOC VAC/ADMIN/RCVD: CPT | Performed by: INTERNAL MEDICINE

## 2020-07-09 PROCEDURE — 3078F DIAST BP <80 MM HG: CPT | Performed by: INTERNAL MEDICINE

## 2020-07-09 PROCEDURE — 99214 OFFICE O/P EST MOD 30 MIN: CPT | Performed by: INTERNAL MEDICINE

## 2020-07-09 PROCEDURE — 1036F TOBACCO NON-USER: CPT | Performed by: INTERNAL MEDICINE

## 2020-07-09 NOTE — PROGRESS NOTES
Cardiology Follow Up    Se Mustafa  9/1/1932  [id number]  [address] [facility]  [address]  [address]    1  Coronary artery disease involving native coronary artery of native heart without angina pectoris     2  Hx of CABG     3  Status post insertion of drug eluting coronary artery stent     4  Ischemic cardiomyopathy  Echo complete with contrast if indicated   5  Ventricular tachycardia ([address] )     6  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)     7  Long term current use of amiodarone  TSH, 3rd generation with Free T4 reflex   8  Typical atrial flutter ([address] )     9  Benign essential hypertension     10  Dyslipidemia     11  Obstructive sleep apnea           Discussion/Summary:  Mr Rachid Reilly is a pleasant 49-year-old gentleman who presents to the office today for routine follow-up  Since his last visit he has been feeling well  He offers no cardiac complaints  He continues to maintain normal sinus rhythm  He will remain on his current AV elizabeth blocking regimen along with systemic anticoagulation with Eliquis  His last device check revealed no recurrent atrial fibrillation  He is maintained on amiodarone given history of ventricular tachycardia  He states he is due to see the eye doctor in the near future  After his last visit he underwent PFTs which were unremarkable  He had recent LFTs which were unremarkable  He will undergo TFTs prior to his next visit  His blood pressure is well controlled in the office today  I did review his most recent set of lipids  They are acceptable on current therapy  He appears euvolemic on his current diuretic regimen to which no changes were made  His most recent echo from his hospital stay reveals an EF of 35%  He is not on an ACE-inhibitor given low blood pressure readings at home    I have asked that he undergo reassessment of his ejection fraction with an echocardiogram     I will see him back in the office in six months or sooner if deemed necessary  Interval History:   Mr Rachid Reilly is a pleasant 80-year-old gentleman who presents to the office today for routine follow-up  Since his last visit he has been feeling well  He has been having physical therapy due to his back  Otherwise he does not participate in any formal physical activity  With the activity he performs he denies any exertional chest pain or shortness of breath  He denies any signs or symptoms of congestive heart failure including increasing lower extremity edema, paroxysmal nocturnal dyspnea, orthopnea, acute weight gain or increasing abdominal girth  He weighs himself at home with a stable weight of 181 lb  He denies lightheadedness, syncope, presyncope, palpitations or symptoms of claudication  He denies any discharges from his AICD  He denies any bleeding consequences or falls on Eliquis  Problem List     CVA (cerebral vascular accident) ([facility] )    Essential hypertension (Chronic)    HLD (hyperlipidemia) (Chronic)    CAD (coronary artery disease) (Chronic)    Atrial flutter (HCC) (Chronic)        Past Medical History:   Diagnosis Date    Acute myocardial infarction ([facility] )     Allergic rhinitis     Anxiety     Bimalleolar fracture of left ankle     CAD (coronary artery disease)     Erectile dysfunction of non-organic origin     Hematuria     workup was negative and felt to be benign    Herpes zoster with complication     Hyperlipidemia     Hypertension     Impaired fasting glucose     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    Prostatic hypertrophy     benign    Stroke [facility])      Social History     Socioeconomic History    Marital status:       Spouse name: moncho    Number of children: Not on file    Years of education: Not on file    Highest education level: Not on file   Occupational History    Occupation: retired Comment: clergy   Social Needs    Financial resource strain: Not on file    Food insecurity:     Worry: Not on file     Inability: Not on file    Transportation needs:     Medical: Not on file     Non-medical: Not on file   Tobacco Use    Smoking status: Former Smoker     Types: Cigarettes     Last attempt to quit: 1968     Years since quittin 5    Smokeless tobacco: Never Used   Substance and Sexual Activity    Alcohol use: Yes     Comment: social- per allscripts    Drug use: No    Sexual activity: Not on file   Lifestyle    Physical activity:     Days per week: Not on file     Minutes per session: Not on file    Stress: Not on file   Relationships    Social connections:     Talks on phone: Not on file     Gets together: Not on file     Attends [employer] service: Not on file     Active member of club or organization: Not on file     Attends meetings of clubs or organizations: Not on file     Relationship status: Not on file    Intimate partner violence:     Fear of current or ex partner: Not on file     Emotionally abused: Not on file     Physically abused: Not on file     Forced sexual activity: Not on file   Other Topics Concern    Not on file   Social History Narrative    Death in the family- spouse, moncho  after a prolonged francis with lymphoma     Exercises regularly      Family History   Problem Relation Age of Onset    Cancer Mother     Hypertension Mother         essential    Pancreatic cancer Mother      Past Surgical History:   Procedure Laterality Date    ANKLE FRACTURE SURGERY Left     CARDIAC PACEMAKER PLACEMENT Left     CORONARY ARTERY BYPASS GRAFT         Current Outpatient Medications:     amiodarone 200 mg tablet, Take 1 tablet (200 mg total) by mouth daily, Disp: 60 tablet, Rfl: 5    apixaban (ELIQUIS) 5 mg, Take 1 tablet (5 mg total) by mouth 2 (two) times a day, Disp: 60 tablet, Rfl: 5    atorvastatin (LIPITOR) 40 mg tablet, Take 1 tablet (40 mg total) by mouth daily, Disp: 90 tablet, Rfl: 3    azelastine (ASTELIN) 0 1 % nasal spray, 1 spray into each nostril 2 (two) times a day Use in each nostril as directed, Disp: 1 Bottle, Rfl: 5    clopidogrel (PLAVIX) 75 mg tablet, Take 1 tablet (75 mg total) by mouth daily Additional refills to be obtained from PCP or cardiologist , Disp: 90 tablet, Rfl: 3    Coenzyme Q10 200 MG capsule, Take 200 mg by mouth daily, Disp: , Rfl:     doxylamine (UNISON) 25 MG tablet, Take 0 5 tablets (12 5 mg total) by mouth daily at bedtime as needed for sleep, Disp: 30 tablet, Rfl: 0    furosemide (LASIX) 20 mg tablet, Take 1 tablet (20 mg total) by mouth daily, Disp: 90 tablet, Rfl: 3    magnesium gluconate (MAGONATE) 500 mg tablet, Take 500 mg by mouth daily, Disp: , Rfl:     melatonin 3 mg, Take 1 tablet (3 mg total) by mouth daily at bedtime, Disp: 30 each, Rfl: 0    metoprolol tartrate (LOPRESSOR) 25 mg tablet, Take 1 tablet (25 mg total) by mouth every 12 (twelve) hours, Disp: 180 tablet, Rfl: 3    potassium chloride (KLOR-CON M20) 20 mEq tablet, Take 1 tablet (20 mEq total) by mouth daily, Disp: 90 tablet, Rfl: 3    Probiotic Product (PROBIOTIC COLON SUPPORT) CAPS, Take 1 capsule by mouth daily, Disp: , Rfl:     nitroglycerin (NITROSTAT) 0 4 mg SL tablet, Place 1 tablet (0 4 mg total) under the tongue every 5 (five) minutes as needed for chest pain (Patient not taking: Reported on 3/16/2020), Disp: 25 tablet, Rfl: 1  Allergies   Allergen Reactions    Penicillins Edema and Rash     Reaction Date: [id number]; Category: Allergy;  Annotation - [id number]: Severe reaction with hospitaization at [facility]       Labs:     Chemistry        Component Value Date/Time     08/17/2017 0705    K 3 8 11/06/2019 0446    K 4 2 03/28/2019 0631     11/06/2019 0446     03/28/2019 0631    CO2 27 11/06/2019 0446    CO2 32 03/28/2019 0631    BUN 20 11/06/2019 0446    BUN 17 03/28/2019 0631    CREATININE 1 04 11/06/2019 0446    CREATININE 1 14 (H) 08/17/2017 0705        Component Value Date/Time    CALCIUM 8 0 (L) 11/06/2019 0446    CALCIUM 9 4 03/28/2019 0631    ALKPHOS 75 11/02/2019 1926    ALKPHOS 78 03/28/2019 0631    AST 24 11/02/2019 1926    AST 14 03/28/2019 0631    ALT 32 11/02/2019 1926    ALT 14 03/28/2019 0631    BILITOT 0 4 07/19/2016 0642            Lab Results   Component Value Date    CHOL 156 08/17/2017    CHOL 166 01/26/2017    CHOL 166 07/19/2016     Lab Results   Component Value Date    HDL 58 10/20/2019    HDL 53 03/28/2019    HDL 54 09/21/2018     Lab Results   Component Value Date    LDLCALC 65 10/20/2019    LDLCALC 75 03/28/2019    LDLCALC 76 09/21/2018     Lab Results   Component Value Date    TRIG 65 10/20/2019    TRIG 150 (H) 03/28/2019    TRIG 116 09/21/2018     No results found for: CHOLHDL    Imaging: Mri Brain Wo Contrast    Result Date: 3/6/2018  Narrative: MRI BRAIN WITHOUT CONTRAST INDICATION: STROKE- WO BRAIN  History taken directly from the electronic ordering system  Right-sided weakness  Difficulty with balance  COMPARISON:   3/6/2018 TECHNIQUE:  Sagittal T1, axial T2, axial FLAIR, axial T1, axial [place] and axial diffusion imaging  IMAGE QUALITY:  Diagnostic  FINDINGS: BRAIN PARENCHYMA:  There is a small band of diffusion restriction in the left insular cortex extending into the periventricular white matter of the left frontal lobe images 19 through 23 of series 3 indicative of recent left MCA infarction  There is bandlike associated FLAIR hyperintensity in this region  Elsewhere there is a small to moderate chronic right frontal infarction with cystic encephalomalacia and gliosis  There is somewhat patchy periventricular FLAIR hyperintensity as well  No acute intracranial hemorrhage or extra-axial fluid collection  Cerebellar tonsils are normally positioned  VENTRICLES:  The ventricles are normal in size and contour   SELLA AND PITUITARY GLAND:  Normal  ORBITS:  Normal  PARANASAL SINUSES:  Normal  VASCULATURE: Evaluation of the major intracranial vasculature demonstrates appropriate flow voids  CALVARIUM AND SKULL BASE:  Normal  EXTRACRANIAL SOFT TISSUES:  Normal      Impression: 1  Small acute/recent left MCA cortical infarction involving a small portion of the insular cortex extending into the periventricular white matter  2   Chronic small to moderate right frontal infarction and mild to moderate, chronic microangiopathy  I personally discussed this study with Dr Jair Paige on 3/6/2018 at 11:42 AM  Workstation performed: [id number]     Xr Stroke Alert Portable Chest    Result Date: 3/6/2018  Narrative: CHEST INDICATION:  Right-sided weakness  Stroke COMPARISON:  None EXAM PERFORMED/VIEWS:  XR STROKE ALERT PORTABLE CHEST FINDINGS:  There are median sternotomy wires indicating prior cardiac surgery  Cardiomediastinal silhouette appears unremarkable  The lungs are clear  No pneumothorax or pleural effusion  Osseous structures appear within normal limits for patient age  Impression: No acute cardiopulmonary disease  Workstation performed: [id number]     Ct Stroke Alert Brain    Result Date: 3/6/2018  Narrative: CT BRAIN - STROKE ALERT PROTOCOL INDICATION:  Woke up with right-sided weakness  Pronator drift  Cerebellar symptoms  COMPARISON:  None  TECHNIQUE:  CT examination of the brain was performed  In addition to axial images, coronal reformatted images were created and submitted for interpretation  Radiation dose length product (DLP) for this visit:   This examination, like all CT scans performed in the [facility], was performed utilizing techniques to minimize radiation dose exposure, including the use of iterative reconstruction  and automated exposure control  IMAGE QUALITY:  Diagnostic  FINDINGS:  PARENCHYMA:  Small to moderate-sized area of encephalomalacia and gliosis right frontal lobe indicative of chronic infarction  Elsewhere there are mild periventricular hypodensities   No acute intracranial hemorrhage  Atherosclerotic calcifications noted  VENTRICLES AND EXTRA-AXIAL SPACES:  Age-appropriate volume loss is noted  No hydrocephalus  VISUALIZED ORBITS AND PARANASAL SINUSES:  Orbits appear normal   Mild scattered sinus mucosal thickening is noted  No fluid levels are seen  CALVARIUM AND EXTRACRANIAL SOFT TISSUES:   Normal      Impression: 1  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, chronic microangiopathy  2   No acute intracranial hemorrhage  Findings were directly discussed with Kandis Brock on 3/6/2018 7:31 AM  Workstation performed: [id number]     Cta Stroke Alert (head/neck)    Result Date: 3/6/2018  Narrative: CTA NECK AND BRAIN WITH AND WITHOUT CONTRAST INDICATION: Left-sided weakness  Stroke alert  Prominent or drift  Pointing  COMPARISON:   None  TECHNIQUE:  Routine CT imaging of the Brain without contrast   Post contrast imaging was performed after administration of iodinated contrast through the neck and brain  Post contrast axial 0 625 mm images timed to opacify the arterial system  3D rendering was performed on an independent workstation  MIP reconstructions performed  Coronal reconstructions were performed of the noncontrast portion of the brain  Radiation dose length product (DLP) for this visit:  526 3 mGy-cm   This examination, like all CT scans performed in the [facility], was performed utilizing techniques to minimize radiation dose exposure, including the use of iterative reconstruction and automated exposure control  IV Contrast:  100 mL of iohexol (OMNIPAQUE)  IMAGE QUALITY:   Diagnostic FINDINGS: NONCONTRAST BRAIN: Reported separately CERVICAL VASCULATURE AORTIC ARCH AND GREAT VESSELS:  Mild athersclerotic disease of the arch, proximal great vessels and visualized subclavian vessels  No significant stenosis  Sternotomy wires and mediastinal clips are noted, compatible with prior CABG   RIGHT VERTEBRAL ARTERY CERVICAL SEGMENT:  Mild stenosis at the origin  The vessel is normal in caliber throughout the neck  LEFT VERTEBRAL ARTERY CERVICAL SEGMENT:  Mild stenosis at the origin  The vessel is normal in caliber throughout the neck  Left vertebral artery is congenitally dominant  RIGHT EXTRACRANIAL CAROTID SEGMENT:  Normal caliber common carotid artery  Normal bifurcation and cervical internal carotid artery  No stenosis or dissection  LEFT EXTRACRANIAL CAROTID SEGMENT:  Mild atherosclerotic disease of the distal common carotid artery and proximal cervical internal carotid artery without significant stenosis compared to the more distal ICA  NASCET criteria was used to determine the degree of internal carotid artery diameter stenosis  INTRACRANIAL VASCULATURE INTERNAL CAROTID ARTERIES:  Atherosclerotic calcifications of the cavernous segment of the internal carotid artery are very mild  Normal ophthalmic artery origins  Normal ICA terminus  ANTERIOR CIRCULATION:  Symmetric A1 segments and anterior cerebral arteries with normal enhancement  Normal anterior communicating artery  MIDDLE CEREBRAL ARTERY CIRCULATION:  M1 segment and middle cerebral artery branches demonstrate normal enhancement bilaterally  DISTAL VERTEBRAL ARTERIES:  Normal distal vertebral arteries  Posterior inferior cerebellar artery origins are normal  Normal vertebral basilar junction  BASILAR ARTERY:  Basilar artery is normal in caliber  Normal superior cerebellar arteries  POSTERIOR CEREBRAL ARTERIES: Both posterior cerebral arteries arises from the basilar tip  Both arteries demonstrate normal enhancement  Normal posterior communicating arteries  DURAL VENOUS SINUSES:  Not well visualized on this early arterial phase scan  NON VASCULAR ANATOMY BONY STRUCTURES:  No acute osseous abnormality  SOFT TISSUES OF THE NECK:  Unremarkable  THORACIC INLET:  Unremarkable  Impression: 1    No hemodynamically significant stenosis in the major arteries of the neck  2   No intracranial aneurysm or major intracranial arterial stenosis  I personally discussed this study with Attila Li on 3/6/2018 at 7:31 AM  Workstation performed: [id number]        Review of Systems   Cardiovascular: Negative for chest pain, claudication, dyspnea on exertion, leg swelling and near-syncope  All other systems reviewed and are negative  Vitals:    07/09/20 1119   BP: 110/60   Pulse: 60   Temp: 98 2 °F (36 8 °C)     Vitals:    07/09/20 1119   Weight: 83 6 kg (184 lb 4 8 oz)     Height: 5' 4" (162 6 cm)   Body mass index is 31 64 kg/m²       Physical Exam:  General:  Alert and cooperative, appears stated age  HEENT:  PERRLA, EOMI, no scleral icterus, no conjunctival pallor  Neck:  No lymphadenopathy, no thyromegaly, no carotid bruits, no elevated JVP  Heart:  Regular rate and rhythm, normal S1/S2, no S3/S4, no murmur  Lungs:  Clear to auscultation bilaterally   Abdomen:  Soft, non-tender, positive bowel sounds, no rebound or guarding,   no organomegaly   Extremities:  No clubbing, cyanosis or edema   Vascular:  2+ pedal pulses  Skin:  No rashes or lesions on exposed skin  Neurologic:  Cranial nerves II-XII grossly intact without focal deficits

## 2020-07-20 DIAGNOSIS — E78.5 DYSLIPIDEMIA: ICD-10-CM

## 2020-07-20 RX ORDER — ATORVASTATIN CALCIUM 40 MG/1
TABLET, FILM COATED ORAL
Qty: 90 TABLET | Refills: 3 | Status: SHIPPED | OUTPATIENT
Start: 2020-07-20 | End: 2021-10-11

## 2020-07-20 NOTE — PLAN OF CARE
Addended by: JACQUELINE ROSADO on: 7/20/2020 06:24 PM     Modules accepted: Orders     Problem: Potential for Falls  Goal: Patient will remain free of falls  Description  INTERVENTIONS:  - Assess patient frequently for physical needs  -  Identify cognitive and physical deficits and behaviors that affect risk of falls  -  [place] fall precautions as indicated by assessment   - Educate patient/family on patient safety including physical limitations  - Instruct patient to call for assistance with activity based on assessment  - Modify environment to reduce risk of injury  - Consider OT/PT consult to assist with strengthening/mobility  Outcome: Progressing     Problem: Nutrition/Hydration-ADULT  Goal: Nutrient/Hydration intake appropriate for improving, restoring or maintaining nutritional needs  Description  Monitor and assess patient's nutrition/hydration status for malnutrition  Collaborate with interdisciplinary team and initiate plan and interventions as ordered  Monitor patient's weight and dietary intake as ordered or per policy  Utilize nutrition screening tool and intervene as necessary  Determine patient's food preferences and provide high-protein, high-caloric foods as appropriate       INTERVENTIONS:  - Monitor oral intake, urinary output, labs, and treatment plans  - Assess nutrition and hydration status and recommend course of action  - Evaluate amount of meals eaten  - Assist patient with eating if necessary   - Allow adequate time for meals  - Recommend/ encourage appropriate diets, oral nutritional supplements, and vitamin/mineral supplements  - Order, calculate, and assess calorie counts as needed  - Recommend, monitor, and adjust tube feedings and TPN/PPN based on assessed needs  - Assess need for intravenous fluids  - Provide specific nutrition/hydration education as appropriate  - Include patient/family/caregiver in decisions related to nutrition  Outcome: Progressing

## 2020-08-14 ENCOUNTER — REMOTE DEVICE CLINIC VISIT (OUTPATIENT)
Dept: CARDIOLOGY CLINIC | Facility: CLINIC | Age: 85
End: 2020-08-14
Payer: COMMERCIAL

## 2020-08-14 DIAGNOSIS — Z95.810 AICD (AUTOMATIC CARDIOVERTER/DEFIBRILLATOR) PRESENT: Primary | ICD-10-CM

## 2020-08-14 PROCEDURE — 93295 DEV INTERROG REMOTE 1/2/MLT: CPT | Performed by: INTERNAL MEDICINE

## 2020-08-14 PROCEDURE — 93296 REM INTERROG EVL PM/IDS: CPT | Performed by: INTERNAL MEDICINE

## 2020-08-14 NOTE — PROGRESS NOTES
Results for orders placed or performed in visit on 08/14/20   Cardiac EP device report    Narrative    MDT DUAL ICD/ACTIVE SYSTEM IS MRI CONDITIONAL  CARELINK TRANSMISSION: BATTERY STATUS "OK"  AP 96%  0%  ALL AVAILABLE LEAD PARAMETERS WITHIN NORMAL LIMITS  NO SIGNIFICANT HIGH RATE EPISODES  OPTI-VOL WITHIN NORMAL LIMITS  NORMAL DEVICE FUNCTION   NC       Current Outpatient Medications:     amiodarone 200 mg tablet, Take 1 tablet (200 mg total) by mouth daily, Disp: 60 tablet, Rfl: 5    apixaban (ELIQUIS) 5 mg, Take 1 tablet (5 mg total) by mouth 2 (two) times a day, Disp: 60 tablet, Rfl: 5    atorvastatin (LIPITOR) 40 mg tablet, TAKE 1 TABLET BY MOUTH DAILY, Disp: 90 tablet, Rfl: 3    azelastine (ASTELIN) 0 1 % nasal spray, 1 spray into each nostril 2 (two) times a day Use in each nostril as directed, Disp: 1 Bottle, Rfl: 5    clopidogrel (PLAVIX) 75 mg tablet, Take 1 tablet (75 mg total) by mouth daily Additional refills to be obtained from PCP or cardiologist , Disp: 90 tablet, Rfl: 3    Coenzyme Q10 200 MG capsule, Take 200 mg by mouth daily, Disp: , Rfl:     doxylamine (UNISON) 25 MG tablet, Take 0 5 tablets (12 5 mg total) by mouth daily at bedtime as needed for sleep, Disp: 30 tablet, Rfl: 0    furosemide (LASIX) 20 mg tablet, Take 1 tablet (20 mg total) by mouth daily, Disp: 90 tablet, Rfl: 3    magnesium gluconate (MAGONATE) 500 mg tablet, Take 500 mg by mouth daily, Disp: , Rfl:     melatonin 3 mg, Take 1 tablet (3 mg total) by mouth daily at bedtime, Disp: 30 each, Rfl: 0    metoprolol tartrate (LOPRESSOR) 25 mg tablet, Take 1 tablet (25 mg total) by mouth every 12 (twelve) hours, Disp: 180 tablet, Rfl: 3    nitroglycerin (NITROSTAT) 0 4 mg SL tablet, Place 1 tablet (0 4 mg total) under the tongue every 5 (five) minutes as needed for chest pain (Patient not taking: Reported on 3/16/2020), Disp: 25 tablet, Rfl: 1    potassium chloride (KLOR-CON M20) 20 mEq tablet, Take 1 tablet (20 mEq total) by mouth daily, Disp: 90 tablet, Rfl: 3    Probiotic Product (PROBIOTIC COLON SUPPORT) CAPS, Take 1 capsule by mouth daily, Disp: , Rfl:

## 2020-09-03 DIAGNOSIS — I48.3 TYPICAL ATRIAL FLUTTER (HCC): ICD-10-CM

## 2020-09-03 RX ORDER — APIXABAN 5 MG/1
TABLET, FILM COATED ORAL
Qty: 60 TABLET | Refills: 5 | Status: SHIPPED | OUTPATIENT
Start: 2020-09-03 | End: 2021-03-29

## 2020-10-04 DIAGNOSIS — I47.2 VENTRICULAR TACHYCARDIA (HCC): ICD-10-CM

## 2020-10-04 DIAGNOSIS — I25.10 CORONARY ARTERY DISEASE INVOLVING NATIVE CORONARY ARTERY OF NATIVE HEART, ANGINA PRESENCE UNSPECIFIED: Chronic | ICD-10-CM

## 2020-10-04 DIAGNOSIS — I25.5 ISCHEMIC CARDIOMYOPATHY: ICD-10-CM

## 2020-10-04 DIAGNOSIS — I10 BENIGN ESSENTIAL HYPERTENSION: ICD-10-CM

## 2020-10-06 ENCOUNTER — TELEPHONE (OUTPATIENT)
Dept: FAMILY MEDICINE CLINIC | Facility: CLINIC | Age: 85
End: 2020-10-06

## 2020-10-07 RX ORDER — FUROSEMIDE 20 MG/1
TABLET ORAL
Qty: 90 TABLET | Refills: 0 | Status: SHIPPED | OUTPATIENT
Start: 2020-10-07 | End: 2020-10-22 | Stop reason: SDUPTHER

## 2020-10-07 RX ORDER — CLOPIDOGREL BISULFATE 75 MG/1
TABLET ORAL
Qty: 90 TABLET | Refills: 0 | Status: SHIPPED | OUTPATIENT
Start: 2020-10-07 | End: 2020-10-22 | Stop reason: SDUPTHER

## 2020-10-22 ENCOUNTER — OFFICE VISIT (OUTPATIENT)
Dept: FAMILY MEDICINE CLINIC | Facility: CLINIC | Age: 85
End: 2020-10-22
Payer: COMMERCIAL

## 2020-10-22 VITALS
WEIGHT: 187 LBS | BODY MASS INDEX: 31.92 KG/M2 | HEIGHT: 64 IN | HEART RATE: 60 BPM | DIASTOLIC BLOOD PRESSURE: 70 MMHG | SYSTOLIC BLOOD PRESSURE: 120 MMHG | RESPIRATION RATE: 16 BRPM | TEMPERATURE: 98.9 F

## 2020-10-22 DIAGNOSIS — Z23 ENCOUNTER FOR IMMUNIZATION: ICD-10-CM

## 2020-10-22 DIAGNOSIS — Z79.899 LONG TERM CURRENT USE OF AMIODARONE: ICD-10-CM

## 2020-10-22 DIAGNOSIS — I10 BENIGN ESSENTIAL HYPERTENSION: Primary | ICD-10-CM

## 2020-10-22 DIAGNOSIS — R73.01 IMPAIRED FASTING GLUCOSE: ICD-10-CM

## 2020-10-22 DIAGNOSIS — Z95.810 PRESENCE OF DOUBLE CHAMBER AUTOMATIC CARDIOVERTER/DEFIBRILLATOR (AICD): ICD-10-CM

## 2020-10-22 DIAGNOSIS — N18.2 CKD (CHRONIC KIDNEY DISEASE) STAGE 2, GFR 60-89 ML/MIN: ICD-10-CM

## 2020-10-22 DIAGNOSIS — E78.2 MIXED HYPERLIPIDEMIA: ICD-10-CM

## 2020-10-22 DIAGNOSIS — Z95.1 HX OF CABG: ICD-10-CM

## 2020-10-22 DIAGNOSIS — Z79.01 CHRONIC ANTICOAGULATION: ICD-10-CM

## 2020-10-22 DIAGNOSIS — Z95.5 STATUS POST INSERTION OF DRUG ELUTING CORONARY ARTERY STENT: ICD-10-CM

## 2020-10-22 DIAGNOSIS — Z00.00 MEDICARE ANNUAL WELLNESS VISIT, SUBSEQUENT: ICD-10-CM

## 2020-10-22 DIAGNOSIS — I25.5 ISCHEMIC CARDIOMYOPATHY: ICD-10-CM

## 2020-10-22 DIAGNOSIS — I48.3 TYPICAL ATRIAL FLUTTER (HCC): ICD-10-CM

## 2020-10-22 DIAGNOSIS — I63.412 CEREBROVASCULAR ACCIDENT (CVA) DUE TO EMBOLISM OF LEFT MIDDLE CEREBRAL ARTERY (HCC): ICD-10-CM

## 2020-10-22 DIAGNOSIS — M54.16 SUBACUTE LEFT LUMBAR RADICULOPATHY: ICD-10-CM

## 2020-10-22 DIAGNOSIS — I25.10 CORONARY ARTERY DISEASE INVOLVING NATIVE CORONARY ARTERY OF NATIVE HEART WITHOUT ANGINA PECTORIS: Chronic | ICD-10-CM

## 2020-10-22 DIAGNOSIS — E66.9 OBESITY (BMI 30.0-34.9): ICD-10-CM

## 2020-10-22 DIAGNOSIS — I50.22 CHRONIC SYSTOLIC CHF (CONGESTIVE HEART FAILURE) (HCC): ICD-10-CM

## 2020-10-22 DIAGNOSIS — I47.2 VENTRICULAR TACHYCARDIA (HCC): ICD-10-CM

## 2020-10-22 PROCEDURE — G0439 PPPS, SUBSEQ VISIT: HCPCS | Performed by: FAMILY MEDICINE

## 2020-10-22 PROCEDURE — 1160F RVW MEDS BY RX/DR IN RCRD: CPT | Performed by: FAMILY MEDICINE

## 2020-10-22 PROCEDURE — 1125F AMNT PAIN NOTED PAIN PRSNT: CPT | Performed by: FAMILY MEDICINE

## 2020-10-22 PROCEDURE — 99214 OFFICE O/P EST MOD 30 MIN: CPT | Performed by: FAMILY MEDICINE

## 2020-10-22 PROCEDURE — 1170F FXNL STATUS ASSESSED: CPT | Performed by: FAMILY MEDICINE

## 2020-10-22 PROCEDURE — 1036F TOBACCO NON-USER: CPT | Performed by: FAMILY MEDICINE

## 2020-10-22 PROCEDURE — 3725F SCREEN DEPRESSION PERFORMED: CPT | Performed by: FAMILY MEDICINE

## 2020-10-22 PROCEDURE — G0008 ADMIN INFLUENZA VIRUS VAC: HCPCS

## 2020-10-22 PROCEDURE — 90662 IIV NO PRSV INCREASED AG IM: CPT

## 2020-10-22 RX ORDER — FUROSEMIDE 20 MG/1
20 TABLET ORAL DAILY
Qty: 90 TABLET | Refills: 3 | Status: SHIPPED | OUTPATIENT
Start: 2020-10-22 | End: 2021-10-11 | Stop reason: SDUPTHER

## 2020-10-22 RX ORDER — CLOPIDOGREL BISULFATE 75 MG/1
75 TABLET ORAL DAILY
Qty: 90 TABLET | Refills: 3 | Status: SHIPPED | OUTPATIENT
Start: 2020-10-22 | End: 2021-10-11 | Stop reason: SDUPTHER

## 2020-10-22 RX ORDER — POTASSIUM CHLORIDE 20 MEQ/1
20 TABLET, EXTENDED RELEASE ORAL DAILY
Qty: 90 TABLET | Refills: 3 | Status: SHIPPED | OUTPATIENT
Start: 2020-10-22 | End: 2021-01-04 | Stop reason: SDUPTHER

## 2020-10-22 RX ORDER — AMIODARONE HYDROCHLORIDE 200 MG/1
200 TABLET ORAL DAILY
Qty: 90 TABLET | Refills: 3 | Status: SHIPPED | OUTPATIENT
Start: 2020-10-22 | End: 2021-10-12

## 2020-11-04 ENCOUNTER — HOSPITAL ENCOUNTER (OUTPATIENT)
Dept: NON INVASIVE DIAGNOSTICS | Facility: CLINIC | Age: 85
Discharge: HOME | End: 2020-11-04
Payer: COMMERCIAL

## 2020-11-04 DIAGNOSIS — I25.5 ISCHEMIC CARDIOMYOPATHY: ICD-10-CM

## 2020-11-04 PROCEDURE — C8929 TTE W OR WO FOL WCON,DOPPLER: HCPCS

## 2020-11-04 PROCEDURE — 93306 TTE W/DOPPLER COMPLETE: CPT | Performed by: INTERNAL MEDICINE

## 2020-11-04 RX ADMIN — PERFLUTREN 0.6 ML/MIN: 6.52 INJECTION, SUSPENSION INTRAVENOUS at 14:45

## 2020-11-13 ENCOUNTER — REMOTE DEVICE CLINIC VISIT (OUTPATIENT)
Dept: CARDIOLOGY CLINIC | Facility: CLINIC | Age: 85
End: 2020-11-13
Payer: COMMERCIAL

## 2020-11-13 DIAGNOSIS — Z95.810 PRESENCE OF AUTOMATIC CARDIOVERTER/DEFIBRILLATOR (AICD): Primary | ICD-10-CM

## 2020-11-13 PROCEDURE — 93295 DEV INTERROG REMOTE 1/2/MLT: CPT | Performed by: INTERNAL MEDICINE

## 2020-11-13 PROCEDURE — 93296 REM INTERROG EVL PM/IDS: CPT | Performed by: INTERNAL MEDICINE

## 2020-11-30 ENCOUNTER — OFFICE VISIT (OUTPATIENT)
Dept: FAMILY MEDICINE CLINIC | Facility: CLINIC | Age: 85
End: 2020-11-30
Payer: COMMERCIAL

## 2020-11-30 VITALS
DIASTOLIC BLOOD PRESSURE: 68 MMHG | RESPIRATION RATE: 14 BRPM | TEMPERATURE: 97.9 F | HEART RATE: 60 BPM | SYSTOLIC BLOOD PRESSURE: 126 MMHG | WEIGHT: 190 LBS | OXYGEN SATURATION: 96 % | BODY MASS INDEX: 32.44 KG/M2 | HEIGHT: 64 IN

## 2020-11-30 DIAGNOSIS — I48.3 TYPICAL ATRIAL FLUTTER (HCC): ICD-10-CM

## 2020-11-30 DIAGNOSIS — R73.01 IMPAIRED FASTING GLUCOSE: Primary | ICD-10-CM

## 2020-11-30 DIAGNOSIS — E78.2 MIXED HYPERLIPIDEMIA: ICD-10-CM

## 2020-11-30 DIAGNOSIS — I63.412 CEREBROVASCULAR ACCIDENT (CVA) DUE TO EMBOLISM OF LEFT MIDDLE CEREBRAL ARTERY (HCC): ICD-10-CM

## 2020-11-30 DIAGNOSIS — D72.829 LEUKOCYTOSIS, UNSPECIFIED TYPE: ICD-10-CM

## 2020-11-30 DIAGNOSIS — I47.2 VENTRICULAR TACHYCARDIA (HCC): ICD-10-CM

## 2020-11-30 DIAGNOSIS — G47.33 OBSTRUCTIVE SLEEP APNEA: ICD-10-CM

## 2020-11-30 DIAGNOSIS — I25.5 ISCHEMIC CARDIOMYOPATHY: ICD-10-CM

## 2020-11-30 DIAGNOSIS — I50.22 CHRONIC SYSTOLIC CHF (CONGESTIVE HEART FAILURE) (HCC): ICD-10-CM

## 2020-11-30 PROCEDURE — T1015 CLINIC SERVICE: HCPCS | Performed by: NURSE PRACTITIONER

## 2021-01-04 DIAGNOSIS — I25.5 ISCHEMIC CARDIOMYOPATHY: ICD-10-CM

## 2021-01-04 DIAGNOSIS — I10 BENIGN ESSENTIAL HYPERTENSION: ICD-10-CM

## 2021-01-04 RX ORDER — POTASSIUM CHLORIDE 20 MEQ/1
20 TABLET, EXTENDED RELEASE ORAL DAILY
Qty: 90 TABLET | Refills: 3 | Status: SHIPPED | OUTPATIENT
Start: 2021-01-04 | End: 2022-03-25

## 2021-01-26 ENCOUNTER — OFFICE VISIT (OUTPATIENT)
Dept: CARDIOLOGY CLINIC | Facility: CLINIC | Age: 86
End: 2021-01-26
Payer: COMMERCIAL

## 2021-01-26 ENCOUNTER — IN-CLINIC DEVICE VISIT (OUTPATIENT)
Dept: CARDIOLOGY CLINIC | Facility: CLINIC | Age: 86
End: 2021-01-26
Payer: COMMERCIAL

## 2021-01-26 VITALS
WEIGHT: 191 LBS | DIASTOLIC BLOOD PRESSURE: 62 MMHG | HEIGHT: 65 IN | HEART RATE: 60 BPM | SYSTOLIC BLOOD PRESSURE: 118 MMHG | BODY MASS INDEX: 31.82 KG/M2

## 2021-01-26 DIAGNOSIS — Z95.810 PRESENCE OF AUTOMATIC CARDIOVERTER/DEFIBRILLATOR (AICD): Primary | ICD-10-CM

## 2021-01-26 DIAGNOSIS — Z79.899 LONG TERM CURRENT USE OF AMIODARONE: Primary | ICD-10-CM

## 2021-01-26 DIAGNOSIS — I48.3 TYPICAL ATRIAL FLUTTER (HCC): ICD-10-CM

## 2021-01-26 PROCEDURE — 93000 ELECTROCARDIOGRAM COMPLETE: CPT | Performed by: PHYSICIAN ASSISTANT

## 2021-01-26 PROCEDURE — 99214 OFFICE O/P EST MOD 30 MIN: CPT | Performed by: PHYSICIAN ASSISTANT

## 2021-01-26 PROCEDURE — 93283 PRGRMG EVAL IMPLANTABLE DFB: CPT | Performed by: INTERNAL MEDICINE

## 2021-01-26 PROCEDURE — 1160F RVW MEDS BY RX/DR IN RCRD: CPT | Performed by: PHYSICIAN ASSISTANT

## 2021-01-26 PROCEDURE — 1036F TOBACCO NON-USER: CPT | Performed by: PHYSICIAN ASSISTANT

## 2021-01-26 NOTE — PROGRESS NOTES
Results for orders placed or performed in visit on 01/26/21   Cardiac EP device report    Narrative    MDT-DUAL ICD (AAIR-DDDR MODE)/ACTIVE SYSTEM IS MRI CONDITIONAL  DEVICE INTERROGATED IN THE [place] OFFICE/YEARLY-OFFICE VISIT TO FOLLOW WITH [facility]  BATTERY ADEQUATE (5-9 4 YRS)  AP 96%;  0%  ALL LEAD PARAMETERS WITHIN NORMAL LIMITS  AF 0%  NO EPISODES  PT  TAKES AMIODARONE, PLAVIX & METOPROLOL TART  OPTI-VOL WITHIN NORMAL LIMITS  WAVELET TEMPLATE UPDATED  NO PROGRAMMING CHANGES MADE TO DEVICE PARAMETERS  NORMAL DEVICE FUNCTION   PL

## 2021-01-26 NOTE — PROGRESS NOTES
Electrophysiology Office Note    Dianelys Barron  9/1/1932  [id number]  HEART & VASCULAR [facility]  [address]        Assessment/Plan     1  Long term current use of amiodarone  Hepatic function panel    Spirometry with diffusing capacity    Complete PFT with post bronchodilator   2  Typical atrial flutter (HCC)  POCT ECG     1  Ventricular tachycardia    * patient presents to \[facility]\"" EP office for routine monitoring of his VT and amiodarone, he is normally a patient of dr Veronica Pate    * device interrogated today showing no ventricular episodes, MDT DC ICD implanted in      * initial event occurred in Oct 2019, has been on amiodarone since without complication    * EF 18% on echo from     * stable     2  Long term use of amiodarone    * given his advanced age and VT he has been placed on amiodarone which plan on using in the long term    * last DLCO was in 2019 but was normal    * TSH from  without major abnormality   * LFT's from 4-2020 are WNL    * does obtain yearly eye exams    * will send for LFT and PFT for continued amiodarone monitoring, he is seeing dr Mary Alice Akbar in the near future she can discuss results with him if necessary     3  CAD s/p CABG (1999) s/p MINNIE to SVG to OM1 ()   * occluded SVG to RCA from [facility] in 2019, other grafts patent     4  HLD   5  Obesity   6  Hx paroxysmal atrial fibrillation/atrial flutter AC w/ eliquis   7  Hx CVA  8   HTN    From an EP standpoint patient is stable, he can f/u with dr Mary Alice Akbar for further VT and amiodarone management             Rhythm History:   Atrial fibrillation:     Atrial flutter:     SVT:     VT/VF:     Device history:   Pacemaker:    Defibrillator:    BIV PPM:    BIV ICD:    ILR:      Cardiac Testing:     ECHO:   Results for orders placed during the hospital encounter of 11/04/20   Echo complete with contrast if indicated    Narrative Jonathan [address]  [address] [place] [address]  [phone]    Transthoracic Echocardiogram  2D, M-mode, Doppler, and Color Doppler    Study date:  2020    Patient: Eugene Pavon  MR number: [id number]  Account number: [de-identified]  : 01-Sep-1932  Age: 80 years  Gender: Male  Status: Outpatient  Location: [address] [place] [address] [address]  Height: 64 in  Weight: 187 lb  BP: 120/ 70 mmHg    Indications: Ischemic cardiomyopathy    Diagnoses: I25 5 - Ischemic cardiomyopathy    Sonographer:  PARVEZ Ruano  Primary Physician:  Anant Zaman MD  Referring Physician:  Iraj Arredondo DO  Group:  [facility]  Cardiology Fellow: Jose Perry MD  RN:  Alexandra Cortés RN  Interpreting Physician:  Ash Mack MD    SUMMARY    PROCEDURE INFORMATION:  Intravenous contrast (  6 mL of Definity in NSS) was administered  LEFT VENTRICLE:  Size was at the upper limits of normal   Systolic function was mildly reduced  Ejection fraction was estimated to be 45 %  There was akinesis of the mid-apical anterior, mid anteroseptal, mid inferoseptal, apical inferior, apical septal, and apical wall(s)  Doppler parameters were consistent with abnormal left ventricular relaxation (grade 1 diastolic dysfunction)  No evidence of apical thrombus  RIGHT VENTRICLE:  The size was normal   Systolic function was normal     LEFT ATRIUM:  The atrium was mildly dilated  MITRAL VALVE:  There was trace regurgitation  COMPARISONS:  Comparison was made with the previous study of 20-Oct-2019  Ejection fraction has improved from 35 % to 45 %  Pulmonary artery pressure has decreased  HISTORY: PRIOR HISTORY: Hyperlipidemia, coronary artery disease, ischemic cardiomyopathy, heart failure, CVA    PROCEDURE: The study was performed in the [address]  This was a routine study  The transthoracic approach was used   The study included complete 2D imaging, M-mode, complete spectral Doppler, and color Doppler  Intravenous contrast (  6 mL of Definity in NSS) was administered  Intravenous contrast was administered to opacify the left ventricle  This was a technically difficult study  LEFT VENTRICLE: Size was at the upper limits of normal  Systolic function was mildly reduced  Ejection fraction was estimated to be 45 %  There was akinesis of the mid-apical anterior, mid anteroseptal, mid inferoseptal, apical inferior,  apical septal, and apical wall(s)  Wall thickness was below normal limits  There was no evidence of concentric hypertrophy  No evidence of apical thrombus  DOPPLER: The pulmonary vein flow pattern was normal  Doppler parameters were  consistent with abnormal left ventricular relaxation (grade 1 diastolic dysfunction)  RIGHT VENTRICLE: The size was normal  Systolic function was normal  Wall thickness was normal  A pacing wire was present in the ventricular cavity  The tip was at the RV apex  LEFT ATRIUM: The atrium was mildly dilated  RIGHT ATRIUM: Size was normal  A pacing wire was present in the atrial cavity  MITRAL VALVE: Valve structure was normal  There was normal leaflet separation  DOPPLER: The transmitral velocity was within the normal range  There was no evidence for stenosis  There was trace regurgitation  AORTIC VALVE: The valve was trileaflet  Leaflets exhibited mildly increased thickness, mild calcification, normal cuspal separation, and sclerosis  DOPPLER: Transaortic velocity was within the normal range  There was no evidence for  stenosis  There was no regurgitation  TRICUSPID VALVE: The valve structure was normal  There was normal leaflet separation  DOPPLER: The transtricuspid velocity was within the normal range  There was no evidence for stenosis  There was trace regurgitation  Pulmonary artery  systolic pressure was within the normal range  Estimated peak PA pressure was 25 mmHg      PULMONIC VALVE: Leaflets exhibited normal thickness, no calcification, and normal cuspal separation  DOPPLER: The transpulmonic velocity was within the normal range  There was no evidence for stenosis  There was no significant  regurgitation  PERICARDIUM: There was no pericardial effusion  The pericardium was normal in appearance  AORTA: The root exhibited normal size  The ascending aorta was normal in size  SYSTEMIC VEINS: HEPATIC VEINS: The hepatic veins were not well visualized  SYSTEM MEASUREMENT TABLES    2D  %FS: 19 53 %  Ao Diam: 3 5 cm  EDV(Teich): 155 04 ml  EF Biplane: 45 26 %  EF(Teich): 39 63 %  ESV(Teich): 93 6 ml  IVSd: 1 01 cm  LA Area: 25 1 cm2  LA Diam: 4 83 cm  LVEDV MOD A2C: 158 46 ml  LVEDV MOD A4C: 186 78 ml  LVEDV MOD BP: 172 06 ml  LVEF MOD A2C: 42 84 %  LVEF MOD A4C: 47 22 %  LVESV MOD A2C: 90 57 ml  LVESV MOD A4C: 98 59 ml  LVESV MOD BP: 94 18 ml  LVIDd: 5 62 cm  LVIDs: 4 52 cm  LVLd A2C: 8 53 cm  LVLd A4C: 8 55 cm  LVLs A2C: 7 69 cm  LVLs A4C: 7 75 cm  LVPWd: 0 96 cm  RA Area: 11 32 cm2  RVIDd: 2 93 cm  SV MOD A2C: 67 89 ml  SV MOD A4C: 88 19 ml  SV(Teich): 61 44 ml    CW  TR Vmax: 2 41 m/s  TR maxP 29 mmHg    MM  TAPSE: 1 58 cm    PW  E' Sept: 0 05 m/s  E/E' Sept: 11 79  MV A Gunnar: 0 72 m/s  MV Dec Blaine: 3 5 m/s2  MV DecT: 179 49 ms  MV E Gunnar: 0 63 m/s  MV E/A Ratio: 0 87  MV PHT: 52 05 ms  MVA By PHT: 4 23 cm2    [facility] Accredited Ech[facility]    Prepared and electronically signed by    Isha Osorio MD  Signed [id number] 15:27:04         CATH/STRESS TEST:   CORONARY CIRCULATION:  Left main: Normal   LAD: There was a 100 % proximal stenosis  Circumflex: There was a 100 % proxmal stenosis  RCA: The vessel was normal sized and dominant, giving rise to the PDA and a posterolateral branch  there was marked aneurysmal enlargement of the ostial/proxmal RCA  The was moderate diffuse disease, but no obstructive lesions  Graft to the LAD: The graft was a LIMA   The body of the graft and the anastomoses are normal  The grafted mid and distal LAD are free of critical disease and fill well  Graft to the 1st diagonal: The graft was a saphenous vein graft from the ascending aorta  The graft filled non-selectively with an injection of the OM graft  The graft was patent and sent flow to D1  Graft to the 1st obtuse marginal: The graft was a saphenous vein graft from the ascending aorta  There was a 99% stenosis in the mid body of the graft with MEGAN 1 flow into the OM branch and circumflex  Graft to the RCA: There was a 100 % stenosis at the graft ostium      1ST LESION INTERVENTIONS:  Following pre-dilation, a Synergy MR 3 5 x 16mm drug-eluting stent was placed across the 99% lesion in the SVG to OM1 and deployed at a maximum inflation pressure of 16 estelle  After post-dilation with a 4 0mm balloon, there was 0% residual  stenosis  MEGAN flow increased from grade 1 to grade 3  EKG:   A paced rhythm         History of Present Illness     HPI/INTERVAL HISTORY: Darion Minaya is a 80 y o  male with history as above who presents to [facility] EP office today under direction of Dr Twyla Aguero for routine follow up      1-26-21:  Since last being seen in the EP office patient has no new concerns or complaints  Not having any LH, dizziness, SOB, palpitations or chest discomfort  Review of Systems  ROS as noted above, otherwise 12 point review of systems was performed and is negative  Historical Information   Social History     Socioeconomic History    Marital status:       Spouse name: moncho    Number of children: Not on file    Years of education: Not on file    Highest education level: Not on file   Occupational History    Occupation: retired     Comment: clergy   Social Needs    Financial resource strain: Not on file    Food insecurity     Worry: Not on file     Inability: Not on file   [employer] needs     Medical: Not on file     Non-medical: Not on file   Tobacco Use    Smoking status: Former Smoker     Types: Cigarettes     Quit date:      Years since quittin 1    Smokeless tobacco: Never Used   Substance and Sexual Activity    Alcohol use: Not Currently     Comment: very rarely    Drug use: No    Sexual activity: Not on file   Lifestyle    Physical activity     Days per week: Not on file     Minutes per session: Not on file    Stress: Not on file   Relationships    Social connections     Talks on phone: Not on file     Gets together: Not on file     Attends Shinto service: Not on file     Active member of club or organization: Not on file     Attends meetings of clubs or organizations: Not on file     Relationship status: Not on file    Intimate partner violence     Fear of current or ex partner: Not on file     Emotionally abused: Not on file     Physically abused: Not on file     Forced sexual activity: Not on file   Other Topics Concern    Not on file   Social History Narrative    Death in the family- spouse, moncho  after a prolonged francis with lymphoma     Exercises regularly     Past Medical History:   Diagnosis Date    Acute myocardial infarction ([facility] [address] )     Allergic rhinitis     Anxiety     Bimalleolar fracture of left ankle     CAD (coronary artery disease)     Erectile dysfunction of non-organic origin     Hematuria     workup was negative and felt to be benign    Herpes zoster with complication     Hyperlipidemia     Hypertension     Impaired fasting glucose     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    Prostatic hypertrophy     benign    Stroke [facility])      Past Surgical History:   Procedure Laterality Date    ANKLE FRACTURE SURGERY Left     CARDIAC PACEMAKER PLACEMENT Left     CORONARY ARTERY BYPASS GRAFT       Social History     Substance and Sexual Activity   Alcohol Use Not Currently    Comment: very rarely     Social History     Substance and Sexual Activity   Drug Use No     Social History     Tobacco Use   Smoking Status Former Smoker    Types: Cigarettes    Quit date:     Years since quittin 1   Smokeless Tobacco Never Used     Family History   Problem Relation Age of Onset    Cancer Mother     Hypertension Mother         essential    Pancreatic cancer Mother        Meds/Allergies       Current Outpatient Medications:     amiodarone 200 mg tablet, Take 1 tablet (200 mg total) by mouth daily, Disp: 90 tablet, Rfl: 3    atorvastatin (LIPITOR) 40 mg tablet, TAKE 1 TABLET BY MOUTH DAILY, Disp: 90 tablet, Rfl: 3    azelastine (ASTELIN) 0 1 % nasal spray, 1 spray into each nostril 2 (two) times a day Use in each nostril as directed, Disp: 1 Bottle, Rfl: 5    clopidogrel (PLAVIX) 75 mg tablet, Take 1 tablet (75 mg total) by mouth daily, Disp: 90 tablet, Rfl: 3    Coenzyme Q10 200 MG capsule, Take 200 mg by mouth daily, Disp: , Rfl:     doxylamine (UNISON) 25 MG tablet, Take 0 5 tablets (12 5 mg total) by mouth daily at bedtime as needed for sleep, Disp: 30 tablet, Rfl: 0    Eliquis 5 MG, TAKE 1 TABLET BY MOUTH TWICE A DAY, Disp: 60 tablet, Rfl: 5    furosemide (LASIX) 20 mg tablet, Take 1 tablet (20 mg total) by mouth daily, Disp: 90 tablet, Rfl: 3    magnesium gluconate (MAGONATE) 500 mg tablet, Take 500 mg by mouth daily, Disp: , Rfl:     melatonin 3 mg, Take 1 tablet (3 mg total) by mouth daily at bedtime, Disp: 30 each, Rfl: 0    metoprolol tartrate (LOPRESSOR) 25 mg tablet, Take 1 tablet (25 mg total) by mouth every 12 (twelve) hours, Disp: 180 tablet, Rfl: 3    nitroglycerin (NITROSTAT) 0 4 mg SL tablet, Place 1 tablet (0 4 mg total) under the tongue every 5 (five) minutes as needed for chest pain, Disp: 25 tablet, Rfl: 1    potassium chloride (Klor-Con M20) 20 mEq tablet, Take 1 tablet (20 mEq total) by mouth daily, Disp: 90 tablet, Rfl: 3    Probiotic Product (PROBIOTIC COLON SUPPORT) CAPS, Take 1 capsule by mouth daily, Disp: , Rfl:     Allergies   Allergen Reactions    Penicillins Edema and Rash Reaction Date: [id number]; Category: Allergy; Annotation - [id number]: Severe reaction with hospitaization at [facility]       Objective   Vitals: Blood pressure 118/62, pulse 60, height 5' 5" (1 651 m), weight 86 6 kg (191 lb)  Physical Exam  Constitutional:       Appearance: He is well-developed  HENT:      Head: Normocephalic and atraumatic  Eyes:      Pupils: Pupils are equal, round, and reactive to light  Neck:      Musculoskeletal: Normal range of motion and neck supple  Cardiovascular:      Rate and Rhythm: Normal rate and regular rhythm  Pulmonary:      Effort: Pulmonary effort is normal       Breath sounds: Normal breath sounds  Abdominal:      General: Bowel sounds are normal       Palpations: Abdomen is soft  Musculoskeletal: Normal range of motion  Skin:     General: Skin is warm and dry  Neurological:      Mental Status: He is alert and oriented to person, place, and time  Labs:not applicable    Imaging: I have personally reviewed pertinent reports

## 2021-02-05 ENCOUNTER — HOSPITAL ENCOUNTER (OUTPATIENT)
Dept: PULMONOLOGY | Facility: HOSPITAL | Age: 86
Discharge: HOME | End: 2021-02-05
Payer: COMMERCIAL

## 2021-02-05 DIAGNOSIS — Z79.899 LONG TERM CURRENT USE OF AMIODARONE: ICD-10-CM

## 2021-02-05 PROCEDURE — 94010 BREATHING CAPACITY TEST: CPT | Performed by: INTERNAL MEDICINE

## 2021-02-05 PROCEDURE — 94726 PLETHYSMOGRAPHY LUNG VOLUMES: CPT

## 2021-02-05 PROCEDURE — 94760 N-INVAS EAR/PLS OXIMETRY 1: CPT

## 2021-02-05 PROCEDURE — 94729 DIFFUSING CAPACITY: CPT

## 2021-02-05 PROCEDURE — 94729 DIFFUSING CAPACITY: CPT | Performed by: INTERNAL MEDICINE

## 2021-02-05 PROCEDURE — 94010 BREATHING CAPACITY TEST: CPT

## 2021-02-05 PROCEDURE — 94726 PLETHYSMOGRAPHY LUNG VOLUMES: CPT | Performed by: INTERNAL MEDICINE

## 2021-03-27 DIAGNOSIS — R09.82 POST-NASAL DRIP: ICD-10-CM

## 2021-03-27 DIAGNOSIS — R09.81 NASAL CONGESTION: ICD-10-CM

## 2021-03-27 DIAGNOSIS — I10 BENIGN ESSENTIAL HYPERTENSION: ICD-10-CM

## 2021-03-27 DIAGNOSIS — I48.3 TYPICAL ATRIAL FLUTTER (HCC): ICD-10-CM

## 2021-03-29 RX ORDER — APIXABAN 5 MG/1
TABLET, FILM COATED ORAL
Qty: 60 TABLET | Refills: 5 | Status: SHIPPED | OUTPATIENT
Start: 2021-03-29 | End: 2021-08-09 | Stop reason: SDUPTHER

## 2021-03-29 RX ORDER — AZELASTINE 1 MG/ML
1 SPRAY, METERED NASAL 2 TIMES DAILY
Qty: 1 BOTTLE | Refills: 5 | Status: SHIPPED | OUTPATIENT
Start: 2021-03-29 | End: 2021-11-20

## 2021-05-11 ENCOUNTER — REMOTE DEVICE CLINIC VISIT (OUTPATIENT)
Dept: CARDIOLOGY CLINIC | Facility: CLINIC | Age: 86
End: 2021-05-11
Payer: COMMERCIAL

## 2021-05-11 DIAGNOSIS — Z95.810 AICD (AUTOMATIC CARDIOVERTER/DEFIBRILLATOR) PRESENT: Primary | ICD-10-CM

## 2021-05-11 PROCEDURE — 93295 DEV INTERROG REMOTE 1/2/MLT: CPT | Performed by: INTERNAL MEDICINE

## 2021-05-11 PROCEDURE — 93296 REM INTERROG EVL PM/IDS: CPT | Performed by: INTERNAL MEDICINE

## 2021-05-11 NOTE — PROGRESS NOTES
Results for orders placed or performed in visit on 05/11/21   Cardiac EP device report    Narrative    MDT-DUAL ICD (AAIR-DDDR MODE)/ACTIVE SYSTEM IS MRI CONDITIONAL  CARELINK TRANSMISSION: BATTERY VOLTAGE ADEQUATE (9 1 YRS)  AP-96%, <0 1%  ALL AVAILABLE LEAD PARAMETERS WITHIN NORMAL LIMITS  NO SIGNIFICANT HIGH RATE EPISODES  OPTI-VOL WITHIN NORMAL LIMITS  NORMAL DEVICE FUNCTION   GV

## 2021-05-12 ENCOUNTER — TRANSCRIBE ORDERS (OUTPATIENT)
Dept: ADMINISTRATIVE | Facility: HOSPITAL | Age: 86
End: 2021-05-12

## 2021-05-12 DIAGNOSIS — M54.16 RADICULOPATHY, LUMBAR REGION: Primary | ICD-10-CM

## 2021-05-12 DIAGNOSIS — M48.061 SPINAL STENOSIS, LUMBAR REGION WITHOUT NEUROGENIC CLAUDICATION: ICD-10-CM

## 2021-05-12 DIAGNOSIS — G83.4 CAUDA EQUINA SYNDROME (HCC): ICD-10-CM

## 2021-06-08 ENCOUNTER — HOSPITAL ENCOUNTER (OUTPATIENT)
Dept: MRI IMAGING | Facility: HOSPITAL | Age: 86
Discharge: HOME | End: 2021-06-08

## 2021-06-09 NOTE — PROGRESS NOTES
Assessment & Plan:     G47 33  Obstructive sleep apnea    I25 10  CAD (coronary artery disease)    I10  Benign essential hypertension     I47 2  Ventricular tachycardia (HCC)     I25 5  Ischemic cardiomyopathy    Z14 39  Chronic systolic CHF (congestive heart failure) (HCC)    N18 2  CKD (chronic kidney disease) stage 2, GFR 60-89 ml/min     E78 5  Dyslipidemia    E66 9  Obesity (BMI 30 0-34  9)    Z79 01  Chronic anticoagulation         Subjective:     Patient ID: Gloria Grijalva is a 80 y o  male     No chief complaint on file  HPI    Review of Systems    Objective: There were no vitals taken for this visit      Problem List Items Addressed This Visit     None          Physical Exam

## 2021-06-10 ENCOUNTER — RA CDI HCC (OUTPATIENT)
Dept: OTHER | Facility: HOSPITAL | Age: 86
End: 2021-06-10

## 2021-06-10 NOTE — PROGRESS NOTES
[facility]  coding opportunities             Chart reviewed, (number of) suggestions sent to provider: 3            Number of suggestions actually used: 2      Number of suggestions NOT actually used: 1     Patients insurance company: [employer] (Medicare Advantage and [employer] / [employer])     Visit status: Patient arrived for their scheduled appointment   dx not used: I130  Provider never responded to [facility]  coding request     [facility]  coding opportunities             Chart reviewed, (number of) suggestions sent to provider: 3   DX:  T23 48-[id number] systolic (congestive) heart failure  I47  2-Ventricular tachycardia-unsure on current status  I665-[id number] heart and chronic kidney disease with heart failure and stage 1 through stage 4 chronic kidney disease, or unspecified chronic kidney disease                 Patients insurance company: [employer] (Medicare Advantage and [employer] / [employer])

## 2021-06-15 PROBLEM — E78.5 DYSLIPIDEMIA: Status: RESOLVED | Noted: 2018-03-06 | Resolved: 2021-06-15

## 2021-06-17 ENCOUNTER — OFFICE VISIT (OUTPATIENT)
Dept: FAMILY MEDICINE CLINIC | Facility: CLINIC | Age: 86
End: 2021-06-17
Payer: COMMERCIAL

## 2021-06-17 VITALS
DIASTOLIC BLOOD PRESSURE: 78 MMHG | WEIGHT: 197 LBS | BODY MASS INDEX: 32.82 KG/M2 | SYSTOLIC BLOOD PRESSURE: 118 MMHG | HEIGHT: 65 IN | TEMPERATURE: 98.7 F | RESPIRATION RATE: 16 BRPM | HEART RATE: 60 BPM

## 2021-06-17 DIAGNOSIS — I63.412 CEREBROVASCULAR ACCIDENT (CVA) DUE TO EMBOLISM OF LEFT MIDDLE CEREBRAL ARTERY (HCC): ICD-10-CM

## 2021-06-17 DIAGNOSIS — I50.22 CHRONIC SYSTOLIC CHF (CONGESTIVE HEART FAILURE) (HCC): ICD-10-CM

## 2021-06-17 DIAGNOSIS — R73.01 IMPAIRED FASTING GLUCOSE: ICD-10-CM

## 2021-06-17 DIAGNOSIS — I48.3 TYPICAL ATRIAL FLUTTER (HCC): ICD-10-CM

## 2021-06-17 DIAGNOSIS — E78.2 MIXED HYPERLIPIDEMIA: ICD-10-CM

## 2021-06-17 DIAGNOSIS — Z95.810 PRESENCE OF DOUBLE CHAMBER AUTOMATIC CARDIOVERTER/DEFIBRILLATOR (AICD): ICD-10-CM

## 2021-06-17 DIAGNOSIS — N18.2 CKD (CHRONIC KIDNEY DISEASE) STAGE 2, GFR 60-89 ML/MIN: ICD-10-CM

## 2021-06-17 DIAGNOSIS — E66.9 OBESITY (BMI 30.0-34.9): ICD-10-CM

## 2021-06-17 DIAGNOSIS — K59.01 SLOW TRANSIT CONSTIPATION: ICD-10-CM

## 2021-06-17 DIAGNOSIS — Z95.1 HX OF CABG: ICD-10-CM

## 2021-06-17 DIAGNOSIS — I25.5 ISCHEMIC CARDIOMYOPATHY: ICD-10-CM

## 2021-06-17 DIAGNOSIS — I25.10 CORONARY ARTERY DISEASE INVOLVING NATIVE CORONARY ARTERY OF NATIVE HEART WITHOUT ANGINA PECTORIS: Chronic | ICD-10-CM

## 2021-06-17 DIAGNOSIS — I47.2 VENTRICULAR TACHYCARDIA (HCC): ICD-10-CM

## 2021-06-17 DIAGNOSIS — Z79.01 CHRONIC ANTICOAGULATION: ICD-10-CM

## 2021-06-17 DIAGNOSIS — Z95.5 STATUS POST INSERTION OF DRUG ELUTING CORONARY ARTERY STENT: ICD-10-CM

## 2021-06-17 DIAGNOSIS — G95.19 NEUROGENIC CLAUDICATION (HCC): ICD-10-CM

## 2021-06-17 DIAGNOSIS — I10 BENIGN ESSENTIAL HYPERTENSION: Primary | ICD-10-CM

## 2021-06-17 PROBLEM — R29.818 NEUROGENIC CLAUDICATION: Status: ACTIVE | Noted: 2021-06-17

## 2021-06-17 PROCEDURE — 1036F TOBACCO NON-USER: CPT | Performed by: FAMILY MEDICINE

## 2021-06-17 PROCEDURE — 99214 OFFICE O/P EST MOD 30 MIN: CPT | Performed by: FAMILY MEDICINE

## 2021-06-17 PROCEDURE — 1160F RVW MEDS BY RX/DR IN RCRD: CPT | Performed by: FAMILY MEDICINE

## 2021-06-17 PROCEDURE — 3725F SCREEN DEPRESSION PERFORMED: CPT | Performed by: FAMILY MEDICINE

## 2021-06-17 PROCEDURE — T1015 CLINIC SERVICE: HCPCS | Performed by: FAMILY MEDICINE

## 2021-06-17 RX ORDER — GABAPENTIN 300 MG/1
CAPSULE ORAL
COMMUNITY
Start: 2021-06-10 | End: 2021-11-26

## 2021-06-17 RX ORDER — GABAPENTIN 100 MG/1
CAPSULE ORAL
COMMUNITY
Start: 2021-04-27 | End: 2021-11-05 | Stop reason: ALTCHOICE

## 2021-06-17 NOTE — PROGRESS NOTES
Chief Complaint   Patient presents with    Follow-up     routine follow up and enhancement visit     Health Maintenance   Topic Date Due    SLP PLAN OF CARE  Never done    DTaP,Tdap,and Td Vaccines (1 - Tdap) 09/01/1953    Fall Risk  10/22/2021    Medicare Annual Wellness Visit (AWV)  10/22/2021    Depression Screening PHQ  06/17/2022    BMI: Followup Plan  06/17/2022    BMI: Adult  06/17/2022    Pneumococcal Vaccine: 65+ Years  Completed    Influenza Vaccine  Completed    COVID-19 Vaccine  Completed    HIB Vaccine  Aged Out    Hepatitis B Vaccine  Aged Out    IPV Vaccine  Aged Out    Hepatitis A Vaccine  Aged Out    Meningococcal ACWY Vaccine  Aged Out    HPV Vaccine  Aged Out     BMI Counseling: Body mass index is 32 78 kg/m²  The BMI is above normal  Nutrition recommendations include encouraging healthy choices of fruits and vegetables, moderation in carbohydrate intake and increasing intake of lean protein  Exercise recommendations include exercising 3-5 times per week  No pharmacotherapy was ordered  Patient referred to PCP due to patient being overweight  Focus on healthy choices and activity as tolerated       Assessment/Plan:     he had both doses COVID vaccine     He had both pneumococcal vaccines     Tdap is current in 2013     He had both doses Shingrix vaccine    Will other screenings are appropriate for age present time    We talked about remaining engaged in purposeful activities  Will await his MRI of his lower back to determine if there is significant spinal stenosis and need for intervention  Slow transit constipation   I reviewed the 3 elements of regular bowel movement which are fiber, fluids, and activity  His stool does tend to be hard so suggested that he begin MiraLax 1/2 to 1 scoop daily    At this time we will have him focus on dietary fiber Source but could add supplement with capsules or tablets at a later time      Discussed with him that use of do clocks is okay on occasion but regularly use of stimulant laxatives can be a detriment to regular bowel movements and aggravate constipation in the long-term    Impaired fasting glucose   He tries to limit sweets and excess of carbohydrates in his diet    Will check his FBS and A1c which was last 6 0    Benign essential hypertension   Blood pressure is at target on current medication which includes metoprolol and furosemide    CAD (coronary artery disease)   Denies any angina or angina equivalent symptoms at this time     He remains on statin, beta-blocker  He is not on aspirin because he is on dual therapy with Plavix and Eliquis for his underlying atrial fibrillation and history of stroke    Cerebrovascular accident (CVA) due to embolism of left middle cerebral artery ([facility] )   He remains stable with no notable residual and continues on his dual therapy with Plavix and Eliquis given aspirin failure at the time of stroke    Chronic systolic CHF (congestive heart failure) ([facility] )  Wt Readings from Last 3 Encounters:   06/17/21 89 4 kg (197 lb)   01/26/21 86 6 kg (191 lb)   11/30/20 86 2 kg (190 lb)      he remains compensated at this time and is on furosemide and potassium    Cardiology follows        Ischemic cardiomyopathy   Followed by cardiology    Typical [facility] )   He is on amiodarone, metoprolol, and Eliquis    Today appears to be in regular sinus rhythm with a controlled rate    Cardiology follows    Ventricular tachycardia ([facility] )   He has underlying ischemic cardiomyopathy and has an implanted defibrillator    CKD (chronic kidney disease) stage 2, GFR 60-89 ml/min  Lab Results   Component Value Date    EGFR 64 11/06/2019    EGFR 61 11/05/2019    EGFR 50 11/03/2019    CREATININE 1 04 11/06/2019    CREATININE 1 08 11/05/2019    CREATININE 1 28 11/03/2019      he drinks adequate fluids and avoids NSAIDs which he would do anyways because of being on anticoagulation    Chronic anticoagulation   Because of underlying history of a full flutter and CVA    Is on dual therapy with Plavix and Eliquis     Cardiology follows    Hx of CABG   Stable and asymptomatic     Cardiology follows    Obesity (BMI 30 0-34  9)   Current strategy is to maintain healthy diet and remain active with core strength and stability as opposed to targeted weight reduction with an ideal weight goal     Hyperlipidemia    He is on atorvastatin 40 mg daily     Lipids were ordered as well as LFTs    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)   Cardiology manages    Status post insertion of drug eluting coronary artery stent   Cardiology follows    Neurogenic claudication    Is scheduling an MRI for definitive diagnosis of spinal stenosis that would support that his symptoms are due to neurogenic claudication  Diagnoses and all orders for this visit:    Benign essential hypertension  -     CBC; Future  -     Comprehensive metabolic panel; Future  -     UA (URINE) with reflex to Scope  -     Magnesium; Future    CKD (chronic kidney disease) stage 2, GFR 60-89 ml/min  -     Comprehensive metabolic panel; Future    Cerebrovascular accident (CVA) due to embolism of left middle cerebral artery (HCC)    Slow transit constipation    Obesity (BMI 30 0-34  9)    Impaired fasting glucose  -     Comprehensive metabolic panel; Future  -     Hemoglobin A1C; Future    Neurogenic claudication    Mixed hyperlipidemia  -     Comprehensive metabolic panel; Future  -     Lipid panel; Future    Coronary artery disease involving native coronary artery of native heart without angina pectoris    Hx of CABG    Status post insertion of drug eluting coronary artery stent    Ischemic cardiomyopathy    Chronic systolic CHF (congestive heart failure) (HCC)  -     Comprehensive metabolic panel; Future  -     Magnesium;  Future    Ventricular tachycardia (HCC)    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)    Typical atrial flutter ([facility] )    Chronic anticoagulation  -     CBC; Future    Other orders  -     gabapentin (NEURONTIN) 100 mg capsule; TITRATE AS DIRECTED TO 3 CAPSULES BY MOUTH EVERY AM  -     gabapentin (NEURONTIN) 300 mg capsule          Subjective:      Patient ID: Chantal Macdonald is a 80 y o  male  He is here today for follow-up his chronic conditions    He most recently has been having low back pain that radiates particularly into his left leg and less went to his right leg  It is impacting his ability to ambulate or to stand for long periods of time  Uses a cane and has a walker at home which he has not found he needed to use yet  He denies any bowel or bladder incontinence  Dr Rogers Blackman who is his neurologist ordered an MRI of his lumbosacral spine to rule out significant spinal stenosis  This is been scheduled  Because of his current limitations in standing and being able to lift her to heavier activities is retired from working with the horses for the [employer]  This is sad and him because he really enjoyed and has been doing it for about 8 years  He also notes that he is 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  He does continue to belong to a clear [employer] group is both social and feel logically stimulating  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 and he looks forward to doing this  His son Leno Lira who lives in [place] is going to retire albeit at age 61  His wife has some medical problems that he feels he needs to be available to help her  Also there is an ailing mother-in-law who has moved in with them  His sister also lives in [place]  He is very close to her and is looking for to her coming up here for 2 weeks and September to visit  He notes that he scraped his lower back recently but is healing      He notes that his insurance company has had providers including advanced practitioner and physician seeing him in his home as part of the [place] program          The following portions of the patient's history were reviewed and updated as appropriate: allergies, current medications, past family history, past medical history, past social history, past surgical history and problem list     Review of Systems   Constitutional:        See HPI   HENT: Positive for hearing loss ( has decreased but stable hearing)  Negative for dental problem (  Sees a dentist twice a year) and trouble swallowing  Drooling:      Eyes:        Gets an eye exam every 1-2 years and wears glasses   Respiratory: Negative for cough, choking, chest tightness and shortness of breath  Cardiovascular: Negative for chest pain, palpitations and leg swelling  Gastrointestinal:        He tends to be constipated and uses do clocks as needed     Denies any dyspepsia or GERD   Genitourinary:        Follows with urology   Musculoskeletal: Positive for back pain ( see HPI) and gait problem ( see HPI)  Skin:        See HPI    Has seen dermatology in the past   Neurological: Negative for dizziness, tremors, light-headedness, numbness and headaches  Psychiatric/Behavioral: Negative for confusion, decreased concentration, dysphoric mood, sleep disturbance and suicidal ideas  The patient is not nervous/anxious  Albeit he denies any depression or anxiety I sense that he has some mild dysphoria based on the change in his ability to participate in things that he enjoys particularly tending to the horses  Objective:      /78   Pulse 60   Temp 98 7 °F (37 1 °C) (Tympanic)   Resp 16   Ht 5' 5" (1 651 m)   Wt 89 4 kg (197 lb)   BMI 32 78 kg/m²          Physical Exam  Vitals and nursing note reviewed  Constitutional:       Comments: He is wearing a mask   HENT:      Ears:      Comments: I noted that I needed to speak a little louder and face him particular there masks on to maintain conversation and not miss pieces  Eyes:      General: No scleral icterus       Comments: He is wearing glasses   Neck:      Vascular: No carotid bruit  Cardiovascular:      Rate and Rhythm: Normal rate and regular rhythm  Pulses: Normal pulses  Heart sounds: No murmur heard  Comments:  Defibrillator present  Pulmonary:      Effort: Pulmonary effort is normal       Breath sounds: Normal breath sounds  Musculoskeletal:      Right lower leg: No edema  Left lower leg: No edema  Comments: His gait is substantially abnormal with him leaning a little bit forward, walking slow   Skin:     Coloration: Skin is not jaundiced  Neurological:      Mental Status: He is oriented to person, place, and time  Psychiatric:         Mood and Affect: Mood normal          Behavior: Behavior normal          Thought Content:  Thought content normal          Judgment: Judgment normal

## 2021-06-18 NOTE — ASSESSMENT & PLAN NOTE
Current strategy is to maintain healthy diet and remain active with core strength and stability as opposed to targeted weight reduction with an ideal weight goal

## 2021-06-18 NOTE — ASSESSMENT & PLAN NOTE
Because of underlying history of a full flutter and CVA    Is on dual therapy with Plavix and Eliquis     Cardiology follows Consultation - Nephrology   Valdemar Vazquez  62 y o  male MRN: [id number]  Unit/Bed#: E4 -01 Encounter: [id number]    ASSESSMENT:  1  Chronic Kidney Disease stage IV- Baseline creatinine appears to be progressive and is now in the mid 2s  Patient is currently at baseline however he did receive IV contrast for a CTA/stroke alert earlier today  He saw Dr Markos Petersen once in consultation in the office in August and at that time his creatinine was 2 0  In January 2019, his creatinine was 1 0  Likely secondary to chemotherapy agents including keytruda and alimta + hypertensive nephrosclerosis + diabetic glomerular sclerosis  - of note, SPEP/UPEP negative but FLC elevated to 2 0  - renal ultrasound benign in past  - UA bland  - PVR: pending  2  Monitor for contrast induced nephropathy- received IV contrast 85ml 11/30  - started on NSS at 100ml/hr  - will give two doses of mucomyst today  3  Hypertension- BP acceptable, avoid hypotension, hold parameters  4  Anemia- mild  5  COPD with acute exacerbation- on solumedrol per primary team  6  Elevated Troponin- cardiology consulted, consider V/Q scan to rule out PE to avoid further IV contrast  7  Stroke Alert- received IV contrast 11/30 for CTA  - neurology on board  - avoid contrast with MRI  8  Adenocarcinoma of Lung- undergoing chemotherapy currently with Dr Calabrese Needs:  Continue IVF  Give 2 doses mucomyst  Avoid hypotension  Avoid further IV contrast    HISTORY OF PRESENT ILLNESS:  Requesting Physician: Geraldine Farris MD  Reason for Consult: CKD    Valdemar Vazquez  is a 62y o  year old male who was admitted to [address] [place] [address] after presenting with shortness of breath however overnight he developed slurred speech and a stroke alert was called this morning  Neurology saw him and he had a STAT CTA of the head  There is concern for a PE and a V/Q scan is being considered  He was started on a heparin drip and on IVF    A renal consultation is requested today for assistance in the management of advanced/progressive chronic kidney disease in the setting of IV contrast given 11/30  The patient is a poor historian and he is having slurred/mumbled speech and difficult to understand  He states his SOB started 2 weeks ago  He denies pain currently  He denies LE edema  No one else is in the room at this time      PAST MEDICAL HISTORY:  Past Medical History:   Diagnosis Date    Bipolar 1 disorder ([facility] )     Cancer ([facility] )     adeno lung  dx 11/2018-lung bx today 4/9/2019-ongoing chemo    Chronic pain disorder     back and right shoulder    CKD (chronic kidney disease)     COPD (chronic obstructive pulmonary disease) (HCC)     Depression     Diabetes mellitus (HCC)     GERD (gastroesophageal reflux disease)     Herniated lumbar intervertebral disc     Hypertension     Insomnia     Latent syphilis     Treated    Low back pain     Lumbosacral disc disease     Lung cancer ([facility] ) 04/2019    Pneumothorax after biopsy     R lung    PTSD (post-traumatic stress disorder)     [facility]     Tobacco abuse 11/13/2018       PAST SURGICAL HISTORY:  Past Surgical History:   Procedure Laterality Date    COLONOSCOPY  2011    CT GUIDED CHEST TUBE  11/21/2018    FL GUIDED CENTRAL VENOUS ACCESS DEVICE INSERTION  2/8/2019    HEMORROIDECTOMY      IR CHEST TUBE  11/14/2018    IR IMAGE GUIDED BIOPSY/ASPIRATION LUNG  11/13/2018    KNEE SURGERY      OR INSJ TUNNELED CTR VAD W/SUBQ PORT AGE 5 YR/> N/A 2/8/2019    Procedure: PLACEMENT OF PORT-A-CATH;  Surgeon: Pierce Johnson MD;  Location:  MAIN OR;  Service: Vascular       ALLERGIES:  Allergies   Allergen Reactions    Lisinopril Anaphylaxis     Took medication a while ago and had a "swelling reaction"  Does not carry epi-pen       SOCIAL HISTORY:  Social History     Substance and Sexual Activity   Alcohol Use Not Currently     Social History     Substance and Sexual Activity   Drug Use Not Currently    Types: Marijuana    Comment: stopped , "i smoked weed and stopped 1 month ago"     Social History     Tobacco Use   Smoking Status Former Smoker    Packs/day: 0 50    Years: 40 00    Pack years: 20 00    Types: Cigarettes    Start date:     Last attempt to quit: 2019    Years since quittin 3   Smokeless Tobacco Never Used       FAMILY HISTORY:  Family History   Problem Relation Age of Onset    Heart disease Mother     Cancer Mother     Hypertension Father     Diabetes Family     Asthma Family     Heart disease Family     Cancer Family     Cancer Maternal Grandfather     Cancer Paternal Grandfather     Cancer Maternal Aunt        MEDICATIONS:    Current Facility-Administered Medications:     acetaminophen (TYLENOL) tablet 650 mg, 650 mg, Oral, Q6H PRN, Manual Patter Smudde, PA-C    acetylcysteine (MUCOMYST) 200 mg/mL oral solution 1,200 mg, 1,200 mg, Oral, BID, Whitevector, PA-C    albuterol (PROVENTIL HFA,VENTOLIN HFA) inhaler 2 puff, 2 puff, Inhalation, Q4H PRN, Manual Patter Smarnav PA-C    aluminum-magnesium hydroxide-simethicone (MYLANTA) 200-200-20 mg/5 mL oral suspension 30 mL, 30 mL, Oral, Q6H PRN, Socorro Delong PA-C    [START ON 2019] amLODIPine (NORVASC) tablet 10 mg, 10 mg, Oral, Daily, Whitevector, PA-C    chlorproMAZINE (THORAZINE) tablet 25 mg, 25 mg, Oral, Q6H PRN, Manual Patter Smudde, PA-C    diphenhydrAMINE (BENADRYL) tablet 25 mg, 25 mg, Oral, Q6H PRN, Manual Patter Smudde, PA-C    famotidine (PEPCID) tablet 20 mg, 20 mg, Oral, BID, Socorro Smarnav, PA-C, 20 mg at 19 0948    FLUoxetine (PROzac) capsule 10 mg, 10 mg, Oral, Daily, Socorro Smudde, PA-C, 10 mg at 19 0949    fluticasone (FLONASE) 50 mcg/act nasal spray 1 spray, 1 spray, Each Nare, Daily, Socorro Delong PA-C, 1 spray at 19 0948    fluticasone-vilanterol (BREO ELLIPTA) 100-25 mcg/inh inhaler 1 puff, 1 puff, Inhalation, Early Morning, Socorro Delong PA-C, 1 puff at 19 4277    folic acid (FOLVITE) tablet 1 mg, 1 mg, Oral, Daily, Socorro Smudde, PA-C, 1 mg at 11/30/19 0948    heparin (porcine) 25,000 units in 250 mL infusion (premix), 3-30 Units/kg/hr (Order-Specific), Intravenous, Titrated, Socorro Smudde, PA-C, Last Rate: 9 8 mL/hr at 11/30/19 0915, 15 Units/kg/hr at 11/30/19 0915    heparin (porcine) injection 2,600 Units, 2,600 Units, Intravenous, PRN, Maldonado Blanks Smudde, PA-C    heparin (porcine) injection 5,200 Units, 5,200 Units, Intravenous, PRN, Socorro Smudde, PA-C    ipratropium-albuterol (DUO-NEB) 0 5-2 5 mg/3 mL inhalation solution 3 mL, 3 mL, Nebulization, Q6H, Coni Heart MD    levalbuterol (XOPENEX) inhalation solution 1 25 mg, 1 25 mg, Nebulization, TID, 1 25 mg at 11/30/19 0712 **AND** [DISCONTINUED] sodium chloride 0 9 % inhalation solution 3 mL, 3 mL, Nebulization, 4x Daily, Socorro Smudde, PA-C    melatonin tablet 3 mg, 3 mg, Oral, HS, Socorro Smudde, PA-C, 3 mg at 11/29/19 2238    methocarbamol (ROBAXIN) tablet 750 mg, 750 mg, Oral, Q6H PRN, Maldonado Grande Smudde, PA-C    methylPREDNISolone sodium succinate (Solu-MEDROL) injection 40 mg, 40 mg, Intravenous, Q8H, Socorro Smudde, PA-C, 40 mg at 11/30/19 0949    ondansetron (ZOFRAN) injection 4 mg, 4 mg, Intravenous, Q6H PRN, Maldonado Grande Smudde, PA-C    oxyCODONE (OxyCONTIN) 12 hr tablet 20 mg, 20 mg, Oral, Q12H GREG, Socorro Smudde, PA-C, 20 mg at 11/30/19 0948    oxyCODONE (ROXICODONE) immediate release tablet 30 mg, 30 mg, Oral, Q4H PRN, Maldonado Grande Smudde, PA-C    pantoprazole (PROTONIX) EC tablet 40 mg, 40 mg, Oral, Early Morning, Socorro Smudde, PA-C, 40 mg at 11/30/19 0602    polyethylene glycol (MIRALAX) packet 17 g, 17 g, Oral, Daily PRN, Maldonado Grande Smfunmilayode, PA-C    pregabalin (LYRICA) capsule 25 mg, 25 mg, Oral, Daily, Socorro Smudde, PA-C, 25 mg at 11/30/19 0948    pregabalin (LYRICA) capsule 50 mg, 50 mg, Oral, HS, Socorro Smudde, PA-C, 50 mg at 11/29/19 2239    QUEtiapine (SEROquel) tablet 25 mg, 25 mg, Oral, HS, JOSSELINE Isaacs-CHERELLE, 25 mg at 11/29/19 2239    senna-docusate sodium (SENOKOT S) 8 6-50 mg per tablet 1 tablet, 1 tablet, Oral, BID PRN, Chrislearcadio Purpura JOSSELINE Delong-CHERELLE    sodium chloride 0 9 % infusion, 100 mL/hr, Intravenous, Continuous, Jonathan Jimenez MD, Last Rate: 100 mL/hr at 11/30/19 0948, 100 mL/hr at 11/30/19 0948    tamsulosin (FLOMAX) capsule 0 4 mg, 0 4 mg, Oral, Daily With Dinner, JOSSELINE Isaacs-CHERELLE    tiotropium [facility]) capsule for inhaler 18 mcg, 18 mcg, Inhalation, Daily, JOSSELINE Isaacs-C, 18 mcg at 11/30/19 0948    REVIEW OF SYSTEMS:  A complete review of systems was performed and found to be negative unless otherwise noted in the history of present illness  General: No fevers, chills  Cardiovascular:  No chest pain, No leg edema  Respiratory: No cough, sputum production,  + shortness of breath  Gastrointestinal:  No nausea/vomiting,  No diarrhea,  No abdominal pain  Genitourinary: No hematuria  No foamy urine    No dysuria    PHYSICAL EXAM:  Current Weight: Weight - Scale: 68 kg (149 lb 14 6 oz)  First Weight: Weight - Scale: 68 kg (149 lb 14 6 oz)  Vitals:    11/30/19 1000 11/30/19 1015 11/30/19 1030 11/30/19 1100   BP: 135/71 141/73 138/68 142/63   BP Location:       Pulse: 89 89 88 88   Resp:       Temp:       TempSrc:       SpO2: 97% 97% 97% 95%   Weight:           Intake/Output Summary (Last 24 hours) at 11/30/2019 1121  Last data filed at 11/30/2019 0601  Gross per 24 hour   Intake 1535 97 ml   Output    Net 1535 97 ml     General: NAD  Skin: no rash  Eyes: anicteric  ENMT: mm moist  Neck: no masses  Respiratory: + wheeze  CVS: RRR  Extremities: no edema  GI: soft nt nd  Neuro: alert awake  Psych: mood and affect appropriate     Lab Results:   Results from last 7 days   Lab Units 11/30/19  0605 11/29/19  1900   WBC Thousand/uL 5 75 8 81   HEMOGLOBIN g/dL 10 4* 11 5*   HEMATOCRIT % 33 9* 36 2*   PLATELETS Thousands/uL 265 240   POTASSIUM mmol/L 4 3 4 1   CHLORIDE mmol/L 101 100   CO2 mmol/L 22 26   BUN mg/dL 30* 31*   CREATININE mg/dL 2 61* 2 44*   CALCIUM mg/dL 8 8 9 0

## 2021-06-18 NOTE — ASSESSMENT & PLAN NOTE
I reviewed the 3 elements of regular bowel movement which are fiber, fluids, and activity  His stool does tend to be hard so suggested that he begin MiraLax 1/2 to 1 scoop daily    At this time we will have him focus on dietary fiber Source but could add supplement with capsules or tablets at a later time      Discussed with him that use of do clocks is okay on occasion but regularly use of stimulant laxatives can be a detriment to regular bowel movements and aggravate constipation in the long-term

## 2021-06-18 NOTE — ASSESSMENT & PLAN NOTE
Is scheduling an MRI for definitive diagnosis of spinal stenosis that would support that his symptoms are due to neurogenic claudication

## 2021-06-18 NOTE — ASSESSMENT & PLAN NOTE
Denies any angina or angina equivalent symptoms at this time     He remains on statin, beta-blocker  He is not on aspirin because he is on dual therapy with Plavix and Eliquis for his underlying atrial fibrillation and history of stroke

## 2021-06-18 NOTE — ASSESSMENT & PLAN NOTE
He is on amiodarone, metoprolol, and Eliquis    Today appears to be in regular sinus rhythm with a controlled rate    Cardiology follows

## 2021-06-18 NOTE — ASSESSMENT & PLAN NOTE
Wt Readings from Last 3 Encounters:   06/17/21 89 4 kg (197 lb)   01/26/21 86 6 kg (191 lb)   11/30/20 86 2 kg (190 lb)      he remains compensated at this time and is on furosemide and potassium    Cardiology follows

## 2021-06-18 NOTE — ASSESSMENT & PLAN NOTE
Lab Results   Component Value Date    EGFR 64 11/06/2019    EGFR 61 11/05/2019    EGFR 50 11/03/2019    CREATININE 1 04 11/06/2019    CREATININE 1 08 11/05/2019    CREATININE 1 28 11/03/2019      he drinks adequate fluids and avoids NSAIDs which he would do anyways because of being on anticoagulation Script ready for  at         The following documentation was provided to patients at time of Rx pickup:      From the Office of Dr. Darius Up at [facility]:  [address] [facility] office is committed to providing the best care to our patients.

## 2021-06-18 NOTE — ASSESSMENT & PLAN NOTE
He remains stable with no notable residual and continues on his dual therapy with Plavix and Eliquis given aspirin failure at the time of stroke

## 2021-06-18 NOTE — ASSESSMENT & PLAN NOTE
He tries to limit sweets and excess of carbohydrates in his diet    Will check his FBS and A1c which was last 6 0

## 2021-07-20 ENCOUNTER — HOSPITAL ENCOUNTER (OUTPATIENT)
Dept: RADIOLOGY | Facility: HOSPITAL | Age: 86
Discharge: HOME | End: 2021-07-20
Payer: COMMERCIAL

## 2021-07-20 DIAGNOSIS — G83.4 CAUDA EQUINA SYNDROME (HCC): ICD-10-CM

## 2021-07-20 DIAGNOSIS — M54.16 RADICULOPATHY, LUMBAR REGION: ICD-10-CM

## 2021-07-20 DIAGNOSIS — M48.061 SPINAL STENOSIS, LUMBAR REGION WITHOUT NEUROGENIC CLAUDICATION: ICD-10-CM

## 2021-07-20 PROCEDURE — G1004 CDSM NDSC: HCPCS

## 2021-07-20 PROCEDURE — 72148 MRI LUMBAR SPINE W/O DYE: CPT

## 2021-08-09 ENCOUNTER — OFFICE VISIT (OUTPATIENT)
Dept: CARDIOLOGY CLINIC | Facility: CLINIC | Age: 86
End: 2021-08-09
Payer: COMMERCIAL

## 2021-08-09 VITALS
HEIGHT: 65 IN | SYSTOLIC BLOOD PRESSURE: 110 MMHG | WEIGHT: 194.2 LBS | HEART RATE: 60 BPM | BODY MASS INDEX: 32.36 KG/M2 | DIASTOLIC BLOOD PRESSURE: 64 MMHG

## 2021-08-09 DIAGNOSIS — E78.5 DYSLIPIDEMIA: ICD-10-CM

## 2021-08-09 DIAGNOSIS — I25.5 ISCHEMIC CARDIOMYOPATHY: ICD-10-CM

## 2021-08-09 DIAGNOSIS — I50.22 CHRONIC SYSTOLIC CHF (CONGESTIVE HEART FAILURE) (HCC): ICD-10-CM

## 2021-08-09 DIAGNOSIS — Z95.810 PRESENCE OF DOUBLE CHAMBER AUTOMATIC CARDIOVERTER/DEFIBRILLATOR (AICD): ICD-10-CM

## 2021-08-09 DIAGNOSIS — Z95.1 HX OF CABG: ICD-10-CM

## 2021-08-09 DIAGNOSIS — Z79.899 LONG TERM CURRENT USE OF AMIODARONE: ICD-10-CM

## 2021-08-09 DIAGNOSIS — I10 BENIGN ESSENTIAL HYPERTENSION: ICD-10-CM

## 2021-08-09 DIAGNOSIS — E66.9 OBESITY (BMI 30.0-34.9): ICD-10-CM

## 2021-08-09 DIAGNOSIS — Z95.5 STATUS POST INSERTION OF DRUG ELUTING CORONARY ARTERY STENT: ICD-10-CM

## 2021-08-09 DIAGNOSIS — I47.2 VENTRICULAR TACHYCARDIA (HCC): ICD-10-CM

## 2021-08-09 DIAGNOSIS — I48.3 TYPICAL ATRIAL FLUTTER (HCC): ICD-10-CM

## 2021-08-09 DIAGNOSIS — I25.10 CORONARY ARTERY DISEASE INVOLVING NATIVE CORONARY ARTERY OF NATIVE HEART WITHOUT ANGINA PECTORIS: Primary | Chronic | ICD-10-CM

## 2021-08-09 PROCEDURE — 1160F RVW MEDS BY RX/DR IN RCRD: CPT | Performed by: INTERNAL MEDICINE

## 2021-08-09 PROCEDURE — 1036F TOBACCO NON-USER: CPT | Performed by: INTERNAL MEDICINE

## 2021-08-09 PROCEDURE — 99214 OFFICE O/P EST MOD 30 MIN: CPT | Performed by: INTERNAL MEDICINE

## 2021-08-09 PROCEDURE — 93000 ELECTROCARDIOGRAM COMPLETE: CPT | Performed by: INTERNAL MEDICINE

## 2021-08-09 NOTE — PROGRESS NOTES
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)     8  Typical atrial flutter (HCC)  POCT ECG   9  Long term current use of amiodarone  TSH, 3rd generation with Free T4 reflex   10  Benign essential hypertension  POCT ECG   11  Dyslipidemia  POCT ECG   12  Obesity (BMI 30 0-34  9)           Discussion/Summary:  Mr Nahid Stock is a pleasant 15-year-old gentleman who presents to the office today for routine follow-up  Since his last visit he has been feeling well  He offers no cardiac complaints  He continues to maintain normal sinus rhythm  He will remain on his current AV elizabeth blocking regimen along with systemic anticoagulation with Eliquis  His last device check revealed no recurrent atrial fibrillation  He is maintained on amiodarone given history of ventricular tachycardia  He is up-to-date on his eye exams  His most recent  PFTs from January were unremarkable  He had recent LFTS and TFTs which were unremarkable  He will undergo reassessment prior to his next visit  His blood pressure is well controlled in the office today  I did review his most recent set of lipids  They are acceptable on current therapy  He will have these reassessed prior to his next visit  He appears euvolemic on his current diuretic regimen to which no changes were made      He underwent an echocardiogram since his last visit revealing his ejection fraction has improved to 45%  He is not maintained on ACE-inhibitor due to hypotension  I will see him back in the office in six months or sooner if deemed necessary  Interval History:   Mr Elena Castro is a pleasant 45-year-old gentleman who presents to the office today for routine follow-up  Since his last visit from a cardiac standpoint he has been feeling relatively well  He does not participate in any formal physical activity  With the activity he does perform he denies any exertional chest pain or shortness of breath  He denies any signs or symptoms of congestive heart failure including increasing lower extremity edema, paroxysmal nocturnal dyspnea, orthopnea, acute weight gain or increasing abdominal girth  He weighs himself at home with a stable weight of 190 lb  He denies lightheadedness, syncope, presyncope, palpitations or symptoms of claudication  He denies any discharges from his AICD  He is maintained on Eliquis without any bleeding consequences or falls  Problem List     CVA (cerebral vascular accident) ([address] )    Essential hypertension (Chronic)    HLD (hyperlipidemia) (Chronic)    CAD (coronary artery disease) (Chronic)    Atrial flutter (HCC) (Chronic)        Past Medical History:   Diagnosis Date    Acute myocardial infarction ([address] )     Allergic rhinitis     Anxiety     Bimalleolar fracture of left ankle     CAD (coronary artery disease)     Dyslipidemia 3/6/2018    Erectile dysfunction of non-organic origin     Hematuria     workup was negative and felt to be benign    Herpes zoster with complication     Hyperlipidemia     Hypertension     Impaired fasting glucose     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    Prostatic hypertrophy     benign    Stroke [facility])      Social History     Socioeconomic History    Marital status:       Spouse name: moncho    Number of children: Not on file    Years of education: Not on file    Highest education level: Not on file   Occupational History    Occupation: retired     Comment: clergy   Tobacco Use    Smoking status: Former Smoker     Types: Cigarettes     Quit date:      Years since quittin 6    Smokeless tobacco: Never Used   Vaping Use    Vaping Use: Never used   Substance and Sexual Activity    Alcohol use: Not Currently     Comment: very rarely    Drug use: No    Sexual activity: Not on file   Other Topics Concern    Not on file   Social History Narrative    Death in the family- spouse, moncho  after a prolonged francis with lymphoma     Exercises regularly     Social Determinants of Health     Financial Resource Strain:     Difficulty of Paying Living Expenses:    Food Insecurity:     Worried About [address] in the Last Year:     [employer] [employer] in the Last Year:    Transportation Needs:     Lack of Transportation (Medical):      Lack of Transportation (Non-Medical):    Physical Activity:     Days of Exercise per Week:     Minutes of Exercise per Session:    Stress:     Feeling of Stress :    Social Connections:     Frequency of Communication with Friends and Family:     Frequency of Social Gatherings with Friends and Family:     Attends Jainism Services:     Active Member of Clubs or Organizations:     Attends Club or Organization Meetings:     Marital Status:    Intimate Partner Violence:     Fear of Current or Ex-Partner:     Emotionally Abused:     Physically Abused:     Sexually Abused:       Family History   Problem Relation Age of Onset    Cancer Mother     Hypertension Mother         essential    Pancreatic cancer Mother      Past Surgical History:   Procedure Laterality Date    ANKLE FRACTURE SURGERY Left     CARDIAC PACEMAKER PLACEMENT Left     CORONARY ARTERY BYPASS GRAFT         Current Outpatient Medications:     amiodarone 200 mg tablet, Take 1 tablet (200 mg total) by mouth daily, Disp: 90 tablet, Rfl: 3    atorvastatin (LIPITOR) 40 mg tablet, TAKE 1 TABLET BY MOUTH DAILY, Disp: 90 tablet, Rfl: 3    clopidogrel (PLAVIX) 75 mg tablet, Take 1 tablet (75 mg total) by mouth daily, Disp: 90 tablet, Rfl: 3    Coenzyme Q10 200 MG capsule, Take 200 mg by mouth daily, Disp: , Rfl:     doxylamine (UNISON) 25 MG tablet, Take 0 5 tablets (12 5 mg total) by mouth daily at bedtime as needed for sleep, Disp: 30 tablet, Rfl: 0    Eliquis 5 MG, TAKE 1 TABLET BY MOUTH TWICE A DAY, Disp: 60 tablet, Rfl: 5    furosemide (LASIX) 20 mg tablet, Take 1 tablet (20 mg total) by mouth daily, Disp: 90 tablet, Rfl: 3    gabapentin (NEURONTIN) 300 mg capsule, , Disp: , Rfl:     magnesium gluconate (MAGONATE) 500 mg tablet, Take 500 mg by mouth daily, Disp: , Rfl:     melatonin 3 mg, Take 1 tablet (3 mg total) by mouth daily at bedtime, Disp: 30 each, Rfl: 0    metoprolol tartrate (LOPRESSOR) 25 mg tablet, TAKE 1 TABLET (25 MG TOTAL) BY MOUTH EVERY 12 (TWELVE) HOURS, Disp: 180 tablet, Rfl: 3    nitroglycerin (NITROSTAT) 0 4 mg SL tablet, Place 1 tablet (0 4 mg total) under the tongue every 5 (five) minutes as needed for chest pain, Disp: 25 tablet, Rfl: 1    potassium chloride (Klor-Con M20) 20 mEq tablet, Take 1 tablet (20 mEq total) by mouth daily, Disp: 90 tablet, Rfl: 3    Probiotic Product (PROBIOTIC COLON SUPPORT) CAPS, Take 1 capsule by mouth daily, Disp: , Rfl:     azelastine (ASTELIN) 0 1 % nasal spray, 1 SPRAY INTO EACH NOSTRIL 2 (TWO) TIMES A DAY USE IN EACH NOSTRIL AS DIRECTED (Patient not taking: Reported on 8/9/2021), Disp: 1 Bottle, Rfl: 5    gabapentin (NEURONTIN) 100 mg capsule, TITRATE AS DIRECTED TO 3 CAPSULES BY MOUTH EVERY AM (Patient not taking: Reported on 8/9/2021), Disp: , Rfl:   Allergies   Allergen Reactions    Penicillins Edema and Rash     Reaction Date: [id number]; Category: Allergy;  Annotation - [id number]: Severe reaction with hospitaization at [facility]       Labs:     Chemistry        Component Value Date/Time     08/17/2017 0705    K 3 8 11/06/2019 0446    K 4 2 03/28/2019 0631     11/06/2019 0446     03/28/2019 0631    CO2 27 11/06/2019 0446    CO2 32 03/28/2019 0631    BUN 20 11/06/2019 0446    BUN 17 03/28/2019 0631    CREATININE 1 04 11/06/2019 0446    CREATININE 1 14 (H) 08/17/2017 0705        Component Value Date/Time    CALCIUM 8 0 (L) 11/06/2019 0446    CALCIUM 9 4 03/28/2019 0631    ALKPHOS 75 11/02/2019 1926    ALKPHOS 78 03/28/2019 0631    AST 24 11/02/2019 1926    AST 14 03/28/2019 0631    ALT 32 11/02/2019 1926    ALT 14 03/28/2019 0631    BILITOT 0 4 07/19/2016 0642            Lab Results   Component Value Date    CHOL 156 08/17/2017    CHOL 166 01/26/2017    CHOL 166 07/19/2016     Lab Results   Component Value Date    HDL 58 10/20/2019    HDL 53 03/28/2019    HDL 54 09/21/2018     Lab Results   Component Value Date    LDLCALC 65 10/20/2019    LDLCALC 75 03/28/2019    LDLCALC 76 09/21/2018     Lab Results   Component Value Date    TRIG 65 10/20/2019    TRIG 150 (H) 03/28/2019    TRIG 116 09/21/2018     No results found for: CHOLHDL    Imaging: Mri Brain Wo Contrast    Result Date: 3/6/2018  Narrative: MRI BRAIN WITHOUT CONTRAST INDICATION: STROKE- WO BRAIN  History taken directly from the electronic ordering system  Right-sided weakness  Difficulty with balance  COMPARISON:   3/6/2018 TECHNIQUE:  Sagittal T1, axial T2, axial FLAIR, axial T1, axial [place] and axial diffusion imaging  IMAGE QUALITY:  Diagnostic  FINDINGS: BRAIN PARENCHYMA:  There is a small band of diffusion restriction in the left insular cortex extending into the periventricular white matter of the left frontal lobe images 19 through 23 of series 3 indicative of recent left MCA infarction  There is bandlike associated FLAIR hyperintensity in this region  Elsewhere there is a small to moderate chronic right frontal infarction with cystic encephalomalacia and gliosis    There is somewhat patchy periventricular FLAIR hyperintensity as well  No acute intracranial hemorrhage or extra-axial fluid collection  Cerebellar tonsils are normally positioned  VENTRICLES:  The ventricles are normal in size and contour  SELLA AND PITUITARY GLAND:  Normal  ORBITS:  Normal  PARANASAL SINUSES:  Normal  VASCULATURE:  Evaluation of the major intracranial vasculature demonstrates appropriate flow voids  CALVARIUM AND SKULL BASE:  Normal  EXTRACRANIAL SOFT TISSUES:  Normal      Impression: 1  Small acute/recent left MCA cortical infarction involving a small portion of the insular cortex extending into the periventricular white matter  2   Chronic small to moderate right frontal infarction and mild to moderate, chronic microangiopathy  I personally discussed this study with Dr Meyers Medicine on 3/6/2018 at 11:42 AM  Workstation performed: [id number]     Xr Stroke Alert Portable Chest    Result Date: 3/6/2018  Narrative: CHEST INDICATION:  Right-sided weakness  Stroke COMPARISON:  None EXAM PERFORMED/VIEWS:  XR STROKE ALERT PORTABLE CHEST FINDINGS:  There are median sternotomy wires indicating prior cardiac surgery  Cardiomediastinal silhouette appears unremarkable  The lungs are clear  No pneumothorax or pleural effusion  Osseous structures appear within normal limits for patient age  Impression: No acute cardiopulmonary disease  Workstation performed: [id number]     Ct Stroke Alert Brain    Result Date: 3/6/2018  Narrative: CT BRAIN - STROKE ALERT PROTOCOL INDICATION:  Woke up with right-sided weakness  Pronator drift  Cerebellar symptoms  COMPARISON:  None  TECHNIQUE:  CT examination of the brain was performed  In addition to axial images, coronal reformatted images were created and submitted for interpretation  Radiation dose length product (DLP) for this visit:     This examination, like all CT scans performed in the [facility], was performed utilizing techniques to minimize radiation dose exposure, including the use of iterative reconstruction  and automated exposure control  IMAGE QUALITY:  Diagnostic  FINDINGS:  PARENCHYMA:  Small to moderate-sized area of encephalomalacia and gliosis right frontal lobe indicative of chronic infarction  Elsewhere there are mild periventricular hypodensities  No acute intracranial hemorrhage  Atherosclerotic calcifications noted  VENTRICLES AND EXTRA-AXIAL SPACES:  Age-appropriate volume loss is noted  No hydrocephalus  VISUALIZED ORBITS AND PARANASAL SINUSES:  Orbits appear normal   Mild scattered sinus mucosal thickening is noted  No fluid levels are seen  CALVARIUM AND EXTRACRANIAL SOFT TISSUES:   Normal      Impression: 1  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, chronic microangiopathy  2   No acute intracranial hemorrhage  Findings were directly discussed with Silas Solis on 3/6/2018 7:31 AM  Workstation performed: [id number]     Cta Stroke Alert (head/neck)    Result Date: 3/6/2018  Narrative: CTA NECK AND BRAIN WITH AND WITHOUT CONTRAST INDICATION: Left-sided weakness  Stroke alert  Prominent or drift  Pointing  COMPARISON:   None  TECHNIQUE:  Routine CT imaging of the Brain without contrast   Post contrast imaging was performed after administration of iodinated contrast through the neck and brain  Post contrast axial 0 625 mm images timed to opacify the arterial system  3D rendering was performed on an independent workstation  MIP reconstructions performed  Coronal reconstructions were performed of the noncontrast portion of the brain  Radiation dose length product (DLP) for this visit:  526 3 mGy-cm   This examination, like all CT scans performed in the [facility], was performed utilizing techniques to minimize radiation dose exposure, including the use of iterative reconstruction and automated exposure control     IV Contrast:  100 mL of iohexol (OMNIPAQUE)  IMAGE QUALITY:   Diagnostic FINDINGS: NONCONTRAST BRAIN: Reported separately CERVICAL VASCULATURE AORTIC ARCH AND GREAT VESSELS:  Mild athersclerotic disease of the arch, proximal great vessels and visualized subclavian vessels  No significant stenosis  Sternotomy wires and mediastinal clips are noted, compatible with prior CABG  RIGHT VERTEBRAL ARTERY CERVICAL SEGMENT:  Mild stenosis at the origin  The vessel is normal in caliber throughout the neck  LEFT VERTEBRAL ARTERY CERVICAL SEGMENT:  Mild stenosis at the origin  The vessel is normal in caliber throughout the neck  Left vertebral artery is congenitally dominant  RIGHT EXTRACRANIAL CAROTID SEGMENT:  Normal caliber common carotid artery  Normal bifurcation and cervical internal carotid artery  No stenosis or dissection  LEFT EXTRACRANIAL CAROTID SEGMENT:  Mild atherosclerotic disease of the distal common carotid artery and proximal cervical internal carotid artery without significant stenosis compared to the more distal ICA  NASCET criteria was used to determine the degree of internal carotid artery diameter stenosis  INTRACRANIAL VASCULATURE INTERNAL CAROTID ARTERIES:  Atherosclerotic calcifications of the cavernous segment of the internal carotid artery are very mild  Normal ophthalmic artery origins  Normal ICA terminus  ANTERIOR CIRCULATION:  Symmetric A1 segments and anterior cerebral arteries with normal enhancement  Normal anterior communicating artery  MIDDLE CEREBRAL ARTERY CIRCULATION:  M1 segment and middle cerebral artery branches demonstrate normal enhancement bilaterally  DISTAL VERTEBRAL ARTERIES:  Normal distal vertebral arteries  Posterior inferior cerebellar artery origins are normal  Normal vertebral basilar junction  BASILAR ARTERY:  Basilar artery is normal in caliber  Normal superior cerebellar arteries  POSTERIOR CEREBRAL ARTERIES: Both posterior cerebral arteries arises from the basilar tip  Both arteries demonstrate normal enhancement  Normal posterior communicating arteries   DURAL VENOUS SINUSES:  Not well visualized on this early arterial phase scan  NON VASCULAR ANATOMY BONY STRUCTURES:  No acute osseous abnormality  SOFT TISSUES OF THE NECK:  Unremarkable  THORACIC INLET:  Unremarkable  Impression: 1  No hemodynamically significant stenosis in the major arteries of the neck  2   No intracranial aneurysm or major intracranial arterial stenosis  I personally discussed this study with Chiqui Yan on 3/6/2018 at 7:31 AM  Workstation performed: [id number]        Review of Systems   Cardiovascular: Negative for chest pain, claudication, cyanosis, dyspnea on exertion and irregular heartbeat  Musculoskeletal: Positive for arthritis and back pain  All other systems reviewed and are negative  There were no vitals filed for this visit  There were no vitals filed for this visit  There is no height or weight on file to calculate BMI       Physical Exam:  General:  Alert and cooperative, appears stated age  HEENT:  PERRLA, EOMI, no scleral icterus, no conjunctival pallor  Neck:  No lymphadenopathy, no thyromegaly, no carotid bruits, no elevated JVP  Heart:  Regular rate and rhythm, normal S1/S2, no S3/S4, no murmur  Lungs:  Clear to auscultation bilaterally   Abdomen:  Soft, non-tender, positive bowel sounds, no rebound or guarding,   no organomegaly   Extremities:  No clubbing, cyanosis or edema   Vascular:  2+ pedal pulses  Skin:  No rashes or lesions on exposed skin  Neurologic:  Cranial nerves II-XII grossly intact without focal deficits Complex Repair And Bilobe Flap Text: The defect edges were debeveled with a #15 scalpel blade.  The primary defect was closed partially with a complex linear closure.  Given the location of the remaining defect, shape of the defect and the proximity to free margins a bilobe flap was deemed most appropriate for complete closure of the defect.  Using a sterile surgical marker, an appropriate advancement flap was drawn incorporating the defect and placing the expected incisions within the relaxed skin tension lines where possible.    The area thus outlined was incised deep to adipose tissue with a #15 scalpel blade.  The skin margins were undermined to an appropriate distance in all directions utilizing iris scissors.

## 2021-08-10 ENCOUNTER — REMOTE DEVICE CLINIC VISIT (OUTPATIENT)
Dept: CARDIOLOGY CLINIC | Facility: CLINIC | Age: 86
End: 2021-08-10
Payer: COMMERCIAL

## 2021-08-10 DIAGNOSIS — Z95.810 AICD (AUTOMATIC CARDIOVERTER/DEFIBRILLATOR) PRESENT: Primary | ICD-10-CM

## 2021-08-10 PROCEDURE — 93295 DEV INTERROG REMOTE 1/2/MLT: CPT | Performed by: INTERNAL MEDICINE

## 2021-08-10 PROCEDURE — 93296 REM INTERROG EVL PM/IDS: CPT | Performed by: INTERNAL MEDICINE

## 2021-08-10 NOTE — PROGRESS NOTES
Results for orders placed or performed in visit on 08/10/21   Cardiac EP device report    Narrative    MDT-DUAL ICD (AAIR-DDDR MODE)/ACTIVE SYSTEM IS MRI CONDITIONAL  CARELINK TRANSMISSION: BATTERY STATUS "9 YRS " AP 97%  0%  NO SIGNIFICANT HIGH RATE EPISODES  NORMAL DEVICE FUNCTION   NC         Current Outpatient Medications:     amiodarone 200 mg tablet, Take 1 tablet (200 mg total) by mouth daily, Disp: 90 tablet, Rfl: 3    apixaban (Eliquis) 5 mg, Take 1 tablet (5 mg total) by mouth 2 (two) times a day, Disp: 60 tablet, Rfl: 11    atorvastatin (LIPITOR) 40 mg tablet, TAKE 1 TABLET BY MOUTH DAILY, Disp: 90 tablet, Rfl: 3    azelastine (ASTELIN) 0 1 % nasal spray, 1 SPRAY INTO EACH NOSTRIL 2 (TWO) TIMES A DAY USE IN EACH NOSTRIL AS DIRECTED (Patient not taking: Reported on 8/9/2021), Disp: 1 Bottle, Rfl: 5    clopidogrel (PLAVIX) 75 mg tablet, Take 1 tablet (75 mg total) by mouth daily, Disp: 90 tablet, Rfl: 3    Coenzyme Q10 200 MG capsule, Take 200 mg by mouth daily, Disp: , Rfl:     doxylamine (UNISON) 25 MG tablet, Take 0 5 tablets (12 5 mg total) by mouth daily at bedtime as needed for sleep, Disp: 30 tablet, Rfl: 0    furosemide (LASIX) 20 mg tablet, Take 1 tablet (20 mg total) by mouth daily, Disp: 90 tablet, Rfl: 3    gabapentin (NEURONTIN) 100 mg capsule, TITRATE AS DIRECTED TO 3 CAPSULES BY MOUTH EVERY AM (Patient not taking: Reported on 8/9/2021), Disp: , Rfl:     gabapentin (NEURONTIN) 300 mg capsule, , Disp: , Rfl:     magnesium gluconate (MAGONATE) 500 mg tablet, Take 500 mg by mouth daily, Disp: , Rfl:     melatonin 3 mg, Take 1 tablet (3 mg total) by mouth daily at bedtime, Disp: 30 each, Rfl: 0    metoprolol tartrate (LOPRESSOR) 25 mg tablet, TAKE 1 TABLET (25 MG TOTAL) BY MOUTH EVERY 12 (TWELVE) HOURS, Disp: 180 tablet, Rfl: 3    nitroglycerin (NITROSTAT) 0 4 mg SL tablet, Place 1 tablet (0 4 mg total) under the tongue every 5 (five) minutes as needed for chest pain, Disp: 25 tablet, Rfl: 1    potassium chloride (Klor-Con M20) 20 mEq tablet, Take 1 tablet (20 mEq total) by mouth daily, Disp: 90 tablet, Rfl: 3    Probiotic Product (PROBIOTIC COLON SUPPORT) CAPS, Take 1 capsule by mouth daily, Disp: , Rfl:

## 2021-09-24 DIAGNOSIS — I48.3 TYPICAL ATRIAL FLUTTER (HCC): ICD-10-CM

## 2021-09-24 RX ORDER — APIXABAN 5 MG/1
TABLET, FILM COATED ORAL
Qty: 60 TABLET | Refills: 5 | Status: SHIPPED | OUTPATIENT
Start: 2021-09-24 | End: 2022-04-11

## 2021-10-10 DIAGNOSIS — I47.2 VENTRICULAR TACHYCARDIA (HCC): ICD-10-CM

## 2021-10-11 DIAGNOSIS — I10 BENIGN ESSENTIAL HYPERTENSION: ICD-10-CM

## 2021-10-11 DIAGNOSIS — I47.2 VENTRICULAR TACHYCARDIA (HCC): ICD-10-CM

## 2021-10-11 DIAGNOSIS — I25.5 ISCHEMIC CARDIOMYOPATHY: ICD-10-CM

## 2021-10-11 DIAGNOSIS — E78.5 DYSLIPIDEMIA: ICD-10-CM

## 2021-10-11 RX ORDER — ATORVASTATIN CALCIUM 40 MG/1
TABLET, FILM COATED ORAL
Qty: 90 TABLET | Refills: 3 | Status: SHIPPED | OUTPATIENT
Start: 2021-10-11

## 2021-10-11 RX ORDER — CLOPIDOGREL BISULFATE 75 MG/1
75 TABLET ORAL DAILY
Qty: 90 TABLET | Refills: 3 | Status: SHIPPED | OUTPATIENT
Start: 2021-10-11

## 2021-10-11 RX ORDER — FUROSEMIDE 20 MG/1
20 TABLET ORAL DAILY
Qty: 90 TABLET | Refills: 3 | Status: SHIPPED | OUTPATIENT
Start: 2021-10-11

## 2021-10-12 RX ORDER — AMIODARONE HYDROCHLORIDE 200 MG/1
TABLET ORAL
Qty: 90 TABLET | Refills: 3 | Status: SHIPPED | OUTPATIENT
Start: 2021-10-12

## 2021-10-28 ENCOUNTER — RA CDI HCC (OUTPATIENT)
Dept: OTHER | Facility: HOSPITAL | Age: 86
End: 2021-10-28

## 2021-10-28 PROBLEM — I13.0 HYPERTENSIVE HEART AND KIDNEY DISEASE WITH HF AND WITH CKD STAGE I-IV (HCC): Status: ACTIVE | Noted: 2018-03-06

## 2021-11-05 ENCOUNTER — OFFICE VISIT (OUTPATIENT)
Dept: FAMILY MEDICINE CLINIC | Facility: CLINIC | Age: 86
End: 2021-11-05
Payer: COMMERCIAL

## 2021-11-05 VITALS
TEMPERATURE: 98.6 F | RESPIRATION RATE: 16 BRPM | OXYGEN SATURATION: 95 % | DIASTOLIC BLOOD PRESSURE: 80 MMHG | HEIGHT: 65 IN | WEIGHT: 201 LBS | BODY MASS INDEX: 33.49 KG/M2 | SYSTOLIC BLOOD PRESSURE: 120 MMHG | HEART RATE: 60 BPM

## 2021-11-05 DIAGNOSIS — M54.2 CERVICALGIA: ICD-10-CM

## 2021-11-05 DIAGNOSIS — Z23 ENCOUNTER FOR IMMUNIZATION: ICD-10-CM

## 2021-11-05 DIAGNOSIS — Z01.818 PRE-OP EXAMINATION: Primary | ICD-10-CM

## 2021-11-05 DIAGNOSIS — H61.21 IMPACTED CERUMEN OF RIGHT EAR: ICD-10-CM

## 2021-11-05 PROCEDURE — 99214 OFFICE O/P EST MOD 30 MIN: CPT

## 2021-11-05 PROCEDURE — G0008 ADMIN INFLUENZA VIRUS VAC: HCPCS

## 2021-11-05 PROCEDURE — 90662 IIV NO PRSV INCREASED AG IM: CPT

## 2021-11-05 PROCEDURE — 1160F RVW MEDS BY RX/DR IN RCRD: CPT

## 2021-11-05 PROCEDURE — 1036F TOBACCO NON-USER: CPT

## 2021-11-09 ENCOUNTER — REMOTE DEVICE CLINIC VISIT (OUTPATIENT)
Dept: CARDIOLOGY CLINIC | Facility: CLINIC | Age: 86
End: 2021-11-09
Payer: COMMERCIAL

## 2021-11-09 DIAGNOSIS — Z95.810 PRESENCE OF AUTOMATIC CARDIOVERTER/DEFIBRILLATOR (AICD): Primary | ICD-10-CM

## 2021-11-09 PROCEDURE — 93295 DEV INTERROG REMOTE 1/2/MLT: CPT | Performed by: INTERNAL MEDICINE

## 2021-11-09 PROCEDURE — 93296 REM INTERROG EVL PM/IDS: CPT | Performed by: INTERNAL MEDICINE

## 2021-11-20 ENCOUNTER — HOSPITAL ENCOUNTER (INPATIENT)
Facility: HOSPITAL | Age: 86
LOS: 3 days | Discharge: HOME HEALTH | DRG: 871 | End: 2021-11-23
Attending: EMERGENCY MEDICINE | Admitting: FAMILY MEDICINE
Payer: COMMERCIAL

## 2021-11-20 ENCOUNTER — APPOINTMENT (EMERGENCY)
Dept: RADIOLOGY | Facility: HOSPITAL | Age: 86
DRG: 871 | End: 2021-11-20
Payer: COMMERCIAL

## 2021-11-20 DIAGNOSIS — N39.0 UTI (URINARY TRACT INFECTION): ICD-10-CM

## 2021-11-20 DIAGNOSIS — A41.9 SEPSIS (HCC): Primary | ICD-10-CM

## 2021-11-20 DIAGNOSIS — R50.9 FEVER: ICD-10-CM

## 2021-11-20 DIAGNOSIS — N17.9 ACUTE KIDNEY INJURY (HCC): ICD-10-CM

## 2021-11-20 DIAGNOSIS — Z98.890 STATUS POST LUMBAR SPINE SURGERY FOR DECOMPRESSION OF SPINAL CORD: ICD-10-CM

## 2021-11-20 LAB
ALBUMIN SERPL BCP-MCNC: 3.2 G/DL (ref 3.5–5)
ALP SERPL-CCNC: 67 U/L (ref 46–116)
ALT SERPL W P-5'-P-CCNC: 23 U/L (ref 12–78)
ANION GAP SERPL CALCULATED.3IONS-SCNC: 7 MMOL/L (ref 4–13)
APTT PPP: 37 SECONDS (ref 23–37)
AST SERPL W P-5'-P-CCNC: 20 U/L (ref 5–45)
ATRIAL RATE: 220 BPM
BACTERIA UR QL AUTO: ABNORMAL /HPF
BASOPHILS # BLD AUTO: 0.04 THOUSANDS/ΜL (ref 0–0.1)
BASOPHILS NFR BLD AUTO: 0 % (ref 0–1)
BILIRUB SERPL-MCNC: 0.69 MG/DL (ref 0.2–1)
BILIRUB UR QL STRIP: NEGATIVE
BUN SERPL-MCNC: 17 MG/DL (ref 5–25)
CALCIUM ALBUM COR SERPL-MCNC: 9.5 MG/DL (ref 8.3–10.1)
CALCIUM SERPL-MCNC: 8.9 MG/DL (ref 8.3–10.1)
CHLORIDE SERPL-SCNC: 105 MMOL/L (ref 100–108)
CLARITY UR: ABNORMAL
CO2 SERPL-SCNC: 25 MMOL/L (ref 21–32)
COLOR UR: YELLOW
CREAT SERPL-MCNC: 1.5 MG/DL (ref 0.6–1.3)
EOSINOPHIL # BLD AUTO: 0.04 THOUSAND/ΜL (ref 0–0.61)
EOSINOPHIL NFR BLD AUTO: 0 % (ref 0–6)
ERYTHROCYTE [DISTWIDTH] IN BLOOD BY AUTOMATED COUNT: 14.2 % (ref 11.6–15.1)
FLUAV RNA RESP QL NAA+PROBE: NEGATIVE
FLUBV RNA RESP QL NAA+PROBE: NEGATIVE
GFR SERPL CREATININE-BSD FRML MDRD: 41 ML/MIN/1.73SQ M
GLUCOSE SERPL-MCNC: 134 MG/DL (ref 65–140)
GLUCOSE UR STRIP-MCNC: NEGATIVE MG/DL
HCT VFR BLD AUTO: 34.3 % (ref 36.5–49.3)
HGB BLD-MCNC: 11.2 G/DL (ref 12–17)
HGB UR QL STRIP.AUTO: ABNORMAL
HYALINE CASTS #/AREA URNS LPF: ABNORMAL /LPF
IMM GRANULOCYTES # BLD AUTO: 0.09 THOUSAND/UL (ref 0–0.2)
IMM GRANULOCYTES NFR BLD AUTO: 1 % (ref 0–2)
INR PPP: 1.76 (ref 0.84–1.19)
KETONES UR STRIP-MCNC: NEGATIVE MG/DL
LACTATE SERPL-SCNC: 0.8 MMOL/L (ref 0.5–2)
LACTATE SERPL-SCNC: 2.3 MMOL/L (ref 0.5–2)
LACTATE SERPL-SCNC: 2.6 MMOL/L (ref 0.5–2)
LEUKOCYTE ESTERASE UR QL STRIP: ABNORMAL
LYMPHOCYTES # BLD AUTO: 1.2 THOUSANDS/ΜL (ref 0.6–4.47)
LYMPHOCYTES NFR BLD AUTO: 7 % (ref 14–44)
MCH RBC QN AUTO: 31.2 PG (ref 26.8–34.3)
MCHC RBC AUTO-ENTMCNC: 32.7 G/DL (ref 31.4–37.4)
MCV RBC AUTO: 96 FL (ref 82–98)
MONOCYTES # BLD AUTO: 1.6 THOUSAND/ΜL (ref 0.17–1.22)
MONOCYTES NFR BLD AUTO: 10 % (ref 4–12)
NEUTROPHILS # BLD AUTO: 13.45 THOUSANDS/ΜL (ref 1.85–7.62)
NEUTS SEG NFR BLD AUTO: 82 % (ref 43–75)
NITRITE UR QL STRIP: NEGATIVE
NON-SQ EPI CELLS URNS QL MICRO: ABNORMAL /HPF
NRBC BLD AUTO-RTO: 0 /100 WBCS
PH UR STRIP.AUTO: 5 [PH]
PLATELET # BLD AUTO: 235 THOUSANDS/UL (ref 149–390)
PMV BLD AUTO: 9.9 FL (ref 8.9–12.7)
POTASSIUM SERPL-SCNC: 4.2 MMOL/L (ref 3.5–5.3)
PROCALCITONIN SERPL-MCNC: 0.11 NG/ML
PROT SERPL-MCNC: 7.2 G/DL (ref 6.4–8.2)
PROT UR STRIP-MCNC: NEGATIVE MG/DL
PROTHROMBIN TIME: 19.7 SECONDS (ref 11.6–14.5)
QRS AXIS: -67 DEGREES
QRSD INTERVAL: 86 MS
QT INTERVAL: 386 MS
QTC INTERVAL: 425 MS
RBC # BLD AUTO: 3.59 MILLION/UL (ref 3.88–5.62)
RBC #/AREA URNS AUTO: ABNORMAL /HPF
RSV RNA RESP QL NAA+PROBE: NEGATIVE
SARS-COV-2 RNA RESP QL NAA+PROBE: NEGATIVE
SODIUM SERPL-SCNC: 137 MMOL/L (ref 136–145)
SP GR UR STRIP.AUTO: 1.01 (ref 1–1.03)
T WAVE AXIS: 98 DEGREES
UROBILINOGEN UR QL STRIP.AUTO: 0.2 E.U./DL
VENTRICULAR RATE: 73 BPM
WBC # BLD AUTO: 16.42 THOUSAND/UL (ref 4.31–10.16)
WBC #/AREA URNS AUTO: ABNORMAL /HPF

## 2021-11-20 PROCEDURE — 87040 BLOOD CULTURE FOR BACTERIA: CPT | Performed by: EMERGENCY MEDICINE

## 2021-11-20 PROCEDURE — 36415 COLL VENOUS BLD VENIPUNCTURE: CPT | Performed by: EMERGENCY MEDICINE

## 2021-11-20 PROCEDURE — 87086 URINE CULTURE/COLONY COUNT: CPT | Performed by: EMERGENCY MEDICINE

## 2021-11-20 PROCEDURE — 0241U HB NFCT DS VIR RESP RNA 4 TRGT: CPT | Performed by: EMERGENCY MEDICINE

## 2021-11-20 PROCEDURE — 93005 ELECTROCARDIOGRAM TRACING: CPT

## 2021-11-20 PROCEDURE — 84145 PROCALCITONIN (PCT): CPT | Performed by: EMERGENCY MEDICINE

## 2021-11-20 PROCEDURE — 87186 SC STD MICRODIL/AGAR DIL: CPT | Performed by: EMERGENCY MEDICINE

## 2021-11-20 PROCEDURE — 96361 HYDRATE IV INFUSION ADD-ON: CPT

## 2021-11-20 PROCEDURE — 85610 PROTHROMBIN TIME: CPT | Performed by: EMERGENCY MEDICINE

## 2021-11-20 PROCEDURE — 83605 ASSAY OF LACTIC ACID: CPT | Performed by: EMERGENCY MEDICINE

## 2021-11-20 PROCEDURE — 80053 COMPREHEN METABOLIC PANEL: CPT | Performed by: EMERGENCY MEDICINE

## 2021-11-20 PROCEDURE — 93010 ELECTROCARDIOGRAM REPORT: CPT | Performed by: INTERNAL MEDICINE

## 2021-11-20 PROCEDURE — 87077 CULTURE AEROBIC IDENTIFY: CPT | Performed by: EMERGENCY MEDICINE

## 2021-11-20 PROCEDURE — 99285 EMERGENCY DEPT VISIT HI MDM: CPT

## 2021-11-20 PROCEDURE — 85025 COMPLETE CBC W/AUTO DIFF WBC: CPT | Performed by: EMERGENCY MEDICINE

## 2021-11-20 PROCEDURE — 71045 X-RAY EXAM CHEST 1 VIEW: CPT

## 2021-11-20 PROCEDURE — 96365 THER/PROPH/DIAG IV INF INIT: CPT

## 2021-11-20 PROCEDURE — 83605 ASSAY OF LACTIC ACID: CPT | Performed by: FAMILY MEDICINE

## 2021-11-20 PROCEDURE — 85730 THROMBOPLASTIN TIME PARTIAL: CPT | Performed by: EMERGENCY MEDICINE

## 2021-11-20 PROCEDURE — 99285 EMERGENCY DEPT VISIT HI MDM: CPT | Performed by: EMERGENCY MEDICINE

## 2021-11-20 PROCEDURE — 99222 1ST HOSP IP/OBS MODERATE 55: CPT | Performed by: FAMILY MEDICINE

## 2021-11-20 PROCEDURE — 81001 URINALYSIS AUTO W/SCOPE: CPT | Performed by: EMERGENCY MEDICINE

## 2021-11-20 RX ORDER — CHOLECALCIFEROL (VITAMIN D3) 125 MCG
200 CAPSULE ORAL DAILY
Status: DISCONTINUED | OUTPATIENT
Start: 2021-11-21 | End: 2021-11-23 | Stop reason: HOSPADM

## 2021-11-20 RX ORDER — ATORVASTATIN CALCIUM 40 MG/1
40 TABLET, FILM COATED ORAL DAILY
Status: DISCONTINUED | OUTPATIENT
Start: 2021-11-21 | End: 2021-11-23 | Stop reason: HOSPADM

## 2021-11-20 RX ORDER — SACCHAROMYCES BOULARDII 250 MG
250 CAPSULE ORAL 2 TIMES DAILY
Status: DISCONTINUED | OUTPATIENT
Start: 2021-11-20 | End: 2021-11-23 | Stop reason: HOSPADM

## 2021-11-20 RX ORDER — POTASSIUM CHLORIDE 20 MEQ/1
20 TABLET, EXTENDED RELEASE ORAL DAILY
Status: DISCONTINUED | OUTPATIENT
Start: 2021-11-21 | End: 2021-11-23 | Stop reason: HOSPADM

## 2021-11-20 RX ORDER — DOCUSATE SODIUM 100 MG/1
100 CAPSULE, LIQUID FILLED ORAL 2 TIMES DAILY
Status: DISCONTINUED | OUTPATIENT
Start: 2021-11-20 | End: 2021-11-23 | Stop reason: HOSPADM

## 2021-11-20 RX ORDER — MAGNESIUM HYDROXIDE/ALUMINUM HYDROXICE/SIMETHICONE 120; 1200; 1200 MG/30ML; MG/30ML; MG/30ML
30 SUSPENSION ORAL EVERY 6 HOURS PRN
Status: DISCONTINUED | OUTPATIENT
Start: 2021-11-20 | End: 2021-11-23 | Stop reason: HOSPADM

## 2021-11-20 RX ORDER — AMIODARONE HYDROCHLORIDE 200 MG/1
200 TABLET ORAL DAILY
Status: DISCONTINUED | OUTPATIENT
Start: 2021-11-21 | End: 2021-11-23 | Stop reason: HOSPADM

## 2021-11-20 RX ORDER — CLOPIDOGREL BISULFATE 75 MG/1
75 TABLET ORAL DAILY
Status: DISCONTINUED | OUTPATIENT
Start: 2021-11-21 | End: 2021-11-23 | Stop reason: HOSPADM

## 2021-11-20 RX ORDER — ACETAMINOPHEN 325 MG/1
975 TABLET ORAL ONCE
Status: COMPLETED | OUTPATIENT
Start: 2021-11-20 | End: 2021-11-20

## 2021-11-20 RX ORDER — ONDANSETRON 2 MG/ML
4 INJECTION INTRAMUSCULAR; INTRAVENOUS EVERY 6 HOURS PRN
Status: DISCONTINUED | OUTPATIENT
Start: 2021-11-20 | End: 2021-11-23 | Stop reason: HOSPADM

## 2021-11-20 RX ORDER — LANOLIN ALCOHOL/MO/W.PET/CERES
3 CREAM (GRAM) TOPICAL
Status: DISCONTINUED | OUTPATIENT
Start: 2021-11-20 | End: 2021-11-23 | Stop reason: HOSPADM

## 2021-11-20 RX ORDER — ACETAMINOPHEN 325 MG/1
650 TABLET ORAL EVERY 6 HOURS PRN
Status: DISCONTINUED | OUTPATIENT
Start: 2021-11-20 | End: 2021-11-23 | Stop reason: HOSPADM

## 2021-11-20 RX ORDER — NITROGLYCERIN 0.4 MG/1
0.4 TABLET SUBLINGUAL
Status: DISCONTINUED | OUTPATIENT
Start: 2021-11-20 | End: 2021-11-23 | Stop reason: HOSPADM

## 2021-11-20 RX ADMIN — DOCUSATE SODIUM 100 MG: 100 CAPSULE ORAL at 20:42

## 2021-11-20 RX ADMIN — CEFEPIME HYDROCHLORIDE 2000 MG: 2 INJECTION, POWDER, FOR SOLUTION INTRAVENOUS at 16:14

## 2021-11-20 RX ADMIN — SODIUM CHLORIDE 500 ML: 0.9 INJECTION, SOLUTION INTRAVENOUS at 19:35

## 2021-11-20 RX ADMIN — ACETAMINOPHEN 975 MG: 325 TABLET, FILM COATED ORAL at 16:05

## 2021-11-20 RX ADMIN — Medication 250 MG: at 20:42

## 2021-11-20 RX ADMIN — AZTREONAM 2000 MG: 2 INJECTION, POWDER, LYOPHILIZED, FOR SOLUTION INTRAMUSCULAR; INTRAVENOUS at 22:49

## 2021-11-20 RX ADMIN — SODIUM CHLORIDE 1000 ML: 0.9 INJECTION, SOLUTION INTRAVENOUS at 16:00

## 2021-11-20 RX ADMIN — APIXABAN 5 MG: 5 TABLET, FILM COATED ORAL at 20:43

## 2021-11-21 PROBLEM — N17.9 SEPSIS WITH ACUTE RENAL FAILURE WITHOUT SEPTIC SHOCK (HCC): Status: ACTIVE | Noted: 2021-11-20

## 2021-11-21 PROBLEM — R65.20 SEPSIS WITH ACUTE RENAL FAILURE WITHOUT SEPTIC SHOCK (HCC): Status: ACTIVE | Noted: 2021-11-20

## 2021-11-21 LAB
ANION GAP SERPL CALCULATED.3IONS-SCNC: 8 MMOL/L (ref 4–13)
ATRIAL RATE: 468 BPM
ATRIAL RATE: 60 BPM
BASOPHILS # BLD AUTO: 0.05 THOUSANDS/ΜL (ref 0–0.1)
BASOPHILS NFR BLD AUTO: 0 % (ref 0–1)
BUN SERPL-MCNC: 16 MG/DL (ref 5–25)
CALCIUM SERPL-MCNC: 8.9 MG/DL (ref 8.3–10.1)
CHLORIDE SERPL-SCNC: 107 MMOL/L (ref 100–108)
CO2 SERPL-SCNC: 25 MMOL/L (ref 21–32)
CREAT SERPL-MCNC: 1.22 MG/DL (ref 0.6–1.3)
EOSINOPHIL # BLD AUTO: 0.08 THOUSAND/ΜL (ref 0–0.61)
EOSINOPHIL NFR BLD AUTO: 1 % (ref 0–6)
ERYTHROCYTE [DISTWIDTH] IN BLOOD BY AUTOMATED COUNT: 14.4 % (ref 11.6–15.1)
GFR SERPL CREATININE-BSD FRML MDRD: 52 ML/MIN/1.73SQ M
GLUCOSE SERPL-MCNC: 106 MG/DL (ref 65–140)
HCT VFR BLD AUTO: 31.4 % (ref 36.5–49.3)
HGB BLD-MCNC: 10 G/DL (ref 12–17)
IMM GRANULOCYTES # BLD AUTO: 0.09 THOUSAND/UL (ref 0–0.2)
IMM GRANULOCYTES NFR BLD AUTO: 1 % (ref 0–2)
LYMPHOCYTES # BLD AUTO: 1.82 THOUSANDS/ΜL (ref 0.6–4.47)
LYMPHOCYTES NFR BLD AUTO: 11 % (ref 14–44)
MCH RBC QN AUTO: 30.8 PG (ref 26.8–34.3)
MCHC RBC AUTO-ENTMCNC: 31.8 G/DL (ref 31.4–37.4)
MCV RBC AUTO: 97 FL (ref 82–98)
MONOCYTES # BLD AUTO: 1.58 THOUSAND/ΜL (ref 0.17–1.22)
MONOCYTES NFR BLD AUTO: 9 % (ref 4–12)
NEUTROPHILS # BLD AUTO: 13.24 THOUSANDS/ΜL (ref 1.85–7.62)
NEUTS SEG NFR BLD AUTO: 78 % (ref 43–75)
NRBC BLD AUTO-RTO: 0 /100 WBCS
P AXIS: -3 DEGREES
PLATELET # BLD AUTO: 220 THOUSANDS/UL (ref 149–390)
PMV BLD AUTO: 10 FL (ref 8.9–12.7)
POTASSIUM SERPL-SCNC: 3.7 MMOL/L (ref 3.5–5.3)
PR INTERVAL: 250 MS
PR INTERVAL: 268 MS
PROCALCITONIN SERPL-MCNC: 0.11 NG/ML
QRS AXIS: -49 DEGREES
QRS AXIS: -67 DEGREES
QRSD INTERVAL: 96 MS
QRSD INTERVAL: 96 MS
QT INTERVAL: 404 MS
QT INTERVAL: 432 MS
QTC INTERVAL: 404 MS
QTC INTERVAL: 432 MS
RBC # BLD AUTO: 3.25 MILLION/UL (ref 3.88–5.62)
SODIUM SERPL-SCNC: 140 MMOL/L (ref 136–145)
T WAVE AXIS: 126 DEGREES
T WAVE AXIS: 137 DEGREES
VENTRICULAR RATE: 60 BPM
VENTRICULAR RATE: 60 BPM
WBC # BLD AUTO: 16.86 THOUSAND/UL (ref 4.31–10.16)

## 2021-11-21 PROCEDURE — 99233 SBSQ HOSP IP/OBS HIGH 50: CPT | Performed by: INTERNAL MEDICINE

## 2021-11-21 PROCEDURE — 85025 COMPLETE CBC W/AUTO DIFF WBC: CPT | Performed by: PHYSICIAN ASSISTANT

## 2021-11-21 PROCEDURE — 93005 ELECTROCARDIOGRAM TRACING: CPT

## 2021-11-21 PROCEDURE — 97163 PT EVAL HIGH COMPLEX 45 MIN: CPT

## 2021-11-21 PROCEDURE — 93010 ELECTROCARDIOGRAM REPORT: CPT | Performed by: INTERNAL MEDICINE

## 2021-11-21 PROCEDURE — 80048 BASIC METABOLIC PNL TOTAL CA: CPT | Performed by: PHYSICIAN ASSISTANT

## 2021-11-21 PROCEDURE — 99223 1ST HOSP IP/OBS HIGH 75: CPT | Performed by: STUDENT IN AN ORGANIZED HEALTH CARE EDUCATION/TRAINING PROGRAM

## 2021-11-21 PROCEDURE — 84145 PROCALCITONIN (PCT): CPT | Performed by: FAMILY MEDICINE

## 2021-11-21 RX ORDER — TAMSULOSIN HYDROCHLORIDE 0.4 MG/1
0.4 CAPSULE ORAL
Status: DISCONTINUED | OUTPATIENT
Start: 2021-11-21 | End: 2021-11-23 | Stop reason: HOSPADM

## 2021-11-21 RX ADMIN — Medication 250 MG: at 17:58

## 2021-11-21 RX ADMIN — AZTREONAM 2000 MG: 2 INJECTION, POWDER, LYOPHILIZED, FOR SOLUTION INTRAMUSCULAR; INTRAVENOUS at 22:04

## 2021-11-21 RX ADMIN — DOCUSATE SODIUM 100 MG: 100 CAPSULE ORAL at 17:58

## 2021-11-21 RX ADMIN — POTASSIUM CHLORIDE 20 MEQ: 1500 TABLET, EXTENDED RELEASE ORAL at 10:15

## 2021-11-21 RX ADMIN — AZTREONAM 2000 MG: 2 INJECTION, POWDER, LYOPHILIZED, FOR SOLUTION INTRAMUSCULAR; INTRAVENOUS at 13:56

## 2021-11-21 RX ADMIN — CLOPIDOGREL BISULFATE 75 MG: 75 TABLET ORAL at 10:16

## 2021-11-21 RX ADMIN — AMIODARONE HYDROCHLORIDE 200 MG: 200 TABLET ORAL at 10:16

## 2021-11-21 RX ADMIN — METOPROLOL TARTRATE 25 MG: 25 TABLET, FILM COATED ORAL at 10:20

## 2021-11-21 RX ADMIN — ATORVASTATIN CALCIUM 40 MG: 40 TABLET, FILM COATED ORAL at 10:19

## 2021-11-21 RX ADMIN — Medication 200 MG: at 10:19

## 2021-11-21 RX ADMIN — AZTREONAM 2000 MG: 2 INJECTION, POWDER, LYOPHILIZED, FOR SOLUTION INTRAMUSCULAR; INTRAVENOUS at 06:26

## 2021-11-21 RX ADMIN — Medication 250 MG: at 10:20

## 2021-11-21 RX ADMIN — TAMSULOSIN HYDROCHLORIDE 0.4 MG: 0.4 CAPSULE ORAL at 17:58

## 2021-11-21 RX ADMIN — METOPROLOL TARTRATE 25 MG: 25 TABLET, FILM COATED ORAL at 20:34

## 2021-11-21 RX ADMIN — APIXABAN 5 MG: 5 TABLET, FILM COATED ORAL at 10:16

## 2021-11-21 RX ADMIN — APIXABAN 5 MG: 5 TABLET, FILM COATED ORAL at 17:58

## 2021-11-21 RX ADMIN — DOCUSATE SODIUM 100 MG: 100 CAPSULE ORAL at 10:20

## 2021-11-22 LAB
ATRIAL RATE: 60 BPM
BACTERIA UR CULT: ABNORMAL
P AXIS: 4 DEGREES
PR INTERVAL: 270 MS
QRS AXIS: -49 DEGREES
QRSD INTERVAL: 94 MS
QT INTERVAL: 458 MS
QTC INTERVAL: 458 MS
T WAVE AXIS: 138 DEGREES
VENTRICULAR RATE: 60 BPM

## 2021-11-22 PROCEDURE — 99232 SBSQ HOSP IP/OBS MODERATE 35: CPT | Performed by: INTERNAL MEDICINE

## 2021-11-22 PROCEDURE — 93005 ELECTROCARDIOGRAM TRACING: CPT

## 2021-11-22 PROCEDURE — 99233 SBSQ HOSP IP/OBS HIGH 50: CPT | Performed by: INTERNAL MEDICINE

## 2021-11-22 PROCEDURE — 93010 ELECTROCARDIOGRAM REPORT: CPT | Performed by: INTERNAL MEDICINE

## 2021-11-22 PROCEDURE — 97110 THERAPEUTIC EXERCISES: CPT

## 2021-11-22 PROCEDURE — 97116 GAIT TRAINING THERAPY: CPT

## 2021-11-22 RX ORDER — METOPROLOL TARTRATE 5 MG/5ML
2.5 INJECTION INTRAVENOUS ONCE
Status: DISCONTINUED | OUTPATIENT
Start: 2021-11-22 | End: 2021-11-22

## 2021-11-22 RX ADMIN — AZTREONAM 2000 MG: 2 INJECTION, POWDER, LYOPHILIZED, FOR SOLUTION INTRAMUSCULAR; INTRAVENOUS at 14:04

## 2021-11-22 RX ADMIN — Medication 3 MG: at 23:21

## 2021-11-22 RX ADMIN — POTASSIUM CHLORIDE 20 MEQ: 1500 TABLET, EXTENDED RELEASE ORAL at 08:48

## 2021-11-22 RX ADMIN — AZTREONAM 2000 MG: 2 INJECTION, POWDER, LYOPHILIZED, FOR SOLUTION INTRAMUSCULAR; INTRAVENOUS at 23:36

## 2021-11-22 RX ADMIN — ATORVASTATIN CALCIUM 40 MG: 40 TABLET, FILM COATED ORAL at 08:48

## 2021-11-22 RX ADMIN — Medication 3 MG: at 01:31

## 2021-11-22 RX ADMIN — METOPROLOL TARTRATE 25 MG: 25 TABLET, FILM COATED ORAL at 23:21

## 2021-11-22 RX ADMIN — AMIODARONE HYDROCHLORIDE 200 MG: 200 TABLET ORAL at 08:48

## 2021-11-22 RX ADMIN — Medication 250 MG: at 08:48

## 2021-11-22 RX ADMIN — AZTREONAM 2000 MG: 2 INJECTION, POWDER, LYOPHILIZED, FOR SOLUTION INTRAMUSCULAR; INTRAVENOUS at 06:09

## 2021-11-22 RX ADMIN — APIXABAN 5 MG: 5 TABLET, FILM COATED ORAL at 17:16

## 2021-11-22 RX ADMIN — Medication 250 MG: at 17:16

## 2021-11-22 RX ADMIN — TAMSULOSIN HYDROCHLORIDE 0.4 MG: 0.4 CAPSULE ORAL at 17:16

## 2021-11-22 RX ADMIN — CLOPIDOGREL BISULFATE 75 MG: 75 TABLET ORAL at 08:48

## 2021-11-22 RX ADMIN — APIXABAN 5 MG: 5 TABLET, FILM COATED ORAL at 08:48

## 2021-11-22 RX ADMIN — METOPROLOL TARTRATE 25 MG: 25 TABLET, FILM COATED ORAL at 08:48

## 2021-11-22 RX ADMIN — Medication 200 MG: at 08:48

## 2021-11-23 ENCOUNTER — TRANSITIONAL CARE MANAGEMENT (OUTPATIENT)
Dept: FAMILY MEDICINE CLINIC | Facility: CLINIC | Age: 86
End: 2021-11-23

## 2021-11-23 VITALS
WEIGHT: 207 LBS | DIASTOLIC BLOOD PRESSURE: 62 MMHG | RESPIRATION RATE: 16 BRPM | HEIGHT: 66 IN | BODY MASS INDEX: 33.27 KG/M2 | TEMPERATURE: 98.7 F | OXYGEN SATURATION: 94 % | HEART RATE: 61 BPM | SYSTOLIC BLOOD PRESSURE: 132 MMHG

## 2021-11-23 LAB
ANION GAP SERPL CALCULATED.3IONS-SCNC: 5 MMOL/L (ref 4–13)
ATRIAL RATE: 97 BPM
BASOPHILS # BLD AUTO: 0.04 THOUSANDS/ΜL (ref 0–0.1)
BASOPHILS NFR BLD AUTO: 0 % (ref 0–1)
BUN SERPL-MCNC: 15 MG/DL (ref 5–25)
CALCIUM SERPL-MCNC: 8.3 MG/DL (ref 8.3–10.1)
CHLORIDE SERPL-SCNC: 106 MMOL/L (ref 100–108)
CO2 SERPL-SCNC: 25 MMOL/L (ref 21–32)
CREAT SERPL-MCNC: 1.11 MG/DL (ref 0.6–1.3)
EOSINOPHIL # BLD AUTO: 0.09 THOUSAND/ΜL (ref 0–0.61)
EOSINOPHIL NFR BLD AUTO: 1 % (ref 0–6)
ERYTHROCYTE [DISTWIDTH] IN BLOOD BY AUTOMATED COUNT: 13.9 % (ref 11.6–15.1)
GFR SERPL CREATININE-BSD FRML MDRD: 59 ML/MIN/1.73SQ M
GLUCOSE SERPL-MCNC: 117 MG/DL (ref 65–140)
HCT VFR BLD AUTO: 32.3 % (ref 36.5–49.3)
HGB BLD-MCNC: 10.6 G/DL (ref 12–17)
IMM GRANULOCYTES # BLD AUTO: 0.08 THOUSAND/UL (ref 0–0.2)
IMM GRANULOCYTES NFR BLD AUTO: 1 % (ref 0–2)
LYMPHOCYTES # BLD AUTO: 1.23 THOUSANDS/ΜL (ref 0.6–4.47)
LYMPHOCYTES NFR BLD AUTO: 10 % (ref 14–44)
MAGNESIUM SERPL-MCNC: 2.2 MG/DL (ref 1.6–2.6)
MCH RBC QN AUTO: 31.4 PG (ref 26.8–34.3)
MCHC RBC AUTO-ENTMCNC: 32.8 G/DL (ref 31.4–37.4)
MCV RBC AUTO: 96 FL (ref 82–98)
MONOCYTES # BLD AUTO: 1.25 THOUSAND/ΜL (ref 0.17–1.22)
MONOCYTES NFR BLD AUTO: 10 % (ref 4–12)
NEUTROPHILS # BLD AUTO: 9.65 THOUSANDS/ΜL (ref 1.85–7.62)
NEUTS SEG NFR BLD AUTO: 78 % (ref 43–75)
NRBC BLD AUTO-RTO: 0 /100 WBCS
PHOSPHATE SERPL-MCNC: 2.3 MG/DL (ref 2.3–4.1)
PLATELET # BLD AUTO: 249 THOUSANDS/UL (ref 149–390)
PMV BLD AUTO: 10.4 FL (ref 8.9–12.7)
POTASSIUM SERPL-SCNC: 4 MMOL/L (ref 3.5–5.3)
QRS AXIS: 119 DEGREES
QRSD INTERVAL: 152 MS
QT INTERVAL: 390 MS
QTC INTERVAL: 560 MS
RBC # BLD AUTO: 3.38 MILLION/UL (ref 3.88–5.62)
SODIUM SERPL-SCNC: 136 MMOL/L (ref 136–145)
T WAVE AXIS: -40 DEGREES
VENTRICULAR RATE: 124 BPM
WBC # BLD AUTO: 12.34 THOUSAND/UL (ref 4.31–10.16)

## 2021-11-23 PROCEDURE — 97116 GAIT TRAINING THERAPY: CPT

## 2021-11-23 PROCEDURE — 80048 BASIC METABOLIC PNL TOTAL CA: CPT | Performed by: INTERNAL MEDICINE

## 2021-11-23 PROCEDURE — 99239 HOSP IP/OBS DSCHRG MGMT >30: CPT | Performed by: INTERNAL MEDICINE

## 2021-11-23 PROCEDURE — 83735 ASSAY OF MAGNESIUM: CPT | Performed by: INTERNAL MEDICINE

## 2021-11-23 PROCEDURE — 85025 COMPLETE CBC W/AUTO DIFF WBC: CPT | Performed by: INTERNAL MEDICINE

## 2021-11-23 PROCEDURE — 99232 SBSQ HOSP IP/OBS MODERATE 35: CPT | Performed by: INTERNAL MEDICINE

## 2021-11-23 PROCEDURE — 84100 ASSAY OF PHOSPHORUS: CPT | Performed by: INTERNAL MEDICINE

## 2021-11-23 PROCEDURE — 93010 ELECTROCARDIOGRAM REPORT: CPT | Performed by: INTERNAL MEDICINE

## 2021-11-23 RX ADMIN — METOPROLOL TARTRATE 25 MG: 25 TABLET, FILM COATED ORAL at 09:10

## 2021-11-23 RX ADMIN — AMIODARONE HYDROCHLORIDE 200 MG: 200 TABLET ORAL at 09:12

## 2021-11-23 RX ADMIN — APIXABAN 5 MG: 5 TABLET, FILM COATED ORAL at 09:10

## 2021-11-23 RX ADMIN — CLOPIDOGREL BISULFATE 75 MG: 75 TABLET ORAL at 09:12

## 2021-11-23 RX ADMIN — DOCUSATE SODIUM 100 MG: 100 CAPSULE ORAL at 09:11

## 2021-11-23 RX ADMIN — Medication 200 MG: at 09:11

## 2021-11-23 RX ADMIN — AZTREONAM 2000 MG: 2 INJECTION, POWDER, LYOPHILIZED, FOR SOLUTION INTRAMUSCULAR; INTRAVENOUS at 05:13

## 2021-11-23 RX ADMIN — Medication 250 MG: at 09:12

## 2021-11-23 RX ADMIN — POTASSIUM CHLORIDE 20 MEQ: 1500 TABLET, EXTENDED RELEASE ORAL at 09:09

## 2021-11-23 RX ADMIN — ATORVASTATIN CALCIUM 40 MG: 40 TABLET, FILM COATED ORAL at 09:11

## 2021-11-26 ENCOUNTER — OFFICE VISIT (OUTPATIENT)
Dept: FAMILY MEDICINE CLINIC | Facility: CLINIC | Age: 86
End: 2021-11-26
Payer: COMMERCIAL

## 2021-11-26 VITALS
HEART RATE: 120 BPM | TEMPERATURE: 98.9 F | BODY MASS INDEX: 32.78 KG/M2 | DIASTOLIC BLOOD PRESSURE: 80 MMHG | SYSTOLIC BLOOD PRESSURE: 122 MMHG | RESPIRATION RATE: 16 BRPM | WEIGHT: 204 LBS | HEIGHT: 66 IN

## 2021-11-26 DIAGNOSIS — I50.22 CHRONIC SYSTOLIC CHF (CONGESTIVE HEART FAILURE) (HCC): ICD-10-CM

## 2021-11-26 DIAGNOSIS — N17.9 SEPSIS DUE TO ESCHERICHIA COLI WITH ACUTE RENAL FAILURE WITHOUT SEPTIC SHOCK, UNSPECIFIED ACUTE RENAL FAILURE TYPE (HCC): Primary | ICD-10-CM

## 2021-11-26 DIAGNOSIS — A41.51 SEPSIS DUE TO ESCHERICHIA COLI WITH ACUTE RENAL FAILURE WITHOUT SEPTIC SHOCK, UNSPECIFIED ACUTE RENAL FAILURE TYPE (HCC): Primary | ICD-10-CM

## 2021-11-26 DIAGNOSIS — R65.20 SEPSIS DUE TO ESCHERICHIA COLI WITH ACUTE RENAL FAILURE WITHOUT SEPTIC SHOCK, UNSPECIFIED ACUTE RENAL FAILURE TYPE (HCC): Primary | ICD-10-CM

## 2021-11-26 LAB
BACTERIA BLD CULT: NORMAL
BACTERIA BLD CULT: NORMAL

## 2021-11-26 PROCEDURE — 1111F DSCHRG MED/CURRENT MED MERGE: CPT | Performed by: FAMILY MEDICINE

## 2021-11-26 PROCEDURE — 99496 TRANSJ CARE MGMT HIGH F2F 7D: CPT | Performed by: FAMILY MEDICINE

## 2021-11-26 RX ORDER — TAMSULOSIN HYDROCHLORIDE 0.4 MG/1
0.4 CAPSULE ORAL
COMMUNITY
Start: 2021-11-10 | End: 2022-11-10

## 2021-11-27 ENCOUNTER — TELEPHONE (OUTPATIENT)
Dept: OTHER | Facility: OTHER | Age: 86
End: 2021-11-27

## 2021-11-29 ENCOUNTER — TELEPHONE (OUTPATIENT)
Dept: FAMILY MEDICINE CLINIC | Facility: CLINIC | Age: 86
End: 2021-11-29

## 2021-11-30 ENCOUNTER — TELEPHONE (OUTPATIENT)
Dept: FAMILY MEDICINE CLINIC | Facility: CLINIC | Age: 86
End: 2021-11-30

## 2022-01-13 ENCOUNTER — OFFICE VISIT (OUTPATIENT)
Dept: FAMILY MEDICINE CLINIC | Facility: CLINIC | Age: 87
End: 2022-01-13
Payer: COMMERCIAL

## 2022-01-13 VITALS
DIASTOLIC BLOOD PRESSURE: 80 MMHG | HEART RATE: 68 BPM | OXYGEN SATURATION: 96 % | BODY MASS INDEX: 31.14 KG/M2 | HEIGHT: 64 IN | RESPIRATION RATE: 20 BRPM | TEMPERATURE: 97.9 F | SYSTOLIC BLOOD PRESSURE: 134 MMHG | WEIGHT: 182.4 LBS

## 2022-01-13 DIAGNOSIS — Z79.01 CHRONIC ANTICOAGULATION: ICD-10-CM

## 2022-01-13 DIAGNOSIS — Z00.00 MEDICARE ANNUAL WELLNESS VISIT, SUBSEQUENT: ICD-10-CM

## 2022-01-13 DIAGNOSIS — N18.31 STAGE 3A CHRONIC KIDNEY DISEASE (HCC): ICD-10-CM

## 2022-01-13 DIAGNOSIS — K59.01 SLOW TRANSIT CONSTIPATION: ICD-10-CM

## 2022-01-13 DIAGNOSIS — Z95.1 HX OF CABG: ICD-10-CM

## 2022-01-13 DIAGNOSIS — Z95.5 STATUS POST INSERTION OF DRUG ELUTING CORONARY ARTERY STENT: ICD-10-CM

## 2022-01-13 DIAGNOSIS — Z79.899 LONG TERM CURRENT USE OF AMIODARONE: ICD-10-CM

## 2022-01-13 DIAGNOSIS — G95.19 NEUROGENIC CLAUDICATION (HCC): ICD-10-CM

## 2022-01-13 DIAGNOSIS — I25.10 CORONARY ARTERY DISEASE INVOLVING NATIVE CORONARY ARTERY OF NATIVE HEART WITHOUT ANGINA PECTORIS: Chronic | ICD-10-CM

## 2022-01-13 DIAGNOSIS — G47.33 OBSTRUCTIVE SLEEP APNEA: ICD-10-CM

## 2022-01-13 DIAGNOSIS — Z95.810 PRESENCE OF DOUBLE CHAMBER AUTOMATIC CARDIOVERTER/DEFIBRILLATOR (AICD): ICD-10-CM

## 2022-01-13 DIAGNOSIS — Z98.890 STATUS POST LUMBAR SPINE SURGERY FOR DECOMPRESSION OF SPINAL CORD: ICD-10-CM

## 2022-01-13 DIAGNOSIS — E78.2 MIXED HYPERLIPIDEMIA: ICD-10-CM

## 2022-01-13 DIAGNOSIS — I47.2 VENTRICULAR TACHYCARDIA (HCC): ICD-10-CM

## 2022-01-13 DIAGNOSIS — I48.3 TYPICAL ATRIAL FLUTTER (HCC): ICD-10-CM

## 2022-01-13 DIAGNOSIS — F51.04 PSYCHOPHYSIOLOGICAL INSOMNIA: ICD-10-CM

## 2022-01-13 DIAGNOSIS — N30.00 ACUTE CYSTITIS WITHOUT HEMATURIA: Primary | ICD-10-CM

## 2022-01-13 DIAGNOSIS — I63.412 CEREBROVASCULAR ACCIDENT (CVA) DUE TO EMBOLISM OF LEFT MIDDLE CEREBRAL ARTERY (HCC): ICD-10-CM

## 2022-01-13 DIAGNOSIS — I50.22 CHRONIC SYSTOLIC CHF (CONGESTIVE HEART FAILURE) (HCC): ICD-10-CM

## 2022-01-13 DIAGNOSIS — R73.01 IMPAIRED FASTING GLUCOSE: ICD-10-CM

## 2022-01-13 PROBLEM — N17.9 SEPSIS WITH ACUTE RENAL FAILURE WITHOUT SEPTIC SHOCK (HCC): Status: RESOLVED | Noted: 2021-11-20 | Resolved: 2022-01-13

## 2022-01-13 PROBLEM — I95.9 HYPOTENSION: Status: RESOLVED | Noted: 2019-10-28 | Resolved: 2022-01-13

## 2022-01-13 PROBLEM — N17.9 ACUTE KIDNEY INJURY (HCC): Status: RESOLVED | Noted: 2019-10-19 | Resolved: 2022-01-13

## 2022-01-13 PROBLEM — Z01.818 PRE-OP EXAMINATION: Status: RESOLVED | Noted: 2021-11-05 | Resolved: 2022-01-13

## 2022-01-13 PROBLEM — A41.9 SEPSIS WITH ACUTE RENAL FAILURE WITHOUT SEPTIC SHOCK (HCC): Status: RESOLVED | Noted: 2021-11-20 | Resolved: 2022-01-13

## 2022-01-13 PROBLEM — E78.5 DYSLIPIDEMIA: Status: RESOLVED | Noted: 2018-03-06 | Resolved: 2022-01-13

## 2022-01-13 PROBLEM — R65.20 SEPSIS WITH ACUTE RENAL FAILURE WITHOUT SEPTIC SHOCK (HCC): Status: RESOLVED | Noted: 2021-11-20 | Resolved: 2022-01-13

## 2022-01-13 PROBLEM — D72.829 LEUKOCYTOSIS: Status: RESOLVED | Noted: 2019-10-28 | Resolved: 2022-01-13

## 2022-01-13 PROBLEM — N18.2 CKD (CHRONIC KIDNEY DISEASE) STAGE 2, GFR 60-89 ML/MIN: Status: RESOLVED | Noted: 2019-10-28 | Resolved: 2022-01-13

## 2022-01-13 PROCEDURE — 87086 URINE CULTURE/COLONY COUNT: CPT | Performed by: FAMILY MEDICINE

## 2022-01-13 PROCEDURE — 3288F FALL RISK ASSESSMENT DOCD: CPT | Performed by: FAMILY MEDICINE

## 2022-01-13 PROCEDURE — 1170F FXNL STATUS ASSESSED: CPT | Performed by: FAMILY MEDICINE

## 2022-01-13 PROCEDURE — 1036F TOBACCO NON-USER: CPT | Performed by: FAMILY MEDICINE

## 2022-01-13 PROCEDURE — 1125F AMNT PAIN NOTED PAIN PRSNT: CPT | Performed by: FAMILY MEDICINE

## 2022-01-13 PROCEDURE — G0439 PPPS, SUBSEQ VISIT: HCPCS | Performed by: FAMILY MEDICINE

## 2022-01-13 PROCEDURE — 87186 SC STD MICRODIL/AGAR DIL: CPT | Performed by: FAMILY MEDICINE

## 2022-01-13 PROCEDURE — 87077 CULTURE AEROBIC IDENTIFY: CPT | Performed by: FAMILY MEDICINE

## 2022-01-13 PROCEDURE — 3725F SCREEN DEPRESSION PERFORMED: CPT | Performed by: FAMILY MEDICINE

## 2022-01-13 PROCEDURE — 1160F RVW MEDS BY RX/DR IN RCRD: CPT | Performed by: FAMILY MEDICINE

## 2022-01-13 PROCEDURE — 99214 OFFICE O/P EST MOD 30 MIN: CPT | Performed by: FAMILY MEDICINE

## 2022-01-13 RX ORDER — SULFAMETHOXAZOLE AND TRIMETHOPRIM 800; 160 MG/1; MG/1
1 TABLET ORAL EVERY 12 HOURS SCHEDULED
Qty: 10 TABLET | Refills: 0 | Status: SHIPPED | OUTPATIENT
Start: 2022-01-13 | End: 2022-01-18

## 2022-01-13 NOTE — PATIENT INSTRUCTIONS
Medicare Preventive Visit Patient Instructions  Thank you for completing your Welcome to Medicare Visit or Medicare Annual Wellness Visit today  Your next wellness visit will be due in one year (1/14/2023)  The screening/preventive services that you may require over the next 5-10 years are detailed below  Some tests may not apply to you based off risk factors and/or age  Screening tests ordered at today's visit but not completed yet may show as past due  Also, please note that scanned in results may not display below  Preventive Screenings:  Service Recommendations Previous Testing/Comments   Colorectal Cancer Screening  · Colonoscopy    · Fecal Occult Blood Test (FOBT)/Fecal Immunochemical Test (FIT)  · Fecal DNA/Cologuard Test  · Flexible Sigmoidoscopy Age: 54-65 years old   Colonoscopy: every 10 years (May be performed more frequently if at higher risk)  OR  FOBT/FIT: every 1 year  OR  Cologuard: every 3 years  OR  Sigmoidoscopy: every 5 years  Screening may be recommended earlier than age 48 if at higher risk for colorectal cancer  Also, an individualized decision between you and your healthcare provider will decide whether screening between the ages of 74-80 would be appropriate   Colonoscopy: Not on file  FOBT/FIT: Not on file  Cologuard: Not on file  Sigmoidoscopy: Not on file    Screening Not Indicated     Prostate Cancer Screening Individualized decision between patient and health care provider in men between ages of 53-78   Medicare will cover every 12 months beginning on the day after your 50th birthday PSA: No results in last 5 years     Screening Not Indicated     Hepatitis C Screening Once for adults born between [facility]  More frequently in patients at high risk for Hepatitis C Hep C Antibody: Not on file        Diabetes Screening 1-2 times per year if you're at risk for diabetes or have pre-diabetes Fasting glucose: No results in last 5 years   A1C: 5 9    Screening Current   Cholesterol Screening Once every 5 years if you don't have a lipid disorder  May order more often based on risk factors  Lipid panel: 10/20/2019    Screening Not Indicated  History Lipid Disorder      Other Preventive Screenings Covered by Medicare:  1  Abdominal Aortic Aneurysm (AAA) Screening: covered once if your at risk  You're considered to be at risk if you have a family history of AAA or a male between the age of 73-68 who smoking at least 100 cigarettes in your lifetime  2  Lung Cancer Screening: covers low dose CT scan once per year if you meet all of the following conditions: (1) Age 50-69; (2) No signs or symptoms of lung cancer; (3) Current smoker or have quit smoking within the last 15 years; (4) You have a tobacco smoking history of at least 30 pack years (packs per day x number of years you smoked); (5) You get a written order from a healthcare provider  3  Glaucoma Screening: covered annually if you're considered high risk: (1) You have diabetes OR (2) Family history of glaucoma OR (3)  aged 48 and older OR (3)  American aged 72 and older  3  Osteoporosis Screening: covered every 2 years if you meet one of the following conditions: (1) Have a vertebral abnormality; (2) On glucocorticoid therapy for more than 3 months; (3) Have primary hyperparathyroidism; (4) On osteoporosis medications and need to assess response to drug therapy  5  HIV Screening: covered annually if you're between the age of 12-76  Also covered annually if you are younger than 13 and older than 72 with risk factors for HIV infection  For pregnant patients, it is covered up to 3 times per pregnancy      Immunizations:  Immunization Recommendations   Influenza Vaccine Annual influenza vaccination during flu season is recommended for all persons aged >= 6 months who do not have contraindications   Pneumococcal Vaccine (Prevnar and Pneumovax)  * Prevnar = PCV13  * Pneumovax = PPSV23 Adults 25-60 years old: 1-3 doses may be recommended based on certain risk factors  Adults 72 years old: Prevnar (PCV13) vaccine recommended followed by Pneumovax (PPSV23) vaccine  If already received PPSV23 since turning 65, then PCV13 recommended at least one year after PPSV23 dose  Hepatitis B Vaccine 3 dose series if at intermediate or high risk (ex: diabetes, end stage renal disease, liver disease)   Tetanus (Td) Vaccine - COST NOT COVERED BY MEDICARE PART B Following completion of primary series, a booster dose should be given every 10 years to maintain immunity against tetanus  Td may also be given as tetanus wound prophylaxis  Tdap Vaccine - COST NOT COVERED BY MEDICARE PART B Recommended at least once for all adults  For pregnant patients, recommended with each pregnancy  Shingles Vaccine (Shingrix) - COST NOT COVERED BY MEDICARE PART B  2 shot series recommended in those aged 48 and above     Health Maintenance Due:  There are no preventive care reminders to display for this patient  Immunizations Due:      Topic Date Due    DTaP,Tdap,and Td Vaccines (1 - Tdap) 06/29/2013     Advance Directives   What are advance directives? Advance directives are legal documents that state your wishes and plans for medical care  These plans are made ahead of time in case you lose your ability to make decisions for yourself  Advance directives can apply to any medical decision, such as the treatments you want, and if you want to donate organs  What are the types of advance directives? There are many types of advance directives, and each state has rules about how to use them  You may choose a combination of any of the following:  · Living will: This is a written record of the treatment you want  You can also choose which treatments you do not want, which to limit, and which to stop at a certain time  This includes surgery, medicine, IV fluid, and tube feedings  · Durable power of  for healthcare [facility]):   This is a written record that states who you want to make healthcare choices for you when you are unable to make them for yourself  This person, called a proxy, is usually a family member or a friend  You may choose more than 1 proxy  · Do not resuscitate (DNR) order:  A DNR order is used in case your heart stops beating or you stop breathing  It is a request not to have certain forms of treatment, such as CPR  A DNR order may be included in other types of advance directives  · Medical directive: This covers the care that you want if you are in a coma, near death, or unable to make decisions for yourself  You can list the treatments you want for each condition  Treatment may include pain medicine, surgery, blood transfusions, dialysis, IV or tube feedings, and a ventilator (breathing machine)  · Values history: This document has questions about your views, beliefs, and how you feel and think about life  This information can help others choose the care that you would choose  Why are advance directives important? An advance directive helps you control your care  Although spoken wishes may be used, it is better to have your wishes written down  Spoken wishes can be misunderstood, or not followed  Treatments may be given even if you do not want them  An advance directive may make it easier for your family to make difficult choices about your care  Fall Prevention    Fall prevention  includes ways to make your home and other areas safer  It also includes ways you can move more carefully to prevent a fall  Health conditions that cause changes in your blood pressure, vision, or muscle strength and coordination may increase your risk for falls  Medicines may also increase your risk for falls if they make you dizzy, weak, or sleepy  Fall prevention tips:   · Stand or sit up slowly  · Use assistive devices as directed  · Wear shoes that fit well and have soles that   · Wear a personal alarm  · Stay active      · Manage your medical conditions  Home Safety Tips:  · Add items to prevent falls in the bathroom  · Keep paths clear  · Install bright lights in your home  · Keep items you use often on shelves within reach  · Paint or place reflective tape on the edges of your stairs  Weight Management   Why it is important to manage your weight:  Being overweight increases your risk of health conditions such as heart disease, high blood pressure, type 2 diabetes, and certain types of cancer  It can also increase your risk for osteoarthritis, sleep apnea, and other respiratory problems  Aim for a slow, steady weight loss  Even a small amount of weight loss can lower your risk of health problems  How to lose weight safely:  A safe and healthy way to lose weight is to eat fewer calories and get regular exercise  You can lose up about 1 pound a week by decreasing the number of calories you eat by 500 calories each day  Healthy meal plan for weight management:  A healthy meal plan includes a variety of foods, contains fewer calories, and helps you stay healthy  A healthy meal plan includes the following:  · Eat whole-grain foods more often  A healthy meal plan should contain fiber  Fiber is the part of grains, fruits, and vegetables that is not broken down by your body  Whole-grain foods are healthy and provide extra fiber in your diet  Some examples of whole-grain foods are whole-wheat breads and pastas, oatmeal, brown rice, and bulgur  · Eat a variety of vegetables every day  Include dark, leafy greens such as spinach, kale, julito greens, and mustard greens  Eat yellow and orange vegetables such as carrots, sweet potatoes, and winter squash  · Eat a variety of fruits every day  Choose fresh or canned fruit (canned in its own juice or light syrup) instead of juice  Fruit juice has very little or no fiber  · Eat low-fat dairy foods  Drink fat-free (skim) milk or 1% milk  Eat fat-free yogurt and low-fat cottage cheese  Try low-fat cheeses such as mozzarella and other reduced-fat cheeses  · Choose meat and other protein foods that are low in fat  Choose beans or other legumes such as split peas or lentils  Choose fish, skinless poultry (chicken or turkey), or lean cuts of red meat (beef or pork)  Before you cook meat or poultry, cut off any visible fat  · Use less fat and oil  Try baking foods instead of frying them  Add less fat, such as margarine, sour cream, regular salad dressing and mayonnaise to foods  Eat fewer high-fat foods  Some examples of high-fat foods include french fries, doughnuts, ice cream, and cakes  · Eat fewer sweets  Limit foods and drinks that are high in sugar  This includes candy, cookies, regular soda, and sweetened drinks  Exercise:  Exercise at least 30 minutes per day on most days of the week  Some examples of exercise include walking, biking, dancing, and swimming  You can also fit in more physical activity by taking the stairs instead of the elevator or parking farther away from stores  Ask your healthcare provider about the best exercise plan for you  © Copyright "[employer] 2018 Information is for End User's use only and may not be sold, redistributed or otherwise used for commercial purposes   All illustrations and images included in CareNotes® are the copyrighted property of [employer]  or [address] [place] [address]

## 2022-01-13 NOTE — PROGRESS NOTES
Chief Complaint   Patient presents with   [facility] Wellness Visit     Subsequent   Follow-up     6 months and review labs  Health Maintenance   Topic Date Due    SLP PLAN OF CARE  Never done    DTaP,Tdap,and Td Vaccines (1 - Tdap) 06/29/2013    Medicare Annual Wellness Visit (AWV)  10/22/2021    BMI: Followup Plan  06/17/2022    Fall Risk  01/13/2023    Depression Screening  01/13/2023    BMI: Adult  01/13/2023    Pneumococcal Vaccine: 65+ Years  Completed    Influenza Vaccine  Completed    COVID-19 Vaccine  Completed    HIB Vaccine  Aged Out    Hepatitis B Vaccine  Aged Out    IPV Vaccine  Aged Out    Hepatitis A Vaccine  Aged Out    Meningococcal ACWY Vaccine  Aged Out    HPV Vaccine  Aged Out      Assessment and Plan:     He had 2 primary ptosis in the poster dose of COVID vaccine    He had the seasonal flu shot    Other vaccines are up-to-date  No screenings are indicated for his age based on current guidelines    He does wear hearing aids and wears glasses    Goals should be for continued independence, fall prevention, healthy eating and regular activity  Would be nice to have his BMI get below for that is not the colon with soft        Problem List Items Addressed This Visit     Typical atrial flutter (HCC)    Impaired fasting glucose    Slow transit constipation    Chronic systolic CHF (congestive heart failure) ([facility] )    Obstructive sleep apnea    Stage 3a chronic kidney disease ([facility] )      Other Visit Diagnoses     Acute cystitis without hematuria    -  Primary    Relevant Medications    sulfamethoxazole-trimethoprim (BACTRIM DS) 800-160 mg per tablet    Other Relevant Orders    Urine culture        BMI Counseling: Body mass index is 31 31 kg/m²  The BMI is above normal  Nutrition recommendations include decreasing portion sizes, encouraging healthy choices of fruits and vegetables, moderation in carbohydrate intake and increasing intake of lean protein   Exercise recommendations include exercising 3-5 times per week  No pharmacotherapy was ordered  Patient referred to PCP  Rationale for BMI follow-up plan is due to patient being overweight or obese  He eats a light breakfast and lunch which he prepares in his apartment  He has a full balanced dinner served by the facility in the dining room  He tries to be active and has finished PT following lumbar spine surgery  Now doingPT for balance      Depression Screening and Follow-up Plan: Patient was screened for depression during today's encounter  They screened negative with a PHQ-2 score of 0  Preventive health issues were discussed with patient, and age appropriate screening tests were ordered as noted in patient's After Visit Summary  Personalized health advice and appropriate referrals for health education or preventive services given if needed, as noted in patient's After Visit Summary  History of Present Illness:     Patient presents for Medicare Annual Wellness visit    He continues to live independently in his apartment at [facility]  She is   His son Lilliam Swann who was a physician in used to be in our practice lives in [place]  He drives without incident and keeps his car in an underground carotid showed [address] [place] [address], [address]  He is active and participates in activities in the building  He walks in the building  He has gone through physical therapy from recent back surgery notes to have physical therapy again for some balance issues and mild peripheral neuropathy  His diet includes light breakfast and lunch today he does in his apartment and then tender which she takes at the facility dining  Drinks coffee 1 cup per month in T to 5 times a month  He does not drink alcoholic beverages  He does like drinking water and also likes some Snapple Ice Tea for change  He does have a cane although uses when he goes outside the facility      Patient Care Team:  Jasmyne Veras MD as PCP - General  Ann-Marie Simon Zaki Amaro MD as PCP - PCP-Merged with [facility] Attributed-Roster  Abbie Bauman MD (Urology)  Crispin Andrews DO (Cardiology)     Problem List:     Patient Active Problem List   Diagnosis    Cerebrovascular accident (CVA) due to embolism of left middle cerebral artery [facility]    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 ([facility] )    Dyslipidemia    CAD (coronary artery disease)    Typical atrial flutter ([facility]    Hx of CABG    Ischemic cardiomyopathy    Impaired fasting glucose    Psychophysiological insomnia    Medicare annual wellness visit, subsequent    Subacute left lumbar radiculopathy    Obesity (BMI 30 0-34  9)    Chronic anticoagulation    Ventricular tachycardia (HCC)    Acute kidney injury ([facility]    Status post insertion of drug eluting coronary artery stent    Leukocytosis    Slow transit constipation    Chronic systolic CHF (congestive heart failure) (HCC)    CKD (chronic kidney disease) stage 2, GFR 60-89 ml/min    Hyperlipidemia    Seborrheic keratosis    Long term current use of amiodarone    Obstructive sleep apnea    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)    Neurogenic claudication ([facility] )    Pre-op examination    Status post lumbar spine surgery for decompression of spinal cord    Sepsis with acute renal failure without septic shock ([facility])    Stage 3a chronic kidney disease ([facility]  )      Past Medical and Surgical History:     Past Medical History:   Diagnosis Date    Acute myocardial infarction ([facility] )     Allergic rhinitis     Anxiety     Bimalleolar fracture of left ankle     CAD (coronary artery disease)     Dyslipidemia 3/6/2018    Erectile dysfunction of non-organic origin     Hematuria     workup was negative and felt to be benign    Herpes zoster with complication     Hyperlipidemia     Hypertension     Hypotension 10/28/2019    Impaired fasting glucose     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    Prostatic hypertrophy     benign    Stroke [facility])      Past Surgical History:   Procedure Laterality Date    ANKLE FRACTURE SURGERY Left     CARDIAC PACEMAKER PLACEMENT Left     CORONARY ARTERY BYPASS GRAFT      x4      Family History:     Family History   Problem Relation Age of Onset    Cancer Mother     Hypertension Mother         essential    Pancreatic cancer Mother       Social History:     Social History     Socioeconomic History    Marital status:       Spouse name: moncho    Number of children: None    Years of education: None    Highest education level: None   Occupational History    Occupation: retired     Comment: clergy   Tobacco Use    Smoking status: Former Smoker     Types: Cigarettes     Quit date:      Years since quittin 0    Smokeless tobacco: Never Used   Vaping Use    Vaping Use: Never used   Substance and Sexual Activity    Alcohol use: Not Currently     Comment: very rarely    Drug use: No    Sexual activity: None   Other Topics Concern    None   Social History Narrative    Death in the family- spouse, moncho  after a prolonged francis with lymphoma     Exercises regularly     Social Determinants of Health     Financial Resource Strain: Not on file   Food Insecurity: Not on file   Transportation Needs: Not on file   Physical Activity: Not on file   Stress: Not on file   Social Connections: Not on file   Intimate Partner Violence: Not on file   Housing Stability: Not on file      Medications and Allergies:     Current Outpatient Medications   Medication Sig Dispense Refill    amiodarone 200 mg tablet TAKE 1 TABLET BY MOUTH EVERY DAY 90 tablet 3    atorvastatin (LIPITOR) 40 mg tablet TAKE 1 TABLET BY MOUTH EVERY DAY 90 tablet 3    clopidogrel (PLAVIX) 75 mg tablet Take 1 tablet (75 mg total) by mouth daily 90 tablet 3    Coenzyme Q10 200 MG capsule Take 200 mg by mouth daily      Eliquis 5 MG TAKE 1 TABLET BY MOUTH TWICE A DAY 60 tablet 5    furosemide (LASIX) 20 mg tablet Take 1 tablet (20 mg total) by mouth daily 90 tablet 3    melatonin 3 mg Take 1 tablet (3 mg total) by mouth daily at bedtime 30 each 0    metoprolol tartrate (LOPRESSOR) 25 mg tablet TAKE 1 TABLET (25 MG TOTAL) BY MOUTH EVERY 12 (TWELVE) HOURS 180 tablet 3    nitroglycerin (NITROSTAT) 0 4 mg SL tablet Place 1 tablet (0 4 mg total) under the tongue every 5 (five) minutes as needed for chest pain 25 tablet 1    potassium chloride (Klor-Con M20) 20 mEq tablet Take 1 tablet (20 mEq total) by mouth daily 90 tablet 3    Probiotic Product (PROBIOTIC COLON SUPPORT) CAPS Take 1 capsule by mouth daily      sulfamethoxazole-trimethoprim (BACTRIM DS) 800-160 mg per tablet Take 1 tablet by mouth every 12 (twelve) hours for 5 days 10 tablet 0    tamsulosin (FLOMAX) 0 4 mg Take 0 4 mg by mouth       No current facility-administered medications for this visit  Allergies   Allergen Reactions    Penicillins Rash and Edema     Reaction Date: [id number]; Category: Allergy; Annotation - [id number]: Severe reaction with hospitaization at hospitals-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      Immunizations:     Immunization History   Administered Date(s) Administered    COVID-19 MODERNA VACC 0 5 ML IM 01/21/2021, 02/23/2021, 10/23/2021    Influenza Split High Dose Preservative Free IM 09/12/2009, 11/01/2010, 11/17/2011, 10/05/2014, 09/14/2015, 09/08/2016, 10/26/2017    Influenza, high dose seasonal 0 7 mL 09/27/2018, 10/17/2019, 10/22/2020, 11/05/2021    Influenza, seasonal, injectable 11/17/2011, 11/15/2012, 12/09/2013    Pneumococcal Conjugate 13-Valent 07/13/2015    Pneumococcal Polysaccharide PPV23 01/01/2004    Td (adult), adsorbed 04/13/2009, 04/13/2009, 06/28/2013    Zoster Vaccine Recombinant 04/19/2019, 10/17/2019      Health Maintenance: There are no preventive care reminders to display for this patient        Topic Date Due    DTaP,Tdap,and Td Vaccines (1 - Tdap) 06/29/2013      Medicare Health Risk Assessment:     /80 (BP Location: Left arm, Patient Position: Sitting, Cuff Size: Adult)   Pulse 68   Temp 97 9 °F (36 6 °C) (Tympanic)   Resp 20   Ht 5' 4" (1 626 m)   Wt 82 7 kg (182 lb 6 4 oz)   SpO2 96%   BMI 31 31 kg/m²      Emmy Sandoval is here for his Subsequent Wellness visit  Last Medicare Wellness visit information reviewed, patient interviewed and updates made to the record today  Health Risk Assessment:   Patient rates overall health as good  Patient feels that their physical health rating is same  Patient is satisfied with their life  Eyesight was rated as same  Hearing was rated as same  Patient feels that their emotional and mental health rating is same  Patients states they are never, rarely angry  Patient states they are never, rarely unusually tired/fatigued  Pain experienced in the last 7 days has been none  Patient states that he has experienced no weight loss or gain in last 6 months  Depression Screening:   PHQ-2 Score: 0      Fall Risk Screening: In the past year, patient has experienced: history of falling in past year    Number of falls: 1  Injured during fall?: No    Worried about falling?: No      Home Safety:  Patient does not have trouble with stairs inside or outside of their home  Patient has working smoke alarms and has working carbon monoxide detector  Home safety hazards include: none  Nutrition:   Current diet is Low Cholesterol and No Added Salt  Medications:   Patient is currently taking over-the-counter supplements  OTC medications include: see medication list  Patient is able to manage medications  Activities of Daily Living (ADLs)/Instrumental Activities of Daily Living (IADLs):   Walk and transfer into and out of bed and chair?: Yes  Dress and groom yourself?: Yes    Bathe or shower yourself?: Yes    Feed yourself?  Yes  Do your laundry/housekeeping?: Yes  Manage your money, pay your bills and track your expenses?: Yes  Make your own meals?: Yes Do your own shopping?: Yes    Previous Hospitalizations:   Any hospitalizations or ED visits within the last 12 months?: Yes    How many hospitalizations have you had in the last year?: 1-2    Advance Care Planning:   Living will: Yes    Durable POA for healthcare:  Yes    Advanced directive: Yes    Advanced directive counseling given: Yes    End of Life Decisions reviewed with patient: Yes      Comments: Son Desmond Gonzalez is a physician and I believe POA  They have discussed his wishes  His wife had struggled and he recognizes need for having plans in place      Cognitive Screening:   Provider or family/friend/caregiver concerned regarding cognition?: No    PREVENTIVE SCREENINGS      Cardiovascular Screening:    General: Screening Not Indicated and History Lipid Disorder      Diabetes Screening:     General: Screening Current      Colorectal Cancer Screening:     General: Screening Not Indicated      Prostate Cancer Screening:    General: Screening Not Indicated      Abdominal Aortic Aneurysm (AAA) Screening:    Risk factors include: tobacco use        Lung Cancer Screening:     General: Screening Not Indicated    Screening, Brief Intervention, and Referral to Treatment (SBIRT)    Screening      AUDIT-C Screening:    3) How often did you have 6 or more drinks on one occasion in the past year? never    Single Item Drug Screening:  How often have you used an illegal drug (including marijuana) or a prescription medication for non-medical reasons in the past year? never    Single Item Drug Screen Score: 0  Interpretation: Negative screen for possible drug use disorder      David Garcia MD

## 2022-01-14 NOTE — PROGRESS NOTES
Assessment/Plan:    And reviewed his recent labs with him which are noted throughout the problem oriented charting  CBC was normal, lytes were normal and LFTs were normal     Continue current medications    Will talk to him next week when the urine C&S comes back or he will call for if he has worsening symptoms  We talked about me decreasing office hours in anticipation of care home  He has seen Dr Grisel Nunez at our office and would like to continue with him for his ongoing care  I also made him aware that [address] [place] [address] has a present sedentary in [facility] with a physician and nurse practitioner to have an office there should he decide that getting his care on site is more convenient  I thanked him for the privilege of being his physician all these years      Slow transit constipation  Continue adequate dietary fiber  Continue adequate daily fluid intake  continue daily stool softener    Impaired fasting glucose  Most recent blood sugar fasting was 94 and A1c was 5 9  no medications are indicated and continue with healthy diet    Obstructive sleep apnea  He does not use CPAP or any other device for this    Cerebrovascular accident (CVA) due to embolism of left middle cerebral artery ([address] )  This is historical and there is no residual  He remains on Plavix    Chronic systolic CHF (congestive heart failure) ([facility] )  Wt Readings from Last 3 Encounters:   01/13/22 82 7 kg (182 lb 6 4 oz)   11/26/21 92 5 kg (204 lb)   11/20/21 93 9 kg (207 lb)     He remains compensated while most recent LVEF was 45%    Continues on furosemide and potassium supplementation            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 ([facility] )  Wt Readings from Last 3 Encounters:   01/13/22 82 7 kg (182 lb 6 4 oz)   11/26/21 92 5 kg (204 lb)   11/20/21 93 9 kg (207 lb)     See notes under specific diagnosis that her combined to this overall diagnosis        Typical atrial flutter ([address] )  He remains in what appears to be sinus rhythm    Continues on amiodarone and Eliquis    Cardiology followups    Ventricular tachycardia [facility])  He has an implanted defibrillator  cardiology follows    Stage 3a chronic kidney disease [facility])  Lab Results   Component Value Date    EGFR 59 11/23/2021    EGFR 52 11/21/2021    EGFR 41 11/20/2021    CREATININE 1 11 11/23/2021    CREATININE 1 22 11/21/2021    CREATININE 1 50 (H) 11/20/2021     He continues to drink adequate fluids and avoids NSAIDs    Chronic anticoagulation  Continues on Eliquis for atrial fib flutter  managed by Cardiology    Hyperlipidemia  Last lipids show total cholesterol 177 HDL 71  and triglycerides 147     Continues on atorvastatin 40 mg daily    Neurogenic claudication  Resolved following lumbar spine surgery in November 2012    Psychophysiological insomnia  Sleeps well with nightly use of melatonin    Status post lumbar spine surgery for decompression of spinal cord  She has completed his physical therapy and hence much improved    CAD (coronary artery disease)  No symptoms of angina  remains on beta-blocker, statin, and has nitroglycerin p r n  Diagnoses and all orders for this visit:    Acute cystitis without hematuria  Comments:  Change in urination  Given his admission fall 2021 and now abnormal urinalysis will get urine C&S and begin antibiotic pending results  Orders:  -     Urine culture; Future  -     sulfamethoxazole-trimethoprim (BACTRIM DS) 800-160 mg per tablet;  Take 1 tablet by mouth every 12 (twelve) hours for 5 days  -     Urine culture    Stage 3a chronic kidney disease (HCC)    Slow transit constipation    Impaired fasting glucose    Obstructive sleep apnea    Mixed hyperlipidemia    Coronary artery disease involving native coronary artery of native heart without angina pectoris    Chronic systolic CHF (congestive heart failure) (HCC)    Typical atrial flutter (HCC)    Chronic anticoagulation    Long term current use of amiodarone    Hx of CABG    Status post insertion of drug eluting coronary artery stent    Ventricular tachycardia (HCC)    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)    Medicare annual wellness visit, subsequent    Neurogenic claudication ([facility] )    Status post lumbar spine surgery for decompression of spinal cord    Psychophysiological insomnia    Cerebrovascular accident (CVA) due to embolism of left middle cerebral artery (HCC)          Subjective:      Patient ID: Everton Roy is a 80 y o  male  Is here today for follow-up of chronic conditions    He feels he has been doing reasonably well  He had lumbar spine surgery in early February and is doing great and no longer has pain in his left calf  He also was hospitalized and November this year for UTI with sepsis and JAMEL on CKD    He notes that for the last week he has been waking up to void at night and has never done that before  He has no burning or pain or pressure  his urine is cloudy and malodorous and he has not noted any blood  he has had no systemic symptoms including fever      The following portions of the patient's history were reviewed and updated as appropriate: allergies, current medications, past family history, past medical history, past social history, past surgical history and problem list     Review of Systems   Constitutional:        Generally doing okay   HENT: Positive for hearing loss  Eyes:        Uses glasses   Respiratory: Negative for cough, choking, chest tightness and shortness of breath  Cardiovascular: Negative for chest pain, palpitations and leg swelling  Gastrointestinal:        Denies any dyspepsia or GERD    He does have a tendency for periodic constipation and uses a stool softener most days  He moves his bowels have an average of 3 to 4 times a week  Genitourinary:        See HPI   Musculoskeletal: Positive for back pain and gait problem          See HPI   Neurological: Negative for dizziness, light-headedness and headaches  Psychiatric/Behavioral: Negative for confusion, decreased concentration, dysphoric mood and sleep disturbance (Goes to bed between 2300 hours and midnight and gets up around 730 in the morning and feels rested)  The patient is not nervous/anxious  Objective:      /80 (BP Location: Left arm, Patient Position: Sitting, Cuff Size: Adult)   Pulse 68   Temp 97 9 °F (36 6 °C) (Tympanic)   Resp 20   Ht 5' 4" (1 626 m)   Wt 82 7 kg (182 lb 6 4 oz)   SpO2 96%   BMI 31 31 kg/m²          Physical Exam  Vitals and nursing note reviewed  Constitutional:       Appearance: Normal appearance  He is obese  He is not ill-appearing  Comments: For a mask   Eyes:      Comments: Is wearing glasses   Neck:      Vascular: No carotid bruit  Comments: There is no apparent increased JVD  Cardiovascular:      Rate and Rhythm: Normal rate and regular rhythm  Heart sounds: No murmur heard  Pulmonary:      Effort: Pulmonary effort is normal       Breath sounds: Normal breath sounds  Musculoskeletal:      Right lower leg: No edema  Lymphadenopathy:      Cervical: No cervical adenopathy  Skin:     Coloration: Skin is not jaundiced or pale  Neurological:      Mental Status: He is alert and oriented to person, place, and time  Psychiatric:         Mood and Affect: Mood normal          Behavior: Behavior normal          Thought Content:  Thought content normal          Judgment: Judgment normal       Comments: Patient researched for words or confused to train of thought but mostly is conversation is coherent and engaged

## 2022-01-14 NOTE — ASSESSMENT & PLAN NOTE
Wt Readings from Last 3 Encounters:   01/13/22 82 7 kg (182 lb 6 4 oz)   11/26/21 92 5 kg (204 lb)   11/20/21 93 9 kg (207 lb)     He remains compensated while most recent LVEF was 45%    Continues on furosemide and potassium supplementation

## 2022-01-14 NOTE — ASSESSMENT & PLAN NOTE
Wt Readings from Last 3 Encounters:   01/13/22 82 7 kg (182 lb 6 4 oz)   11/26/21 92 5 kg (204 lb)   11/20/21 93 9 kg (207 lb)     See notes under specific diagnosis that her combined to this overall diagnosis

## 2022-01-14 NOTE — ASSESSMENT & PLAN NOTE
Last lipids show total cholesterol 177 HDL 71  and triglycerides 147     Continues on atorvastatin 40 mg daily

## 2022-01-14 NOTE — ASSESSMENT & PLAN NOTE
Most recent blood sugar fasting was 94 and A1c was 5 9  no medications are indicated and continue with healthy diet

## 2022-01-14 NOTE — ASSESSMENT & PLAN NOTE
Lab Results   Component Value Date    EGFR 59 11/23/2021    EGFR 52 11/21/2021    EGFR 41 11/20/2021    CREATININE 1 11 11/23/2021    CREATININE 1 22 11/21/2021    CREATININE 1 50 (H) 11/20/2021     He continues to drink adequate fluids and avoids NSAIDs

## 2022-01-14 NOTE — ASSESSMENT & PLAN NOTE
He remains in what appears to be sinus rhythm    Continues on amiodarone and Eliquis    Cardiology followups

## 2022-01-14 NOTE — ASSESSMENT & PLAN NOTE
Continue adequate dietary fiber  Continue adequate daily fluid intake  continue daily stool softener

## 2022-01-15 LAB — BACTERIA UR CULT: ABNORMAL

## 2022-01-25 ENCOUNTER — IN-CLINIC DEVICE VISIT (OUTPATIENT)
Dept: CARDIOLOGY CLINIC | Facility: CLINIC | Age: 87
End: 2022-01-25
Payer: COMMERCIAL

## 2022-01-25 DIAGNOSIS — Z95.810 PRESENCE OF AUTOMATIC CARDIOVERTER/DEFIBRILLATOR (AICD): Primary | ICD-10-CM

## 2022-01-25 PROCEDURE — 93283 PRGRMG EVAL IMPLANTABLE DFB: CPT | Performed by: INTERNAL MEDICINE

## 2022-01-25 NOTE — PROGRESS NOTES
Results for orders placed or performed in visit on 01/25/22   Cardiac EP device report    Narrative    MDT-DUAL ICD (AAI-DDD MODE)/ACTIVE SYSTEM IS MRI CONDITIONAL  DEVICE INTERROGATED IN THE [place] OFFICE/YEARLY  BATTERY ADEQUATE (5-8 3 YRS)  AP 87%;  0% (AAI-DDD 60/SSS)  ALL LEAD PARAMETERS WITHIN NORMAL LIMITS  AF 1% (LONGEST EPISODE: 6 HRS)  PT  TAKES AMIODARONE, ELIQUIS, METOPROLOL TART  & PLAVIX  OPTI-VOL WITHIN NORMAL LIMITS/REVIEWED WITH DR BELTRÁN & THERAPY ZONE WAS REPROGRAMMED FOR LOWER RATE THERAPIES  METOPROLOL WAS INCREASED TO 1 TABLET IN THE MORNING & 2 TABLETS @ NIGHT  WILL CHECK REMOTELY EVERY WEEK X4 & THEN, EVERY MONTH X3  WAVELET TEMPLATE UPDATED  NO PROGRAMMING CHANGES MADE TO DEVICE PARAMETERS  NORMAL DEVICE FUNCTION   PL

## 2022-02-01 ENCOUNTER — REMOTE DEVICE CLINIC VISIT (OUTPATIENT)
Dept: CARDIOLOGY CLINIC | Facility: CLINIC | Age: 87
End: 2022-02-01

## 2022-02-01 DIAGNOSIS — Z95.810 AICD (AUTOMATIC CARDIOVERTER/DEFIBRILLATOR) PRESENT: Primary | ICD-10-CM

## 2022-02-01 PROCEDURE — RECHECK: Performed by: INTERNAL MEDICINE

## 2022-02-01 NOTE — PROGRESS NOTES
Results for orders placed or performed in visit on 02/01/22   Cardiac EP device report    Narrative    MDT-DUAL ICD (AAI-DDD MODE)/ACTIVE SYSTEM IS MRI CONDITIONAL  1 WEEK CARELINK TRANSMISSION TO CHECK EPISODES PER DR JAVID WOODALL: NO SIGNIFICANT HIGH RATE EPISODES  BATTERY VOLTAGE ADEQUATE (8 2 YRS)  AP-90%, <0 1%  ALL AVAILABLE LEAD PARAMETERS WITHIN NORMAL LIMITS  OPTI-VOL WITHIN NORMAL LIMITS  NORMAL DEVICE FUNCTION   GV

## 2022-02-15 ENCOUNTER — REMOTE DEVICE CLINIC VISIT (OUTPATIENT)
Dept: CARDIOLOGY CLINIC | Facility: CLINIC | Age: 87
End: 2022-02-15

## 2022-02-15 DIAGNOSIS — Z95.810 PRESENCE OF IMPLANTABLE CARDIOVERTER-DEFIBRILLATOR (ICD): Primary | ICD-10-CM

## 2022-02-15 PROCEDURE — RECHECK: Performed by: INTERNAL MEDICINE

## 2022-02-15 NOTE — PROGRESS NOTES
MDT-DUAL ICD ACTIVE SYSTEM IS MRI CONDITIONAL   NB CARELINK TRANSMISSION:  1 WK EPISODE CHECK   BATTERY VOLTAGE ADEQUATE (8 2 YR)   AP 93 8%  <0 1%    ALL LEAD PARAMETERS WITHIN NORMAL LIMITS   NO HIGH RATE EPISODES   OPTI-VOL WITHIN NORMAL LIMITS   NORMAL DEVICE FUNCTION   RG

## 2022-02-22 ENCOUNTER — REMOTE DEVICE CLINIC VISIT (OUTPATIENT)
Dept: CARDIOLOGY CLINIC | Facility: CLINIC | Age: 87
End: 2022-02-22

## 2022-02-22 DIAGNOSIS — Z95.810 PRESENCE OF IMPLANTABLE CARDIOVERTER-DEFIBRILLATOR (ICD): Primary | ICD-10-CM

## 2022-02-22 PROCEDURE — RECHECK

## 2022-02-22 NOTE — PROGRESS NOTES
Results for orders placed or performed in visit on 02/22/22   Cardiac EP device report    Narrative    MDT-DUAL ICD (AAI-DDD MODE)/ACTIVE SYSTEM IS MRI CONDITIONAL  NB; CARELINK TRANSMISSION: 1 [address] [place]  BATTERY VOLTAGE ADEQUATE (8 2 YR)  AP 95 6%  <0 1% (AAI-DDD 60)  ALL AVAILABLE  LEAD PARAMETERS WITHIN NORMAL LIMITS  NO NEW HIGH RATE EPISODES  OPTI-VOL WITHIN NORMAL LIMITS  NORMAL DEVICE FUNCTION     ES

## 2022-03-25 ENCOUNTER — REMOTE DEVICE CLINIC VISIT (OUTPATIENT)
Dept: CARDIOLOGY CLINIC | Facility: CLINIC | Age: 87
End: 2022-03-25
Payer: COMMERCIAL

## 2022-03-25 DIAGNOSIS — I10 BENIGN ESSENTIAL HYPERTENSION: ICD-10-CM

## 2022-03-25 DIAGNOSIS — Z95.810 PRESENCE OF AUTOMATIC CARDIOVERTER/DEFIBRILLATOR (AICD): Primary | ICD-10-CM

## 2022-03-25 DIAGNOSIS — I25.5 ISCHEMIC CARDIOMYOPATHY: ICD-10-CM

## 2022-03-25 PROCEDURE — 93295 DEV INTERROG REMOTE 1/2/MLT: CPT | Performed by: INTERNAL MEDICINE

## 2022-03-25 PROCEDURE — 93296 REM INTERROG EVL PM/IDS: CPT | Performed by: INTERNAL MEDICINE

## 2022-03-25 RX ORDER — POTASSIUM CHLORIDE 1500 MG/1
TABLET, EXTENDED RELEASE ORAL
Qty: 90 TABLET | Refills: 3 | Status: SHIPPED | OUTPATIENT
Start: 2022-03-25

## 2022-03-25 NOTE — PROGRESS NOTES
Results for orders placed or performed in visit on 03/25/22   Cardiac EP device report    Narrative    MDT-DUAL ICD (AAI-DDD MODE)/ACTIVE SYSTEM IS MRI CONDITIONAL  CARELINK TRANSMISSION: BATTERY VOLTAGE ADEQUATE (8 1 YRS)  AP: 93 3%  : <0 1% (MVP-0N)  ALL AVAILABLE LEAD PARAMETERS WITHIN NORMAL LIMITS  NO SIGNIFICANT HIGH RATE EPISODES  OPTI-VOL WITHIN NORMAL LIMITS  APPROPRIATELY FUNCTIONING ICD    [address] [place] [address]

## 2022-04-11 DIAGNOSIS — I48.3 TYPICAL ATRIAL FLUTTER (HCC): ICD-10-CM

## 2022-04-11 RX ORDER — APIXABAN 5 MG/1
TABLET, FILM COATED ORAL
Qty: 60 TABLET | Refills: 5 | Status: SHIPPED | OUTPATIENT
Start: 2022-04-11

## 2022-04-26 ENCOUNTER — REMOTE DEVICE CLINIC VISIT (OUTPATIENT)
Dept: CARDIOLOGY CLINIC | Facility: CLINIC | Age: 87
End: 2022-04-26
Payer: COMMERCIAL

## 2022-04-26 DIAGNOSIS — Z95.810 PRESENCE OF AUTOMATIC CARDIOVERTER/DEFIBRILLATOR (AICD): Primary | ICD-10-CM

## 2022-04-26 PROCEDURE — 93295 DEV INTERROG REMOTE 1/2/MLT: CPT | Performed by: INTERNAL MEDICINE

## 2022-04-26 PROCEDURE — 93296 REM INTERROG EVL PM/IDS: CPT | Performed by: INTERNAL MEDICINE

## 2022-04-26 NOTE — PROGRESS NOTES
Results for orders placed or performed in visit on 04/26/22   Cardiac EP device report    Narrative    MDT-DUAL ICD (AAI-DDD MODE)/ACTIVE SYSTEM IS MRI CONDITIONAL  CARELINK TRANSMISSION: BATTERY VOLTAGE ADEQUATE (8 YRS)  AP 91%  <0 1% (AAI-DDD 60)  ALL AVAILABLE LEAD PARAMETERS WITHIN NORMAL LIMITS  NO SIGNIFICANT HIGH RATE EPISODES  PATIENT TAKES ELIQUIS, CLOPIDOGREL, AMIODORONE, METOPROLOL SUCC  OPTI-VOL WITHIN NORMAL LIMITS  NORMAL DEVICE FUNCTION    ES

## 2022-05-26 ENCOUNTER — REMOTE DEVICE CLINIC VISIT (OUTPATIENT)
Dept: CARDIOLOGY CLINIC | Facility: CLINIC | Age: 87
End: 2022-05-26

## 2022-05-26 DIAGNOSIS — Z95.810 AICD (AUTOMATIC CARDIOVERTER/DEFIBRILLATOR) PRESENT: Primary | ICD-10-CM

## 2022-05-26 PROCEDURE — RECHECK: Performed by: INTERNAL MEDICINE

## 2022-05-26 NOTE — PROGRESS NOTES
Results for orders placed or performed in visit on 05/26/22   Cardiac EP device report    Narrative    MDT-DUAL ICD (AAI-DDD MODE)/ACTIVE SYSTEM IS MRI CONDITIONAL  1 MONTH CARELINK TRANSMISSION TO CHECK EPISODES PER DR JAVID WOODALL: NO SIGNIFICANT HIGH RATE EPISODES  BATTERY VOLTAGE ADEQUATE (7 9 YRS)  AP-91%, <0 1%  ALL AVAILABLE LEAD PARAMETERS WITHIN NORMAL LIMITS  OPTI-VOL WITHIN NORMAL LIMITS  NORMAL DEVICE FUNCTION   GV

## 2022-06-15 DIAGNOSIS — I10 BENIGN ESSENTIAL HYPERTENSION: ICD-10-CM

## 2022-06-15 NOTE — TELEPHONE ENCOUNTER
Patient states that script for medication need to be updated to reflect change in dose  Patient states he currently takes medication 3x per day, 1 tablet in the morning and 2 in the evening  Current script only lists 1 tablet per day  Pharmacy is requesting an updated script to approve refill for medication

## 2022-07-08 ENCOUNTER — RA CDI HCC (OUTPATIENT)
Dept: OTHER | Facility: HOSPITAL | Age: 87
End: 2022-07-08

## 2022-07-08 NOTE — PROGRESS NOTES
[facility]  coding opportunities          Chart Reviewed number of suggestions sent to Provider: 2   [phone]  [phone]    Patients Insurance     Medicare Insurance: [employer]

## 2022-07-15 ENCOUNTER — OFFICE VISIT (OUTPATIENT)
Dept: FAMILY MEDICINE CLINIC | Facility: CLINIC | Age: 87
End: 2022-07-15
Payer: COMMERCIAL

## 2022-07-15 VITALS
WEIGHT: 173 LBS | DIASTOLIC BLOOD PRESSURE: 64 MMHG | BODY MASS INDEX: 29.53 KG/M2 | HEART RATE: 60 BPM | SYSTOLIC BLOOD PRESSURE: 112 MMHG | HEIGHT: 64 IN | RESPIRATION RATE: 16 BRPM | TEMPERATURE: 99.3 F

## 2022-07-15 DIAGNOSIS — I48.3 TYPICAL ATRIAL FLUTTER (HCC): ICD-10-CM

## 2022-07-15 DIAGNOSIS — E78.2 MIXED HYPERLIPIDEMIA: ICD-10-CM

## 2022-07-15 DIAGNOSIS — R09.89 RHONCHI AT LEFT LUNG BASE: Primary | ICD-10-CM

## 2022-07-15 DIAGNOSIS — I25.10 CORONARY ARTERY DISEASE INVOLVING NATIVE CORONARY ARTERY OF NATIVE HEART WITHOUT ANGINA PECTORIS: Chronic | ICD-10-CM

## 2022-07-15 DIAGNOSIS — I50.22 CHRONIC SYSTOLIC CHF (CONGESTIVE HEART FAILURE) (HCC): ICD-10-CM

## 2022-07-15 DIAGNOSIS — I13.0 HYPERTENSIVE HEART AND KIDNEY DISEASE WITH HF AND WITH CKD STAGE I-IV (HCC): ICD-10-CM

## 2022-07-15 DIAGNOSIS — Z13.29 SCREENING FOR THYROID DISORDER: ICD-10-CM

## 2022-07-15 PROCEDURE — 1160F RVW MEDS BY RX/DR IN RCRD: CPT | Performed by: FAMILY MEDICINE

## 2022-07-15 PROCEDURE — 99214 OFFICE O/P EST MOD 30 MIN: CPT | Performed by: FAMILY MEDICINE

## 2022-07-15 RX ORDER — ALBUTEROL SULFATE 90 UG/1
AEROSOL, METERED RESPIRATORY (INHALATION)
COMMUNITY
Start: 2022-07-06

## 2022-07-15 NOTE — PATIENT INSTRUCTIONS
Please use the antihistamine - claritin daily for 1 week and flonase nasal spray 1 -2 sprays daily in each nostril

## 2022-07-15 NOTE — ASSESSMENT & PLAN NOTE
Wt Readings from Last 3 Encounters:   07/15/22 78 5 kg (173 lb)   01/13/22 82 7 kg (182 lb 6 4 oz)   11/26/21 92 5 kg (204 lb)       CHF is controlled at this time  Patient is currently on Lasix 20 mg daily with potassium 20 mEq  Advised patient to continue daily weight measurements  If greater than 3 -4 lb change, contact office or cardiologist     Obtain CMP, CBC, TSH  Recheck in 6 months

## 2022-07-15 NOTE — ASSESSMENT & PLAN NOTE
Wt Readings from Last 3 Encounters:   07/15/22 78 5 kg (173 lb)   01/13/22 82 7 kg (182 lb 6 4 oz)   11/26/21 92 5 kg (204 lb)   Blood pressure stable today  112/64  Continue with Lopressor 25 mg daily  Recheck CMP      Follow-up in 6 months

## 2022-07-15 NOTE — ASSESSMENT & PLAN NOTE
New findings of bronchi noted in left lower lung field  Will obtain chest x-ray to rule out pneumonia  May also be exacerbation of his CHF  Follow-up when results are available  For postnasal drip recommend that patient a use OTC antihistamine such as Claritin and Flonase 1-2 sprays daily in each nostril for 1 week  If no improvement advised patient to contact office

## 2022-07-15 NOTE — PROGRESS NOTES
Assessment/Plan:    Rhonchi at left lung base  New findings of bronchi noted in left lower lung field  Will obtain chest x-ray to rule out pneumonia  May also be exacerbation of his CHF  Follow-up when results are available  For postnasal drip recommend that patient a use OTC antihistamine such as Claritin and Flonase 1-2 sprays daily in each nostril for 1 week  If no improvement advised patient to contact office  Hyperlipidemia  Stable  No side effects  Continue with Lipitor 40 mg daily and follow-up in 6 months  Recheck lipid panel    Chronic systolic CHF (congestive heart failure) (HCC)  Wt Readings from Last 3 Encounters:   07/15/22 78 5 kg (173 lb)   01/13/22 82 7 kg (182 lb 6 4 oz)   11/26/21 92 5 kg (204 lb)       CHF is controlled at this time  Patient is currently on Lasix 20 mg daily with potassium 20 mEq  Advised patient to continue daily weight measurements  If greater than 3 -4 lb change, contact office or cardiologist     Obtain CMP, CBC, TSH  Recheck in 6 months  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 ([facility] [address] )  Wt Readings from Last 3 Encounters:   07/15/22 78 5 kg (173 lb)   01/13/22 82 7 kg (182 lb 6 4 oz)   11/26/21 92 5 kg (204 lb)   Blood pressure stable today  112/64  Continue with Lopressor 25 mg daily  Recheck CMP  Follow-up in 6 months          CAD (coronary artery disease)  Patient has CAD status post CABG  Continue with Plavix 75 mg daily  Follow-up in 6 months    Typical atrial flutter ([facility] [address] )  Continue with amiodarone per cardiologist   Follow-up cardiologist        Diagnoses and all orders for this visit:    Pearletha Expose at left lung base  -     XR chest pa & lateral; Future    Coronary artery disease involving native coronary artery of native heart without angina pectoris  -     CBC and differential; Future  -     Comprehensive metabolic panel; Future    Mixed hyperlipidemia  -     Lipid panel;  Future    Chronic systolic CHF (congestive heart failure) (HCC)  -     CBC and differential; Future  -     Comprehensive metabolic panel; Future    Screening for thyroid disorder  -     TSH, 3rd generation with Free T4 reflex; Future    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 ([facility])    Typical atrial flutter ([address] )    Other orders  -     albuterol (PROVENTIL HFA,VENTOLIN HFA) 90 mcg/act inhaler        Subjective:   Chief Complaint   Patient presents with    Follow-up     6 month follow up      Health Maintenance   Topic Date Due    SLP PLAN OF CARE  Never done    COVID-19 Vaccine (4 - Booster for Moderna series) 02/23/2022    Influenza Vaccine (1) 09/01/2022    Depression Screening  01/13/2023    BMI: Followup Plan  01/13/2023    Fall Risk  01/13/2023    Medicare Annual Wellness Visit (AWV)  01/13/2023    BMI: Adult  07/15/2023    Pneumococcal Vaccine: 65+ Years  Completed    HIB Vaccine  Aged Out    Hepatitis B Vaccine  Aged Out    IPV Vaccine  Aged Out    Hepatitis A Vaccine  Aged Out    Meningococcal ACWY Vaccine  Aged Out    HPV Vaccine  Aged Out        Patient ID: Gloria Grijalva is a 80 y o  male  HPI    Patient presents for six-month follow-up  CAD s/p CABG/HTN/A flutter/CHF:  Patient currently is on amiodarone 200 mg daily and Lopressor 25 mg b i d  for rate/rhythm control  On Plavix 75 mg daily and Lipitor 40 mg daily  Also on Eliquis 5 mg b i d   For heart failure on Lasix 20 mg daily with 20 mEq of potassium  BPH:  On Flomax 0 4 mg daily  Of note patient reports seen urgent care 2 weeks ago for cough  Patient had x-rays done and was diagnosed with bronchitis  Prescribed doxycycline for 10 days and albuterol inhaler to be used as needed  Patient reports that symptoms have not significantly improved despite taking antibiotics  Has an additional day left  No sick contacts  No recent travel    Told by son who is also physician that previously patient had his cough abated with cough syrup with codeine  The following portions of the patient's history were reviewed and updated as appropriate: allergies, current medications, past family history, past medical history, past social history, past surgical history, and problem list     Review of Systems   Constitutional: Negative for chills and fever  HENT: Negative for congestion  Respiratory: Positive for cough  Negative for shortness of breath  Cardiovascular: Negative for chest pain and palpitations  Gastrointestinal: Negative for diarrhea, nausea and vomiting  Genitourinary: Negative for dysuria  Musculoskeletal: Negative for myalgias  Skin: Negative for rash  Neurological: Negative for dizziness and headaches  Objective:  /64   Pulse 60   Temp 99 3 °F (37 4 °C) (Tympanic)   Resp 16   Ht 5' 4" (1 626 m)   Wt 78 5 kg (173 lb)   BMI 29 70 kg/m²      Physical Exam  Vitals and nursing note reviewed  Constitutional:       General: He is not in acute distress  HENT:      Head: Normocephalic and atraumatic  Right Ear: Tympanic membrane normal       Left Ear: Tympanic membrane normal       Nose: Nose normal       Mouth/Throat:      Comments: Postnasal drip noted  Eyes:      Conjunctiva/sclera: Conjunctivae normal    Cardiovascular:      Rate and Rhythm: Normal rate and regular rhythm  Pulses: Normal pulses  Pulmonary:      Comments: Rhonchi noted in left lower lung field  Abdominal:      General: Bowel sounds are normal  There is no distension  Palpations: Abdomen is soft  Musculoskeletal:         General: No swelling  Lymphadenopathy:      Cervical: No cervical adenopathy  Neurological:      Mental Status: He is alert  This note has been constructed using a voice recognition system  There may be translation, syntax, or grammatical errors  If you have an questions, please contact the dictating provider

## 2022-07-15 NOTE — ASSESSMENT & PLAN NOTE
Stable  No side effects  Continue with Lipitor 40 mg daily and follow-up in 6 months    Recheck lipid panel

## 2022-07-18 ENCOUNTER — APPOINTMENT (OUTPATIENT)
Dept: RADIOLOGY | Age: 87
End: 2022-07-18
Payer: COMMERCIAL

## 2022-07-18 DIAGNOSIS — R09.89 RHONCHI AT LEFT LUNG BASE: ICD-10-CM

## 2022-07-18 PROCEDURE — 71046 X-RAY EXAM CHEST 2 VIEWS: CPT

## 2022-07-26 ENCOUNTER — REMOTE DEVICE CLINIC VISIT (OUTPATIENT)
Dept: CARDIOLOGY CLINIC | Facility: CLINIC | Age: 87
End: 2022-07-26
Payer: COMMERCIAL

## 2022-07-26 DIAGNOSIS — Z95.810 PRESENCE OF IMPLANTABLE CARDIOVERTER-DEFIBRILLATOR (ICD): Primary | ICD-10-CM

## 2022-07-26 PROCEDURE — 93296 REM INTERROG EVL PM/IDS: CPT | Performed by: INTERNAL MEDICINE

## 2022-07-26 PROCEDURE — 93295 DEV INTERROG REMOTE 1/2/MLT: CPT | Performed by: INTERNAL MEDICINE

## 2022-07-26 NOTE — PROGRESS NOTES
MDT-DUAL ICD (AAI-DDD MODE)/ACTIVE SYSTEM IS MRI CONDITIONAL   CARELINK TRANSMISSION:  BATTERY VOLTAGE ADEQUATE (7 7 YR )   AP 92 1%  <0 1%    ALL LEAD PARAMETERS WITHIN NORMAL LIMITS   NO SIGNIFICANT HIGH RATE EPISODES   OPTI-VOL WITHIN NORMAL LIMITS   NORMAL DEVICE FUNCTION   RG

## 2022-09-13 DIAGNOSIS — I10 BENIGN ESSENTIAL HYPERTENSION: ICD-10-CM

## 2022-09-13 DIAGNOSIS — I25.5 ISCHEMIC CARDIOMYOPATHY: ICD-10-CM

## 2022-09-13 DIAGNOSIS — I47.20 VENTRICULAR TACHYCARDIA: ICD-10-CM

## 2022-09-13 DIAGNOSIS — I48.3 TYPICAL ATRIAL FLUTTER (HCC): ICD-10-CM

## 2022-09-13 DIAGNOSIS — E78.5 DYSLIPIDEMIA: ICD-10-CM

## 2022-09-13 RX ORDER — FUROSEMIDE 20 MG/1
TABLET ORAL
Qty: 90 TABLET | Refills: 3 | Status: SHIPPED | OUTPATIENT
Start: 2022-09-13

## 2022-09-14 DIAGNOSIS — I47.20 VENTRICULAR TACHYCARDIA: ICD-10-CM

## 2022-09-14 RX ORDER — CLOPIDOGREL BISULFATE 75 MG/1
TABLET ORAL
Qty: 90 TABLET | Refills: 3 | Status: SHIPPED | OUTPATIENT
Start: 2022-09-14

## 2022-09-14 RX ORDER — AMIODARONE HYDROCHLORIDE 200 MG/1
200 TABLET ORAL DAILY
Qty: 90 TABLET | Refills: 3 | Status: SHIPPED | OUTPATIENT
Start: 2022-09-14

## 2022-09-14 RX ORDER — APIXABAN 5 MG/1
TABLET, FILM COATED ORAL
Qty: 60 TABLET | Refills: 5 | Status: SHIPPED | OUTPATIENT
Start: 2022-09-14

## 2022-09-14 RX ORDER — ATORVASTATIN CALCIUM 40 MG/1
TABLET, FILM COATED ORAL
Qty: 90 TABLET | Refills: 3 | Status: SHIPPED | OUTPATIENT
Start: 2022-09-14

## 2022-09-17 NOTE — PROGRESS NOTES
Cardiology Follow Up    Destin Laguna  9/1/1932  [id number]  [address]  [place] [address]   [phone]    1  Typical atrial flutter (HCC)  POCT ECG   2  Coronary artery disease involving native coronary artery of native heart without angina pectoris     3  Cerebrovascular accident (CVA) due to embolism of left middle cerebral artery ([address] )     4  Benign essential hypertension     5  Ischemic cardiomyopathy     6  Ventricular tachycardia ([address] )     7  Chronic systolic CHF (congestive heart failure) ([address] )     8  CKD (chronic kidney disease) stage 2, GFR 60-89 ml/min     9  Dyslipidemia     10  Obesity (BMI 30 0-34 9)     11  Chronic anticoagulation     12  Status post insertion of drug eluting coronary artery stent     13  Mixed hyperlipidemia     14  Long term current use of amiodarone         HPI:  Destin Laguna is a 80 y o  male presents to the office today status post ICD implantation in October 2019     Symptomatically he is doing well    There is no history of anginal like chest pain or pressure  There is no worsening orthopnea or PND  There is no new worsening of leg swelling    Patient does not complain of any palpitations  There is no new presyncope or syncope  The patient does have relatively limited activity-he does walk to the letter box, he also walks in the passes way of his home      It is to be noted the patient was significantly anemic when he was seen for the procedure  He has been evaluated for the same and is following up with Dr Radha Hamilton    Patient does have history of snoring, morning fatigue and daytime sleepiness  This he attributes to his age  He is not interested in being evaluated for sleep apnea    He has a long history of coronary artery disease and is post bypass  He does have a recent stent  He is on therapy for ventricular tachycardia, hyperlipidemia, coronary artery MEDICAL NUTRITION THERAPY - CONSULT/POSITIVE SCREEN ACKNOWLEDGEMENT    Acknowledgement of consult/positive nutrition screening regarding MST 2 pta. CC 4 carbs per meal diet order currently on chart. Note weight history below. PER EMR, Pt wife reports Pt lost 16# x 1 month. + Emesis'. Labs reviewed. Will add 4 oz Glucerna TID with meals, PO 51-75%, and f/u with further recommendations as indicated.     Wt Readings from Last 10 Encounters:   09/16/22 244 lb 14.4 oz (111.1 kg)   06/20/22 252 lb (114.3 kg)   04/11/22 259 lb (117.5 kg)   11/15/21 259 lb (117.5 kg)   11/04/21 257 lb (116.6 kg)   10/21/21 258 lb (117 kg)   09/24/21 252 lb (114.3 kg)   08/23/21 253 lb (114.8 kg)   08/19/21 250 lb (113.4 kg)   07/20/21 260 lb (117.9 kg)       Jesse Venegas RDN, LILLY 9/17/2022 8:43 AM disease    He does not smoke or abuse alcohol at the current time    Previous History:   Sustained monomorphic VT, post external shock, both outpatient and inpatient  Origin of VT from inferior scar region  Scar related VT cannot be treated by stents at other ischemic sites  Secondary prevention ICD     Coronary artery disease, ischemic cardiomyopathy  LVEF 35%  NYHA class 2-3     On optimal medical therapy-lisinopril, metoprolol for greater than a year     Shared decision making done with patient, primary cardiologist     Sick sinus syndrome  Paroxysmal atrial flutter and fibrillation  History of CVA     Hypertension  Hyperlipidemia  Hematuria              /70 (BP Location: Left arm, Patient Position: Sitting, Cuff Size: Adult)   Pulse 60   Ht 5' 4" (1 626 m)   Wt 82 6 kg (182 lb)   BMI 31 24 kg/m²       Patient Active Problem List   Diagnosis    Cerebrovascular accident (CVA) due to embolism of left middle cerebral artery (HCC)    Benign essential hypertension    Dyslipidemia    CAD (coronary artery disease)    Typical atrial flutter (HCC)    Hx of CABG    Ischemic cardiomyopathy    Impaired fasting glucose    Psychophysiological insomnia    Medicare annual wellness visit, subsequent    Subacute left lumbar radiculopathy    Obesity (BMI 30 0-34  9)    Chronic anticoagulation    Exertional chest pain    Ventricular tachycardia (HCC)    Status post insertion of drug eluting coronary artery stent    Hypotension    Leukocytosis    Slow transit constipation    Chronic systolic CHF (congestive heart failure) (HCC)    Physical deconditioning    CKD (chronic kidney disease) stage 2, GFR 60-89 ml/min    Acute blood loss anemia    Hyperlipidemia    Seborrheic keratosis    Long term current use of amiodarone     Past Medical History:   Diagnosis Date    [facility] myocardial infarction [facility] [address] [facility] )     Allergic rhinitis     Anxiety     Bimalleolar fracture of left ankle     CAD (coronary artery disease)     Erectile dysfunction of non-organic origin     Hematuria     workup was negative and felt to be benign    Herpes zoster with complication     Hyperlipidemia     Hypertension     Impaired fasting glucose     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    Prostatic hypertrophy     benign    Stroke [facility])      Social History     Socioeconomic History    Marital status:       Spouse name: moncho    Number of children: Not on file    Years of education: Not on file    Highest education level: Not on file   Occupational History    Occupation: retired     Comment: clergy   Social Needs    Financial resource strain: Not on file    Food insecurity:     Worry: Not on file     Inability: Not on file   Algorithmia needs:     Medical: Not on file     Non-medical: Not on file   Tobacco Use    Smoking status: Former Smoker     Types: Cigarettes     Last attempt to quit:      Years since quittin 9    Smokeless tobacco: Never Used   Substance and Sexual Activity    Alcohol use: Yes     Comment: social- per allscripts    Drug use: No    Sexual activity: Not on file   Lifestyle    Physical activity:     Days per week: Not on file     Minutes per session: Not on file    Stress: Not on file   Relationships    Social connections:     Talks on phone: Not on file     Gets together: Not on file     Attends Taoist service: Not on file     Active member of club or organization: Not on file     Attends meetings of clubs or organizations: Not on file     Relationship status: Not on file    Intimate partner violence:     Fear of current or ex partner: Not on file     Emotionally abused: Not on file     Physically abused: Not on file     Forced sexual activity: Not on file   Other Topics Concern    Not on file   Social History Narrative    Death in the family- spouse, moncho  after a prolonged francis with lymphoma     Exercises regularly      Family History   Problem Relation Age of Onset    Cancer Mother     Hypertension Mother         essential    Pancreatic cancer Mother      Past Surgical History:   Procedure Laterality Date    ANKLE FRACTURE SURGERY Left     CARDIAC PACEMAKER PLACEMENT Left     CORONARY ARTERY BYPASS GRAFT         Current Outpatient Medications:     amiodarone 200 mg tablet, Take 200 mg by mouth daily, Disp: , Rfl:     apixaban (ELIQUIS) 5 mg, Take 1 tablet (5 mg total) by mouth 2 (two) times a day, Disp: 84 tablet, Rfl: 0    atorvastatin (LIPITOR) 40 mg tablet, Take 1 tablet (40 mg total) by mouth daily, Disp: 90 tablet, Rfl: 3    clopidogrel (PLAVIX) 75 mg tablet, Take 1 tablet (75 mg total) by mouth daily Additional refills to be obtained from PCP or cardiologist , Disp: 90 tablet, Rfl: 3    Coenzyme Q10 200 MG capsule, Take 200 mg by mouth daily, Disp: , Rfl:     doxylamine (UNISON) 25 MG tablet, Take 0 5 tablets (12 5 mg total) by mouth daily at bedtime as needed for sleep, Disp: 30 tablet, Rfl: 0    furosemide (LASIX) 20 mg tablet, Take 1 tablet (20 mg total) by mouth daily, Disp: 90 tablet, Rfl: 3    melatonin 3 mg, Take 1 tablet (3 mg total) by mouth daily at bedtime, Disp: 30 each, Rfl: 0    metoprolol tartrate (LOPRESSOR) 25 mg tablet, Take 1 tablet (25 mg total) by mouth every 12 (twelve) hours, Disp: 60 tablet, Rfl: 0    potassium chloride (KLOR-CON M20) 20 mEq tablet, Take 1 tablet (20 mEq total) by mouth daily, Disp: 90 tablet, Rfl: 3    Probiotic Product (PROBIOTIC COLON SUPPORT) CAPS, Take 1 capsule by mouth daily, Disp: , Rfl:     Triamcinolone Acetonide 55 MCG/ACT AERO, 1 Act (55 mcg total) into each nostril daily, Disp: 1 Bottle, Rfl: 0    magnesium gluconate (MAGONATE) 500 mg tablet, Take 500 mg by mouth daily, Disp: , Rfl:     nitroglycerin (NITROSTAT) 0 4 mg SL tablet, Place 1 tablet (0 4 mg total) under the tongue every 5 (five) minutes as needed for chest pain (Patient not taking: Reported on 12/6/2019), Disp: 25 tablet, Rfl: 1    pantoprazole (PROTONIX) 40 mg tablet, Take 1 tablet (40 mg total) by mouth 2 (two) times a day before meals for 26 doses (Patient not taking: Reported on 12/6/2019), Disp: 26 tablet, Rfl: 0  Allergies   Allergen Reactions    Penicillins Edema and Rash     Reaction Date: [id number]; Category: Allergy; Annotation - [id number]: Severe reaction with hospita[facility] at [facility]       Labs:  Lab Results   Component Value Date     08/17/2017    K 3 8 11/06/2019    K 4 2 03/28/2019     11/06/2019     03/28/2019    CO2 27 11/06/2019    CO2 32 03/28/2019    BUN 20 11/06/2019    BUN 17 03/28/2019    CREATININE 1 04 11/06/2019    CREATININE 1 14 (H) 08/17/2017    GLUCOSE 127 03/06/2018    GLUCOSE 107 08/01/2014    CALCIUM 8 0 (L) 11/06/2019    CALCIUM 9 4 03/28/2019     Lab Results   Component Value Date    TROPONINI 0 17 (H) 11/03/2019     Lab Results   Component Value Date    WBC 8 67 11/06/2019    WBC 7 7 07/19/2016    WBC NONE SEEN 07/19/2016    HGB 7 7 (L) 11/06/2019    HGB 13 5 07/19/2016    HCT 24 0 (L) 11/06/2019    HCT 41 0 07/19/2016    MCV 98 11/06/2019    MCV 91 3 07/19/2016     11/06/2019     07/19/2016     Lab Results   Component Value Date    CHOL 156 08/17/2017    TRIG 65 10/20/2019    TRIG 150 (H) 03/28/2019    HDL 58 10/20/2019    HDL 53 03/28/2019     Imaging:     Cardiac catheterization 2019  Summary:  Severe CAD with total occlusions of the LAD and circumflex  The LAD is supplied by a patent LIMA  The OM and circumflex are supplied by an SVG that had a 99% stenosis in mid body  This was the culprit for the patient's NSTEMI  Successful PCI of SVG to OM1 and circumflex  Plan: DAPT and anticoagulation, switching to anticoagulation with a NOAC and clopdigrel in 1 month      INDICATIONS:  --  Possible CAD: myocardial infarction without ST elevation (NSTEMI)  --  Congestive heart failure with ischemic cardiomyopathy    --  Cardiac: cardiac arrest                       Complete echo October 2019  SUMMARY     LEFT VENTRICLE:  Systolic function was moderately reduced  Ejection fraction was estimated to be 35 %  There was akinesis of the mid-apical anterior, mid anteroseptal, mid inferoseptal, apical septal, and apical wall(s)      TRICUSPID VALVE:  Estimated peak PA pressure was 40 mmHg  The findings suggest mild pulmonary hypertension          Review of Systems:  Review of Systems   All other systems reviewed and are negative  As described in my history of present illness      Physical Exam:  Physical Exam   Constitutional: He is oriented to person, place, and time  He appears well-developed and well-nourished  No distress  Not in any distress at the current time   HENT:   Head: Normocephalic and atraumatic  Right Ear: External ear normal    Left Ear: External ear normal    Nose: Nose normal    Mouth/Throat: Uvula is midline and mucous membranes are normal    Posterior pharynx is crowded   Eyes: Pupils are equal, round, and reactive to light  Conjunctivae, EOM and lids are normal  No scleral icterus  No pallor  No cyanosis  No icterus   Neck: Trachea normal and normal range of motion  No JVD present  Carotid bruit is not present  No thyromegaly present  No jugular lymphadenopathy   Cardiovascular: Normal rate, regular rhythm, S1 normal, S2 normal, normal heart sounds, intact distal pulses and normal pulses  PMI is not displaced  Exam reveals no gallop, no S3, no S4 and no friction rub  No murmur heard  Pulmonary/Chest: Effort normal and breath sounds normal  No accessory muscle usage  No respiratory distress  He has no decreased breath sounds  He has no wheezes  He has no rhonchi  He has no rales  He exhibits no tenderness  Abdominal: Soft  Normal appearance and bowel sounds are normal  He exhibits no distension and no mass  There is no splenomegaly or hepatomegaly  There is no tenderness  Musculoskeletal: Normal range of motion   He exhibits no tenderness or deformity  Lymphadenopathy:     He has no cervical adenopathy  Neurological: He is alert and oriented to person, place, and time  Facial symmetry is retained  Extraocular movements are retained  Head neck tongue and palate movement are retained and symmetric   Skin: Skin is intact  No abrasion, no lesion and no rash noted  No erythema  Nails show no clubbing  Psychiatric: He has a normal mood and affect  His speech is normal and behavior is normal  Thought content normal    Vitals reviewed        Discussion/Summary:    Sustained monomorphic VT, post external shock, both outpatient and inpatient  Origin of VT from inferior scar region  Scar related VT cannot be treated by stents at other ischemic sites  Secondary prevention ICD     Coronary artery disease, ischemic cardiomyopathy  LVEF 35%  NYHA class 2-3     On optimal medical therapy-lisinopril, metoprolol for greater than a year     Shared decision making done with patient, primary cardiologist     Sick sinus syndrome  Paroxysmal atrial flutter and fibrillation  History of CVA     Hypertension  Hyperlipidemia  Hematuria           - systolic and diastolic heart failure    he is well compensated now  No significant crackles or leg swelling  He is on management with metoprolol succinate  Blood pressure limits the use of Ace inhibitors    -VT post cardioversion  Patient has low EF of 35%  He does have an ICD in place now  He is on metoprolol and amiodarone    -coronary artery disease, post CABG, post PCI  No symptoms of angina at the current time  Patient is on metoprolol, clopidogrel, statin    -sick sinus syndrome  Has a dual-chamber device in place    -paroxysmal atrial flutter and fibrillation  Patient is currently in sinus rhythm  He is on apixaban    -hyperlipidemia  He is on atorvastatin    - hypertension  Patient is on metoprolol tartrate  Blood pressure is well controlled    -long-term anticoagulation  History of paroxysmal atrial fibrillation  Chads Vasc score is 7  Continue with apixaban    -long-term amiodarone  Patient had VT which is currently well controlled  He has a  large scar  He is on amiodarone 200 mg daily  Follow-up is recommended which includes-eye evaluation of cornea and retina along with optic nerve, thyroid function, lung function including PFT with DLCO, liver function with CMP, skin evaluation for discoloration, Neurology evaluation for extrapyramidal symptoms    -history of CVA  Patient has a high chads Vasc score of 7- heart failure, age, prior CVA, vascular disease and post bypass, hypertension  He is on apixaban    -anemia  Suspected from GI bleed  Also has some history of hematuria  Follow-up as per primary      - ICD in place  This was placed in the last admission in the setting of monomorphic ventricular tachycardia  Device parameters a stable    -chronic kidney disease, stage III  There has been improvement in kidney function after atrial pacing and achieving a higher rate  Currently stable    -obesity  BMI 31 2  Stressed the importance of good diet     - obstructive sleep apnea  Patient does have symptoms of the same  He also has the crowded posterior pharynx  However he is not interested in in evaluation of therapy              Summary of my recommendation for the patient  Continue aggressive management of heart failure  Will need evaluation and treatment for his anemia  Will need long-term monitoring for amiodarone  No further electrophysiologic interventions at the current time

## 2022-10-25 ENCOUNTER — REMOTE DEVICE CLINIC VISIT (OUTPATIENT)
Dept: CARDIOLOGY CLINIC | Facility: CLINIC | Age: 87
End: 2022-10-25
Payer: COMMERCIAL

## 2022-10-25 DIAGNOSIS — Z95.810 AICD (AUTOMATIC CARDIOVERTER/DEFIBRILLATOR) PRESENT: Primary | ICD-10-CM

## 2022-10-25 PROCEDURE — 93295 DEV INTERROG REMOTE 1/2/MLT: CPT | Performed by: INTERNAL MEDICINE

## 2022-10-25 PROCEDURE — 93296 REM INTERROG EVL PM/IDS: CPT | Performed by: INTERNAL MEDICINE

## 2022-10-25 NOTE — PROGRESS NOTES
Results for orders placed or performed in visit on 10/25/22   Cardiac EP device report    Narrative    MDT-DUAL ICD (AAI-DDD MODE)/ACTIVE SYSTEM IS MRI CONDITIONAL  CARELINK TRANSMISSION: BATTERY STATUS "7 YRS " AP 95%  0%  ALL AVAILABLE LEAD PARAMETERS WITHIN NORMAL LIMITS  NO SIGNIFICANT HIGH RATE EPISODES  OPTI-VOL WITHIN NORMAL LIMITS  NORMAL DEVICE FUNCTION   NC

## 2022-10-31 DIAGNOSIS — I10 BENIGN ESSENTIAL HYPERTENSION: ICD-10-CM

## 2022-11-02 NOTE — PROGRESS NOTES
Cardiology Follow Up    Candida Nicole  9/1/1932  [id number]  [address] [facility] Tito Mcqueen  [address] [place] [address] [address]    1  Coronary artery disease involving native coronary artery of native heart without angina pectoris  POCT ECG   2  Hx of CABG     3  Status post insertion of drug eluting coronary artery stent     4  Chronic systolic CHF (congestive heart failure) ([facility] )     5  Ischemic cardiomyopathy     6  Ventricular tachycardia     7  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)     8  Typical atrial flutter ([facility] )     9  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 ([facility] )     10  Dyslipidemia     11  Obstructive sleep apnea     12  Obesity (BMI 30 0-34  9)           Discussion/Summary:  Mr Roberta Hope is a pleasant 27-year-old gentleman who presents to the office today for routine follow-up  Since his last visit he has been feeling well  He offers no cardiac complaints  He continues to maintain normal sinus rhythm  He will remain on his current AV elizabeth blocking regimen along with systemic anticoagulation with Eliquis  His last device check revealed no recurrent atrial fibrillation  He is maintained on amiodarone given history of ventricular tachycardia  He is up-to-date on his eye exams  He had recent TFTs and LFTs  I will discuss repeat PFTs at his next visit  His blood pressure is well controlled in the office today  I did review his most recent set of lipids  His LDL is slightly suboptimal in the setting of his coronary disease  Therapeutic lifestyle modifications were recommended  If he continues with elevated numbers his regimen can be intensified  He appears euvolemic on his current diuretic regimen to which no changes were made  He underwent an echocardiogram in 2020 revealing his ejection fraction has improved to 45%    He is not maintained on ACE-inhibitor due to hypotension  He remains on Plavix in the setting of a stent a number of years ago  We discussed transition from Plavix back to aspirin  He wishes to remain on his current regimen  I will see him back in the office in six months or sooner if deemed necessary  Interval History:   Mr Bo Long is a pleasant 59-year-old gentleman who presents to the office today for routine follow-up  Since his last visit from a cardiac standpoint he has been feeling relatively well  He does not participate in any formal physical activity  With the activity he does perform he denies any exertional chest pain or shortness of breath  He denies any signs or symptoms of congestive heart failure including lower extremity edema, paroxysmal nocturnal dyspnea, orthopnea, acute weight gain or increasing abdominal girth  He denies lightheadedness, syncope or presyncope  He denies palpitations  He denies symptoms of claudication  He is maintained on Eliquis without any bleeding consequences or falls  He denies any discharges from his AICD      Problem List     CVA (cerebral vascular accident) ([facility] )    Essential hypertension (Chronic)    HLD (hyperlipidemia) (Chronic)    CAD (coronary artery disease) (Chronic)    Atrial flutter (HCC) (Chronic)        Past Medical History:   Diagnosis Date   • Acute kidney injury ([facility] ) 10/19/2019   • Acute myocardial infarction [facility])    • Allergic rhinitis    • Anxiety    • Bimalleolar fracture of left ankle    • CAD (coronary artery disease)    • Dyslipidemia 3/6/2018   • Erectile dysfunction of non-organic origin    • Hematuria     workup was negative and felt to be benign   • Herpes zoster with complication    • Hyperlipidemia    • Hypertension    • Hypotension 10/28/2019   • Impaired fasting glucose    • 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   • Prostatic hypertrophy     benign   • Sepsis with acute renal failure without septic shock ([facility] ) 11/20/2021 • Stroke [facility])      Social History     Socioeconomic History   • Marital status:       Spouse name: moncho   • Number of children: Not on file   • Years of education: Not on file   • Highest education level: Not on file   Occupational History   • Occupation: retired     Comment: clergy   Tobacco Use   • Smoking status: Former Smoker     Types: Cigarettes     Quit date:      Years since quittin 8   • Smokeless tobacco: Never Used   Vaping Use   • Vaping Use: Never used   Substance and Sexual Activity   • Alcohol use: Not Currently     Comment: very rarely   • Drug use: No   • Sexual activity: Not on file   Other Topics Concern   • Not on file   Social History Narrative    Death in the family- spouse, moncho  after a prolonged francis with lymphoma     Exercises regularly     Social Determinants of Health     Financial Resource Strain: Not on file   Food Insecurity: Not on file   Transportation Needs: Not on file   Physical Activity: Not on file   Stress: Not on file   Social Connections: Not on file   Intimate Partner Violence: Not on file   Housing Stability: Not on file      Family History   Problem Relation Age of Onset   • Cancer Mother    • Hypertension Mother         essential   • Pancreatic cancer Mother      Past Surgical History:   Procedure Laterality Date   • ANKLE FRACTURE SURGERY Left    • CARDIAC PACEMAKER PLACEMENT Left    • CORONARY ARTERY BYPASS GRAFT      x4       Current Outpatient Medications:   •  amiodarone 200 mg tablet, Take 1 tablet (200 mg total) by mouth daily, Disp: 90 tablet, Rfl: 3  •  atorvastatin (LIPITOR) 40 mg tablet, TAKE 1 TABLET BY MOUTH EVERY DAY, Disp: 90 tablet, Rfl: 3  •  clopidogrel (PLAVIX) 75 mg tablet, TAKE 1 TABLET BY MOUTH EVERY DAY, Disp: 90 tablet, Rfl: 3  •  Coenzyme Q10 200 MG capsule, Take 200 mg by mouth daily, Disp: , Rfl:   •  Eliquis 5 MG, TAKE 1 TABLET BY MOUTH TWICE A DAY, Disp: 60 tablet, Rfl: 5  •  furosemide (LASIX) 20 mg tablet, TAKE 1 TABLET BY MOUTH EVERY DAY, Disp: 90 tablet, Rfl: 3  •  Klor-Con M20 20 MEQ tablet, TAKE 1 TABLET BY MOUTH EVERY DAY, Disp: 90 tablet, Rfl: 3  •  melatonin 3 mg, Take 1 tablet (3 mg total) by mouth daily at bedtime, Disp: 30 each, Rfl: 0  •  metoprolol tartrate (LOPRESSOR) 25 mg tablet, TAKE ONE TABLET IN THE MORNING AND 2 TABLETS IN THE EVENING, Disp: 270 tablet, Rfl: 1  •  nitroglycerin (NITROSTAT) 0 4 mg SL tablet, Place 1 tablet (0 4 mg total) under the tongue every 5 (five) minutes as needed for chest pain, Disp: 25 tablet, Rfl: 1  •  Probiotic Product (PROBIOTIC COLON SUPPORT) CAPS, Take 1 capsule by mouth daily, Disp: , Rfl:   •  tamsulosin (FLOMAX) 0 4 mg, Take 0 4 mg by mouth, Disp: , Rfl:   •  albuterol (PROVENTIL HFA,VENTOLIN HFA) 90 mcg/act inhaler, , Disp: , Rfl:   Allergies   Allergen Reactions   • Penicillins Rash and Edema     Reaction Date: [id number]; Category: Allergy;  Annotation - [id number]: Severe reaction with hospitaization at [facility]-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       Labs:     Chemistry        Component Value Date/Time     08/17/2017 0705    K 4 0 11/23/2021 0503    K 4 2 03/28/2019 0631     11/23/2021 0503     03/28/2019 0631    CO2 25 11/23/2021 0503    CO2 32 03/28/2019 0631    BUN 15 11/23/2021 0503    BUN 17 03/28/2019 0631    CREATININE 1 11 11/23/2021 0503    CREATININE 1 14 (H) 08/17/2017 0705        Component Value Date/Time    CALCIUM 8 3 11/23/2021 0503    CALCIUM 9 4 03/28/2019 0631    ALKPHOS 67 11/20/2021 1556    ALKPHOS 78 03/28/2019 0631    AST 20 11/20/2021 1556    AST 14 03/28/2019 0631    ALT 23 11/20/2021 1556    ALT 14 03/28/2019 0631    BILITOT 0 4 07/19/2016 0642            Lab Results   Component Value Date    CHOL 156 08/17/2017    CHOL 166 01/26/2017    CHOL 166 07/19/2016     Lab Results   Component Value Date    HDL 58 10/20/2019    HDL 53 03/28/2019    HDL 54 09/21/2018     Lab Results   Component Value Date    LDLCALC 65 10/20/2019    LDLCALC 75 03/28/2019 [address] 09/21/2018     Lab Results   Component Value Date    TRIG 65 10/20/2019    TRIG 150 (H) 03/28/2019    TRIG 116 09/21/2018     No results found for: CHOLHDL    Imaging: Mri Brain Wo Contrast    Result Date: 3/6/2018  Narrative: MRI BRAIN WITHOUT CONTRAST INDICATION: STROKE- WO BRAIN  History taken directly from the electronic ordering system  Right-sided weakness  Difficulty with balance  COMPARISON:   3/6/2018 TECHNIQUE:  Sagittal T1, axial T2, axial FLAIR, axial T1, axial [place] and axial diffusion imaging  IMAGE QUALITY:  Diagnostic  FINDINGS: BRAIN PARENCHYMA:  There is a small band of diffusion restriction in the left insular cortex extending into the periventricular white matter of the left frontal lobe images 19 through 23 of series 3 indicative of recent left MCA infarction  There is bandlike associated FLAIR hyperintensity in this region  Elsewhere there is a small to moderate chronic right frontal infarction with cystic encephalomalacia and gliosis  There is somewhat patchy periventricular FLAIR hyperintensity as well  No acute intracranial hemorrhage or extra-axial fluid collection  Cerebellar tonsils are normally positioned  VENTRICLES:  The ventricles are normal in size and contour  SELLA AND PITUITARY GLAND:  Normal  ORBITS:  Normal  PARANASAL SINUSES:  Normal  VASCULATURE:  Evaluation of the major intracranial vasculature demonstrates appropriate flow voids  CALVARIUM AND SKULL BASE:  Normal  EXTRACRANIAL SOFT TISSUES:  Normal      Impression: 1  Small acute/recent left MCA cortical infarction involving a small portion of the insular cortex extending into the periventricular white matter  2   Chronic small to moderate right frontal infarction and mild to moderate, chronic microangiopathy    I personally discussed this study with Dr Mitch Salas on 3/6/2018 at 11:42 AM  Workstation performed: [id number]     Xr Stroke Alert Portable Chest    Result Date: 3/6/2018  Narrative: CHEST INDICATION:  Right-sided weakness  Stroke COMPARISON:  None EXAM PERFORMED/VIEWS:  XR STROKE ALERT PORTABLE CHEST FINDINGS:  There are median sternotomy wires indicating prior cardiac surgery  Cardiomediastinal silhouette appears unremarkable  The lungs are clear  No pneumothorax or pleural effusion  Osseous structures appear within normal limits for patient age  Impression: No acute cardiopulmonary disease  Workstation performed: [id number]     Ct Stroke Alert Brain    Result Date: 3/6/2018  Narrative: CT BRAIN - STROKE ALERT PROTOCOL INDICATION:  Woke up with right-sided weakness  Pronator drift  Cerebellar symptoms  COMPARISON:  None  TECHNIQUE:  CT examination of the brain was performed  In addition to axial images, coronal reformatted images were created and submitted for interpretation  Radiation dose length product (DLP) for this visit:   This examination, like all CT scans performed in the [facility], was performed utilizing techniques to minimize radiation dose exposure, including the use of iterative reconstruction  and automated exposure control  IMAGE QUALITY:  Diagnostic  FINDINGS:  PARENCHYMA:  Small to moderate-sized area of encephalomalacia and gliosis right frontal lobe indicative of chronic infarction  Elsewhere there are mild periventricular hypodensities  No acute intracranial hemorrhage  Atherosclerotic calcifications noted  VENTRICLES AND EXTRA-AXIAL SPACES:  Age-appropriate volume loss is noted  No hydrocephalus  VISUALIZED ORBITS AND PARANASAL SINUSES:  Orbits appear normal   Mild scattered sinus mucosal thickening is noted  No fluid levels are seen  CALVARIUM AND EXTRACRANIAL SOFT TISSUES:   Normal      Impression: 1  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, chronic microangiopathy  2   No acute intracranial hemorrhage   Findings were directly discussed with Ace Flow on 3/6/2018 7:31 AM  Workstation performed: [id number]     Cta Stroke Alert (head/neck)    Result Date: 3/6/2018  Narrative: CTA NECK AND BRAIN WITH AND WITHOUT CONTRAST INDICATION: Left-sided weakness  Stroke alert  Prominent or drift  Pointing  COMPARISON:   None  TECHNIQUE:  Routine CT imaging of the Brain without contrast   Post contrast imaging was performed after administration of iodinated contrast through the neck and brain  Post contrast axial 0 625 mm images timed to opacify the arterial system  3D rendering was performed on an independent workstation  MIP reconstructions performed  Coronal reconstructions were performed of the noncontrast portion of the brain  Radiation dose length product (DLP) for this visit:  526 3 mGy-cm   This examination, like all CT scans performed in the [facility], was performed utilizing techniques to minimize radiation dose exposure, including the use of iterative reconstruction and automated exposure control  IV Contrast:  100 mL of iohexol (OMNIPAQUE)  IMAGE QUALITY:   Diagnostic FINDINGS: NONCONTRAST BRAIN: Reported separately CERVICAL VASCULATURE AORTIC ARCH AND GREAT VESSELS:  Mild athersclerotic disease of the arch, proximal great vessels and visualized subclavian vessels  No significant stenosis  Sternotomy wires and mediastinal clips are noted, compatible with prior CABG  RIGHT VERTEBRAL ARTERY CERVICAL SEGMENT:  Mild stenosis at the origin  The vessel is normal in caliber throughout the neck  LEFT VERTEBRAL ARTERY CERVICAL SEGMENT:  Mild stenosis at the origin  The vessel is normal in caliber throughout the neck  Left vertebral artery is congenitally dominant  RIGHT EXTRACRANIAL CAROTID SEGMENT:  Normal caliber common carotid artery  Normal bifurcation and cervical internal carotid artery  No stenosis or dissection   LEFT EXTRACRANIAL CAROTID SEGMENT:  Mild atherosclerotic disease of the distal common carotid artery and proximal cervical internal carotid artery without significant stenosis compared to the more distal ICA  NASCET criteria was used to determine the degree of internal carotid artery diameter stenosis  INTRACRANIAL VASCULATURE INTERNAL CAROTID ARTERIES:  Atherosclerotic calcifications of the cavernous segment of the internal carotid artery are very mild  Normal ophthalmic artery origins  Normal ICA terminus  ANTERIOR CIRCULATION:  Symmetric A1 segments and anterior cerebral arteries with normal enhancement  Normal anterior communicating artery  MIDDLE CEREBRAL ARTERY CIRCULATION:  M1 segment and middle cerebral artery branches demonstrate normal enhancement bilaterally  DISTAL VERTEBRAL ARTERIES:  Normal distal vertebral arteries  Posterior inferior cerebellar artery origins are normal  Normal vertebral basilar junction  BASILAR ARTERY:  Basilar artery is normal in caliber  Normal superior cerebellar arteries  POSTERIOR CEREBRAL ARTERIES: Both posterior cerebral arteries arises from the basilar tip  Both arteries demonstrate normal enhancement  Normal posterior communicating arteries  DURAL VENOUS SINUSES:  Not well visualized on this early arterial phase scan  NON VASCULAR ANATOMY BONY STRUCTURES:  No acute osseous abnormality  SOFT TISSUES OF THE NECK:  Unremarkable  THORACIC INLET:  Unremarkable  Impression: 1  No hemodynamically significant stenosis in the major arteries of the neck  2   No intracranial aneurysm or major intracranial arterial stenosis  I personally discussed this study with Michael Huerta on 3/6/2018 at 7:31 AM  Workstation performed: [id number]        Review of Systems   Cardiovascular: Negative for chest pain, claudication, cyanosis, dyspnea on exertion, irregular heartbeat and leg swelling  All other systems reviewed and are negative  Vitals:    11/03/22 1034   BP: 110/62   Pulse:      Vitals:    11/03/22 1022   Weight: 84 8 kg (187 lb)     Height: 5' 4" (162 6 cm)   Body mass index is 32 1 kg/m²       Physical Exam:  General:  Alert and cooperative, appears stated age  [de-identified]:  PERRLA, EOMI, no scleral icterus, no conjunctival pallor  Neck:  No lymphadenopathy, no thyromegaly, no carotid bruits, no elevated JVP  Heart:  Regular rate and rhythm, normal S1/S2, no S3/S4, no murmur  Lungs:  Clear to auscultation bilaterally   Abdomen:  Soft, non-tender, positive bowel sounds, no rebound or guarding,   no organomegaly   Extremities:  No clubbing, cyanosis or edema   Vascular:  2+ pedal pulses  Skin:  No rashes or lesions on exposed skin  Neurologic:  Cranial nerves II-XII grossly intact without focal deficits

## 2022-11-03 ENCOUNTER — OFFICE VISIT (OUTPATIENT)
Dept: CARDIOLOGY CLINIC | Facility: CLINIC | Age: 87
End: 2022-11-03

## 2022-11-03 VITALS
HEIGHT: 64 IN | DIASTOLIC BLOOD PRESSURE: 62 MMHG | WEIGHT: 187 LBS | HEART RATE: 60 BPM | BODY MASS INDEX: 31.92 KG/M2 | SYSTOLIC BLOOD PRESSURE: 110 MMHG

## 2022-11-03 DIAGNOSIS — Z95.5 STATUS POST INSERTION OF DRUG ELUTING CORONARY ARTERY STENT: ICD-10-CM

## 2022-11-03 DIAGNOSIS — I25.10 CORONARY ARTERY DISEASE INVOLVING NATIVE CORONARY ARTERY OF NATIVE HEART WITHOUT ANGINA PECTORIS: Chronic | ICD-10-CM

## 2022-11-03 DIAGNOSIS — E78.5 DYSLIPIDEMIA: ICD-10-CM

## 2022-11-03 DIAGNOSIS — I50.22 CHRONIC SYSTOLIC CHF (CONGESTIVE HEART FAILURE) (HCC): ICD-10-CM

## 2022-11-03 DIAGNOSIS — I25.5 ISCHEMIC CARDIOMYOPATHY: ICD-10-CM

## 2022-11-03 DIAGNOSIS — I48.3 TYPICAL ATRIAL FLUTTER (HCC): ICD-10-CM

## 2022-11-03 DIAGNOSIS — E66.9 OBESITY (BMI 30.0-34.9): ICD-10-CM

## 2022-11-03 DIAGNOSIS — Z95.810 PRESENCE OF DOUBLE CHAMBER AUTOMATIC CARDIOVERTER/DEFIBRILLATOR (AICD): ICD-10-CM

## 2022-11-03 DIAGNOSIS — Z95.1 HX OF CABG: ICD-10-CM

## 2022-11-03 DIAGNOSIS — I47.20 VENTRICULAR TACHYCARDIA: ICD-10-CM

## 2022-11-03 DIAGNOSIS — I13.0 HYPERTENSIVE HEART AND KIDNEY DISEASE WITH HF AND WITH CKD STAGE I-IV (HCC): ICD-10-CM

## 2022-11-03 DIAGNOSIS — G47.33 OBSTRUCTIVE SLEEP APNEA: ICD-10-CM

## 2023-01-10 ENCOUNTER — RA CDI HCC (OUTPATIENT)
Dept: OTHER | Facility: HOSPITAL | Age: 88
End: 2023-01-10

## 2023-01-10 NOTE — PROGRESS NOTES
[facility]  coding opportunities          Chart Reviewed number of suggestions sent to Provider: 1   [phone]    This is a reminder to address ALL HCC (risk adjustment) codes as found on active problem list for 2023 as patient scores reset to zero JEANA    Patients Insurance     Medicare Insurance: [employer]

## 2023-01-17 ENCOUNTER — OFFICE VISIT (OUTPATIENT)
Dept: FAMILY MEDICINE CLINIC | Facility: CLINIC | Age: 88
End: 2023-01-17

## 2023-01-17 VITALS
HEART RATE: 60 BPM | HEIGHT: 66 IN | SYSTOLIC BLOOD PRESSURE: 121 MMHG | BODY MASS INDEX: 30.37 KG/M2 | WEIGHT: 189 LBS | DIASTOLIC BLOOD PRESSURE: 64 MMHG | RESPIRATION RATE: 16 BRPM | TEMPERATURE: 99.3 F

## 2023-01-17 DIAGNOSIS — G95.19 NEUROGENIC CLAUDICATION (HCC): ICD-10-CM

## 2023-01-17 DIAGNOSIS — Z79.01 CHRONIC ANTICOAGULATION: ICD-10-CM

## 2023-01-17 DIAGNOSIS — I50.22 CHRONIC SYSTOLIC CHF (CONGESTIVE HEART FAILURE) (HCC): ICD-10-CM

## 2023-01-17 DIAGNOSIS — E78.5 DYSLIPIDEMIA: ICD-10-CM

## 2023-01-17 DIAGNOSIS — Z00.00 MEDICARE ANNUAL WELLNESS VISIT, SUBSEQUENT: Primary | ICD-10-CM

## 2023-01-17 DIAGNOSIS — Z95.810 PRESENCE OF DOUBLE CHAMBER AUTOMATIC CARDIOVERTER/DEFIBRILLATOR (AICD): ICD-10-CM

## 2023-01-17 DIAGNOSIS — Z12.5 PROSTATE CANCER SCREENING: ICD-10-CM

## 2023-01-17 DIAGNOSIS — N18.31 STAGE 3A CHRONIC KIDNEY DISEASE (HCC): ICD-10-CM

## 2023-01-17 DIAGNOSIS — E66.9 OBESITY (BMI 30.0-34.9): ICD-10-CM

## 2023-01-17 DIAGNOSIS — I25.10 CORONARY ARTERY DISEASE INVOLVING NATIVE CORONARY ARTERY OF NATIVE HEART WITHOUT ANGINA PECTORIS: Chronic | ICD-10-CM

## 2023-01-17 DIAGNOSIS — I48.3 TYPICAL ATRIAL FLUTTER (HCC): ICD-10-CM

## 2023-01-17 DIAGNOSIS — R73.01 IMPAIRED FASTING GLUCOSE: ICD-10-CM

## 2023-01-17 DIAGNOSIS — I25.5 ISCHEMIC CARDIOMYOPATHY: ICD-10-CM

## 2023-01-17 DIAGNOSIS — I13.0 HYPERTENSIVE HEART AND KIDNEY DISEASE WITH HF AND WITH CKD STAGE I-IV (HCC): ICD-10-CM

## 2023-01-17 DIAGNOSIS — M48.062 SPINAL STENOSIS OF LUMBAR REGION WITH NEUROGENIC CLAUDICATION: ICD-10-CM

## 2023-01-17 DIAGNOSIS — E78.2 MIXED HYPERLIPIDEMIA: ICD-10-CM

## 2023-01-17 DIAGNOSIS — I63.412 CEREBROVASCULAR ACCIDENT (CVA) DUE TO EMBOLISM OF LEFT MIDDLE CEREBRAL ARTERY (HCC): ICD-10-CM

## 2023-01-17 PROBLEM — R29.818 NEUROGENIC CLAUDICATION: Status: RESOLVED | Noted: 2021-06-17 | Resolved: 2023-01-17

## 2023-01-17 NOTE — ASSESSMENT & PLAN NOTE
Lab Results   Component Value Date    EGFR 59 11/23/2021    EGFR 52 11/21/2021    EGFR 41 11/20/2021    CREATININE 1 11 11/23/2021    CREATININE 1 22 11/21/2021    CREATININE 1 50 (H) 11/20/2021   Most recent GFR stable

## 2023-01-17 NOTE — PATIENT INSTRUCTIONS
Medicare Preventive Visit Patient Instructions  Thank you for completing your Welcome to Medicare Visit or Medicare Annual Wellness Visit today  Your next wellness visit will be due in one year (1/18/2024)  The screening/preventive services that you may require over the next 5-10 years are detailed below  Some tests may not apply to you based off risk factors and/or age  Screening tests ordered at today's visit but not completed yet may show as past due  Also, please note that scanned in results may not display below  Preventive Screenings:  Service Recommendations Previous Testing/Comments   Colorectal Cancer Screening  · Colonoscopy    · Fecal Occult Blood Test (FOBT)/Fecal Immunochemical Test (FIT)  · Fecal DNA/Cologuard Test  · Flexible Sigmoidoscopy Age: 39-70 years old   Colonoscopy: every 10 years (May be performed more frequently if at higher risk)  OR  FOBT/FIT: every 1 year  OR  Cologuard: every 3 years  OR  Sigmoidoscopy: every 5 years  Screening may be recommended earlier than age 39 if at higher risk for colorectal cancer  Also, an individualized decision between you and your healthcare provider will decide whether screening between the ages of 74-80 would be appropriate   Colonoscopy: Not on file  FOBT/FIT: Not on file  Cologuard: Not on file  Sigmoidoscopy: Not on file          Prostate Cancer Screening Individualized decision between patient and health care provider in men between ages of 53-78   Medicare will cover every 12 months beginning on the day after your 50th birthday PSA: No results in last 5 years           Hepatitis C Screening Once for adults born between [facility]  More frequently in patients at high risk for Hepatitis C Hep C Antibody: Not on file        Diabetes Screening 1-2 times per year if you're at risk for diabetes or have pre-diabetes Fasting glucose: No results in last 5 years (No results in last 5 years)  A1C: 5 9 (1/7/2022)      Cholesterol Screening Once every 5 years if you don't have a lipid disorder  May order more often based on risk factors  Lipid panel: 10/20/2019         Other Preventive Screenings Covered by Medicare:  1  Abdominal Aortic Aneurysm (AAA) Screening: covered once if your at risk  You're considered to be at risk if you have a family history of AAA or a male between the age of 73-68 who smoking at least 100 cigarettes in your lifetime  2  Lung Cancer Screening: covers low dose CT scan once per year if you meet all of the following conditions: (1) Age 50-69; (2) No signs or symptoms of lung cancer; (3) Current smoker or have quit smoking within the last 15 years; (4) You have a tobacco smoking history of at least 20 pack years (packs per day x number of years you smoked); (5) You get a written order from a healthcare provider  3  Glaucoma Screening: covered annually if you're considered high risk: (1) You have diabetes OR (2) Family history of glaucoma OR (3)  aged 48 and older OR (3)  American aged 72 and older  3  Osteoporosis Screening: covered every 2 years if you meet one of the following conditions: (1) Have a vertebral abnormality; (2) On glucocorticoid therapy for more than 3 months; (3) Have primary hyperparathyroidism; (4) On osteoporosis medications and need to assess response to drug therapy  5  HIV Screening: covered annually if you're between the age of 12-76  Also covered annually if you are younger than 13 and older than 72 with risk factors for HIV infection  For pregnant patients, it is covered up to 3 times per pregnancy      Immunizations:  Immunization Recommendations   Influenza Vaccine Annual influenza vaccination during flu season is recommended for all persons aged >= 6 months who do not have contraindications   Pneumococcal Vaccine   * Pneumococcal conjugate vaccine = PCV13 (Prevnar 13), PCV15 (Vaxneuvance), PCV20 (Prevnar 20)  * Pneumococcal polysaccharide vaccine = PPSV23 (Pneumovax) Adults 19-64 years old: 1-3 doses may be recommended based on certain risk factors  Adults 72 years old: 1-2 doses may be recommended based off what pneumonia vaccine you previously received   Hepatitis B Vaccine 3 dose series if at intermediate or high risk (ex: diabetes, end stage renal disease, liver disease)   Tetanus (Td) Vaccine - COST NOT COVERED BY MEDICARE PART B Following completion of primary series, a booster dose should be given every 10 years to maintain immunity against tetanus  Td may also be given as tetanus wound prophylaxis  Tdap Vaccine - COST NOT COVERED BY MEDICARE PART B Recommended at least once for all adults  For pregnant patients, recommended with each pregnancy  Shingles Vaccine (Shingrix) - COST NOT COVERED BY MEDICARE PART B  2 shot series recommended in those aged 48 and above     Health Maintenance Due:  There are no preventive care reminders to display for this patient  Immunizations Due:      Topic Date Due   • COVID-19 Vaccine (4 - Booster for Moderna series) 12/18/2021     Advance Directives   What are advance directives? Advance directives are legal documents that state your wishes and plans for medical care  These plans are made ahead of time in case you lose your ability to make decisions for yourself  Advance directives can apply to any medical decision, such as the treatments you want, and if you want to donate organs  What are the types of advance directives? There are many types of advance directives, and each state has rules about how to use them  You may choose a combination of any of the following:  · Living will: This is a written record of the treatment you want  You can also choose which treatments you do not want, which to limit, and which to stop at a certain time  This includes surgery, medicine, IV fluid, and tube feedings  · Durable power of  for healthcare [facility]):   This is a written record that states who you want to make healthcare choices for you when you are unable to make them for yourself  This person, called a proxy, is usually a family member or a friend  You may choose more than 1 proxy  · Do not resuscitate (DNR) order:  A DNR order is used in case your heart stops beating or you stop breathing  It is a request not to have certain forms of treatment, such as CPR  A DNR order may be included in other types of advance directives  · Medical directive: This covers the care that you want if you are in a coma, near death, or unable to make decisions for yourself  You can list the treatments you want for each condition  Treatment may include pain medicine, surgery, blood transfusions, dialysis, IV or tube feedings, and a ventilator (breathing machine)  · Values history: This document has questions about your views, beliefs, and how you feel and think about life  This information can help others choose the care that you would choose  Why are advance directives important? An advance directive helps you control your care  Although spoken wishes may be used, it is better to have your wishes written down  Spoken wishes can be misunderstood, or not followed  Treatments may be given even if you do not want them  An advance directive may make it easier for your family to make difficult choices about your care  Weight Management   Why it is important to manage your weight:  Being overweight increases your risk of health conditions such as heart disease, high blood pressure, type 2 diabetes, and certain types of cancer  It can also increase your risk for osteoarthritis, sleep apnea, and other respiratory problems  Aim for a slow, steady weight loss  Even a small amount of weight loss can lower your risk of health problems  How to lose weight safely:  A safe and healthy way to lose weight is to eat fewer calories and get regular exercise  You can lose up about 1 pound a week by decreasing the number of calories you eat by 500 calories each day     Healthy meal plan for weight management:  A healthy meal plan includes a variety of foods, contains fewer calories, and helps you stay healthy  A healthy meal plan includes the following:  · Eat whole-grain foods more often  A healthy meal plan should contain fiber  Fiber is the part of grains, fruits, and vegetables that is not broken down by your body  Whole-grain foods are healthy and provide extra fiber in your diet  Some examples of whole-grain foods are whole-wheat breads and pastas, oatmeal, brown rice, and bulgur  · Eat a variety of vegetables every day  Include dark, leafy greens such as spinach, kale, julito greens, and mustard greens  Eat yellow and orange vegetables such as carrots, sweet potatoes, and winter squash  · Eat a variety of fruits every day  Choose fresh or canned fruit (canned in its own juice or light syrup) instead of juice  Fruit juice has very little or no fiber  · Eat low-fat dairy foods  Drink fat-free (skim) milk or 1% milk  Eat fat-free yogurt and low-fat cottage cheese  Try low-fat cheeses such as mozzarella and other reduced-fat cheeses  · Choose meat and other protein foods that are low in fat  Choose beans or other legumes such as split peas or lentils  Choose fish, skinless poultry (chicken or turkey), or lean cuts of red meat (beef or pork)  Before you cook meat or poultry, cut off any visible fat  · Use less fat and oil  Try baking foods instead of frying them  Add less fat, such as margarine, sour cream, regular salad dressing and mayonnaise to foods  Eat fewer high-fat foods  Some examples of high-fat foods include french fries, doughnuts, ice cream, and cakes  · Eat fewer sweets  Limit foods and drinks that are high in sugar  This includes candy, cookies, regular soda, and sweetened drinks  Exercise:  Exercise at least 30 minutes per day on most days of the week  Some examples of exercise include walking, biking, dancing, and swimming   You can also fit in more physical activity by taking the stairs instead of the elevator or parking farther away from stores  Ask your healthcare provider about the best exercise plan for you  © Copyright [employer] 2018 Information is for End User's use only and may not be sold, redistributed or otherwise used for commercial purposes   All illustrations and images included in CareNotes® are the copyrighted property of [employer]  or [address] [place] [address] [employer]

## 2023-01-17 NOTE — ASSESSMENT & PLAN NOTE
Wt Readings from Last 3 Encounters:   01/17/23 85 7 kg (189 lb)   11/03/22 84 8 kg (187 lb)   07/15/22 78 5 kg (173 lb)     Blood pressure today in the office is 121/64  He continues on Lopressor  He continues to follow with cardiology

## 2023-01-17 NOTE — ASSESSMENT & PLAN NOTE
Patient is limited in what exercises he can perform due to his ambulatory issues as well as his neuropathy

## 2023-01-17 NOTE — PROGRESS NOTES
Chief Complaint   Patient presents with   • Medicare Wellness Visit   • Follow-up     Health Maintenance   Topic Date Due   • COVID-19 Vaccine (5 - Booster for Evorn Cohen series) 04/18/2023 (Originally 6/13/2022)   • SLP PLAN OF CARE  01/17/2024 (Originally 9/1/1932)   • Fall Risk  01/17/2024   • Depression Screening  01/17/2024   • Medicare Annual Wellness Visit (AWV)  01/17/2024   • BMI: Adult  01/17/2024   • Pneumococcal Vaccine: 65+ Years  Completed   • Influenza Vaccine  Completed   • HIB Vaccine  Aged Out   • IPV Vaccine  Aged Out   • Hepatitis A Vaccine  Aged Out   • Meningococcal ACWY Vaccine  Aged Out   • HPV Vaccine  Aged Out        Assessment and Plan:     Problem List Items Addressed This Visit        Endocrine    Impaired fasting glucose     Blood work from October shows a normal fasting glucose level  Cardiovascular and Mediastinum    CAD (coronary artery disease) (Chronic)    Cerebrovascular accident (CVA) due to embolism of left middle cerebral artery ([facility] )     History of a CVA in 2018 with no residual effects  He remains on Plavix  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 ([facility] [address] )     Wt Readings from Last 3 Encounters:   01/17/23 85 7 kg (189 lb)   11/03/22 84 8 kg (187 lb)   07/15/22 78 5 kg (173 lb)     Blood pressure today in the office is 121/64  He continues on Lopressor  He continues to follow with cardiology  Typical atrial flutter (HCC)    Ischemic cardiomyopathy    Chronic systolic CHF (congestive heart failure) (HCC)     Wt Readings from Last 3 Encounters:   01/17/23 85 7 kg (189 lb)   11/03/22 84 8 kg (187 lb)   07/15/22 78 5 kg (173 lb)       Well-controlled at this time  Patient continues to follow with cardiology  He currently is on Lasix and potassium  His weight is stable                Genitourinary    Stage 3a chronic kidney disease [facility])     Lab Results   Component Value Date    EGFR 59 11/23/2021    EGFR 52 11/21/2021 EGFR 41 11/20/2021    CREATININE 1 11 11/23/2021    CREATININE 1 22 11/21/2021    CREATININE 1 50 (H) 11/20/2021   Most recent GFR stable  Relevant Orders    Comprehensive metabolic panel    CBC and differential       Other    Medicare annual wellness visit, subsequent - Primary    Obesity (BMI 30 0-34  9)     Patient is limited in what exercises he can perform due to his ambulatory issues as well as his neuropathy  Chronic anticoagulation     Continue on Eliquis for atrial flutter         Hyperlipidemia     Continue with Lipitor  Last lipid panel in October was normal   Continue low-fat diet  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)     Close follow-up with cardiology         Spinal stenosis of lumbar region with neurogenic claudication     Patient did have surgery last year to remove a cyst on his spinal column  He does have known spinal stenosis with some peripheral neuropathy  RESOLVED: Neurogenic claudication ([facility] [address] )   Other Visit Diagnoses     Dyslipidemia        Relevant Orders    Lipid Panel with Direct LDL reflex    Prostate cancer screening        Relevant Orders    PSA, Total Screen        BMI Counseling: Body mass index is 30 51 kg/m²  Follow-up plan was not completed due to elderly patient (72 years old) where weight reduction/weight gain would complicate underlying health condition such as: illness or physical disability  Peripheral neuropathy, exercise capacity limited, ambulates with cane    Depression Screening and Follow-up Plan: Patient was screened for depression during today's encounter  They screened negative with a PHQ-2 score of 0  Preventive health issues were discussed with patient, and age appropriate screening tests were ordered as noted in patient's After Visit Summary    Personalized health advice and appropriate referrals for health education or preventive services given if needed, as noted in patient's After Visit Summary  History of Present Illness:     Patient presents for a Medicare Wellness Visit    The patient presents to the office today for an annual wellness visit and follow-up  Overall the patient feels well  He continues to [facility] independently at [facility]  He continues to follow with cardiology, ophthalmology and urology  He does have a dual-chamber ICD in place  He recently saw his ophthalmologist for a yearly exam   He does ambulate with a cane  He denies any falls  He is up-to-date with vaccines  Blood work has been ordered for the patient to have done prior to his next visit in 6 months  Patient Care Team:  Oscar Garza as PCP - General (Internal Medicine)  Saira Quigley MD as PCP - PCP-[facility]  Carol Wallace MD (Urology)  Rigoberto Estrella DO (Cardiology)  Merline Eller MD (Ophthalmology)     Review of Systems:     Review of Systems   Constitutional: Negative  Negative for fatigue  HENT: Negative  Negative for congestion, postnasal drip, rhinorrhea and trouble swallowing  Eyes: Negative  Negative for visual disturbance  Respiratory: Negative  Negative for choking and shortness of breath  Cardiovascular: Negative  Negative for chest pain  Gastrointestinal: Negative  Endocrine: Negative  Genitourinary: Negative  Musculoskeletal: Positive for back pain and gait problem (ambulates with cane)  Negative for arthralgias, myalgias and neck pain  Skin: Negative  Neurological: Positive for numbness (peripheral neuropathy)  Negative for dizziness and headaches  Psychiatric/Behavioral: Negative           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 ([facility])   • CAD (coronary artery disease)   • Typical atrial flutter (HCC)   • Hx of CABG   • Ischemic cardiomyopathy   • Impaired fasting glucose   • Psychophysiological insomnia   • Medicare annual wellness visit, subsequent   • Subacute left lumbar radiculopathy   • Obesity (BMI 30 0-34  9)   • Chronic anticoagulation   • Ventricular tachycardia   • Status post insertion of drug eluting coronary artery stent   • Slow transit constipation   • Chronic systolic CHF (congestive heart failure) (HCC)   • Hyperlipidemia   • Seborrheic keratosis   • Long term current use of amiodarone   • 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)   • Status post lumbar spine surgery for decompression of spinal cord   • Stage 3a chronic kidney disease ([place] [address] )   • Spinal stenosis of lumbar region with neurogenic claudication      Past Medical and Surgical History:     Past Medical History:   Diagnosis Date   • Acute kidney injury ([place] [address] ) 10/19/2019   • Acute myocardial infarction [facility])    • Allergic rhinitis    • Anxiety    • Bimalleolar fracture of left ankle    • CAD (coronary artery disease)    • Dyslipidemia 3/6/2018   • Erectile dysfunction of non-organic origin    • Hematuria     workup was negative and felt to be benign   • Herpes zoster with complication    • Hyperlipidemia    • Hypertension    • Hypotension 10/28/2019   • Impaired fasting glucose    • 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   • Neurogenic claudication ([place] [address] ) 6/17/2021   • Prostatic hypertrophy     benign   • Sepsis with acute renal failure without septic shock ([place] [address] ) 11/20/2021   • Stroke [facility])      Past Surgical History:   Procedure Laterality Date   • ANKLE FRACTURE SURGERY Left    • CARDIAC PACEMAKER PLACEMENT Left    • CORONARY ARTERY BYPASS GRAFT      x4      Family History:     Family History   Problem Relation Age of Onset   • Cancer Mother    • Hypertension Mother         essential   • Pancreatic cancer Mother       Social History:     Social History     Socioeconomic History   • Marital status:       Spouse name: moncho   • Number of children: None   • Years of education: None   • Highest education level: None   Occupational History   • Occupation: retired     Comment: clergy   Tobacco Use   • Smoking status: Former     Types: Cigarettes     Quit date:      Years since quittin 0   • Smokeless tobacco: Never   Vaping Use   • Vaping Use: Never used   Substance and Sexual Activity   • Alcohol use: Not Currently     Comment: very rarely   • Drug use: No   • Sexual activity: None   Other Topics Concern   • None   Social History Narrative    Death in the family- spouse, moncho  after a prolonged francis with lymphoma     Exercises regularly     Social Determinants of Health     Financial Resource Strain: Low Risk    • Difficulty of Paying Living Expenses: Not hard at all   Food Insecurity: Not on file   Transportation Needs: No Transportation Needs   • Lack of Transportation (Medical): No   • Lack of Transportation (Non-Medical):  No   Physical Activity: Not on file   Stress: Not on file   Social Connections: Not on file   Intimate Partner Violence: Not on file   Housing Stability: Not on file      Medications and Allergies:     Current Outpatient Medications   Medication Sig Dispense Refill   • amiodarone 200 mg tablet Take 1 tablet (200 mg total) by mouth daily 90 tablet 3   • atorvastatin (LIPITOR) 40 mg tablet TAKE 1 TABLET BY MOUTH EVERY DAY 90 tablet 3   • clopidogrel (PLAVIX) 75 mg tablet TAKE 1 TABLET BY MOUTH EVERY DAY 90 tablet 3   • Coenzyme Q10 200 MG capsule Take 200 mg by mouth daily     • Eliquis 5 MG TAKE 1 TABLET BY MOUTH TWICE A DAY 60 tablet 5   • furosemide (LASIX) 20 mg tablet TAKE 1 TABLET BY MOUTH EVERY DAY 90 tablet 3   • Klor-Con M20 20 MEQ tablet TAKE 1 TABLET BY MOUTH EVERY DAY 90 tablet 3   • melatonin 3 mg Take 1 tablet (3 mg total) by mouth daily at bedtime 30 each 0   • metoprolol tartrate (LOPRESSOR) 25 mg tablet TAKE ONE TABLET IN THE MORNING AND 2 TABLETS IN THE EVENING 270 tablet 1   • nitroglycerin (NITROSTAT) 0 4 mg SL tablet Place 1 tablet (0 4 mg total) under the tongue every 5 (five) minutes as needed for chest pain 25 tablet 1   • Probiotic Product (PROBIOTIC COLON SUPPORT) CAPS Take 1 capsule by mouth daily     • albuterol (PROVENTIL HFA,VENTOLIN HFA) 90 mcg/act inhaler  (Patient not taking: Reported on 1/17/2023)       No current facility-administered medications for this visit  Allergies   Allergen Reactions   • Penicillins Rash and Edema     Reaction Date: [id number]; Category: Allergy; Annotation - [id number]: Severe reaction with hospitaization at [facility]-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      Immunizations:     Immunization History   Administered Date(s) Administered   • COVID-19 MODERNA VACC 0 5 ML IM 01/21/2021, 02/23/2021, 10/23/2021   • COVID-19 Pfizer Vac BIVALENT Brandyn-sucrose 12 Yr+ IM (BOOSTER ONLY) 09/22/2022   • COVID-19, unspecified 04/18/2022   • INFLUENZA 10/13/2022   • Influenza Split High Dose Preservative Free IM 09/12/2009, 11/01/2010, 11/17/2011, 10/05/2014, 09/14/2015, 09/08/2016, 10/26/2017   • Influenza, high dose seasonal 0 7 mL 09/27/2018, 10/17/2019, 10/22/2020, 11/05/2021   • Influenza, seasonal, injectable 11/17/2011, 11/15/2012, 12/09/2013   • Pneumococcal Conjugate 13-Valent 07/13/2015   • Pneumococcal Polysaccharide PPV23 01/01/2004   • Td (adult), adsorbed 04/13/2009, 04/13/2009, 06/28/2013   • Zoster Vaccine Recombinant 04/19/2019, 10/17/2019      Health Maintenance: There are no preventive care reminders to display for this patient  There are no preventive care reminders to display for this patient  Medicare Screening Tests and Risk Assessments:     Viviana Nathan is here for his Subsequent Wellness visit  Last Medicare Wellness visit information reviewed, patient interviewed, no change since last AWV  Health Risk Assessment:   Patient rates overall health as good  Patient feels that their physical health rating is same  Patient is very satisfied with their life   Eyesight was rated as same  Hearing was rated as same  Patient feels that their emotional and mental health rating is same  Patients states they are never, rarely angry  Patient states they are never, rarely unusually tired/fatigued  Pain experienced in the last 7 days has been none  Patient states that he has experienced no weight loss or gain in last 6 months  Depression Screening:   PHQ-2 Score: 0      Fall Risk Screening: In the past year, patient has experienced: no history of falling in past year      Home Safety:  Patient does not have trouble with stairs inside or outside of their home  Patient has working smoke alarms and has working carbon monoxide detector  Home safety hazards include: none  Nutrition:   Current diet is No Added Salt and Low Cholesterol  Medications:   Patient is currently taking over-the-counter supplements  OTC medications include: see medication list  Patient is able to manage medications  Activities of Daily Living (ADLs)/Instrumental Activities of Daily Living (IADLs):   Walk and transfer into and out of bed and chair?: Yes  Dress and groom yourself?: Yes    Bathe or shower yourself?: Yes    Feed yourself? Yes  Do your laundry/housekeeping?: Yes  Manage your money, pay your bills and track your expenses?: Yes  Make your own meals?: Yes    Do your own shopping?: Yes    Previous Hospitalizations:   Any hospitalizations or ED visits within the last 12 months?: No      Advance Care Planning:   Living will: Yes    Durable POA for healthcare:  Yes    Advanced directive: Yes    Five wishes given: No      Comments: Son in durable POA    Cognitive Screening:   Provider or family/friend/caregiver concerned regarding cognition?: No    PREVENTIVE SCREENINGS      Cardiovascular Screening:    General: History Lipid Disorder    Due for: Lipid Panel      Diabetes Screening:     General: Screening Not Indicated      Colorectal Cancer Screening:     General: Screening Not Indicated      Prostate Cancer Screening:    General: Screening Not Indicated      Osteoporosis Screening:    General: Screening Not Indicated      Abdominal Aortic Aneurysm (AAA) Screening:    Risk factors include: tobacco use        Lung Cancer Screening:     General: Screening Not Indicated      Hepatitis C Screening:    General: Screening Not Indicated    Screening, Brief Intervention, and Referral to Treatment (SBIRT)    Screening      AUDIT-C Screenin) How often did you have a drink containing alcohol in the past year? monthly or less  2) How many drinks did you have on a typical day when you were drinking in the past year? 1 to 2  3) How often did you have 6 or more drinks on one occasion in the past year? never    AUDIT-C Score: 1  Interpretation: Score 0-3 (male): Negative screen for alcohol misuse    Single Item Drug Screening:  How often have you used an illegal drug (including marijuana) or a prescription medication for non-medical reasons in the past year? never    Single Item Drug Screen Score: 0  Interpretation: Negative screen for possible drug use disorder    No results found  Physical Exam:     /64   Pulse 60   Temp 99 3 °F (37 4 °C) (Tympanic)   Resp 16   Ht 5' 6" (1 676 m)   Wt 85 7 kg (189 lb)   BMI 30 51 kg/m²     Physical Exam  Vitals reviewed  Constitutional:       Appearance: Normal appearance  He is obese  HENT:      Head: Normocephalic and atraumatic  Nose: Nose normal       Mouth/Throat:      Mouth: Mucous membranes are moist    Eyes:      Extraocular Movements: Extraocular movements intact  Pupils: Pupils are equal, round, and reactive to light  Cardiovascular:      Rate and Rhythm: Normal rate and regular rhythm  Pulses: Normal pulses  Heart sounds: Normal heart sounds  Pulmonary:      Effort: Pulmonary effort is normal       Breath sounds: Normal breath sounds  Musculoskeletal:         General: Normal range of motion  Skin:     General: Skin is warm  Neurological:      General: No focal deficit present  Mental Status: He is alert and oriented to person, place, and time  Gait: Gait abnormal (ambulates with cane)  Psychiatric:         Mood and Affect: Mood normal          Behavior: Behavior normal          Thought Content:  Thought content normal          Judgment: Judgment normal           MARJAN Morales

## 2023-01-17 NOTE — ASSESSMENT & PLAN NOTE
Patient did have surgery last year to remove a cyst on his spinal column  He does have known spinal stenosis with some peripheral neuropathy

## 2023-01-17 NOTE — ASSESSMENT & PLAN NOTE
Wt Readings from Last 3 Encounters:   01/17/23 85 7 kg (189 lb)   11/03/22 84 8 kg (187 lb)   07/15/22 78 5 kg (173 lb)       Well-controlled at this time  Patient continues to follow with cardiology  He currently is on Lasix and potassium  His weight is stable

## 2023-02-01 DIAGNOSIS — I10 BENIGN ESSENTIAL HYPERTENSION: ICD-10-CM

## 2023-02-01 DIAGNOSIS — I25.5 ISCHEMIC CARDIOMYOPATHY: ICD-10-CM

## 2023-02-01 RX ORDER — POTASSIUM CHLORIDE 1500 MG/1
TABLET, EXTENDED RELEASE ORAL
Qty: 90 TABLET | Refills: 3 | Status: SHIPPED | OUTPATIENT
Start: 2023-02-01

## 2023-04-25 ENCOUNTER — REMOTE DEVICE CLINIC VISIT (OUTPATIENT)
Dept: CARDIOLOGY CLINIC | Facility: CLINIC | Age: 88
End: 2023-04-25

## 2023-04-25 DIAGNOSIS — Z95.810 PRESENCE OF AUTOMATIC CARDIOVERTER/DEFIBRILLATOR (AICD): Primary | ICD-10-CM

## 2023-04-25 NOTE — PROGRESS NOTES
Results for orders placed or performed in visit on 04/25/23   Cardiac EP device report    Narrative    MDT-DUAL ICD (AAI-DDD MODE)/ACTIVE SYSTEM IS MRI CONDITIONAL  CARELINK TRANSMISSION: BATTERY VOLTAGE ADEQUATE (6 7 YRS) AP 97 9%  <0 1% ALL AVAILABLE LEAD PARAMETERS WITHIN NORMAL LIMITS  NO NEW SIGNIFICANT HIGH RATE EPISODES  OPTI-VOL WITHIN NORMAL LIMITS  NORMAL DEVICE FUNCTION   AM/GV

## 2023-04-30 DIAGNOSIS — I10 BENIGN ESSENTIAL HYPERTENSION: ICD-10-CM

## 2023-05-11 DIAGNOSIS — I48.3 TYPICAL ATRIAL FLUTTER (HCC): ICD-10-CM

## 2023-07-07 ENCOUNTER — RA CDI HCC (OUTPATIENT)
Dept: OTHER | Facility: HOSPITAL | Age: 88
End: 2023-07-07

## 2023-07-07 NOTE — PROGRESS NOTES
[address] [facility] coding opportunities       Chart reviewed, no opportunity found: CHART REVIEWED, NO OPPORTUNITY FOUND        Patients Insurance     Medicare Insurance: [employer]

## 2023-07-10 DIAGNOSIS — I47.20 VENTRICULAR TACHYCARDIA (HCC): ICD-10-CM

## 2023-07-10 DIAGNOSIS — I10 BENIGN ESSENTIAL HYPERTENSION: ICD-10-CM

## 2023-07-10 DIAGNOSIS — I25.5 ISCHEMIC CARDIOMYOPATHY: ICD-10-CM

## 2023-07-10 RX ORDER — AMIODARONE HYDROCHLORIDE 200 MG/1
TABLET ORAL
Qty: 90 TABLET | Refills: 3 | Status: SHIPPED | OUTPATIENT
Start: 2023-07-10

## 2023-07-11 RX ORDER — FUROSEMIDE 20 MG/1
TABLET ORAL
Qty: 90 TABLET | Refills: 3 | Status: SHIPPED | OUTPATIENT
Start: 2023-07-11

## 2023-07-17 ENCOUNTER — OFFICE VISIT (OUTPATIENT)
Dept: FAMILY MEDICINE CLINIC | Facility: CLINIC | Age: 88
End: 2023-07-17
Payer: COMMERCIAL

## 2023-07-17 VITALS
SYSTOLIC BLOOD PRESSURE: 120 MMHG | HEIGHT: 66 IN | TEMPERATURE: 98 F | DIASTOLIC BLOOD PRESSURE: 78 MMHG | RESPIRATION RATE: 14 BRPM | BODY MASS INDEX: 31.34 KG/M2 | HEART RATE: 60 BPM | WEIGHT: 195 LBS | OXYGEN SATURATION: 95 %

## 2023-07-17 DIAGNOSIS — N18.31 STAGE 3A CHRONIC KIDNEY DISEASE (HCC): Primary | ICD-10-CM

## 2023-07-17 DIAGNOSIS — Z95.810 PRESENCE OF DOUBLE CHAMBER AUTOMATIC CARDIOVERTER/DEFIBRILLATOR (AICD): ICD-10-CM

## 2023-07-17 DIAGNOSIS — I25.5 ISCHEMIC CARDIOMYOPATHY: ICD-10-CM

## 2023-07-17 DIAGNOSIS — I63.412 CEREBROVASCULAR ACCIDENT (CVA) DUE TO EMBOLISM OF LEFT MIDDLE CEREBRAL ARTERY (HCC): ICD-10-CM

## 2023-07-17 DIAGNOSIS — R73.01 IMPAIRED FASTING GLUCOSE: ICD-10-CM

## 2023-07-17 DIAGNOSIS — E78.2 MIXED HYPERLIPIDEMIA: ICD-10-CM

## 2023-07-17 DIAGNOSIS — K59.01 SLOW TRANSIT CONSTIPATION: ICD-10-CM

## 2023-07-17 DIAGNOSIS — I47.20 VENTRICULAR TACHYCARDIA (HCC): ICD-10-CM

## 2023-07-17 DIAGNOSIS — M48.062 SPINAL STENOSIS OF LUMBAR REGION WITH NEUROGENIC CLAUDICATION: ICD-10-CM

## 2023-07-17 DIAGNOSIS — I50.22 CHRONIC SYSTOLIC CHF (CONGESTIVE HEART FAILURE) (HCC): ICD-10-CM

## 2023-07-17 DIAGNOSIS — Z79.01 CHRONIC ANTICOAGULATION: ICD-10-CM

## 2023-07-17 DIAGNOSIS — I13.0 HYPERTENSIVE HEART AND KIDNEY DISEASE WITH HF AND WITH CKD STAGE I-IV (HCC): ICD-10-CM

## 2023-07-17 PROCEDURE — 99214 OFFICE O/P EST MOD 30 MIN: CPT | Performed by: NURSE PRACTITIONER

## 2023-07-17 RX ORDER — GABAPENTIN 300 MG/1
300 CAPSULE ORAL 2 TIMES DAILY
COMMUNITY
Start: 2023-06-30

## 2023-07-17 RX ORDER — TAMSULOSIN HYDROCHLORIDE 0.4 MG/1
0.4 CAPSULE ORAL
COMMUNITY
Start: 2023-05-01

## 2023-07-17 NOTE — PATIENT INSTRUCTIONS
Chronic Kidney Disease   AMBULATORY CARE:   Chronic kidney disease (CKD)  is the gradual and permanent loss of kidney function. Normally, the kidneys remove fluid, chemicals, and waste from your blood. These wastes are turned into urine by your kidneys. CKD may worsen over time and lead to kidney failure. Common signs and symptoms include the following:   Changes in how often you need to urinate    Swelling in your arms, legs, or feet    Shortness of breath    Fatigue or weakness    Bad or bitter taste in your mouth    Nausea, vomiting, or loss of appetite    Call your local emergency number (911 in the [facility]) if:   You have a seizure. You have shortness of breath. Seek care immediately if:   You are confused and very drowsy. Call your doctor or nephrologist if:   You suddenly gain or lose more weight than your healthcare provider has told you is okay. You have itchy skin or a rash. You urinate more or less than you normally do. You have blood in your urine. You have nausea and are vomiting. You have fatigue or muscle weakness. You have hiccups that will not stop. You have questions or concerns about your condition or care. How CKD is diagnosed:  CKD has 5 stages. Your healthcare provider will use results from the following tests to find the stage of CKD you have:  Blood and urine tests  show how well your kidneys are working. They may also show the cause of your CKD. Ultrasound, CT scan, or MRI  pictures may be used to check your kidneys. You may be given contrast liquid to help your kidneys show up better in the pictures. Tell the healthcare provider if you have ever had an allergic reaction to contrast liquid. Do not enter the MRI room with anything metal. Metal can cause serious injury. Tell the healthcare provider if you have any metal in or on your body. A biopsy  is a procedure to remove a small piece of tissue from your kidney.  It is done to find the cause of your CKD.    Treatment  can help control signs and symptoms, and prevent a worse stage of CKD. Your care team may include specialists, such as a dietitian or a heart specialist. This depends on the stage of your CKD and if you have other health conditions to manage. Healthcare providers will work with you to create a plan based on your decisions for treatment. Your treatment plan may include any of the following:  Medicines  may be given to decrease your blood pressure and get rid of extra fluid. You may also receive medicine to manage health conditions that may occur with CKD, such as anemia, diabetes, and heart disease. Dialysis  is a treatment to remove chemicals and waste from your blood when your kidneys can no longer do this. Surgery  may be needed to create an arteriovenous fistula (AVF) in your arm or insert a catheter into your abdomen. This is done so you can receive dialysis. A kidney transplant  may be done if your CKD becomes severe. What you can do to manage CKD: Management may include making some lifestyle changes. Tell your healthcare provider if you have any concerns about being able to make changes. He or she can help you find solutions, including working with specialists. Ask for help creating a plan to break large goals into smaller steps. Your plan may include any of the following:  Manage other health conditions. Your healthcare provider will work with you to make a care plan that meets your needs. You will be checked regularly for heart disease or other conditions that can make CKD worse, such as diabetes. Your blood pressure will be closely monitored. You will also get a target blood pressure and help making a plan to reach your target. This may include taking your blood pressure at home. Maintain a healthy weight. Your weight and body mass index (BMI) will be checked regularly. BMI helps find if your weight is healthy for your height.  Your healthcare provider will use other tests to check your muscle and protein levels. Extra weight can strain your kidneys. A low weight or low muscle mass can make you feel more tired. You may have trouble doing your daily activities. Ask your provider what a healthy weight is for you. He or she can help you create a plan to lose or gain weight safely, if needed. The plan may include keeping a food diary. This is a list of foods and liquids you have each day. Your provider will use the diary to help you make changes, if needed. Changes are based on your health and any other conditions you have, such as diabetes. Create an exercise plan. Regular exercise can help you manage CKD, high blood pressure, and diabetes. Exercise also helps control weight. Your provider can help you create exercise goals and a plan to reach those goals. For example, your goal may be to exercise for 30 minutes in a day. Your plan can include breaking exercise into 10 minute sessions, 3 times during the day. Create a healthy eating plan. Your provider may tell you to eat food low in potassium, phosphorus, or protein. Your provider may also recommend vitamin or mineral supplements. Do not take any supplements without talking to your provider. A dietitian can help you plan meals if needed. Ask how much liquid to drink each day and which liquids are best for you. Limit sodium (salt) as directed. You may need to limit sodium to less than 2,300 milligrams (mg) each day. Ask your dietitian or healthcare provider how much sodium you can have each day. The amount depends on your stage of kidney disease. Table salt, canned foods, soups, salted snacks, and processed meats, like deli meats and sausage, are high in sodium. Your provider or a dietitian can show you how to read food labels for sodium. Limit alcohol as directed. Alcohol can cause fluid retention and can affect your kidneys. Ask how much alcohol is safe for you.  A drink of alcohol is 12 ounces of beer, 5 ounces of wine, or 1½ ounces of liquor. Do not smoke. Nicotine and other chemicals in cigarettes and cigars can cause kidney damage. Ask your provider for information if you currently smoke and need help to quit. E-cigarettes or smokeless tobacco still contain nicotine. Talk to your provider before you use these products. Ask about over-the-counter medicines. Medicines such as NSAIDs and laxatives may harm your kidneys. Some cough and cold medicines can raise your blood pressure. Always ask if a medicine is safe before you take it. Ask about vaccines you may need. CKD can increase your risk for infections such as pneumonia, influenza, and hepatitis. Vaccines lower your risk for infection. Your healthcare provider will tell you which vaccines you need and when to get them. Follow up with your doctor or nephrologist as directed: You will need to return for tests to monitor your kidney and nerve function, and your parathyroid hormone level. Your medicines may be changed, based on certain test results. Write down your questions so you remember to ask them during your visits. © Copyright Carl Rater 2022 Information is for End User's use only and may not be sold, redistributed or otherwise used for commercial purposes. The above information is an  only. It is not intended as medical advice for individual conditions or treatments. Talk to your doctor, nurse or pharmacist before following any medical regimen to see if it is safe and effective for you. DASH Eating Plan   WHAT YOU NEED TO KNOW:   The DASH (Dietary Approaches to Stop Hypertension) Eating Plan is designed to help prevent or lower high blood pressure. It can also help to lower LDL (bad) cholesterol and decrease your risk for heart disease. The plan is low in sodium, sugar, unhealthy fats, and total fat. It is high in potassium, calcium, magnesium, and fiber.  These nutrients are added when you eat more fruits, vegetables, and whole grains. With the DASH eating plan, you need to eat a certain number of servings from each food group. This will help you get enough of certain nutrients and limit others. The amount of servings you should eat depends on how many calories you need. Your dietitian can help you create meal plans with the right number of servings for each food group. DISCHARGE INSTRUCTIONS:   What you need to know about sodium:  Your dietitian will tell you how much sodium is safe for you to have each day. People with high blood pressure should have no more than 1,500 to 2,300 mg of sodium in a day. A teaspoon (tsp) of salt has 2,300 mg of sodium. This may seem like a difficult goal, but small changes to the foods you eat can make a big difference. Your healthcare provider or dietitian can help you create a meal plan that follows your sodium limit. Read food labels. Food labels can help you choose foods that are low in sodium. The amount of sodium is listed in milligrams (mg). The % Daily Value (DV) column tells you how much of your daily needs are met by 1 serving of the food for each nutrient listed. Choose foods that have less than 5% of the DV of sodium. These foods are considered low in sodium. Foods that have 20% or more of the DV of sodium are considered high in sodium. Avoid foods that have more than 300 mg of sodium in each serving. Choose foods that say low-sodium, reduced-sodium, or no salt added on the food label. Limit added salt. Do not salt food at the table if you add salt when you cook. Use herbs and spices, such as onions, garlic, and salt-free seasonings to add flavor. Try lemon or lime juice or vinegar to add a tart flavor. Use hot peppers or a small amount of hot pepper sauce to add a spicy flavor.  Limit foods high in added salt, such as the following:    Seasonings made with salt, such as garlic salt, celery salt, onion salt, seasoned salt, meat tenderizers, and monosodium glutamate (MSG)    Miso soup and canned or dried soup mixes    Regular soy sauce, barbecue sauce, teriyaki sauce, steak sauce, Worcestershire sauce, and most flavored vinegars    Snack foods, such as salted chips, popcorn, pretzels, pork rinds, salted crackers, and salted nuts    Frozen foods, such as dinners, entrees, vegetables with sauces, and breaded meats    Ask about salt substitutes. Ask your healthcare provider if you may use salt substitutes. Some salt substitutes have ingredients that can be harmful if you have certain health conditions. Choose foods carefully at restaurants. Meals from restaurants, especially fast food restaurants, are often high in sodium. Some restaurants have nutrition information that tells you the amount of sodium in their foods. Ask to have your food prepared with less, or no salt. What you need to know about fats:  Healthy fats include unsaturated fats and omega-3 fatty acids. Unhealthy fats include saturated fats and trans fats. Include healthy fats, such as the following:      Cooking oils, such as soybean, canola, olive, or sunflower    Fatty fish, such as salmon, tuna, mackerel, or sardines    Flaxseed oil or ground flaxseed    ½ cup of cooked beans, such as black beans, kidney beans, or fang beans    1½ ounces of low-sodium nuts, such as almonds or walnuts    Low-sugar, low-sodium peanut butter    Seeds such as chucky seeds or sunflower seeds       Limit or do not have unhealthy fats, such as the following:      Foods that contain fat from animals, such as fatty meats, whole milk, butter, and cream    Shortening, stick margarine, palm oil, and coconut oil    Full-fat or creamy salad dressing    Creamy soup    Crackers, chips, and baked goods made with margarine or shortening    Foods that are fried in unhealthy fats    Gravy and sauces, such as Michele or cheese sauces    What you need to know about carbohydrates (carbs): All carbs break down into sugar.  Complex carbs contain more fiber than simple carbs. This means complex carbs go into the bloodstream more slowly and cause less of a blood sugar spike. Try to include more complex carbs and fewer simple carbs. Include complex carbs, such as the followin slice of whole-grain bread    1 ounce of dry cereal that does not contain added sugar    ½ cup of cooked oatmeal    2 ounces of cooked whole-grain pasta    ½ cup of cooked brown rice    Limit or do not have simple carbs, such as the following:      [employer], such as doughnuts, pastries, and cookies    Mixes for cornbread and biscuits    White rice and pasta mixes, such as boxed macaroni and cheese    Instant and cold cereals that contain sugar    Jelly, jam, and ice cream that contain sugar    Condiments such as ketchup    Drinks high in sugar, such as soft drinks, lemonade, and fruit juice    What you need to know about vegetables and fruits:  Vegetables and fruits can be fresh, frozen, or canned. If possible, try to choose low-sodium canned options. Include a variety of vegetables and fruits, such as the followin medium apple, pear, or peach (about ½ cup chopped)    ½ small banana    ½ cup berries, such as blueberries, strawberries, or blackberries    1 cup of raw leafy greens, such as lettuce, spinach, kale, or julito greens    ½ cup of frozen or canned (no added salt) vegetables, such as green beans    ½ cup of fresh, frozen, or canned fruit (canned in light syrup or fruit juice)    ½ cup of vegetable or fruit juice    Limit or do not have vegetables and fruits made in the following ways:      Frozen fruit such as cherries that have added sugar    Fruit in cream or butter sauce    Canned vegetables that are high in sodium    Sauerkraut, pickled vegetables, and other foods prepared in brine    Fried vegetables or vegetables in butter or high-fat sauces    What you need to know about protein foods:    Include lean or low-fat protein foods, such as the following:      [employer] (chicken, turkey) with no skin    Fish (especially fatty fish, such as salmon, fresh tuna, or mackerel)    Lean beef and pork (loin, round, extra lean hamburger)    Egg whites and egg substitutes    1 cup of nonfat (skim) or 1% milk    1½ ounces of fat-free or low-fat cheese    6 ounces of nonfat or low-fat yogurt    Limit or do not have high-fat protein foods, such as the following:      Smoked or cured meat, such as corned beef, matias, ham, hot dogs, and sausage    Canned beans and canned meats or spreads, such as potted meats, sardines, anchovies, and imitation seafood    Deli or lunch meats, such as bologna, ham, turkey, and roast beef    High-fat meat (T-bone steak, regular hamburger, and ribs)    Whole eggs and egg yolks    Whole milk, 2% milk, and cream    Regular cheese and processed cheese    Other guidelines to follow:   Maintain a healthy weight. Your risk for heart disease is higher if you are overweight. Your healthcare provider may suggest that you lose weight if you are overweight. You can lose weight by eating fewer calories and foods that have added sugars and fat. The DASH meal plan can help you do this. Decrease calories by eating smaller portions at each meal and fewer snacks. Ask your healthcare provider for more information about how to lose weight. Exercise regularly. Regular exercise can help you reach or maintain a healthy weight. Regular exercise can also help decrease your blood pressure and improve your cholesterol levels. Get 30 minutes or more of moderate exercise each day of the week. To lose weight, get at least 60 minutes of exercise. Talk to your healthcare provider about the best exercise program for you. Limit alcohol. Women should limit alcohol to 1 drink a day. Men should limit alcohol to 2 drinks a day. A drink of alcohol is 12 ounces of beer, 5 ounces of wine, or 1½ ounces of liquor.     For more information:   [employer] [facility] and [address] [facility]  [address]  [place] [address]  Phone: [phone]  Web Address: Valentina.no    © Copyright [employer] 2022 Information is for End User's use only and may not be sold, redistributed or otherwise used for commercial purposes. The above information is an  only. It is not intended as medical advice for individual conditions or treatments. Talk to your doctor, nurse or pharmacist before following any medical regimen to see if it is safe and effective for you. Cholesterol and Your Health   AMBULATORY CARE:   Cholesterol  is a waxy, fat-like substance. Your body uses cholesterol to make hormones and new cells, and to protect nerves. Cholesterol is made by your body. It also comes from certain foods you eat, such as meat and dairy products. Your healthcare provider can help you set goals for your cholesterol levels. He or she can help you create a plan to meet your goals. Cholesterol level goals: Your cholesterol level goals depend on your risk for heart disease, your age, and your other health conditions. The following are general guidelines: Total cholesterol  includes low-density lipoprotein (LDL), high-density lipoprotein (HDL), and triglyceride levels. The total cholesterol level should be lower than 200 mg/dL and is best at about 150 mg/dL. LDL cholesterol  is called bad cholesterol  because it forms plaque in your arteries. As plaque builds up, your arteries become narrow, and less blood flows through. When plaque decreases blood flow to your heart, you may have chest pain. If plaque completely blocks an artery that brings blood to your heart, you may have a heart attack. Plaque can break off and form blood clots. Blood clots may block arteries in your brain and cause a stroke. The level should be less than 130 mg/dL and is best at about 100 mg/dL.          HDL cholesterol  is called good cholesterol  because it helps remove LDL cholesterol from your arteries. It does this by attaching to LDL cholesterol and carrying it to your liver. Your liver breaks down LDL cholesterol so your body can get rid of it. High levels of HDL cholesterol can help prevent a heart attack and stroke. Low levels of HDL cholesterol can increase your risk for heart disease, heart attack, and stroke. The level should be 60 mg/dL or higher. Triglycerides  are a type of fat that store energy from foods you eat. High levels of triglycerides also cause plaque buildup. This can increase your risk for a heart attack or stroke. If your triglyceride level is high, your LDL cholesterol level may also be high. The level should be less than 150 mg/dL. Any of the following can increase your risk for high cholesterol:   Smoking cigarettes    Being overweight or obese, or not getting enough exercise    Drinking large amounts of alcohol    A medical condition such as hypertension (high blood pressure) or diabetes    Certain genes passed from your parents to you    Age older than 65 years    What you need to know about having your cholesterol levels checked: Adults 21to 39years of age should have their cholesterol levels checked every 4 to 6 years. Adults 45 years or older should have their cholesterol checked every 1 to 2 years. You may need your cholesterol checked more often, or at a younger age, if you have risk factors for heart disease. You may also need to have your cholesterol checked more often if you have other health conditions, such as diabetes. Blood tests are used to check cholesterol levels. Blood tests measure your levels of triglycerides, LDL cholesterol, and HDL cholesterol. How healthy fats affect your cholesterol levels:  Healthy fats, also called unsaturated fats, help lower LDL cholesterol and triglyceride levels.  Healthy fats include the following:  Monounsaturated fats  are found in foods such as olive oil, canola oil, avocado, nuts, and olives. Polyunsaturated fats,  such as omega 3 fats, are found in fish, such as salmon, trout, and tuna. They can also be found in plant foods such as flaxseed, walnuts, and soybeans. How unhealthy fats affect your cholesterol levels:  Unhealthy fats increase LDL cholesterol and triglyceride levels. They are found in foods high in cholesterol, saturated fat, and trans fat:  Cholesterol  is found in eggs, dairy, and meat. Saturated fat  is found in butter, cheese, ice cream, whole milk, and coconut oil. Saturated fat is also found in meat, such as sausage, hot dogs, and bologna. Trans fat  is found in liquid oils and is used in fried and baked foods. Foods that contain trans fats include chips, crackers, muffins, sweet rolls, microwave popcorn, and cookies. Treatment  for high cholesterol will also decrease your risk of heart disease, heart attack, and stroke. Treatment may include any of the following:  Lifestyle changes  may include food, exercise, weight loss, and quitting smoking. You may also need to decrease the amount of alcohol you drink. Your healthcare provider will want you to start with lifestyle changes. Other treatment may be added if lifestyle changes are not enough. Your healthcare provider may recommend you work with a team to manage hyperlipidemia. The team may include medical experts such as a dietitian, an exercise or physical therapist, and a behavior therapist. Your family members may be included in helping you create lifestyle changes. Medicines  may be given to lower your LDL cholesterol, triglyceride levels, or total cholesterol level. You may need medicines to lower your cholesterol if any of the following is true:    You have a history of stroke, TIA, unstable angina, or a heart attack. Your LDL cholesterol level is 190 mg/dL or higher.     You are age 36 to 76 years, have diabetes or heart disease risk factors, and your LDL cholesterol is 70 mg/dL or higher. Supplements  include fish oil, red yeast rice, and garlic. Fish oil may help lower your triglyceride and LDL cholesterol levels. It may also increase your HDL cholesterol level. Red yeast rice may help decrease your total cholesterol level and LDL cholesterol level. Garlic may help lower your total cholesterol level. Do not take any supplements without talking to your healthcare provider. Food changes you can make to lower your cholesterol levels:  A dietitian can help you create a healthy eating plan. He or she can show you how to read food labels and choose foods low in saturated fat, trans fats, and cholesterol. Decrease the total amount of fat you eat. Choose lean meats, fat-free or 1% fat milk, and low-fat dairy products, such as yogurt and cheese. Try to limit or avoid red meats. Limit or do not eat fried foods or baked goods, such as cookies. Replace unhealthy fats with healthy fats. Cook foods in olive oil or canola oil. Choose soft margarines that are low in saturated fat and trans fat. Seeds, nuts, and avocados are other examples of healthy fats. Eat foods with omega-3 fats. Examples include salmon, tuna, mackerel, walnuts, and flaxseed. Eat fish 2 times per week. Pregnant women should not eat fish that have high levels of mercury, such as shark, swordfish, and pastor mackerel. Increase the amount of high-fiber foods you eat. High-fiber foods can help lower your LDL cholesterol. Aim to get between 20 and 30 grams of fiber each day. Fruits and vegetables are high in fiber. Eat at least 5 servings each day. Other high-fiber foods are whole-grain or whole-wheat breads, pastas, or cereals, and brown rice. Eat 3 ounces of whole-grain foods each day. Increase fiber slowly. You may have abdominal discomfort, bloating, and gas if you add fiber to your diet too quickly. Eat healthy protein foods.   Examples include low-fat dairy products, skinless chicken and turkey, fish, and nuts. Limit foods and drinks that are high in sugar. Your dietitian or healthcare provider can help you create daily limits for high-sugar foods and drinks. The limit may be lower if you have diabetes or another health condition. Limits can also help you lose weight if needed. Lifestyle changes you can make to lower your cholesterol levels:   Maintain a healthy weight. Ask your healthcare provider what a healthy weight is for you. Ask him or her to help you create a weight loss plan if needed. Weight loss can decrease your total cholesterol and triglyceride levels. Weight loss may also help keep your blood pressure at a healthy level. Be physically active throughout the day. Physical activity, such as exercise, can help lower your total cholesterol level and maintain a healthy weight. Physical activity can also help increase your HDL cholesterol level. Work with your healthcare provider to create an program that is right for you. Get at least 30 to 40 minutes of moderate physical activity most days of the week. Examples of exercise include brisk walking, swimming, or biking. Also include strength training at least 2 times each week. Your healthcare providers can help you create a physical activity plan. Do not smoke. Nicotine and other chemicals in cigarettes and cigars can raise your cholesterol levels. Ask your healthcare provider for information if you currently smoke and need help to quit. E-cigarettes or smokeless tobacco still contain nicotine. Talk to your healthcare provider before you use these products. Limit or do not drink alcohol. Alcohol can increase your triglyceride levels. Ask your healthcare provider before you drink alcohol. Ask how much is okay for you to drink in 24 hours or 1 week. Follow up with your doctor as directed:  Write down your questions so you remember to ask them during your visits.   © Copyright Daniel Schaeffer 2022 Information is for End User's use only and may not be sold, redistributed or otherwise used for commercial purposes. The above information is an  only. It is not intended as medical advice for individual conditions or treatments. Talk to your doctor, nurse or pharmacist before following any medical regimen to see if it is safe and effective for you.

## 2023-07-17 NOTE — PROGRESS NOTES
[facility] - [facility]    NAME: Silver Munson  AGE: 80 y.o. SEX: male  : 1932     DATE: 2023     Assessment and Plan:     Problem List Items Addressed This Visit        Digestive    Slow transit constipation     Continues with stool softener, fluid intake and adequate fiber intake            Endocrine    Impaired fasting glucose     Fasting glucose on recent blood work 104            Cardiovascular and Mediastinum    Cerebrovascular accident (CVA) due to embolism of left middle cerebral artery ([address])     Remains on plavix. 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 He continues on lopressor. He follows with cardiology           Relevant Orders    CBC and differential    Comprehensive metabolic panel    Ischemic cardiomyopathy     Continues to follow closely with cardiology. Ventricular tachycardia ([address])     Follows closely with cardiology, Has implanted defibrillator         Chronic systolic CHF (congestive heart failure) ([address])     Wt Readings from Last 3 Encounters:   23 88.5 kg (195 lb)   23 85.7 kg (189 lb)   22 84.8 kg (187 lb)       Weight stable. Continues lasix and potassium. Genitourinary    Stage 3a chronic kidney disease ([address]) - Primary     Post recent GFR is 58. Nephrotoxins discussed.  Information regarding CKD provided to the patient         Relevant Orders    CBC and differential    Comprehensive metabolic panel       Other    Chronic anticoagulation     Remains on eliquis         Hyperlipidemia    Relevant Orders    Lipid Panel with Direct LDL reflex    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)    Spinal stenosis of lumbar region with neurogenic claudication     Will start Physical therapy in the near future. Return in about 6 months (around 1/17/2024) for Medicare wellness visit, Clarissa Garza. Chief Complaint:     Chief Complaint   Patient presents with   • Follow-up     6 month         History of Present Illness:     Tremaine Acuna presents to the office today for follow-up. Overall he feels well. Recent blood work was reviewed with him. He continues to follow with urology, neurology, and cardiology. He had 1 recent trip and fall incident at home last week without injury. He is not having any issues with depression. He does continue to live at [facility] and continues to drive. Vital signs stable today. I will see him back in 6 months for his annual wellness visit. Review of Systems:     Review of Systems   Constitutional: Negative. Negative for fatigue. HENT: Negative. Negative for congestion, postnasal drip, rhinorrhea and trouble swallowing. Eyes: Negative. Negative for visual disturbance. Respiratory: Negative. Negative for choking and shortness of breath. Cardiovascular: Negative. Negative for chest pain. Gastrointestinal: Negative. Endocrine: Negative. Genitourinary: Negative. Musculoskeletal: Positive for gait problem (ambulates with cane). Negative for arthralgias, back pain, myalgias and neck pain. Skin: Negative. Neurological: Negative for dizziness and headaches. Psychiatric/Behavioral: Negative.          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 9)   • Chronic anticoagulation   • Ventricular tachycardia (HCC)   • Status post insertion of drug eluting coronary artery stent   • Slow transit constipation   • Chronic systolic CHF (congestive heart failure) ([facility])   • Hyperlipidemia   • Seborrheic keratosis   • Long term current use of amiodarone   • 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)   • Status post lumbar spine surgery for decompression of spinal cord   • Stage 3a chronic kidney disease (HCC)   • Spinal stenosis of lumbar region with neurogenic claudication        Objective:     /78   Pulse 60   Temp 98 °F (36.7 °C) (Tympanic)   Resp 14   Ht 5' 6" (1.676 m)   Wt 88.5 kg (195 lb)   SpO2 95%   BMI 31.47 kg/m²     Current Outpatient Medications   Medication Sig Dispense Refill   • amiodarone 200 mg tablet TAKE 1 TABLET BY MOUTH EVERY DAY 90 tablet 3   • apixaban (Eliquis) 5 mg Take 1 tablet (5 mg total) by mouth 2 (two) times a day 60 tablet 5   • atorvastatin (LIPITOR) 40 mg tablet TAKE 1 TABLET BY MOUTH EVERY DAY 90 tablet 3   • clopidogrel (PLAVIX) 75 mg tablet TAKE 1 TABLET BY MOUTH EVERY DAY 90 tablet 3   • Coenzyme Q10 200 MG capsule Take 200 mg by mouth daily     • furosemide (LASIX) 20 mg tablet TAKE 1 TABLET BY MOUTH EVERY DAY 90 tablet 3   • gabapentin (NEURONTIN) 300 mg capsule Take 300 mg by mouth 2 (two) times a day     • Klor-Con M20 20 MEQ tablet TAKE 1 TABLET BY MOUTH EVERY DAY 90 tablet 3   • melatonin 3 mg Take 1 tablet (3 mg total) by mouth daily at bedtime 30 each 0   • metoprolol tartrate (LOPRESSOR) 25 mg tablet TAKE ONE TABLET IN THE MORNING AND 2 TABLETS IN THE EVENING 270 tablet 1   • nitroglycerin (NITROSTAT) 0.4 mg SL tablet Place 1 tablet (0.4 mg total) under the tongue every 5 (five) minutes as needed for chest pain 25 tablet 1   • Probiotic Product (PROBIOTIC COLON SUPPORT) CAPS Take 1 capsule by mouth daily     • tamsulosin (FLOMAX) 0.4 mg Take 0.4 mg by mouth daily at bedtime     • albuterol (PROVENTIL HFA,VENTOLIN HFA) 90 mcg/act inhaler  (Patient not taking: Reported on 1/17/2023)       No current facility-administered medications for this visit. Physical Exam  Vitals reviewed. Constitutional:       Appearance: Normal appearance. He is obese. HENT:      Head: Normocephalic and atraumatic. Nose: Nose normal.      Mouth/Throat:      Mouth: Mucous membranes are moist.   Eyes:      Extraocular Movements: Extraocular movements intact. Pupils: Pupils are equal, round, and reactive to light. Cardiovascular:      Rate and Rhythm: Normal rate and regular rhythm. Pulses: Normal pulses. Heart sounds: Normal heart sounds. Pulmonary:      Effort: Pulmonary effort is normal.      Breath sounds: Normal breath sounds. Musculoskeletal:         General: Normal range of motion. Skin:     General: Skin is warm. Neurological:      General: No focal deficit present. Mental Status: He is alert and oriented to person, place, and time. Mental status is at baseline. Gait: Gait abnormal.   Psychiatric:         Mood and Affect: Mood normal.         Behavior: Behavior normal.         Thought Content:  Thought content normal.         Judgment: Judgment normal.         Regine Cotton, [address] [place]

## 2023-07-17 NOTE — ASSESSMENT & PLAN NOTE
Wt Readings from Last 3 Encounters:   07/17/23 88.5 kg (195 lb)   01/17/23 85.7 kg (189 lb)   11/03/22 84.8 kg (187 lb)       Weight stable. Continues lasix and potassium.

## 2023-07-17 NOTE — ASSESSMENT & PLAN NOTE
Wt Readings from Last 3 Encounters:   07/17/23 88.5 kg (195 lb)   01/17/23 85.7 kg (189 lb)   11/03/22 84.8 kg (187 lb)       Blood pressure in the office today is 120/78. He continues on lopressor.  He follows with cardiology

## 2023-07-25 ENCOUNTER — REMOTE DEVICE CLINIC VISIT (OUTPATIENT)
Dept: CARDIOLOGY CLINIC | Facility: CLINIC | Age: 88
End: 2023-07-25
Payer: COMMERCIAL

## 2023-07-25 DIAGNOSIS — Z95.810 AICD (AUTOMATIC CARDIOVERTER/DEFIBRILLATOR) PRESENT: Primary | ICD-10-CM

## 2023-07-25 PROCEDURE — 93296 REM INTERROG EVL PM/IDS: CPT | Performed by: INTERNAL MEDICINE

## 2023-07-25 PROCEDURE — 93295 DEV INTERROG REMOTE 1/2/MLT: CPT | Performed by: INTERNAL MEDICINE

## 2023-07-25 NOTE — PROGRESS NOTES
Results for orders placed or performed in visit on 07/25/23   Cardiac EP device report    Narrative    MDT-DUAL ICD (AAI-DDD MODE)/ACTIVE SYSTEM IS MRI CONDITIONAL  CARELINK TRANSMISSION: BATTERY STATUS "6 YRS." AP 97%  0%. ALL AVAILABLE LEAD PARAMETERS WITHIN NORMAL LIMITS. NO SIGNIFICANT HIGH RATE EPISODES. OPTI-VOL WITHIN NORMAL LIMITS. NORMAL DEVICE FUNCTION.  NC

## 2023-07-26 DIAGNOSIS — I47.20 VENTRICULAR TACHYCARDIA (HCC): ICD-10-CM

## 2023-07-26 RX ORDER — CLOPIDOGREL BISULFATE 75 MG/1
TABLET ORAL
Qty: 90 TABLET | Refills: 3 | Status: SHIPPED | OUTPATIENT
Start: 2023-07-26

## 2023-09-05 NOTE — ASSESSMENT & PLAN NOTE
Blood pressure controlled on current meds  Continue same Cyclosporine Pregnancy And Lactation Text: This medication is Pregnancy Category C and it isn't know if it is safe during pregnancy. This medication is excreted in breast milk.

## 2023-09-08 DIAGNOSIS — I10 BENIGN ESSENTIAL HYPERTENSION: ICD-10-CM

## 2023-10-24 ENCOUNTER — REMOTE DEVICE CLINIC VISIT (OUTPATIENT)
Dept: CARDIOLOGY CLINIC | Facility: CLINIC | Age: 88
End: 2023-10-24
Payer: COMMERCIAL

## 2023-10-24 DIAGNOSIS — Z95.810 AICD (AUTOMATIC CARDIOVERTER/DEFIBRILLATOR) PRESENT: Primary | ICD-10-CM

## 2023-10-24 PROCEDURE — 93295 DEV INTERROG REMOTE 1/2/MLT: CPT | Performed by: INTERNAL MEDICINE

## 2023-10-24 PROCEDURE — 93296 REM INTERROG EVL PM/IDS: CPT | Performed by: INTERNAL MEDICINE

## 2023-10-24 NOTE — PROGRESS NOTES
Results for orders placed or performed in visit on 10/24/23   Cardiac EP device report    Narrative    MDT-DUAL ICD (AAI-DDD MODE)/ACTIVE SYSTEM IS MRI CONDITIONAL  CARELINK TRANSMISSION: BATTERY VOLTAGE ADEQUATE (5.6 YRS). AP 97.2%  <0.1% (AAI-DDD 60PPM); ALL AVAILABLE LEAD PARAMETERS WITHIN NORMAL LIMITS. NO SIGNIFICANT HIGH RATE EPISODES. OPTI-VOL WITHIN NORMAL LIMITS. NORMAL DEVICE FUNCTION.   ES

## 2023-11-02 DIAGNOSIS — I48.3 TYPICAL ATRIAL FLUTTER (HCC): ICD-10-CM

## 2023-11-02 RX ORDER — APIXABAN 5 MG/1
5 TABLET, FILM COATED ORAL 2 TIMES DAILY
Qty: 60 TABLET | Refills: 5 | Status: SHIPPED | OUTPATIENT
Start: 2023-11-02

## 2023-11-06 DIAGNOSIS — E78.5 DYSLIPIDEMIA: ICD-10-CM

## 2023-11-06 RX ORDER — ATORVASTATIN CALCIUM 40 MG/1
40 TABLET, FILM COATED ORAL DAILY
Qty: 90 TABLET | Refills: 3 | Status: SHIPPED | OUTPATIENT
Start: 2023-11-06

## 2023-12-18 DIAGNOSIS — I25.5 ISCHEMIC CARDIOMYOPATHY: ICD-10-CM

## 2023-12-18 DIAGNOSIS — I10 BENIGN ESSENTIAL HYPERTENSION: ICD-10-CM

## 2023-12-19 RX ORDER — POTASSIUM CHLORIDE 1500 MG/1
TABLET, EXTENDED RELEASE ORAL
Qty: 90 TABLET | Refills: 3 | Status: SHIPPED | OUTPATIENT
Start: 2023-12-19

## 2023-12-27 DIAGNOSIS — I25.5 ISCHEMIC CARDIOMYOPATHY: ICD-10-CM

## 2023-12-27 DIAGNOSIS — I10 BENIGN ESSENTIAL HYPERTENSION: ICD-10-CM

## 2023-12-27 RX ORDER — POTASSIUM CHLORIDE 20 MEQ/1
20 TABLET, EXTENDED RELEASE ORAL DAILY
Qty: 90 TABLET | Refills: 3 | OUTPATIENT
Start: 2023-12-27

## 2024-01-10 ENCOUNTER — RA CDI HCC (OUTPATIENT)
Dept: OTHER | Facility: HOSPITAL | Age: 89
End: 2024-01-10

## 2024-01-10 NOTE — PROGRESS NOTES
HCC coding opportunities          Chart Reviewed number of suggestions sent to Provider: 1   I48.3    This is a reminder to address (resolve/update/assess) ALL HCC (risk adjustment) codes as found on active problem list for 2024 as patient scores reset to zero JEANA.  Also, just a reminder to please review and assess all other chronic conditions for 2024  Patients Insurance     Medicare Insurance: [employer]

## 2024-01-17 ENCOUNTER — OFFICE VISIT (OUTPATIENT)
Dept: FAMILY MEDICINE CLINIC | Facility: CLINIC | Age: 89
End: 2024-01-17
Payer: COMMERCIAL

## 2024-01-17 VITALS
HEART RATE: 60 BPM | HEIGHT: 66 IN | TEMPERATURE: 97.5 F | OXYGEN SATURATION: 97 % | WEIGHT: 200.4 LBS | BODY MASS INDEX: 32.21 KG/M2 | DIASTOLIC BLOOD PRESSURE: 78 MMHG | SYSTOLIC BLOOD PRESSURE: 122 MMHG | RESPIRATION RATE: 21 BRPM

## 2024-01-17 DIAGNOSIS — N18.31 STAGE 3A CHRONIC KIDNEY DISEASE (HCC): ICD-10-CM

## 2024-01-17 DIAGNOSIS — Z23 ENCOUNTER FOR IMMUNIZATION: ICD-10-CM

## 2024-01-17 DIAGNOSIS — Z79.01 CHRONIC ANTICOAGULATION: ICD-10-CM

## 2024-01-17 DIAGNOSIS — I50.22 CHRONIC SYSTOLIC CHF (CONGESTIVE HEART FAILURE) (HCC): ICD-10-CM

## 2024-01-17 DIAGNOSIS — I13.0 HYPERTENSIVE HEART AND KIDNEY DISEASE WITH HF AND WITH CKD STAGE I-IV (HCC): ICD-10-CM

## 2024-01-17 DIAGNOSIS — I25.5 ISCHEMIC CARDIOMYOPATHY: ICD-10-CM

## 2024-01-17 DIAGNOSIS — Z00.00 MEDICARE ANNUAL WELLNESS VISIT, SUBSEQUENT: Primary | ICD-10-CM

## 2024-01-17 DIAGNOSIS — R73.01 IMPAIRED FASTING GLUCOSE: ICD-10-CM

## 2024-01-17 DIAGNOSIS — G95.19 OTHER VASCULAR MYELOPATHIES (HCC): ICD-10-CM

## 2024-01-17 DIAGNOSIS — E66.9 OBESITY (BMI 30.0-34.9): ICD-10-CM

## 2024-01-17 DIAGNOSIS — I47.20 VENTRICULAR TACHYCARDIA (HCC): ICD-10-CM

## 2024-01-17 DIAGNOSIS — Z95.5 STATUS POST INSERTION OF DRUG ELUTING CORONARY ARTERY STENT: ICD-10-CM

## 2024-01-17 DIAGNOSIS — Z95.810 PRESENCE OF DOUBLE CHAMBER AUTOMATIC CARDIOVERTER/DEFIBRILLATOR (AICD): ICD-10-CM

## 2024-01-17 DIAGNOSIS — E78.2 MIXED HYPERLIPIDEMIA: ICD-10-CM

## 2024-01-17 DIAGNOSIS — Z12.5 PROSTATE CANCER SCREENING: ICD-10-CM

## 2024-01-17 PROCEDURE — G0439 PPPS, SUBSEQ VISIT: HCPCS | Performed by: NURSE PRACTITIONER

## 2024-01-17 PROCEDURE — 99214 OFFICE O/P EST MOD 30 MIN: CPT | Performed by: NURSE PRACTITIONER

## 2024-01-17 PROCEDURE — 90662 IIV NO PRSV INCREASED AG IM: CPT

## 2024-01-17 PROCEDURE — G0008 ADMIN INFLUENZA VIRUS VAC: HCPCS

## 2024-01-17 NOTE — ASSESSMENT & PLAN NOTE
Wt Readings from Last 3 Encounters:   01/17/24 90.9 kg (200 lb 6.4 oz)   07/17/23 88.5 kg (195 lb)   01/17/23 85.7 kg (189 lb)     Blood pressure in the office today is 122/78.  Continues with low salt diet.

## 2024-01-17 NOTE — PATIENT INSTRUCTIONS
Chronic Kidney Disease   AMBULATORY CARE:   Chronic kidney disease (CKD)  is the gradual and permanent loss of kidney function. Normally, the kidneys remove fluid, chemicals, and waste from your blood. These wastes are turned into urine by your kidneys. CKD may worsen over time and lead to kidney failure.       Common signs and symptoms include the following:   Changes in how often you need to urinate    Swelling in your arms, legs, or feet    Shortness of breath    Fatigue or weakness    Bad or bitter taste in your mouth    Nausea, vomiting, or loss of appetite    Call your local emergency number (911 in the [place]) if:   You have a seizure.    You have shortness of breath.    Seek care immediately if:   You are confused and very drowsy.       Call your doctor or nephrologist if:   You suddenly gain or lose more weight than your healthcare provider has told you is okay.    You have itchy skin or a rash.    You urinate more or less than you normally do.    You have blood in your urine.    You have nausea and are vomiting.    You have fatigue or muscle weakness.    You have hiccups that will not stop.    You have questions or concerns about your condition or care.    How CKD is diagnosed:  CKD has 5 stages. Your healthcare provider will use results from the following tests to find the stage of CKD you have:  Blood and urine tests  show how well your kidneys are working. They may also show the cause of your CKD.    Ultrasound, CT scan, or MRI  pictures may be used to check your kidneys. You may be given contrast liquid to help your kidneys show up better in the pictures. Tell the healthcare provider if you have ever had an allergic reaction to contrast liquid. Do not enter the MRI room with anything metal. Metal can cause serious injury. Tell the healthcare provider if you have any metal in or on your body.    A biopsy  is a procedure to remove a small piece of tissue from your kidney. It is done to find the cause of your  CKD.    Treatment  can help control signs and symptoms, and prevent a worse stage of CKD. Your care team may include specialists, such as a dietitian or a heart specialist. This depends on the stage of your CKD and if you have other health conditions to manage. Healthcare providers will work with you to create a plan based on your decisions for treatment. Your treatment plan may include any of the following:  Medicines  may be given to decrease your blood pressure and get rid of extra fluid. You may also receive medicine to manage health conditions that may occur with CKD, such as anemia, diabetes, and heart disease.    Dialysis  is a treatment to remove chemicals and waste from your blood when your kidneys can no longer do this.    Surgery  may be needed to create an arteriovenous fistula (AVF) in your arm or insert a catheter into your abdomen. This is done so you can receive dialysis.    A kidney transplant  may be done if your CKD becomes severe.    What you can do to manage CKD:  Management may include making some lifestyle changes. Tell your healthcare provider if you have any concerns about being able to make changes. He or she can help you find solutions, including working with specialists. Ask for help creating a plan to break large goals into smaller steps. Your plan may include any of the following:  Manage other health conditions.  Your healthcare provider will work with you to make a care plan that meets your needs. You will be checked regularly for heart disease or other conditions that can make CKD worse, such as diabetes. Your blood pressure will be closely monitored. You will also get a target blood pressure and help making a plan to reach your target. This may include taking your blood pressure at home.         Maintain a healthy weight.  Your weight and body mass index (BMI) will be checked regularly. BMI helps find if your weight is healthy for your height. Your healthcare provider will use other  tests to check your muscle and protein levels. Extra weight can strain your kidneys. A low weight or low muscle mass can make you feel more tired. You may have trouble doing your daily activities. Ask your provider what a healthy weight is for you. He or she can help you create a plan to lose or gain weight safely, if needed. The plan may include keeping a food diary. This is a list of foods and liquids you have each day. Your provider will use the diary to help you make changes, if needed. Changes are based on your health and any other conditions you have, such as diabetes.    Create an exercise plan.  Regular exercise can help you manage CKD, high blood pressure, and diabetes. Exercise also helps control weight. Your provider can help you create exercise goals and a plan to reach those goals. For example, your goal may be to exercise for 30 minutes in a day. Your plan can include breaking exercise into 10 minute sessions, 3 times during the day.         Create a healthy eating plan.  Your provider may tell you to eat food low in potassium, phosphorus, or protein. Your provider may also recommend vitamin or mineral supplements. Do not take any supplements without talking to your provider. A dietitian can help you plan meals if needed. Ask how much liquid to drink each day and which liquids are best for you.         Limit sodium (salt) as directed.  You may need to limit sodium to less than 2,300 milligrams (mg) each day. Ask your dietitian or healthcare provider how much sodium you can have each day. The amount depends on your stage of kidney disease. Table salt, canned foods, soups, salted snacks, and processed meats, like deli meats and sausage, are high in sodium. Your provider or a dietitian can show you how to read food labels for sodium.    Limit alcohol as directed.  Alcohol can cause fluid retention and can affect your kidneys. Ask how much alcohol is safe for you. A drink of alcohol is 12 ounces of beer, 5  ounces of wine, or 1½ ounces of liquor.    Do not smoke.  Nicotine and other chemicals in cigarettes and cigars can cause kidney damage. Ask your provider for information if you currently smoke and need help to quit. E-cigarettes or smokeless tobacco still contain nicotine. Talk to your provider before you use these products.    Ask about over-the-counter medicines.  Medicines such as NSAIDs and laxatives may harm your kidneys. Some cough and cold medicines can raise your blood pressure. Always ask if a medicine is safe before you take it.    Ask about vaccines you may need.  CKD can increase your risk for infections such as pneumonia, influenza, and hepatitis. Vaccines lower your risk for infection. Your healthcare provider will tell you which vaccines you need and when to get them.    Follow up with your doctor or nephrologist as directed:  You will need to return for tests to monitor your kidney and nerve function, and your parathyroid hormone level. Your medicines may be changed, based on certain test results. Write down your questions so you remember to ask them during your visits.  © Copyright [employer] 2023 Information is for End User's use only and may not be sold, redistributed or otherwise used for commercial purposes.  The above information is an  only. It is not intended as medical advice for individual conditions or treatments. Talk to your doctor, nurse or pharmacist before following any medical regimen to see if it is safe and effective for you.  DASH Eating Plan   WHAT YOU NEED TO KNOW:   The DASH (Dietary Approaches to Stop Hypertension) Eating Plan is designed to help prevent or lower high blood pressure. It can also help to lower LDL (bad) cholesterol and decrease your risk for heart disease. The plan is low in sodium, sugar, unhealthy fats, and total fat. It is high in potassium, calcium, magnesium, and fiber. These nutrients are added when you eat more fruits, vegetables, and whole  grains. With the DASH eating plan, you need to eat a certain number of servings from each food group. This will help you get enough of certain nutrients and limit others. The amount of servings you should eat depends on how many calories you need. Your dietitian can help you create meal plans with the right number of servings for each food group.       DISCHARGE INSTRUCTIONS:   What you need to know about sodium:  Your dietitian will tell you how much sodium is safe for you to have each day. People with high blood pressure should have no more than 1,500 to 2,300 mg of sodium in a day. A teaspoon (tsp) of salt has 2,300 mg of sodium. This may seem like a difficult goal, but small changes to the foods you eat can make a big difference. Your healthcare provider or dietitian can help you create a meal plan that follows your sodium limit.  Read food labels.  Food labels can help you choose foods that are low in sodium. The amount of sodium is listed in milligrams (mg). The % Daily Value (DV) column tells you how much of your daily needs are met by 1 serving of the food for each nutrient listed. Choose foods that have less than 5% of the DV of sodium. These foods are considered low in sodium. Foods that have 20% or more of the DV of sodium are considered high in sodium. Avoid foods that have more than 300 mg of sodium in each serving. Choose foods that say low-sodium, reduced-sodium, or no salt added on the food label.         Limit added salt.  Do not salt food at the table if you add salt when you cook. Use herbs and spices, such as onions, garlic, and salt-free seasonings to add flavor. Try lemon or lime juice or vinegar to add a tart flavor. Use hot peppers or a small amount of hot pepper sauce to add a spicy flavor. Limit foods high in added salt, such as the following:    Seasonings made with salt, such as garlic salt, celery salt, onion salt, seasoned salt, meat tenderizers, and monosodium glutamate (MSG)    Miso  soup and canned or dried soup mixes    Regular soy sauce, barbecue sauce, teriyaki sauce, steak sauce, Worcestershire sauce, and most flavored vinegars    Snack foods, such as salted chips, popcorn, pretzels, pork rinds, salted crackers, and salted nuts    Frozen foods, such as dinners, entrees, vegetables with sauces, and breaded meats    Ask about salt substitutes.  Ask your healthcare provider if you may use salt substitutes. Some salt substitutes have ingredients that can be harmful if you have certain health conditions.    Choose foods carefully at restaurants.  Meals from restaurants, especially fast food restaurants, are often high in sodium. Some restaurants have nutrition information that tells you the amount of sodium in their foods. Ask to have your food prepared with less, or no salt.    What you need to know about fats:  Healthy fats include unsaturated fats and omega-3 fatty acids. Unhealthy fats include saturated fats and trans fats.  Include healthy fats, such as the following:      Cooking oils, such as soybean, canola, olive, or sunflower    Fatty fish, such as salmon, tuna, mackerel, or sardines    Flaxseed oil or ground flaxseed    ½ cup of cooked beans, such as black beans, kidney beans, or fang beans    1½ ounces of low-sodium nuts, such as almonds or walnuts    Low-sugar, low-sodium peanut butter    Seeds such as chucky seeds or sunflower seeds       Limit or do not have unhealthy fats, such as the following:      Foods that contain fat from animals, such as fatty meats, whole milk, butter, and cream    Shortening, stick margarine, palm oil, and coconut oil    Full-fat or creamy salad dressing    Creamy soup    Crackers, chips, and baked goods made with margarine or shortening    Foods that are fried in unhealthy fats    Gravy and sauces, such as Michele or cheese sauces    What you need to know about carbohydrates (carbs):  All carbs break down into sugar. Complex carbs contain more fiber than  simple carbs. This means complex carbs go into the bloodstream more slowly and cause less of a blood sugar spike. Try to include more complex carbs and fewer simple carbs.  Include complex carbs, such as the followin slice of whole-grain bread    1 ounce of dry cereal that does not contain added sugar    ½ cup of cooked oatmeal    2 ounces of cooked whole-grain pasta    ½ cup of cooked brown rice    Limit or do not have simple carbs, such as the following:      Baked goods, such as doughnuts, pastries, and cookies    Mixes for cornbread and biscuits    White rice and pasta mixes, such as boxed macaroni and cheese    Instant and cold cereals that contain sugar    Jelly, jam, and ice cream that contain sugar    Condiments such as ketchup    Drinks high in sugar, such as soft drinks, lemonade, and fruit juice    What you need to know about vegetables and fruits:  Vegetables and fruits can be fresh, frozen, or canned. If possible, try to choose low-sodium canned options.  Include a variety of vegetables and fruits, such as the followin medium apple, pear, or peach (about ½ cup chopped)    ½ small banana    ½ cup berries, such as blueberries, strawberries, or blackberries    1 cup of raw leafy greens, such as lettuce, spinach, kale, or julito greens    ½ cup of frozen or canned (no added salt) vegetables, such as green beans    ½ cup of fresh, frozen, or canned fruit (canned in light syrup or fruit juice)    ½ cup of vegetable or fruit juice    Limit or do not have vegetables and fruits made in the following ways:      Frozen fruit such as cherries that have added sugar    Fruit in cream or butter sauce    Canned vegetables that are high in sodium    Sauerkraut, pickled vegetables, and other foods prepared in brine    Fried vegetables or vegetables in butter or high-fat sauces    What you need to know about protein foods:   Include lean or low-fat protein foods, such as the following:      Poultry  (chicken, turkey) with no skin    Fish (especially fatty fish, such as salmon, fresh tuna, or mackerel)    Lean beef and pork (loin, round, extra lean hamburger)    Egg whites and egg substitutes    1 cup of nonfat (skim) or 1% milk    1½ ounces of fat-free or low-fat cheese    6 ounces of nonfat or low-fat yogurt    Limit or do not have high-fat protein foods, such as the following:      Smoked or cured meat, such as corned beef, matias, ham, hot dogs, and sausage    Canned beans and canned meats or spreads, such as potted meats, sardines, anchovies, and imitation seafood    Deli or lunch meats, such as bologna, ham, turkey, and roast beef    High-fat meat (T-bone steak, regular hamburger, and ribs)    Whole eggs and egg yolks    Whole milk, 2% milk, and cream    Regular cheese and processed cheese    Other guidelines to follow:   Maintain a healthy weight.  Your risk for heart disease is higher if you have extra weight. Ask your healthcare provider what a healthy weight is for you. Your provider may suggest that you lose weight. You can lose weight by eating fewer calories and foods that have added sugars and fat. The DASH meal plan can help you do this. Decrease calories by eating smaller portions at each meal and fewer snacks. Ask your provider for more information about how to lose weight.    Exercise regularly.  Regular exercise can help you reach or maintain a healthy weight. Regular exercise can also help decrease your blood pressure and improve your cholesterol levels. Get 30 minutes or more of moderate exercise each day of the week. To lose weight, get at least 60 minutes of exercise. Talk to your healthcare provider about the best exercise program for you.         Limit alcohol.  Women should limit alcohol to 1 drink a day. Men should limit alcohol to 2 drinks a day. A drink of alcohol is 12 ounces of beer, 5 ounces of wine, or 1½ ounces of liquor.    For more information:   [employer]  [place]  [facility]  [address] [address]  [place] [address]  Phone: 1- 601 [phone]  Web Address: [email or web address]/[email or web address]    © Copyright [employer] 2023 Information is for End User's use only and may not be sold, redistributed or otherwise used for commercial purposes.  The above information is an  only. It is not intended as medical advice for individual conditions or treatments. Talk to your doctor, nurse or pharmacist before following any medical regimen to see if it is safe and effective for you.  Hypertension   WHAT YOU NEED TO KNOW:   Hypertension is high blood pressure. Your blood pressure is the force of your blood moving against the walls of your arteries. Hypertension causes your blood pressure to get so high that your heart has to work much harder than normal. This can damage your heart. Hypertension that does not respond to medicines and lifestyle changes is called resistant hypertension. Hypertension is considered chronic when it continues for 3 months or longer.  DISCHARGE INSTRUCTIONS:   Call your local emergency number (911 in the ) or have someone call if:   You have chest pain.    You have any of the following signs of a heart attack:      Squeezing, pressure, or pain in your chest    You may  also have any of the following:     Discomfort or pain in your back, neck, jaw, stomach, or arm    Shortness of breath    Nausea or vomiting    Lightheadedness or a sudden cold sweat    You become confused or have trouble speaking.    You suddenly feel lightheaded or have trouble breathing.    Return to the emergency department if:   You have a severe headache or vision loss.    You have weakness in an arm or leg.    Call your doctor or cardiologist if:   You feel faint, dizzy, confused, or drowsy.    You have been taking your blood pressure medicine but your pressure is higher than your provider says it should be.    You have questions or concerns  about your condition or care.    Medicines:  You may  need any of the following:  Antihypertensives  may be used to help lower your blood pressure. Several kinds of medicines are available. Your healthcare provider will choose medicines based on the kind of hypertension you have. You may need more than one type of medicine. Take the medicine exactly as directed.    Diuretics  help decrease extra fluid that collects in your body. This will help lower your blood pressure. You may urinate more often while you take this medicine.    Cholesterol medicine  helps lower your cholesterol level. A low cholesterol level helps prevent heart disease and makes it easier to control your blood pressure.    Take your medicine as directed.  Contact your healthcare provider if you think your medicine is not helping or if you have side effects. Tell your provider if you are allergic to any medicine. Keep a list of the medicines, vitamins, and herbs you take. Include the amounts, and when and why you take them. Bring the list or the pill bottles to follow-up visits. Carry your medicine list with you in case of an emergency.    Follow up with your doctor or cardiologist as directed:  You will need to return to have your blood pressure checked and to have other lab tests done. Write down your questions so you remember to ask them during your visits.  Stages of hypertension:  Your healthcare provider will give you a blood pressure goal based on your age, health, and risk for cardiovascular disease. The following are general guidelines on the stages of hypertension:  Normal blood pressure is 119/79 or lower . Your healthcare provider may only check your blood pressure each year if it stays at a normal level.    Elevated blood pressure is 120/79 to 129/79 . This is sometimes called prehypertension. Your healthcare provider may suggest lifestyle changes to help lower your blood pressure to a normal level. He or she may then check it again in  3 to 6 months.    Stage 1 hypertension is 130/80  to 139/89 . Your provider may recommend lifestyle changes, medication, and checks every 3 to 6 months until your blood pressure is controlled.    Stage 2 hypertension is 140/90 or higher . Your provider will recommend lifestyle changes and have you take 2 kinds of hypertension medicines. You will also need to have your blood pressure checked monthly until it is controlled.       Manage hypertension:   Check your blood pressure at home.  Do not smoke, have caffeine, or exercise for at least 30 minutes before you check your blood pressure. Sit and rest for 5 minutes before you check your blood pressure. Extend your arm and support it on a flat surface. Your arm should be at the same level as your heart. Follow the directions that came with your blood pressure monitor. Check your blood pressure 2 times, 1 minute apart, before you take your medicine in the morning. Also check your blood pressure before your evening meal. Keep a record of your readings and bring it to your follow-up visits. Ask your healthcare provider what your blood pressure should be.         Manage any other health conditions you have.  Health conditions such as diabetes can increase your risk for hypertension. Follow your healthcare provider's instructions and take all your medicines as directed.    Ask about all medicines.  Certain medicines can increase your blood pressure. Examples include oral birth control pills, decongestants, herbal supplements, and NSAIDs, such as ibuprofen. Your healthcare provider can tell you which medicines are safe for you to take. This includes prescription and over-the-counter medicines.    Lifestyle changes you can make to manage hypertension:  Your healthcare provider may recommend you work with a team to manage hypertension. The team may include medical experts such as a dietitian, an exercise or physical therapist, and a behavior therapist. Your family members may  be included in helping you create lifestyle changes.     Limit sodium (salt) as directed.  Too much sodium can affect your fluid balance. Check labels to find low-sodium or no-salt-added foods. Some low-sodium foods use potassium salts for flavor. Too much potassium can also cause health problems. Your healthcare provider will tell you how much sodium and potassium are safe for you to have in a day. He or she may recommend that you limit sodium to 2,300 mg a day.         Follow the meal plan recommended by your healthcare provider.  A dietitian or your provider can give you more information on low-sodium plans or the DASH (Dietary Approaches to Stop Hypertension) eating plan. The DASH plan is low in sodium, processed sugar, unhealthy fats, and total fat. It is high in potassium, calcium, and fiber. These can be found in vegetables, fruit, and whole-grain foods.         Be physically active throughout the day.  Physical activity, such as exercise, can help control your blood pressure and your weight. Be physically active for at least 30 minutes per day, on most days of the week. Include aerobic activity, such as walking or riding a bicycle. Also include strength training at least 2 times each week. Your healthcare providers can help you create a physical activity plan.            Decrease stress.  This may help lower your blood pressure. Learn ways to relax, such as deep breathing or listening to music.    Limit alcohol as directed.  Alcohol can increase your blood pressure. A drink of alcohol is 12 ounces of beer, 5 ounces of wine, or 1½ ounces of liquor.    Do not smoke.  Nicotine and other chemicals in cigarettes and cigars can increase your blood pressure and also cause lung damage. Ask your healthcare provider for information if you currently smoke and need help to quit. E-cigarettes or smokeless tobacco still contain nicotine. Talk to your healthcare provider before you use these products.       © Copyright  [employer] 2023 Information is for End User's use only and may not be sold, redistributed or otherwise used for commercial purposes.  The above information is an  only. It is not intended as medical advice for individual conditions or treatments. Talk to your doctor, nurse or pharmacist before following any medical regimen to see if it is safe and effective for you.  Medicare Preventive Visit Patient Instructions  Thank you for completing your Welcome to Medicare Visit or Medicare Annual Wellness Visit today. Your next wellness visit will be due in one year (1/17/2025).  The screening/preventive services that you may require over the next 5-10 years are detailed below. Some tests may not apply to you based off risk factors and/or age. Screening tests ordered at today's visit but not completed yet may show as past due. Also, please note that scanned in results may not display below.  Preventive Screenings:  Service Recommendations Previous Testing/Comments   Colorectal Cancer Screening  Colonoscopy    Fecal Occult Blood Test (FOBT)/Fecal Immunochemical Test (FIT)  Fecal DNA/Cologuard Test  Flexible Sigmoidoscopy Age: 45-75 years old   Colonoscopy: every 10 years (May be performed more frequently if at higher risk)  OR  FOBT/FIT: every 1 year  OR  Cologuard: every 3 years  OR  Sigmoidoscopy: every 5 years  Screening may be recommended earlier than age 45 if at higher risk for colorectal cancer. Also, an individualized decision between you and your healthcare provider will decide whether screening between the ages of 76-85 would be appropriate. Colonoscopy: Not on file  FOBT/FIT: Not on file  Cologuard: Not on file  Sigmoidoscopy: Not on file          Prostate Cancer Screening Individualized decision between patient and health care provider in men between ages of 55-69   Medicare will cover every 12 months beginning on the day after your 50th birthday PSA: No results in last 5 years           Hepatitis C  Screening Once for adults born between 1945 and 1965  More frequently in patients at high risk for Hepatitis C Hep C Antibody: Not on file        Diabetes Screening 1-2 times per year if you're at risk for diabetes or have pre-diabetes Fasting glucose: No results in last 5 years (No results in last 5 years)  A1C: 5.9 (1/7/2022)      Cholesterol Screening Once every 5 years if you don't have a lipid disorder. May order more often based on risk factors. Lipid panel: 10/20/2019         Other Preventive Screenings Covered by Medicare:  Abdominal Aortic Aneurysm (AAA) Screening: covered once if your at risk. You're considered to be at risk if you have a family history of AAA or a male between the age of 65-75 who smoking at least 100 cigarettes in your lifetime.  Lung Cancer Screening: covers low dose CT scan once per year if you meet all of the following conditions: (1) Age 55-77; (2) No signs or symptoms of lung cancer; (3) Current smoker or have quit smoking within the last 15 years; (4) You have a tobacco smoking history of at least 20 pack years (packs per day x number of years you smoked); (5) You get a written order from a healthcare provider.  Glaucoma Screening: covered annually if you're considered high risk: (1) You have diabetes OR (2) Family history of glaucoma OR (3)  aged 50 and older OR (4)  American aged 65 and older  Osteoporosis Screening: covered every 2 years if you meet one of the following conditions: (1) Have a vertebral abnormality; (2) On glucocorticoid therapy for more than 3 months; (3) Have primary hyperparathyroidism; (4) On osteoporosis medications and need to assess response to drug therapy.  HIV Screening: covered annually if you're between the age of 15-65. Also covered annually if you are younger than 15 and older than 65 with risk factors for HIV infection. For pregnant patients, it is covered up to 3 times per pregnancy.    Immunizations:  Immunization  Recommendations   Influenza Vaccine Annual influenza vaccination during flu season is recommended for all persons aged >= 6 months who do not have contraindications   Pneumococcal Vaccine   * Pneumococcal conjugate vaccine = PCV13 (Prevnar 13), PCV15 (Vaxneuvance), PCV20 (Prevnar 20)  * Pneumococcal polysaccharide vaccine = PPSV23 (Pneumovax) Adults 19-65 yo with certain risk factors or if 65+ yo  If never received any pneumonia vaccine: recommend Prevnar 20 (PCV20)  Give PCV20 if previously received 1 dose of PCV13 or PPSV23   Hepatitis B Vaccine 3 dose series if at intermediate or high risk (ex: diabetes, end stage renal disease, liver disease)   Respiratory syncytial virus (RSV) Vaccine - COVERED BY MEDICARE PART D  * RSVPreF3 (Arexvy) CDC recommends that adults 60 years of age and older may receive a single dose of RSV vaccine using shared clinical decision-making (SCDM)   Tetanus (Td) Vaccine - COST NOT COVERED BY MEDICARE PART B Following completion of primary series, a booster dose should be given every 10 years to maintain immunity against tetanus. Td may also be given as tetanus wound prophylaxis.   Tdap Vaccine - COST NOT COVERED BY MEDICARE PART B Recommended at least once for all adults. For pregnant patients, recommended with each pregnancy.   Shingles Vaccine (Shingrix) - COST NOT COVERED BY MEDICARE PART B  2 shot series recommended in those 19 years and older who have or will have weakened immune systems or those 50 years and older     Health Maintenance Due:  There are no preventive care reminders to display for this patient.  Immunizations Due:      Topic Date Due    Influenza Vaccine (1) 09/01/2023    COVID-19 Vaccine (7 - 2023-24 season) 09/01/2023     Advance Directives   What are advance directives?  Advance directives are legal documents that state your wishes and plans for medical care. These plans are made ahead of time in case you lose your ability to make decisions for yourself. Advance  directives can apply to any medical decision, such as the treatments you want, and if you want to donate organs.   What are the types of advance directives?  There are many types of advance directives, and each state has rules about how to use them. You may choose a combination of any of the following:  Living will:  This is a written record of the treatment you want. You can also choose which treatments you do not want, which to limit, and which to stop at a certain time. This includes surgery, medicine, IV fluid, and tube feedings.   Durable power of  for healthcare (DPAHC):  This is a written record that states who you want to make healthcare choices for you when you are unable to make them for yourself. This person, called a proxy, is usually a family member or a friend. You may choose more than 1 proxy.  Do not resuscitate (DNR) order:  A DNR order is used in case your heart stops beating or you stop breathing. It is a request not to have certain forms of treatment, such as CPR. A DNR order may be included in other types of advance directives.  Medical directive:  This covers the care that you want if you are in a coma, near death, or unable to make decisions for yourself. You can list the treatments you want for each condition. Treatment may include pain medicine, surgery, blood transfusions, dialysis, IV or tube feedings, and a ventilator (breathing machine).  Values history:  This document has questions about your views, beliefs, and how you feel and think about life. This information can help others choose the care that you would choose.  Why are advance directives important?  An advance directive helps you control your care. Although spoken wishes may be used, it is better to have your wishes written down. Spoken wishes can be misunderstood, or not followed. Treatments may be given even if you do not want them. An advance directive may make it easier for your family to make difficult choices about  your care.   Weight Management   Why it is important to manage your weight:  Being overweight increases your risk of health conditions such as heart disease, high blood pressure, type 2 diabetes, and certain types of cancer. It can also increase your risk for osteoarthritis, sleep apnea, and other respiratory problems. Aim for a slow, steady weight loss. Even a small amount of weight loss can lower your risk of health problems.  How to lose weight safely:  A safe and healthy way to lose weight is to eat fewer calories and get regular exercise. You can lose up about 1 pound a week by decreasing the number of calories you eat by 500 calories each day.   Healthy meal plan for weight management:  A healthy meal plan includes a variety of foods, contains fewer calories, and helps you stay healthy. A healthy meal plan includes the following:  Eat whole-grain foods more often.  A healthy meal plan should contain fiber. Fiber is the part of grains, fruits, and vegetables that is not broken down by your body. Whole-grain foods are healthy and provide extra fiber in your diet. Some examples of whole-grain foods are whole-wheat breads and pastas, oatmeal, brown rice, and bulgur.  Eat a variety of vegetables every day.  Include dark, leafy greens such as spinach, kale, julito greens, and mustard greens. Eat yellow and orange vegetables such as carrots, sweet potatoes, and winter squash.   Eat a variety of fruits every day.  Choose fresh or canned fruit (canned in its own juice or light syrup) instead of juice. Fruit juice has very little or no fiber.  Eat low-fat dairy foods.  Drink fat-free (skim) milk or 1% milk. Eat fat-free yogurt and low-fat cottage cheese. Try low-fat cheeses such as mozzarella and other reduced-fat cheeses.  Choose meat and other protein foods that are low in fat.  Choose beans or other legumes such as split peas or lentils. Choose fish, skinless poultry (chicken or turkey), or lean cuts of red meat  (beef or pork). Before you cook meat or poultry, cut off any visible fat.   Use less fat and oil.  Try baking foods instead of frying them. Add less fat, such as margarine, sour cream, regular salad dressing and mayonnaise to foods. Eat fewer high-fat foods. Some examples of high-fat foods include french fries, doughnuts, ice cream, and cakes.  Eat fewer sweets.  Limit foods and drinks that are high in sugar. This includes candy, cookies, regular soda, and sweetened drinks.  Exercise:  Exercise at least 30 minutes per day on most days of the week. Some examples of exercise include walking, biking, dancing, and swimming. You can also fit in more physical activity by taking the stairs instead of the elevator or parking farther away from stores. Ask your healthcare provider about the best exercise plan for you.      © Copyright [employer] 2018 Information is for End User's use only and may not be sold, redistributed or otherwise used for commercial purposes. All illustrations and images included in CareNotes® are the copyrighted property of [employer] or [employer]

## 2024-01-17 NOTE — ASSESSMENT & PLAN NOTE
Wt Readings from Last 3 Encounters:   01/17/24 90.9 kg (200 lb 6.4 oz)   07/17/23 88.5 kg (195 lb)   01/17/23 85.7 kg (189 lb)     Weight stable. Continues with Lasix and potassium, low salt diet. Follows with cardiology

## 2024-01-17 NOTE — ASSESSMENT & PLAN NOTE
Recent lipid panel discussed. Continue Co Q10, low fat diet    Units 1/12/24  9:13 AM   Cholesterol  <200 mg/dL 166   Triglyceride  <150 mg/dL 111   Cholesterol, HDL, Direct  23 - 92 mg/dL 57   Cholesterol, Non-HDL  <160 mg/dL 109   CHOL/HDL Ratio 2.9   Cholesterol, LDL, Calculated  <130 mg/dL 87

## 2024-01-17 NOTE — ASSESSMENT & PLAN NOTE
Current GFR is 46, slightly lower than previous.  Information provided to the patient regarding low salt diet, nephrotoxins and CKD

## 2024-01-17 NOTE — PROGRESS NOTES
Chief Complaint   Patient presents with   • Medicare Wellness Visit     Subsequent visit      Health Maintenance   Topic Date Due   • COVID-19 Vaccine (7 - 2023-24 season) 09/01/2023   • SLP PLAN OF CARE  01/17/2024 (Originally 9/1/1932)   • Fall Risk  01/17/2025   • Depression Screening  01/17/2025   • Medicare Annual Wellness Visit (AWV)  01/17/2025   • Zoster Vaccine  Completed   • Pneumococcal Vaccine: 65+ Years  Completed   • Influenza Vaccine  Completed   • HIB Vaccine  Aged Out   • IPV Vaccine  Aged Out   • Hepatitis A Vaccine  Aged Out   • Meningococcal ACWY Vaccine  Aged Out   • HPV Vaccine  Aged Out        Assessment and Plan:     Problem List Items Addressed This Visit        Endocrine    Impaired fasting glucose     Recent fasting glucose was 123. Patient states he took his medication with orange juice the morning of blood work. Will repeat in six months.          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  Continues with low salt diet.                Relevant Orders    CBC and differential    Comprehensive metabolic panel    Ischemic cardiomyopathy     Continues to follow closely with cardiology. No new concerns.          Ventricular tachycardia (HCC)     ICD in place. Follows closely with cardiology.  Continues on amiodarone and plavix         Chronic systolic CHF (congestive heart failure) (HCC)     Wt Readings from Last 3 Encounters:   01/17/24 90.9 kg (200 lb 6.4 oz)   07/17/23 88.5 kg (195 lb)   01/17/23 85.7 kg (189 lb)     Weight stable. Continues with Lasix and potassium, low salt diet. Follows with cardiology                Nervous and Auditory    Other vascular myelopathies  (HCC)       Genitourinary    Stage 3a chronic kidney disease (HCC)     Current GFR is 46, slightly lower than previous.  Information provided to the patient regarding low salt diet, nephrotoxins and CKD         Relevant Orders    Comprehensive metabolic panel       Other    Medicare annual wellness visit, subsequent - Primary    Obesity (BMI 30.0-34.9)    Chronic anticoagulation    Status post insertion of drug eluting coronary artery stent    Hyperlipidemia     Recent lipid panel discussed. Continue Co Q10, low fat diet    Units 1/12/24  9:13 AM   Cholesterol  <200 mg/dL 166   Triglyceride  <150 mg/dL 111   Cholesterol, HDL, Direct  23 - 92 mg/dL 57   Cholesterol, Non-HDL  <160 mg/dL 109   CHOL/HDL Ratio 2.9   Cholesterol, LDL, Calculated  <130 mg/dL 87          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)   Other Visit Diagnoses     Prostate cancer screening        Encounter for immunization        Relevant Orders    influenza vaccine, high-dose, PF 0.7 mL (FLUZONE HIGH-DOSE) (Completed)            Depression Screening and Follow-up Plan: Patient was screened for depression during today's encounter. They screened negative with a PHQ-2 score of 0.      Preventive health issues were discussed with patient, and age appropriate screening tests were ordered as noted in patient's After Visit Summary.  Personalized health advice and appropriate referrals for health education or preventive services given if needed, as noted in patient's After Visit Summary.     History of Present Illness:     Patient presents for a Medicare Wellness Visit    Main presents to the office today for an annual wellness visit and follow-up.  Recent blood work was reviewed with the patient.  He has no new medical concerns.  He continues to live independently and drive.  He has had no falls at home.  He continues to follow with cardiology, urology and neurology.  He denies any problems with anxiety and depression.   His blood pressure is stable.  Flu vaccine was provided to the patient today.  A discussion was had regarding the COVID booster and the patient will obtain at her local pharmacy.  I will see him back in the office in 6 months.       Patient Care Team:  MARJAN Munoz as PCP - General (Internal Medicine)  Rei Saleh MD as PCP - PCP-[facility]  Ilan Gutierrez MD (Urology)  Tisha Baires DO (Cardiology)  Denisse Redman MD (Ophthalmology)  Ceci Lebron MD (Neurology)     Review of Systems:     Review of Systems   Constitutional: Negative.  Negative for fatigue.   HENT: Negative.  Negative for congestion, postnasal drip, rhinorrhea and trouble swallowing.    Eyes: Negative.  Negative for visual disturbance.   Respiratory: Negative.  Negative for choking and shortness of breath.    Cardiovascular: Negative.  Negative for chest pain.   Gastrointestinal: Negative.    Endocrine: Negative.    Genitourinary: Negative.    Musculoskeletal:  Positive for gait problem (ambulates with cane). Negative for arthralgias, back pain, myalgias and neck pain.   Skin: Negative.    Neurological:  Negative for dizziness and headaches.   Psychiatric/Behavioral: Negative.          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 ([facility])   • CAD (coronary artery disease)   • Typical atrial flutter (HCC)   • Hx of CABG   • Ischemic cardiomyopathy   • Impaired fasting glucose   • Psychophysiological insomnia   • Medicare annual wellness visit, subsequent   • Subacute left lumbar radiculopathy   • Obesity (BMI 30.0-34.9)   • Chronic anticoagulation   • Ventricular tachycardia ([facility])   • Status post insertion of drug eluting coronary artery stent   • Slow transit constipation   • Chronic systolic CHF (congestive heart failure) ([facility])   •  Hyperlipidemia   • Seborrheic keratosis   • Long term current use of amiodarone   • 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)   • Status post lumbar spine surgery for decompression of spinal cord   • Stage 3a chronic kidney disease (HCC)   • Spinal stenosis of lumbar region with neurogenic claudication   • Other vascular myelopathies (HCC)      Past Medical and Surgical History:     Past Medical History:   Diagnosis Date   • Acute kidney injury (HCC) 10/19/2019   • Acute myocardial infarction (HCC)    • Allergic rhinitis    • Anxiety    • Bimalleolar fracture of left ankle    • CAD (coronary artery disease)    • Dyslipidemia 3/6/2018   • Erectile dysfunction of non-organic origin    • Hematuria     workup was negative and felt to be benign   • Herpes zoster with complication    • Hyperlipidemia    • Hypertension    • Hypotension 10/28/2019   • Impaired fasting glucose    • 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   • Neurogenic claudication 2021   • Prostatic hypertrophy     benign   • Sepsis with acute renal failure without septic shock (HCC) 2021   • Stroke (HCC)      Past Surgical History:   Procedure Laterality Date   • ANKLE FRACTURE SURGERY Left    • CARDIAC PACEMAKER PLACEMENT Left    • CORONARY ARTERY BYPASS GRAFT      x4      Family History:     Family History   Problem Relation Age of Onset   • Cancer Mother    • Hypertension Mother         essential   • Pancreatic cancer Mother       Social History:     Social History     Socioeconomic History   • Marital status:      Spouse name: moncho   • Number of children: None   • Years of education: None   • Highest education level: None   Occupational History   • Occupation: retired     Comment: clergy   Tobacco Use   • Smoking status: Former     Current packs/day: 0.00     Types: Cigarettes     Start date: 1950     Quit date:      Years since quittin.0     Passive exposure: Past   •  Smokeless tobacco: Never   Vaping Use   • Vaping status: Never Used   Substance and Sexual Activity   • Alcohol use: Not Currently     Comment: very rarely   • Drug use: No   • Sexual activity: Not Currently   Other Topics Concern   • None   Social History Narrative    Death in the family- spouse, moncho  after a prolonged francis with lymphoma     Exercises regularly     Social Determinants of Health     Financial Resource Strain: Low Risk  (2024)    Overall Financial Resource Strain (CARDIA)    • Difficulty of Paying Living Expenses: Not hard at all   Food Insecurity: Not on file   Transportation Needs: No Transportation Needs (2024)    PRAPARE - Transportation    • Lack of Transportation (Medical): No    • Lack of Transportation (Non-Medical): No   Physical Activity: Not on file   Stress: Not on file   Social Connections: Not on file   Intimate Partner Violence: Not on file   Housing Stability: Not on file      Medications and Allergies:     Current Outpatient Medications   Medication Sig Dispense Refill   • amiodarone 200 mg tablet TAKE 1 TABLET BY MOUTH EVERY DAY 90 tablet 3   • atorvastatin (LIPITOR) 40 mg tablet Take 1 tablet (40 mg total) by mouth daily 90 tablet 3   • clopidogrel (PLAVIX) 75 mg tablet TAKE 1 TABLET BY MOUTH EVERY DAY 90 tablet 3   • Coenzyme Q10 200 MG capsule Take 200 mg by mouth daily     • Eliquis 5 MG TAKE 1 TABLET BY MOUTH TWICE A DAY 60 tablet 5   • furosemide (LASIX) 20 mg tablet TAKE 1 TABLET BY MOUTH EVERY DAY 90 tablet 3   • gabapentin (NEURONTIN) 300 mg capsule Take 300 mg by mouth 2 (two) times a day     • Klor-Con M20 20 MEQ tablet TAKE 1 TABLET BY MOUTH EVERY DAY 90 tablet 3   • melatonin 3 mg Take 1 tablet (3 mg total) by mouth daily at bedtime 30 each 0   • metoprolol tartrate (LOPRESSOR) 25 mg tablet TAKE ONE TABLET IN THE MORNING AND 2 TABLETS IN THE EVENING 270 tablet 1   • nitroglycerin (NITROSTAT) 0.4 mg SL tablet Place 1 tablet (0.4 mg total) under the  tongue every 5 (five) minutes as needed for chest pain 25 tablet 1   • Probiotic Product (PROBIOTIC COLON SUPPORT) CAPS Take 1 capsule by mouth daily     • tamsulosin (FLOMAX) 0.4 mg Take 0.4 mg by mouth daily at bedtime       No current facility-administered medications for this visit.     Allergies   Allergen Reactions   • Penicillins Rash and Edema     Reaction Date: 01Jan2000; Category: Allergy; Annotation - [id number]: Severe reaction with hospitaization at [facility]-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      Immunizations:     Immunization History   Administered Date(s) Administered   • COVID-19 MODERNA VACC 0.5 ML IM 01/21/2021, 02/23/2021, 10/23/2021   • COVID-19 Pfizer Vac BIVALENT Brandyn-sucrose 12 Yr+ IM 09/22/2022, 06/08/2023   • COVID-19, unspecified 04/18/2022   • INFLUENZA 10/13/2022   • Influenza Split High Dose Preservative Free IM 09/12/2009, 11/01/2010, 11/17/2011, 10/05/2014, 09/14/2015, 09/08/2016, 10/26/2017   • Influenza, high dose seasonal 0.7 mL 09/27/2018, 10/17/2019, 10/22/2020, 11/05/2021, 01/17/2024   • Influenza, seasonal, injectable 11/17/2011, 11/15/2012, 12/09/2013   • Pneumococcal Conjugate 13-Valent 07/13/2015   • Pneumococcal Polysaccharide PPV23 01/01/2004   • Td (adult), adsorbed 04/13/2009, 04/13/2009, 06/28/2013   • Zoster Vaccine Recombinant 04/19/2019, 10/17/2019      Health Maintenance:     There are no preventive care reminders to display for this patient.      Topic Date Due   • COVID-19 Vaccine (7 - 2023-24 season) 09/01/2023      Medicare Screening Tests and Risk Assessments:     Main is here for his Subsequent Wellness visit. Last Medicare Wellness visit information reviewed, patient interviewed, no change since last AWV.     Health Risk Assessment:   Patient rates overall health as good. Patient feels that their physical health rating is same. Patient is very satisfied with their life. Eyesight was rated as same. Hearing was rated as same. Patient feels that their emotional and mental health rating  is same. Patients states they are never, rarely angry. Patient states they are never, rarely unusually tired/fatigued. Pain experienced in the last 7 days has been none. Patient states that he has experienced no weight loss or gain in last 6 months.     Depression Screening:   PHQ-2 Score: 0      Fall Risk Screening:   In the past year, patient has experienced: no history of falling in past year      Home Safety:  Patient does not have trouble with stairs inside or outside of their home. Patient has working smoke alarms and has working carbon monoxide detector. Home safety hazards include: none.     Nutrition:   Current diet is No Added Salt and Low Cholesterol.     Medications:   Patient is currently taking over-the-counter supplements. OTC medications include: see medication list. Patient is able to manage medications.     Activities of Daily Living (ADLs)/Instrumental Activities of Daily Living (IADLs):   Walk and transfer into and out of bed and chair?: Yes  Dress and groom yourself?: Yes    Bathe or shower yourself?: Yes    Feed yourself? Yes  Do your laundry/housekeeping?: Yes  Manage your money, pay your bills and track your expenses?: Yes  Make your own meals?: Yes    Do your own shopping?: Yes    Previous Hospitalizations:   Any hospitalizations or ED visits within the last 12 months?: No      Advance Care Planning:   Living will: Yes    Durable POA for healthcare: Yes    Advanced directive: Yes    Five wishes given: No      Comments: Son in durable POA    Cognitive Screening:   Provider or family/friend/caregiver concerned regarding cognition?: No    PREVENTIVE SCREENINGS      Cardiovascular Screening:    General: History Lipid Disorder and Screening Current    Due for: Lipid Panel      Diabetes Screening:     General: Screening Not Indicated      Colorectal Cancer Screening:     General: Screening Not Indicated      Prostate Cancer Screening:    General: Screening Not Indicated      Osteoporosis  "Screening:    General: Screening Not Indicated      Abdominal Aortic Aneurysm (AAA) Screening:    Risk factors include: tobacco use        Lung Cancer Screening:     General: Screening Not Indicated      Hepatitis C Screening:    General: Screening Not Indicated    Screening, Brief Intervention, and Referral to Treatment (SBIRT)    Screening      AUDIT-C Screenin) How often did you have a drink containing alcohol in the past year? monthly or less  2) How many drinks did you have on a typical day when you were drinking in the past year? 1 to 2  3) How often did you have 6 or more drinks on one occasion in the past year? never    AUDIT-C Score: 1  Interpretation: Score 0-3 (male): Negative screen for alcohol misuse    Single Item Drug Screening:  How often have you used an illegal drug (including marijuana) or a prescription medication for non-medical reasons in the past year? never    Single Item Drug Screen Score: 0  Interpretation: Negative screen for possible drug use disorder    No results found.     Physical Exam:     /78   Pulse 60   Temp 97.5 °F (36.4 °C) (Temporal)   Resp 21   Ht 5' 6\" (1.676 m)   Wt 90.9 kg (200 lb 6.4 oz)   SpO2 97%   BMI 32.35 kg/m²     Physical Exam  Vitals and nursing note reviewed.   Constitutional:       Appearance: Normal appearance. He is well-developed. He is obese.   HENT:      Head: Normocephalic and atraumatic.      Right Ear: Tympanic membrane, ear canal and external ear normal. There is no impacted cerumen.      Left Ear: Tympanic membrane, ear canal and external ear normal. There is no impacted cerumen.      Nose: Nose normal.      Mouth/Throat:      Mouth: Mucous membranes are moist.      Pharynx: Oropharynx is clear.   Eyes:      Extraocular Movements: Extraocular movements intact.      Conjunctiva/sclera: Conjunctivae normal.      Pupils: Pupils are equal, round, and reactive to light.   Cardiovascular:      Rate and Rhythm: Normal rate and regular rhythm. "      Pulses: Normal pulses.      Heart sounds: Normal heart sounds. No murmur heard.  Pulmonary:      Effort: Pulmonary effort is normal. No respiratory distress.      Breath sounds: Normal breath sounds.   Abdominal:      General: Bowel sounds are normal. There is no distension.      Palpations: Abdomen is soft. There is no mass.      Tenderness: There is no abdominal tenderness. There is no guarding or rebound.      Hernia: No hernia is present.   Musculoskeletal:         General: Normal range of motion.      Cervical back: Normal range of motion and neck supple.   Skin:     General: Skin is warm and dry.   Neurological:      General: No focal deficit present.      Mental Status: He is alert and oriented to person, place, and time. Mental status is at baseline.      Gait: Gait abnormal (ambulates with cane).   Psychiatric:         Mood and Affect: Mood normal.         Behavior: Behavior normal.         Thought Content: Thought content normal.         Judgment: Judgment normal.          MARJAN Smith

## 2024-01-17 NOTE — ASSESSMENT & PLAN NOTE
Continues with stool softener, fluid intake and adequate fiber intake.  No problems with bowel movements currently

## 2024-01-19 NOTE — ASSESSMENT & PLAN NOTE
1/19/2024        To the Employer of  Maryam Sanchez;    Please excuse Maryam  from work from 12/26/23 through 2/5/24.   Maryam may return to work on 2/6/24, without restrictions.    Sincerely,      Lizet Velasco MD  Obstetrics and Gynecology  [address]  [address]  [phone]                   Recent fasting glucose was 123. Patient states he took his medication with orange juice the morning of blood work. Will repeat in six months.

## 2024-01-23 ENCOUNTER — REMOTE DEVICE CLINIC VISIT (OUTPATIENT)
Dept: CARDIOLOGY CLINIC | Facility: CLINIC | Age: 89
End: 2024-01-23
Payer: COMMERCIAL

## 2024-01-23 DIAGNOSIS — Z95.0 PRESENCE OF PERMANENT CARDIAC PACEMAKER: Primary | ICD-10-CM

## 2024-01-23 PROCEDURE — 93296 REM INTERROG EVL PM/IDS: CPT | Performed by: INTERNAL MEDICINE

## 2024-01-23 PROCEDURE — 93294 REM INTERROG EVL PM/LDLS PM: CPT | Performed by: INTERNAL MEDICINE

## 2024-01-23 NOTE — PROGRESS NOTES
Results for orders placed or performed in visit on 01/23/24   Cardiac EP device report    Narrative    MDT-DUAL ICD (AAI-DDD MODE)/ACTIVE SYSTEM IS MRI CONDITIONAL  CARELINK TRANSMISSION: BATTERY VOLTAGE ADEQUATE.(5.3 YRS). AP 96%  1%. ALL AVAILABLE LEAD PARAMETERS WITHIN NORMAL LIMITS. NO SIGNIFICANT HIGH RATE EPISODES. NORMAL DEVICE FUNCTION.---JAUREGUI

## 2024-02-03 DIAGNOSIS — I10 BENIGN ESSENTIAL HYPERTENSION: ICD-10-CM

## 2024-02-21 PROBLEM — Z00.00 MEDICARE ANNUAL WELLNESS VISIT, SUBSEQUENT: Status: RESOLVED | Noted: 2018-09-27 | Resolved: 2024-02-21

## 2024-04-02 ENCOUNTER — TELEPHONE (OUTPATIENT)
Dept: FAMILY MEDICINE CLINIC | Facility: CLINIC | Age: 89
End: 2024-04-02

## 2024-04-02 NOTE — TELEPHONE ENCOUNTER
Patient dropped this off to be completed-form fee attached and placed in provider's bin. Please call patient when completed and  he will  pick it up.

## 2024-04-23 ENCOUNTER — REMOTE DEVICE CLINIC VISIT (OUTPATIENT)
Dept: CARDIOLOGY CLINIC | Facility: CLINIC | Age: 89
End: 2024-04-23
Payer: COMMERCIAL

## 2024-04-23 DIAGNOSIS — Z95.810 PRESENCE OF IMPLANTABLE CARDIOVERTER-DEFIBRILLATOR (ICD): Primary | ICD-10-CM

## 2024-04-23 PROCEDURE — 93295 DEV INTERROG REMOTE 1/2/MLT: CPT | Performed by: INTERNAL MEDICINE

## 2024-04-23 PROCEDURE — 93296 REM INTERROG EVL PM/IDS: CPT | Performed by: INTERNAL MEDICINE

## 2024-04-23 NOTE — PROGRESS NOTES
MDT DC ICD/ACTIVE SYSTEM IS MRI CONDITIONAL   CARELINK TRANSMISSION:  BATTERY VOLTAGE ADEQUATE (4.7 YR.).  AP 97.8%  <0.1%.  ALL LEAD PARAMETERS WITHIN NORMAL LIMITS.  NO SIGNIFICANT HIGH RATE EPISODES.  OPTI-VOL WITHIN NORMAL LIMITS.  NORMAL DEVICE FUNCTION. RG

## 2024-05-06 DIAGNOSIS — I48.3 TYPICAL ATRIAL FLUTTER (HCC): ICD-10-CM

## 2024-05-07 RX ORDER — APIXABAN 5 MG/1
5 TABLET, FILM COATED ORAL 2 TIMES DAILY
Qty: 60 TABLET | Refills: 5 | Status: SHIPPED | OUTPATIENT
Start: 2024-05-07

## 2024-05-16 ENCOUNTER — IN-CLINIC DEVICE VISIT (OUTPATIENT)
Dept: CARDIOLOGY CLINIC | Facility: CLINIC | Age: 89
End: 2024-05-16
Payer: COMMERCIAL

## 2024-05-16 ENCOUNTER — OFFICE VISIT (OUTPATIENT)
Dept: CARDIOLOGY CLINIC | Facility: CLINIC | Age: 89
End: 2024-05-16
Payer: COMMERCIAL

## 2024-05-16 VITALS
HEART RATE: 61 BPM | WEIGHT: 202.3 LBS | SYSTOLIC BLOOD PRESSURE: 122 MMHG | DIASTOLIC BLOOD PRESSURE: 68 MMHG | BODY MASS INDEX: 32.51 KG/M2 | HEIGHT: 66 IN

## 2024-05-16 DIAGNOSIS — I48.3 TYPICAL ATRIAL FLUTTER (HCC): ICD-10-CM

## 2024-05-16 DIAGNOSIS — Z95.810 AICD (AUTOMATIC CARDIOVERTER/DEFIBRILLATOR) PRESENT: Primary | ICD-10-CM

## 2024-05-16 DIAGNOSIS — Z95.1 HX OF CABG: ICD-10-CM

## 2024-05-16 DIAGNOSIS — I13.0 HYPERTENSIVE HEART AND KIDNEY DISEASE WITH HF AND WITH CKD STAGE I-IV (HCC): ICD-10-CM

## 2024-05-16 DIAGNOSIS — I25.10 CORONARY ARTERY DISEASE INVOLVING NATIVE CORONARY ARTERY OF NATIVE HEART WITHOUT ANGINA PECTORIS: Primary | Chronic | ICD-10-CM

## 2024-05-16 DIAGNOSIS — I47.20 VENTRICULAR TACHYCARDIA (HCC): ICD-10-CM

## 2024-05-16 DIAGNOSIS — E78.5 DYSLIPIDEMIA: ICD-10-CM

## 2024-05-16 DIAGNOSIS — I25.5 ISCHEMIC CARDIOMYOPATHY: ICD-10-CM

## 2024-05-16 DIAGNOSIS — Z95.810 PRESENCE OF DOUBLE CHAMBER AUTOMATIC CARDIOVERTER/DEFIBRILLATOR (AICD): ICD-10-CM

## 2024-05-16 DIAGNOSIS — Z79.899 LONG TERM CURRENT USE OF AMIODARONE: ICD-10-CM

## 2024-05-16 PROCEDURE — 99214 OFFICE O/P EST MOD 30 MIN: CPT | Performed by: INTERNAL MEDICINE

## 2024-05-16 PROCEDURE — 93283 PRGRMG EVAL IMPLANTABLE DFB: CPT | Performed by: INTERNAL MEDICINE

## 2024-05-16 PROCEDURE — 93000 ELECTROCARDIOGRAM COMPLETE: CPT | Performed by: INTERNAL MEDICINE

## 2024-05-16 NOTE — PROGRESS NOTES
Results for orders placed or performed in visit on 05/16/24   Cardiac EP device report    Narrative    MDT DC ICD/ACTIVE SYSTEM IS MRI CONDITIONAL  DEVICE INTERROGATED IN THE [place] OFFICE. BATTERY VOLTAGE ADEQUATE (4.6 YRS). AP-96%, <0.1%. ALL LEAD PARAMETERS WITHIN NORMAL LIMITS. NO SIGNIFICANT HIGH RATE EPISODES. OPTI-VOL WITHIN NORMAL LIMITS. HISTOGRAMS FLAT- PROGRAMMED RATE RESPONSE ON. PER [employer] ADVISORY, REPROGRAMMED VT/VF RX PATHWAYS B>AX. WAVELET TEMPLATE COLLECTED. PT TO SEE DR. GUTIÉRREZ TODAY.  NORMAL DEVICE FUNCTION. GV

## 2024-05-16 NOTE — PROGRESS NOTES
Cardiology Follow Up    Main Kidd  9/1/1932  [id number]  HEART & VASCULAR [facility] [place]  [address]  [place] [address]    1. Coronary artery disease involving native coronary artery of native heart without angina pectoris        2. Hx of CABG        3. Ischemic cardiomyopathy        4. Ventricular tachycardia (HCC)        5. 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)        6. Typical atrial flutter (HCC)        7. 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 Dyslipidemia                Discussion/Summary:  Mr. Kidd is a pleasant 89-year-old gentleman who presents to the office today for routine follow-up.  Since his last visit he has been feeling well.  He offers no cardiac complaints.    He continues to maintain normal sinus rhythm.  He will remain on his current AV elizabeth blocking regimen along with systemic anticoagulation with Eliquis.  His last device check revealed no recurrent atrial fibrillation.  He is maintained on amiodarone given history of ventricular tachycardia.  He is up-to-date on his eye exams.  He had recent LFTs which were unremarkable.  I requested TFTs.    His blood pressure is well controlled in the office today.  His most recent lipids do reveal suboptimal numbers in the setting of his coronary disease.  We discussed intensification of his regimen versus therapeutic lifestyle modifications.  He will attempt therapeutic lifestyle modifications.    He appears euvolemic on his current diuretic regimen to which no changes were made.  He underwent an echocardiogram in 2020 revealing his ejection fraction has improved to 45%.  He is not maintained on ACE-inhibitor due to hypotension.  I requested a repeat echocardiogram for reassessment of his EF.    He remains on Plavix in the setting of a stent a number of years ago.  He is on no aspirin in  the setting of Eliquis use.    I will see him back in the office in six months or sooner if deemed necessary.    Interval History:   Mr. Kidd is a pleasant 89-year-old gentleman who presents to the office today for routine follow-up.    Since his last visit from a cardiac standpoint he has been feeling relatively well.  He does not participate in any formal physical activity.  He ambulates with the aid of his walker when he is walking to and from the dining facility.  He ambulates with the aid of a cane outside of his home.  With the activity he does perform he denies any exertional chest pain or shortness.  He denies any signs or symptoms of congestive heart failure including lower extremity edema, paroxysmal nocturnal dyspnea, orthopnea, acute weight gain or increasing abdominal girth.  He denies lightheadedness, syncope or presyncope.  He denies palpitations.  He denies symptoms of claudication.    He is maintained on Eliquis without any bleeding consequences or falls.    He denies any discharges from his AICD.    Problem List       CVA (cerebral vascular accident) (HCC)    Essential hypertension (Chronic)    HLD (hyperlipidemia) (Chronic)    CAD (coronary artery disease) (Chronic)    Atrial flutter (HCC) (Chronic)          Past Medical History:   Diagnosis Date    Acute kidney injury ([facility]) 10/19/2019    Acute myocardial infarction ([facility])     Allergic rhinitis     Anxiety     Bimalleolar fracture of left ankle     CAD (coronary artery disease)     Dyslipidemia 3/6/2018    Erectile dysfunction of non-organic origin     Hematuria     workup was negative and felt to be benign    Herpes zoster with complication     Hyperlipidemia     Hypertension     Hypotension 10/28/2019    Impaired fasting glucose     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    Neurogenic claudication 6/17/2021    Prostatic hypertrophy     benign    Sepsis with acute renal failure without septic shock ([facility]) 11/20/2021    Stroke ([facility])       Social History     Socioeconomic History    Marital status:      Spouse name: moncho    Number of children: Not on file    Years of education: Not on file    Highest education level: Not on file   Occupational History    Occupation: retired     Comment: clergy   Tobacco Use    Smoking status: Former     Current packs/day: 0.00     Types: Cigarettes     Start date: 1950     Quit date:      Years since quittin.4     Passive exposure: Past    Smokeless tobacco: Never   Vaping Use    Vaping status: Never Used   Substance and Sexual Activity    Alcohol use: Not Currently     Comment: very rarely    Drug use: No    Sexual activity: Not Currently   Other Topics Concern    Not on file   Social History Narrative    Death in the family- spouse, moncho  after a prolonged francis with lymphoma     Exercises regularly     Social Determinants of Health     Financial Resource Strain: Low Risk  (2024)    Overall Financial Resource Strain (CARDIA)     Difficulty of Paying Living Expenses: Not hard at all   Food Insecurity: Not on file   Transportation Needs: No Transportation Needs (2024)    PRAPARE - Transportation     Lack of Transportation (Medical): No     Lack of Transportation (Non-Medical): No   Physical Activity: Not on file   Stress: Not on file   Social Connections: Not on file   Intimate Partner Violence: Not on file   Housing Stability: Not on file      Family History   Problem Relation Age of Onset    Cancer Mother     Hypertension Mother         essential    Pancreatic cancer Mother      Past Surgical History:   Procedure Laterality Date    ANKLE FRACTURE SURGERY Left     CARDIAC PACEMAKER PLACEMENT Left     CORONARY ARTERY BYPASS GRAFT      x4       Current Outpatient Medications:     amiodarone 200 mg tablet, TAKE 1 TABLET BY MOUTH EVERY DAY, Disp: 90 tablet, Rfl: 3    atorvastatin (LIPITOR) 40 mg tablet, Take 1 tablet (40 mg total) by mouth daily, Disp: 90 tablet, Rfl: 3     clopidogrel (PLAVIX) 75 mg tablet, TAKE 1 TABLET BY MOUTH EVERY DAY, Disp: 90 tablet, Rfl: 3    Coenzyme Q10 200 MG capsule, Take 200 mg by mouth daily, Disp: , Rfl:     Eliquis 5 MG, TAKE 1 TABLET BY MOUTH TWICE A DAY, Disp: 60 tablet, Rfl: 5    furosemide (LASIX) 20 mg tablet, TAKE 1 TABLET BY MOUTH EVERY DAY, Disp: 90 tablet, Rfl: 3    gabapentin (NEURONTIN) 300 mg capsule, Take 300 mg by mouth 2 (two) times a day, Disp: , Rfl:     Klor-Con M20 20 MEQ tablet, TAKE 1 TABLET BY MOUTH EVERY DAY, Disp: 90 tablet, Rfl: 3    melatonin 3 mg, Take 1 tablet (3 mg total) by mouth daily at bedtime, Disp: 30 each, Rfl: 0    metoprolol tartrate (LOPRESSOR) 25 mg tablet, TAKE ONE TABLET IN THE MORNING AND 2 TABLETS IN THE EVENING, Disp: 270 tablet, Rfl: 1    nitroglycerin (NITROSTAT) 0.4 mg SL tablet, Place 1 tablet (0.4 mg total) under the tongue every 5 (five) minutes as needed for chest pain, Disp: 25 tablet, Rfl: 1    Probiotic Product (PROBIOTIC COLON SUPPORT) CAPS, Take 1 capsule by mouth daily, Disp: , Rfl:     tamsulosin (FLOMAX) 0.4 mg, Take 0.4 mg by mouth daily at bedtime, Disp: , Rfl:   Allergies   Allergen Reactions    Penicillins Rash and Edema     Reaction Date: 01Jan2000; Category: Allergy; Annotation - [id number]: Severe reaction with hospitaization at [facility]-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       Labs:     Chemistry        Component Value Date/Time     08/17/2017 0705    K 4.5 01/12/2024 0913     01/12/2024 0913    CO2 30 01/12/2024 0913    BUN 24 01/12/2024 0913    CREATININE 1.44 (H) 01/12/2024 0913        Component Value Date/Time    CALCIUM 9.3 01/12/2024 0913    ALKPHOS 60 01/12/2024 0913    AST 15 01/12/2024 0913    ALT 14 01/12/2024 0913    BILITOT 0.4 07/19/2016 0642            Lab Results   Component Value Date    CHOL 156 08/17/2017    CHOL 166 01/26/2017    CHOL 166 07/19/2016     Lab Results   Component Value Date    HDL 58 10/20/2019    HDL 53 03/28/2019    HDL 54 09/21/2018     Lab Results   Component Value  "Date    LDLCALC 65 10/20/2019    LDLCALC 75 03/28/2019    LDLCALC 76 09/21/2018     Lab Results   Component Value Date    TRIG 65 10/20/2019    TRIG 150 (H) 03/28/2019    TRIG 116 09/21/2018     No results found for: \"CHOLHDL\"    Imaging: Mri Brain Wo Contrast    Result Date: 3/6/2018  Narrative: MRI BRAIN WITHOUT CONTRAST INDICATION: STROKE- WO BRAIN. History taken directly from the electronic ordering system.  Right-sided weakness.  Difficulty with balance. COMPARISON:   3/6/2018 TECHNIQUE:  Sagittal T1, axial T2, axial FLAIR, axial T1, axial [place] and axial diffusion imaging. IMAGE QUALITY:  Diagnostic. FINDINGS: BRAIN PARENCHYMA:  There is a small band of diffusion restriction in the left insular cortex extending into the periventricular white matter of the left frontal lobe images 19 through 23 of series 3 indicative of recent left MCA infarction.  There is bandlike associated FLAIR hyperintensity in this region. Elsewhere there is a small to moderate chronic right frontal infarction with cystic encephalomalacia and gliosis.  There is somewhat patchy periventricular FLAIR hyperintensity as well. No acute intracranial hemorrhage or extra-axial fluid collection.  Cerebellar tonsils are normally positioned.  VENTRICLES:  The ventricles are normal in size and contour. SELLA AND PITUITARY GLAND:  Normal. ORBITS:  Normal. PARANASAL SINUSES:  Normal. VASCULATURE:  Evaluation of the major intracranial vasculature demonstrates appropriate flow voids. CALVARIUM AND SKULL BASE:  Normal. EXTRACRANIAL SOFT TISSUES:  Normal.     Impression: 1.  Small acute/recent left MCA cortical infarction involving a small portion of the insular cortex extending into the periventricular white matter. 2.  Chronic small to moderate right frontal infarction and mild to moderate, chronic microangiopathy.  I personally discussed this study with Dr. Razo on 3/6/2018 at 11:42 AM. Workstation performed: [id number]     Xr Stroke Alert " Portable Chest    Result Date: 3/6/2018  Narrative: CHEST INDICATION:  Right-sided weakness.  Stroke COMPARISON:  None EXAM PERFORMED/VIEWS:  XR STROKE ALERT PORTABLE CHEST FINDINGS:  There are median sternotomy wires indicating prior cardiac surgery. Cardiomediastinal silhouette appears unremarkable. The lungs are clear.  No pneumothorax or pleural effusion. Osseous structures appear within normal limits for patient age.     Impression: No acute cardiopulmonary disease. Workstation performed: [id number]     Ct Stroke Alert Brain    Result Date: 3/6/2018  Narrative: CT BRAIN - STROKE ALERT PROTOCOL INDICATION:  Woke up with right-sided weakness.  Pronator drift.  Cerebellar symptoms. COMPARISON:  None. TECHNIQUE:  CT examination of the brain was performed.  In addition to axial images, coronal reformatted images were created and submitted for interpretation.  Radiation dose length product (DLP) for this visit: .  This examination, like all CT scans performed in the [facility], was performed utilizing techniques to minimize radiation dose exposure, including the use of iterative reconstruction  and automated exposure control.  IMAGE QUALITY:  Diagnostic. FINDINGS:  PARENCHYMA:  Small to moderate-sized area of encephalomalacia and gliosis right frontal lobe indicative of chronic infarction.  Elsewhere there are mild periventricular hypodensities. No acute intracranial hemorrhage. Atherosclerotic calcifications noted.  VENTRICLES AND EXTRA-AXIAL SPACES:  Age-appropriate volume loss is noted.  No hydrocephalus. VISUALIZED ORBITS AND PARANASAL SINUSES:  Orbits appear normal.  Mild scattered sinus mucosal thickening is noted.  No fluid levels are seen. CALVARIUM AND EXTRACRANIAL SOFT TISSUES:   Normal.     Impression: 1.  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, chronic microangiopathy. 2.  No acute intracranial hemorrhage. Findings were directly discussed with WENDY  APWEL on 3/6/2018 7:31 AM. Workstation performed: [id number]     Cta Stroke Alert (head/neck)    Result Date: 3/6/2018  Narrative: CTA NECK AND BRAIN WITH AND WITHOUT CONTRAST INDICATION: Left-sided weakness.  Stroke alert.  Prominent or drift. Pointing. COMPARISON:   None. TECHNIQUE:  Routine CT imaging of the Brain without contrast.  Post contrast imaging was performed after administration of iodinated contrast through the neck and brain. Post contrast axial 0.625 mm images timed to opacify the arterial system.  3D rendering was performed on an independent workstation.   MIP reconstructions performed. Coronal reconstructions were performed of the noncontrast portion of the brain.  Radiation dose length product (DLP) for this visit:  526.3 mGy-cm .  This examination, like all CT scans performed in the [facility], was performed utilizing techniques to minimize radiation dose exposure, including the use of iterative reconstruction and automated exposure control.   IV Contrast:  100 mL of iohexol (OMNIPAQUE)  IMAGE QUALITY:   Diagnostic FINDINGS: NONCONTRAST BRAIN: Reported separately CERVICAL VASCULATURE AORTIC ARCH AND GREAT VESSELS:  Mild athersclerotic disease of the arch, proximal great vessels and visualized subclavian vessels.  No significant stenosis. Sternotomy wires and mediastinal clips are noted, compatible with prior CABG. RIGHT VERTEBRAL ARTERY CERVICAL SEGMENT:  Mild stenosis at the origin. The vessel is normal in caliber throughout the neck. LEFT VERTEBRAL ARTERY CERVICAL SEGMENT:  Mild stenosis at the origin. The vessel is normal in caliber throughout the neck.  Left vertebral artery is congenitally dominant. RIGHT EXTRACRANIAL CAROTID SEGMENT:  Normal caliber common carotid artery.  Normal bifurcation and cervical internal carotid artery.  No stenosis or dissection. LEFT EXTRACRANIAL CAROTID SEGMENT:  Mild atherosclerotic disease of the distal common carotid artery and proximal  cervical internal carotid artery without significant stenosis compared to the more distal ICA. NASCET criteria was used to determine the degree of internal carotid artery diameter stenosis. INTRACRANIAL VASCULATURE INTERNAL CAROTID ARTERIES:  Atherosclerotic calcifications of the cavernous segment of the internal carotid artery are very mild.  Normal ophthalmic artery origins.  Normal ICA terminus. ANTERIOR CIRCULATION:  Symmetric A1 segments and anterior cerebral arteries with normal enhancement.  Normal anterior communicating artery. MIDDLE CEREBRAL ARTERY CIRCULATION:  M1 segment and middle cerebral artery branches demonstrate normal enhancement bilaterally. DISTAL VERTEBRAL ARTERIES:  Normal distal vertebral arteries.  Posterior inferior cerebellar artery origins are normal. Normal vertebral basilar junction. BASILAR ARTERY:  Basilar artery is normal in caliber.  Normal superior cerebellar arteries. POSTERIOR CEREBRAL ARTERIES: Both posterior cerebral arteries arises from the basilar tip.  Both arteries demonstrate normal enhancement.   Normal posterior communicating arteries. DURAL VENOUS SINUSES:  Not well visualized on this early arterial phase scan. NON VASCULAR ANATOMY BONY STRUCTURES:  No acute osseous abnormality. SOFT TISSUES OF THE NECK:  Unremarkable. THORACIC INLET:  Unremarkable.     Impression: 1.  No hemodynamically significant stenosis in the major arteries of the neck. 2.  No intracranial aneurysm or major intracranial arterial stenosis.  I personally discussed this study with WENDY ARMAS on 3/6/2018 at 7:31 AM. Workstation performed: [id number]        Review of Systems   Cardiovascular:  Negative for chest pain, leg swelling, palpitations, paroxysmal nocturnal dyspnea and syncope.   Musculoskeletal:  Positive for arthritis and back pain.   All other systems reviewed and are negative.      There were no vitals filed for this visit.    There were no vitals filed for this visit.        There is  no height or weight on file to calculate BMI.     Physical Exam:  General:  Alert and cooperative, appears stated age  HEENT:  PERRLA, EOMI, no scleral icterus, no conjunctival pallor  Neck:  No lymphadenopathy, no thyromegaly, no carotid bruits, no elevated JVP  Heart:  Regular rate and rhythm, normal S1/S2, no S3/S4, no murmur  Lungs:  Clear to auscultation bilaterally   Abdomen:  Soft, non-tender, positive bowel sounds, no rebound or guarding,   no organomegaly   Extremities:  No clubbing, cyanosis or edema   Vascular:  2+ pedal pulses  Skin:  No rashes or lesions on exposed skin  Neurologic:  Cranial nerves II-XII grossly intact without focal deficits

## 2024-07-15 ENCOUNTER — RA CDI HCC (OUTPATIENT)
Dept: OTHER | Facility: HOSPITAL | Age: 89
End: 2024-07-15

## 2024-07-16 LAB
ALBUMIN SERPL-MCNC: 4.2 G/DL (ref 3.5–5.7)
ALP SERPL-CCNC: 68 U/L (ref 35–120)
ALT SERPL-CCNC: 17 U/L
ANION GAP SERPL CALCULATED.3IONS-SCNC: 12 MMOL/L (ref 3–11)
AST SERPL-CCNC: 18 U/L
BASOPHILS # BLD AUTO: 0.1 THOU/CMM (ref 0–0.1)
BASOPHILS NFR BLD AUTO: 1 %
BILIRUB SERPL-MCNC: 0.6 MG/DL (ref 0.2–1)
BUN SERPL-MCNC: 23 MG/DL (ref 7–28)
CALCIUM SERPL-MCNC: 9.1 MG/DL (ref 8.5–10.1)
CHLORIDE SERPL-SCNC: 102 MMOL/L (ref 100–109)
CO2 SERPL-SCNC: 27 MMOL/L (ref 21–31)
CREAT SERPL-MCNC: 1.31 MG/DL (ref 0.53–1.3)
CYTOLOGY CMNT CVX/VAG CYTO-IMP: ABNORMAL
DIFFERENTIAL METHOD BLD: ABNORMAL
EOSINOPHIL # BLD AUTO: 0.2 THOU/CMM (ref 0–0.5)
EOSINOPHIL NFR BLD AUTO: 3 %
ERYTHROCYTE [DISTWIDTH] IN BLOOD BY AUTOMATED COUNT: 14.3 % (ref 12–16)
EST. AVERAGE GLUCOSE BLD GHB EST-MCNC: 128 MG/DL
GFR/BSA.PRED SERPLBLD CYS-BASED-ARV: 51 ML/MIN/{1.73_M2}
GLUCOSE SERPL-MCNC: 102 MG/DL (ref 65–99)
HBA1C MFR BLD: 6.1 %
HCT VFR BLD AUTO: 39.9 % (ref 37–48)
HGB BLD-MCNC: 13.5 G/DL (ref 12.5–17)
LYMPHOCYTES # BLD AUTO: 3.4 THOU/CMM (ref 1–3)
LYMPHOCYTES NFR BLD AUTO: 38 %
MCH RBC QN AUTO: 30.8 PG (ref 27–36)
MCHC RBC AUTO-ENTMCNC: 33.7 G/DL (ref 32–37)
MCV RBC AUTO: 91 FL (ref 80–100)
MONOCYTES # BLD AUTO: 1.1 THOU/CMM (ref 0.3–1)
MONOCYTES NFR BLD AUTO: 12 %
NEUTROPHILS # BLD AUTO: 4.3 THOU/CMM (ref 1.8–7.8)
NEUTROPHILS NFR BLD AUTO: 46 %
PLATELET # BLD AUTO: 220 THOU/CMM (ref 140–350)
PMV BLD REES-ECKER: 9.1 FL (ref 7.5–11.3)
POTASSIUM SERPL-SCNC: 4.5 MMOL/L (ref 3.5–5.2)
PROT SERPL-MCNC: 6.9 G/DL (ref 6.3–8.3)
PSA SERPL-MCNC: 0.93 NG/ML
RBC # BLD AUTO: 4.38 MILL/CMM (ref 4–5.4)
SODIUM SERPL-SCNC: 141 MMOL/L (ref 135–145)
WBC # BLD AUTO: 9.1 THOU/CMM (ref 4–10.5)

## 2024-07-22 ENCOUNTER — OFFICE VISIT (OUTPATIENT)
Dept: FAMILY MEDICINE CLINIC | Facility: CLINIC | Age: 89
End: 2024-07-22
Payer: COMMERCIAL

## 2024-07-22 VITALS
HEIGHT: 66 IN | HEART RATE: 81 BPM | TEMPERATURE: 98.2 F | DIASTOLIC BLOOD PRESSURE: 70 MMHG | RESPIRATION RATE: 18 BRPM | WEIGHT: 197.4 LBS | SYSTOLIC BLOOD PRESSURE: 118 MMHG | BODY MASS INDEX: 31.72 KG/M2 | OXYGEN SATURATION: 93 %

## 2024-07-22 DIAGNOSIS — Z79.899 LONG TERM CURRENT USE OF AMIODARONE: ICD-10-CM

## 2024-07-22 DIAGNOSIS — I25.10 CORONARY ARTERY DISEASE INVOLVING NATIVE CORONARY ARTERY OF NATIVE HEART WITHOUT ANGINA PECTORIS: Chronic | ICD-10-CM

## 2024-07-22 DIAGNOSIS — Z95.1 HX OF CABG: ICD-10-CM

## 2024-07-22 DIAGNOSIS — I47.20 VENTRICULAR TACHYCARDIA (HCC): ICD-10-CM

## 2024-07-22 DIAGNOSIS — Z95.810 PRESENCE OF DOUBLE CHAMBER AUTOMATIC CARDIOVERTER/DEFIBRILLATOR (AICD): ICD-10-CM

## 2024-07-22 DIAGNOSIS — I13.0 HYPERTENSIVE HEART AND KIDNEY DISEASE WITH HF AND WITH CKD STAGE I-IV (HCC): ICD-10-CM

## 2024-07-22 DIAGNOSIS — I50.22 HEART FAILURE WITH MID-RANGE EJECTION FRACTION (HFMEF) (HCC): ICD-10-CM

## 2024-07-22 DIAGNOSIS — R73.03 PREDIABETES: ICD-10-CM

## 2024-07-22 DIAGNOSIS — N18.31 STAGE 3A CHRONIC KIDNEY DISEASE (HCC): Primary | ICD-10-CM

## 2024-07-22 DIAGNOSIS — R73.01 IMPAIRED FASTING GLUCOSE: ICD-10-CM

## 2024-07-22 DIAGNOSIS — M48.062 SPINAL STENOSIS OF LUMBAR REGION WITH NEUROGENIC CLAUDICATION: ICD-10-CM

## 2024-07-22 DIAGNOSIS — I63.412 CEREBROVASCULAR ACCIDENT (CVA) DUE TO EMBOLISM OF LEFT MIDDLE CEREBRAL ARTERY (HCC): ICD-10-CM

## 2024-07-22 DIAGNOSIS — E78.5 DYSLIPIDEMIA: ICD-10-CM

## 2024-07-22 PROCEDURE — 99214 OFFICE O/P EST MOD 30 MIN: CPT | Performed by: NURSE PRACTITIONER

## 2024-07-22 PROCEDURE — G2211 COMPLEX E/M VISIT ADD ON: HCPCS | Performed by: NURSE PRACTITIONER

## 2024-07-22 NOTE — ASSESSMENT & PLAN NOTE
Wt Readings from Last 3 Encounters:   07/22/24 89.5 kg (197 lb 6.4 oz)   05/16/24 91.8 kg (202 lb 4.8 oz)   01/17/24 90.9 kg (200 lb 6.4 oz)       He continues to follow with cardiology.  His weight is stable.  He states he will have an echocardiogram prior to his next cardiology visit.  He continues on his Lasix, potassium and low-salt diet.

## 2024-07-22 NOTE — ASSESSMENT & PLAN NOTE
Wt Readings from Last 3 Encounters:   07/22/24 89.5 kg (197 lb 6.4 oz)   05/16/24 91.8 kg (202 lb 4.8 oz)   01/17/24 90.9 kg (200 lb 6.4 oz)     Blood pressure in the office today is 118/70.  Recent kidney function is stable.

## 2024-07-22 NOTE — ASSESSMENT & PLAN NOTE
Last lipid panel performed in January 2024.  He continues with co-Q10.  He continues with a low-fat diet.  He will repeat his lipid panel prior to his next appointment.    Units 1/12/24  9:13 AM   Cholesterol  <200 mg/dL 166   Triglyceride  <150 mg/dL 111   Cholesterol, HDL, Direct  23 - 92 mg/dL 57   Cholesterol, Non-HDL  <160 mg/dL 109   CHOL/HDL Ratio 2.9   Cholesterol, LDL, Calculated  <130 mg/dL 87

## 2024-07-22 NOTE — ASSESSMENT & PLAN NOTE
Patient with a history of CABG.  He continues Plavix.  Continues to follow with cardiology.  Asymptomatic.

## 2024-07-22 NOTE — ASSESSMENT & PLAN NOTE
Lab Results   Component Value Date    EGFR 51 (L) 07/16/2024    EGFR 52 (L) 05/28/2024    EGFR 46 (L) 01/12/2024    CREATININE 1.31 (H) 07/16/2024    CREATININE 1.30 05/28/2024    CREATININE 1.44 (H) 01/12/2024   Presently stable.  He is trying to watch the salt in his diet.

## 2024-07-22 NOTE — ASSESSMENT & PLAN NOTE
Patient with a small acute left MCA history.  He continues on Plavix.  Overall the patient is well.  He continues to drive.  He does live in an independent living facility.

## 2024-07-22 NOTE — ASSESSMENT & PLAN NOTE
Lab Results   Component Value Date    HGBA1C 6.1 (H) 07/16/2024     Most recent hemoglobin A1c is 6.1.  He is now living in an independent living facility.  He does not prepare his own meals.  Low-carb diet discussed.

## 2024-07-22 NOTE — ASSESSMENT & PLAN NOTE
Patient's symptoms are stable.  He does ambulate with a cane.  He continues to drive.  He has had no falls.

## 2024-07-22 NOTE — PROGRESS NOTES
Ambulatory Visit  Name: Main Kidd      : 1932      MRN: [id number]  Encounter Provider: MARJAN Smith  Encounter Date: 2024   Encounter department: [facility]    Assessment & Plan   1. Stage 3a chronic kidney disease (HCC)  Assessment & Plan:  Lab Results   Component Value Date    EGFR 51 (L) 2024    EGFR 52 (L) 2024    EGFR 46 (L) 2024    CREATININE 1.31 (H) 2024    CREATININE 1.30 2024    CREATININE 1.44 (H) 2024   Presently stable.  He is trying to watch the salt in his diet.  Orders:  -     CBC and differential; Future; Expected date: 2024  -     Comprehensive metabolic panel; Future; Expected date: 2024  2. Dyslipidemia  Assessment & Plan:  Last lipid panel performed in 2024.  He continues with co-Q10.  He continues with a low-fat diet.  He will repeat his lipid panel prior to his next appointment.    Units 24  9:13 AM   Cholesterol  <200 mg/dL 166   Triglyceride  <150 mg/dL 111   Cholesterol, HDL, Direct  23 - 92 mg/dL 57   Cholesterol, Non-HDL  <160 mg/dL 109   CHOL/HDL Ratio 2.9   Cholesterol, LDL, Calculated  <130 mg/dL 87     Orders:  -     Lipid Panel with Direct LDL reflex; Future; Expected date: 2024  3. Prediabetes  -     Hemoglobin A1C; Future; Expected date: 2024  4. Coronary artery disease involving native coronary artery of native heart without angina pectoris  Assessment & Plan:  Patient with a history of CABG.  He continues Plavix.  Continues to follow with cardiology.  Asymptomatic.  5. Cerebrovascular accident (CVA) due to embolism of left middle cerebral artery (HCC)  Assessment & Plan:  Patient with a small acute left MCA history.  He continues on Plavix.  Overall the patient is well.  He continues to drive.  He does live in an independent living facility.  6. Heart failure with mid-range ejection fraction (HFmEF) ([facility])  Assessment & Plan:  Wt Readings from Last 3 Encounters:    07/22/24 89.5 kg (197 lb 6.4 oz)   05/16/24 91.8 kg (202 lb 4.8 oz)   01/17/24 90.9 kg (200 lb 6.4 oz)       He continues to follow with cardiology.  His weight is stable.  He states he will have an echocardiogram prior to his next cardiology visit.  He continues on his Lasix, potassium and low-salt diet.      7. 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  Recent kidney function is stable.        8. Ventricular tachycardia (HCC)  Assessment & Plan:  ICD in place.  Follows with cardiology.  Continues on Plavix and amiodarone.  9. Impaired fasting glucose  Assessment & Plan:  Lab Results   Component Value Date    HGBA1C 6.1 (H) 07/16/2024     Most recent hemoglobin A1c is 6.1.  He is now living in an independent living facility.  He does not prepare his own meals.  Low-carb diet discussed.    10. Hx of CABG  11. Spinal stenosis of lumbar region with neurogenic claudication  Assessment & Plan:  Patient's symptoms are stable.  He does ambulate with a cane.  He continues to drive.  He has had no falls.  12. 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)  13. Long term current use of amiodarone    Depression Screening and Follow-up Plan: Patient was screened for depression during today's encounter. They screened negative with a PHQ-2 score of 0.      History of Present Illness     Main presents to the office today for follow-up.  He recently had blood work performed and this was reviewed with him.  He is currently living in an independent living facility.  He does not need to prepare his own meals.  He continues to ambulate with a cane.  He continues to drive without accidents.  He has no falls at home.  He is sleeping well.  He does have lumbar  "stenosis and does have chronic back pain due to this.  He is having normal bowel movements.  He has no issues with urination.  He continues to follow with neurology, cardiology and urology.  I will see him back in the office in 6 months for his annual wellness visit.            Review of Systems   Constitutional: Negative.  Negative for fatigue.   HENT: Negative.  Negative for congestion, postnasal drip, rhinorrhea and trouble swallowing.    Eyes: Negative.  Negative for visual disturbance.   Respiratory: Negative.  Negative for choking and shortness of breath.    Cardiovascular: Negative.  Negative for chest pain.   Gastrointestinal: Negative.    Endocrine: Negative.    Genitourinary: Negative.    Musculoskeletal:  Positive for back pain and gait problem. Negative for arthralgias, myalgias and neck pain.   Skin: Negative.    Neurological:  Negative for dizziness and headaches.   Psychiatric/Behavioral: Negative.         Objective     /70   Pulse 81   Temp 98.2 °F (36.8 °C) (Temporal)   Resp 18   Ht 5' 6\" (1.676 m)   Wt 89.5 kg (197 lb 6.4 oz)   SpO2 93%   BMI 31.86 kg/m²     Physical Exam  Vitals reviewed.   Constitutional:       Appearance: Normal appearance. He is obese.   HENT:      Head: Normocephalic and atraumatic.      Nose: Nose normal.      Mouth/Throat:      Mouth: Mucous membranes are moist.   Eyes:      Extraocular Movements: Extraocular movements intact.      Pupils: Pupils are equal, round, and reactive to light.   Cardiovascular:      Rate and Rhythm: Normal rate and regular rhythm.      Pulses: Normal pulses.      Heart sounds: Normal heart sounds.   Pulmonary:      Effort: Pulmonary effort is normal.      Breath sounds: Normal breath sounds.   Musculoskeletal:         General: Normal range of motion.   Skin:     General: Skin is warm.   Neurological:      General: No focal deficit present.      Mental Status: He is alert and oriented to person, place, and time.      Gait: Gait abnormal " (ambulates with cane).   Psychiatric:         Mood and Affect: Mood normal.         Behavior: Behavior normal.         Thought Content: Thought content normal.         Judgment: Judgment normal.       Administrative Statements

## 2024-07-30 DIAGNOSIS — I47.20 VENTRICULAR TACHYCARDIA (HCC): ICD-10-CM

## 2024-07-30 DIAGNOSIS — I10 BENIGN ESSENTIAL HYPERTENSION: ICD-10-CM

## 2024-07-30 DIAGNOSIS — I25.5 ISCHEMIC CARDIOMYOPATHY: ICD-10-CM

## 2024-07-31 RX ORDER — CLOPIDOGREL BISULFATE 75 MG/1
TABLET ORAL
Qty: 100 TABLET | Refills: 1 | Status: SHIPPED | OUTPATIENT
Start: 2024-07-31

## 2024-07-31 RX ORDER — FUROSEMIDE 20 MG/1
TABLET ORAL
Qty: 100 TABLET | Refills: 1 | Status: SHIPPED | OUTPATIENT
Start: 2024-07-31

## 2024-07-31 RX ORDER — AMIODARONE HYDROCHLORIDE 200 MG/1
TABLET ORAL
Qty: 90 TABLET | Refills: 3 | Status: SHIPPED | OUTPATIENT
Start: 2024-07-31

## 2024-08-20 ENCOUNTER — REMOTE DEVICE CLINIC VISIT (OUTPATIENT)
Dept: CARDIOLOGY CLINIC | Facility: CLINIC | Age: 89
End: 2024-08-20
Payer: COMMERCIAL

## 2024-08-20 DIAGNOSIS — Z95.810 PRESENCE OF AUTOMATIC CARDIOVERTER/DEFIBRILLATOR (AICD): Primary | ICD-10-CM

## 2024-08-20 PROCEDURE — 93295 DEV INTERROG REMOTE 1/2/MLT: CPT | Performed by: INTERNAL MEDICINE

## 2024-08-20 PROCEDURE — 93296 REM INTERROG EVL PM/IDS: CPT | Performed by: INTERNAL MEDICINE

## 2024-08-20 NOTE — PROGRESS NOTES
Results for orders placed or performed in visit on 08/20/24   Cardiac EP device report    Narrative    MDT DC ICD/ACTIVE SYSTEM IS MRI CONDITIONAL  CARELINK TRANSMISSION: BATTERY VOLTAGE ADEQUATE. (4.7 YRS) AP 99%  1%. ALL AVAILABLE LEAD PARAMETERS WITHIN NORMAL LIMITS. NO SIGNIFICANT HIGH RATE EPISODES. OPTI-VOL WITHIN NORMAL LIMITS. NORMAL DEVICE FUNCTION.---JAUREGUI

## 2024-10-26 DIAGNOSIS — E78.5 DYSLIPIDEMIA: ICD-10-CM

## 2024-10-28 RX ORDER — ATORVASTATIN CALCIUM 40 MG/1
40 TABLET, FILM COATED ORAL DAILY
Qty: 90 TABLET | Refills: 1 | Status: SHIPPED | OUTPATIENT
Start: 2024-10-28

## 2024-11-08 DIAGNOSIS — I48.3 TYPICAL ATRIAL FLUTTER (HCC): ICD-10-CM

## 2024-11-08 RX ORDER — APIXABAN 5 MG/1
5 TABLET, FILM COATED ORAL 2 TIMES DAILY
Qty: 60 TABLET | Refills: 5 | Status: SHIPPED | OUTPATIENT
Start: 2024-11-08

## 2024-11-19 ENCOUNTER — REMOTE DEVICE CLINIC VISIT (OUTPATIENT)
Dept: CARDIOLOGY CLINIC | Facility: CLINIC | Age: 89
End: 2024-11-19
Payer: COMMERCIAL

## 2024-11-19 DIAGNOSIS — Z95.810 PRESENCE OF IMPLANTABLE CARDIOVERTER-DEFIBRILLATOR (ICD): Primary | ICD-10-CM

## 2024-11-19 PROCEDURE — 93295 DEV INTERROG REMOTE 1/2/MLT: CPT | Performed by: INTERNAL MEDICINE

## 2024-11-19 PROCEDURE — 93296 REM INTERROG EVL PM/IDS: CPT | Performed by: INTERNAL MEDICINE

## 2024-11-19 NOTE — PROGRESS NOTES
MDT DC ICD/ACTIVE SYSTEM IS MRI CONDITIONAL   CARELINK TRANSMISSION:  BATTERY VOLTAGE ADEQUATE (4.1 YR.).  AP 99.2%  0.1%.  ALL LEAD PARAMETERS WITHIN NORMAL LIMITS.  NO SIGNIFICANT HIGH RATE EPISODES.  OPTI-VOL WITHIN NORMAL LIMITS.  NORMAL DEVICE FUNCTION.  RG

## 2024-11-21 ENCOUNTER — RESULTS FOLLOW-UP (OUTPATIENT)
Dept: CARDIOLOGY CLINIC | Facility: CLINIC | Age: 89
End: 2024-11-21

## 2024-11-26 LAB
T4 FREE SERPL-MCNC: 0.78 NG/DL (ref 0.61–1.12)
TSH SERPL-ACNC: 11.22 UIU/ML (ref 0.45–5.33)

## 2024-12-04 ENCOUNTER — OFFICE VISIT (OUTPATIENT)
Dept: CARDIOLOGY CLINIC | Facility: CLINIC | Age: 89
End: 2024-12-04
Payer: COMMERCIAL

## 2024-12-04 ENCOUNTER — HOSPITAL ENCOUNTER (OUTPATIENT)
Dept: NON INVASIVE DIAGNOSTICS | Facility: CLINIC | Age: 89
Discharge: HOME | End: 2024-12-04
Payer: COMMERCIAL

## 2024-12-04 VITALS
DIASTOLIC BLOOD PRESSURE: 70 MMHG | HEART RATE: 81 BPM | WEIGHT: 197 LBS | BODY MASS INDEX: 31.66 KG/M2 | HEIGHT: 66 IN | SYSTOLIC BLOOD PRESSURE: 118 MMHG

## 2024-12-04 VITALS
SYSTOLIC BLOOD PRESSURE: 126 MMHG | WEIGHT: 198.2 LBS | HEART RATE: 82 BPM | HEIGHT: 66 IN | BODY MASS INDEX: 31.85 KG/M2 | DIASTOLIC BLOOD PRESSURE: 70 MMHG | OXYGEN SATURATION: 97 %

## 2024-12-04 DIAGNOSIS — E78.5 DYSLIPIDEMIA: ICD-10-CM

## 2024-12-04 DIAGNOSIS — Z95.810 PRESENCE OF DOUBLE CHAMBER AUTOMATIC CARDIOVERTER/DEFIBRILLATOR (AICD): ICD-10-CM

## 2024-12-04 DIAGNOSIS — I47.20 VENTRICULAR TACHYCARDIA (HCC): ICD-10-CM

## 2024-12-04 DIAGNOSIS — I50.22 HEART FAILURE WITH MID-RANGE EJECTION FRACTION (HFMEF) (HCC): ICD-10-CM

## 2024-12-04 DIAGNOSIS — I25.5 ISCHEMIC CARDIOMYOPATHY: ICD-10-CM

## 2024-12-04 DIAGNOSIS — Z79.899 LONG TERM CURRENT USE OF AMIODARONE: ICD-10-CM

## 2024-12-04 DIAGNOSIS — I25.10 CORONARY ARTERY DISEASE INVOLVING NATIVE CORONARY ARTERY OF NATIVE HEART WITHOUT ANGINA PECTORIS: Chronic | ICD-10-CM

## 2024-12-04 DIAGNOSIS — Z95.1 HX OF CABG: ICD-10-CM

## 2024-12-04 DIAGNOSIS — Z95.5 STATUS POST INSERTION OF DRUG ELUTING CORONARY ARTERY STENT: ICD-10-CM

## 2024-12-04 DIAGNOSIS — I13.0 HYPERTENSIVE HEART AND KIDNEY DISEASE WITH HF AND WITH CKD STAGE I-IV (HCC): ICD-10-CM

## 2024-12-04 DIAGNOSIS — I50.20 HFREF (HEART FAILURE WITH REDUCED EJECTION FRACTION) (HCC): Primary | ICD-10-CM

## 2024-12-04 DIAGNOSIS — I48.3 TYPICAL ATRIAL FLUTTER (HCC): ICD-10-CM

## 2024-12-04 LAB
AORTIC ROOT: 3.8 CM
APICAL FOUR CHAMBER EJECTION FRACTION: 44 %
ASCENDING AORTA: 3.7 CM
BSA FOR ECHO PROCEDURE: 1.99 M2
E WAVE DECELERATION TIME: 160 MS
E/A RATIO: 0.92
FRACTIONAL SHORTENING: 18 (ref 28–44)
INTERVENTRICULAR SEPTUM IN DIASTOLE (PARASTERNAL SHORT AXIS VIEW): 1.1 CM
INTERVENTRICULAR SEPTUM: 1.1 CM (ref 0.6–1.1)
LAAS-AP2: 27.2 CM2
LAAS-AP4: 25.9 CM2
LEFT ATRIUM SIZE: 4.8 CM
LEFT ATRIUM VOLUME (MOD BIPLANE): 101 ML
LEFT ATRIUM VOLUME INDEX (MOD BIPLANE): 50.8 ML/M2
LEFT INTERNAL DIMENSION IN SYSTOLE: 4.7 CM (ref 2.1–4)
LEFT VENTRICLE DIASTOLIC VOLUME (MOD BIPLANE): 126 ML
LEFT VENTRICLE DIASTOLIC VOLUME INDEX (MOD BIPLANE): 63.3 ML/M2
LEFT VENTRICLE SYSTOLIC VOLUME (MOD BIPLANE): 82 ML
LEFT VENTRICLE SYSTOLIC VOLUME INDEX (MOD BIPLANE): 41.2 ML/M2
LEFT VENTRICULAR INTERNAL DIMENSION IN DIASTOLE: 5.7 CM (ref 3.5–6)
LEFT VENTRICULAR POSTERIOR WALL IN END DIASTOLE: 1.2 CM
LEFT VENTRICULAR STROKE VOLUME: 59 ML
LV EF: 35 %
LVSV (TEICH): 59 ML
MV E'TISSUE VEL-LAT: 8 CM/S
MV E'TISSUE VEL-SEP: 8 CM/S
MV PEAK A VEL: 0.79 M/S
MV PEAK E VEL: 73 CM/S
MV STENOSIS PRESSURE HALF TIME: 46 MS
MV VALVE AREA P 1/2 METHOD: 4.78
RA PRESSURE ESTIMATED: 3 MMHG
RIGHT ATRIUM AREA SYSTOLE A4C: 13.1 CM2
RIGHT VENTRICLE ID DIMENSION: 3.9 CM
RV PSP: 21 MMHG
SL CV LEFT ATRIUM LENGTH A2C: 6.2 CM
SL CV LV EF: 35
SL CV PED ECHO LEFT VENTRICLE DIASTOLIC VOLUME (MOD BIPLANE) 2D: 162 ML
SL CV PED ECHO LEFT VENTRICLE SYSTOLIC VOLUME (MOD BIPLANE) 2D: 103 ML
TR MAX PG: 18 MMHG
TR PEAK VELOCITY: 2.1 M/S
TRICUSPID ANNULAR PLANE SYSTOLIC EXCURSION: 2.1 CM
TRICUSPID VALVE PEAK REGURGITATION VELOCITY: 2.12 M/S

## 2024-12-04 PROCEDURE — 99214 OFFICE O/P EST MOD 30 MIN: CPT | Performed by: INTERNAL MEDICINE

## 2024-12-04 PROCEDURE — C8929 TTE W OR WO FOL WCON,DOPPLER: HCPCS

## 2024-12-04 PROCEDURE — 93306 TTE W/DOPPLER COMPLETE: CPT | Performed by: INTERNAL MEDICINE

## 2024-12-04 RX ADMIN — PERFLUTREN 0.8 ML/MIN: 6.52 INJECTION, SUSPENSION INTRAVENOUS at 13:20

## 2024-12-04 NOTE — PROGRESS NOTES
Cardiology Follow Up    Main Kidd  9/1/1932  [id number]  HEART & VASCULAR [facility] [place]  [address]  [facility] [address]    1. HFrEF (heart failure with reduced ejection fraction) ([facility])        2. Coronary artery disease involving native coronary artery of native heart without angina pectoris        3. Status post insertion of drug eluting coronary artery stent        4. Hx of CABG        5. Ischemic cardiomyopathy        6. Heart failure with mid-range ejection fraction (HFmEF) ([facility])  Basic metabolic panel      7. Ventricular tachycardia ([facility])        8. 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)        9. Typical atrial flutter ([facility])        10. Long term current use of amiodarone        11. 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 ([facility])        12. Dyslipidemia            Discussion/Summary:  Mr. Kidd is a pleasant 89-year-old gentleman who presents to the office today for routine follow-up.  Since his last visit he has been feeling well.  He offers no cardiac complaints.    He continues to maintain normal sinus rhythm.  He will remain on his current AV elizabeth blocking regimen along with systemic anticoagulation with Eliquis.  His last device check revealed no recurrent atrial fibrillation.  He is maintained on amiodarone given history of ventricular tachycardia.  He is up-to-date on his eye exams.  He had recent LFTs which were unremarkable.  His TSH is suppressed although his free T4 is normal.  I will discuss this with his primary care provider.    His blood pressure is well controlled in the office today.  His most recent lipids do reveal suboptimal numbers in the setting of his coronary disease.  We had discussed intensification of his regimen versus therapeutic lifestyle modifications.  He wanted to attempt therapeutic lifestyle modifications.  I will await his next  lipid panel    He appears euvolemic on his current diuretic regimen to which no changes were made.  He underwent an echocardiogram in 2020 revealing his ejection fraction has improved to 45%.  He underwent an echocardiogram earlier today revealing his ejection fraction is now 35%.  He is on GDMT with metoprolol albeit tartrate.  We discussed initiation of Entresto.  Apparently he was on losartan in the past but this was discontinued in the setting of hypotension.  He is agreeable.  Samples were provided.  I will reassess his renal function and electrolytes in a week or so.  If he tolerates the medication with stable renal function a prescription will be sent to the pharmacy.    He remains on Plavix in the setting of a stent a number of years ago.  He is on no aspirin in the setting of Eliquis use.    I will see him back in the office in six months or sooner if deemed necessary.    Interval History:   Mr. Kidd is a pleasant 89-year-old gentleman who presents to the office today for routine follow-up.    Since his last visit from a cardiac standpoint he has been feeling relatively well.  He does not participate in any formal physical activity.  He ambulates with the aid of his walker when he is walking to and from the dining facility.  He ambulates with the aid of a cane outside of his home.  With the activity he does perform he denies any exertional chest pain or shortness.  He denies any signs or symptoms of congestive heart failure including lower extremity edema, paroxysmal nocturnal dyspnea, orthopnea, acute weight gain or increasing abdominal girth.  He denies lightheadedness, syncope or presyncope.  He denies palpitations.  He denies symptoms of claudication.    He is maintained on Eliquis without any bleeding consequences or falls.    He denies any discharges from his AICD.    Problem List       CVA (cerebral vascular accident) (HCC)    Essential hypertension (Chronic)    HLD (hyperlipidemia) (Chronic)    CAD  (coronary artery disease) (Chronic)    Atrial flutter (HCC) (Chronic)          Past Medical History:   Diagnosis Date    Acute kidney injury (HCC) 10/19/2019    Acute myocardial infarction (HCC)     Allergic rhinitis     Anxiety     Bimalleolar fracture of left ankle     CAD (coronary artery disease)     Dyslipidemia 3/6/2018    Erectile dysfunction of non-organic origin     Hematuria     workup was negative and felt to be benign    Herpes zoster with complication     Hyperlipidemia     Hypertension     Hypotension 10/28/2019    Impaired fasting glucose     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    Neurogenic claudication 2021    Prostatic hypertrophy     benign    Sepsis with acute renal failure without septic shock (HCC) 2021    Stroke (HCC)      Social History     Socioeconomic History    Marital status:      Spouse name: moncho    Number of children: Not on file    Years of education: Not on file    Highest education level: Not on file   Occupational History    Occupation: retired     Comment: clergy   Tobacco Use    Smoking status: Former     Current packs/day: 0.00     Types: Cigarettes     Start date: 1950     Quit date:      Years since quittin.9     Passive exposure: Past    Smokeless tobacco: Never   Vaping Use    Vaping status: Never Used   Substance and Sexual Activity    Alcohol use: Not Currently     Comment: very rarely    Drug use: No    Sexual activity: Not Currently   Other Topics Concern    Not on file   Social History Narrative    Death in the family- spouse, moncho  after a prolonged francis with lymphoma     Exercises regularly     Social Drivers of Health     Financial Resource Strain: Low Risk  (2024)    Overall Financial Resource Strain (CARDIA)     Difficulty of Paying Living Expenses: Not hard at all   Food Insecurity: Not on file   Transportation Needs: No Transportation Needs (2024)    PRAPARE - Transportation     Lack of  Transportation (Medical): No     Lack of Transportation (Non-Medical): No   Physical Activity: Not on file   Stress: Not on file   Social Connections: Not on file   Intimate Partner Violence: Not on file   Housing Stability: Not on file      Family History   Problem Relation Age of Onset    Cancer Mother     Hypertension Mother         essential    Pancreatic cancer Mother      Past Surgical History:   Procedure Laterality Date    ANKLE FRACTURE SURGERY Left     CARDIAC PACEMAKER PLACEMENT Left     CORONARY ARTERY BYPASS GRAFT      x4       Current Outpatient Medications:     amiodarone 200 mg tablet, TAKE 1 TABLET BY MOUTH EVERY DAY, Disp: 90 tablet, Rfl: 3    atorvastatin (LIPITOR) 40 mg tablet, TAKE 1 TABLET BY MOUTH EVERY DAY, Disp: 90 tablet, Rfl: 1    clopidogrel (PLAVIX) 75 mg tablet, TAKE 1 TABLET BY MOUTH EVERY DAY, Disp: 100 tablet, Rfl: 1    Coenzyme Q10 200 MG capsule, Take 200 mg by mouth daily, Disp: , Rfl:     Eliquis 5 MG, TAKE 1 TABLET BY MOUTH TWICE A DAY, Disp: 60 tablet, Rfl: 5    furosemide (LASIX) 20 mg tablet, TAKE 1 TABLET BY MOUTH EVERY DAY, Disp: 100 tablet, Rfl: 1    gabapentin (NEURONTIN) 300 mg capsule, Take 300 mg by mouth 2 (two) times a day, Disp: , Rfl:     Klor-Con M20 20 MEQ tablet, TAKE 1 TABLET BY MOUTH EVERY DAY, Disp: 90 tablet, Rfl: 3    metoprolol tartrate (LOPRESSOR) 25 mg tablet, TAKE ONE TABLET IN THE MORNING AND 2 TABLETS IN THE EVENING, Disp: 270 tablet, Rfl: 1    nitroglycerin (NITROSTAT) 0.4 mg SL tablet, Place 1 tablet (0.4 mg total) under the tongue every 5 (five) minutes as needed for chest pain, Disp: 25 tablet, Rfl: 1    Probiotic Product (PROBIOTIC COLON SUPPORT) CAPS, Take 1 capsule by mouth daily, Disp: , Rfl:     tamsulosin (FLOMAX) 0.4 mg, Take 0.4 mg by mouth daily at bedtime, Disp: , Rfl:   No current facility-administered medications for this visit.  Allergies   Allergen Reactions    Penicillins Rash and Edema     Reaction Date: 01Jan2000; Category:  "Allergy; Annotation - [id number]: Severe reaction with hospitaization at [facility]-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       Labs:     Chemistry        Component Value Date/Time     08/17/2017 0705    K 4.5 07/16/2024 0901    K 4.5 01/12/2024 0913     07/16/2024 0901     01/12/2024 0913    CO2 27 07/16/2024 0901    CO2 30 01/12/2024 0913    BUN 23 07/16/2024 0901    BUN 19 05/28/2024 0936    CREATININE 1.31 (H) 07/16/2024 0901    CREATININE 1.30 05/28/2024 0936        Component Value Date/Time    CALCIUM 9.1 07/16/2024 0901    CALCIUM 9.3 01/12/2024 0913    ALKPHOS 68 07/16/2024 0901    ALKPHOS 60 01/12/2024 0913    AST 18 07/16/2024 0901    AST 15 01/12/2024 0913    ALT 17 07/16/2024 0901    ALT 14 01/12/2024 0913    BILITOT 0.4 07/19/2016 0642            Lab Results   Component Value Date    CHOL 156 08/17/2017    CHOL 166 01/26/2017    CHOL 166 07/19/2016     Lab Results   Component Value Date    HDL 58 10/20/2019    HDL 53 03/28/2019    HDL 54 09/21/2018     Lab Results   Component Value Date    LDLCALC 65 10/20/2019    LDLCALC 75 03/28/2019    LDLCALC 76 09/21/2018     Lab Results   Component Value Date    TRIG 65 10/20/2019    TRIG 150 (H) 03/28/2019    TRIG 116 09/21/2018     No results found for: \"CHOLHDL\"    Imaging: Mri Brain Wo Contrast    Result Date: 3/6/2018  Narrative: MRI BRAIN WITHOUT CONTRAST INDICATION: STROKE- WO BRAIN. History taken directly from the electronic ordering system.  Right-sided weakness.  Difficulty with balance. COMPARISON:   3/6/2018 TECHNIQUE:  Sagittal T1, axial T2, axial FLAIR, axial T1, axial [place] and axial diffusion imaging. IMAGE QUALITY:  Diagnostic. FINDINGS: BRAIN PARENCHYMA:  There is a small band of diffusion restriction in the left insular cortex extending into the periventricular white matter of the left frontal lobe images 19 through 23 of series 3 indicative of recent left MCA infarction.  There is bandlike associated FLAIR hyperintensity in this region. Elsewhere there is a " small to moderate chronic right frontal infarction with cystic encephalomalacia and gliosis.  There is somewhat patchy periventricular FLAIR hyperintensity as well. No acute intracranial hemorrhage or extra-axial fluid collection.  Cerebellar tonsils are normally positioned.  VENTRICLES:  The ventricles are normal in size and contour. SELLA AND PITUITARY GLAND:  Normal. ORBITS:  Normal. PARANASAL SINUSES:  Normal. VASCULATURE:  Evaluation of the major intracranial vasculature demonstrates appropriate flow voids. CALVARIUM AND SKULL BASE:  Normal. EXTRACRANIAL SOFT TISSUES:  Normal.     Impression: 1.  Small acute/recent left MCA cortical infarction involving a small portion of the insular cortex extending into the periventricular white matter. 2.  Chronic small to moderate right frontal infarction and mild to moderate, chronic microangiopathy.  I personally discussed this study with Dr. Razo on 3/6/2018 at 11:42 AM. Workstation performed: [id number]     Xr Stroke Alert Portable Chest    Result Date: 3/6/2018  Narrative: CHEST INDICATION:  Right-sided weakness.  Stroke COMPARISON:  None EXAM PERFORMED/VIEWS:  XR STROKE ALERT PORTABLE CHEST FINDINGS:  There are median sternotomy wires indicating prior cardiac surgery. Cardiomediastinal silhouette appears unremarkable. The lungs are clear.  No pneumothorax or pleural effusion. Osseous structures appear within normal limits for patient age.     Impression: No acute cardiopulmonary disease. Workstation performed: [id number]     Ct Stroke Alert Brain    Result Date: 3/6/2018  Narrative: CT BRAIN - STROKE ALERT PROTOCOL INDICATION:  Woke up with right-sided weakness.  Pronator drift.  Cerebellar symptoms. COMPARISON:  None. TECHNIQUE:  CT examination of the brain was performed.  In addition to axial images, coronal reformatted images were created and submitted for interpretation.  Radiation dose length product (DLP) for this visit: .  This examination, like all CT  scans performed in the [facility], was performed utilizing techniques to minimize radiation dose exposure, including the use of iterative reconstruction  and automated exposure control.  IMAGE QUALITY:  Diagnostic. FINDINGS:  PARENCHYMA:  Small to moderate-sized area of encephalomalacia and gliosis right frontal lobe indicative of chronic infarction.  Elsewhere there are mild periventricular hypodensities. No acute intracranial hemorrhage. Atherosclerotic calcifications noted.  VENTRICLES AND EXTRA-AXIAL SPACES:  Age-appropriate volume loss is noted.  No hydrocephalus. VISUALIZED ORBITS AND PARANASAL SINUSES:  Orbits appear normal.  Mild scattered sinus mucosal thickening is noted.  No fluid levels are seen. CALVARIUM AND EXTRACRANIAL SOFT TISSUES:   Normal.     Impression: 1.  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, chronic microangiopathy. 2.  No acute intracranial hemorrhage. Findings were directly discussed with WENDY ARMAS on 3/6/2018 7:31 AM. Workstation performed: [id number]     Cta Stroke Alert (head/neck)    Result Date: 3/6/2018  Narrative: CTA NECK AND BRAIN WITH AND WITHOUT CONTRAST INDICATION: Left-sided weakness.  Stroke alert.  Prominent or drift. Pointing. COMPARISON:   None. TECHNIQUE:  Routine CT imaging of the Brain without contrast.  Post contrast imaging was performed after administration of iodinated contrast through the neck and brain. Post contrast axial 0.625 mm images timed to opacify the arterial system.  3D rendering was performed on an independent workstation.   MIP reconstructions performed. Coronal reconstructions were performed of the noncontrast portion of the brain.  Radiation dose length product (DLP) for this visit:  526.3 mGy-cm .  This examination, like all CT scans performed in the [facility], was performed utilizing techniques to minimize radiation dose exposure, including the use of iterative  reconstruction and automated exposure control.   IV Contrast:  100 mL of iohexol (OMNIPAQUE)  IMAGE QUALITY:   Diagnostic FINDINGS: NONCONTRAST BRAIN: Reported separately CERVICAL VASCULATURE AORTIC ARCH AND GREAT VESSELS:  Mild athersclerotic disease of the arch, proximal great vessels and visualized subclavian vessels.  No significant stenosis. Sternotomy wires and mediastinal clips are noted, compatible with prior CABG. RIGHT VERTEBRAL ARTERY CERVICAL SEGMENT:  Mild stenosis at the origin. The vessel is normal in caliber throughout the neck. LEFT VERTEBRAL ARTERY CERVICAL SEGMENT:  Mild stenosis at the origin. The vessel is normal in caliber throughout the neck.  Left vertebral artery is congenitally dominant. RIGHT EXTRACRANIAL CAROTID SEGMENT:  Normal caliber common carotid artery.  Normal bifurcation and cervical internal carotid artery.  No stenosis or dissection. LEFT EXTRACRANIAL CAROTID SEGMENT:  Mild atherosclerotic disease of the distal common carotid artery and proximal cervical internal carotid artery without significant stenosis compared to the more distal ICA. NASCET criteria was used to determine the degree of internal carotid artery diameter stenosis. INTRACRANIAL VASCULATURE INTERNAL CAROTID ARTERIES:  Atherosclerotic calcifications of the cavernous segment of the internal carotid artery are very mild.  Normal ophthalmic artery origins.  Normal ICA terminus. ANTERIOR CIRCULATION:  Symmetric A1 segments and anterior cerebral arteries with normal enhancement.  Normal anterior communicating artery. MIDDLE CEREBRAL ARTERY CIRCULATION:  M1 segment and middle cerebral artery branches demonstrate normal enhancement bilaterally. DISTAL VERTEBRAL ARTERIES:  Normal distal vertebral arteries.  Posterior inferior cerebellar artery origins are normal. Normal vertebral basilar junction. BASILAR ARTERY:  Basilar artery is normal in caliber.  Normal superior cerebellar arteries. POSTERIOR CEREBRAL ARTERIES: Both  "posterior cerebral arteries arises from the basilar tip.  Both arteries demonstrate normal enhancement.   Normal posterior communicating arteries. DURAL VENOUS SINUSES:  Not well visualized on this early arterial phase scan. NON VASCULAR ANATOMY BONY STRUCTURES:  No acute osseous abnormality. SOFT TISSUES OF THE NECK:  Unremarkable. THORACIC INLET:  Unremarkable.     Impression: 1.  No hemodynamically significant stenosis in the major arteries of the neck. 2.  No intracranial aneurysm or major intracranial arterial stenosis.  I personally discussed this study with WENDY ARMAS on 3/6/2018 at 7:31 AM. Workstation performed: [id number]        Review of Systems   Cardiovascular:  Negative for chest pain, cyanosis, leg swelling, orthopnea, palpitations and paroxysmal nocturnal dyspnea.   All other systems reviewed and are negative.      Vitals:    12/04/24 1357   BP: 126/70   Pulse: 82   SpO2: 97%       Vitals:    12/04/24 1357   Weight: 89.9 kg (198 lb 3.2 oz)       Height: 5' 6\" (167.6 cm)   Body mass index is 31.99 kg/m².     Physical Exam:  General:  Alert and cooperative, appears stated age  HEENT:  PERRLA, EOMI, no scleral icterus, no conjunctival pallor  Neck:  No lymphadenopathy, no thyromegaly, no carotid bruits, no elevated JVP  Heart:  Regular rate and rhythm, normal S1/S2, no S3/S4, no murmur  Lungs:  Clear to auscultation bilaterally   Abdomen:  Soft, non-tender, positive bowel sounds, no rebound or guarding,   no organomegaly   Extremities:  No clubbing, cyanosis or edema   Vascular:  2+ pedal pulses  Skin:  No rashes or lesions on exposed skin  Neurologic:  Cranial nerves II-XII grossly intact without focal deficits   "

## 2024-12-23 DIAGNOSIS — I25.5 ISCHEMIC CARDIOMYOPATHY: ICD-10-CM

## 2024-12-23 DIAGNOSIS — I10 BENIGN ESSENTIAL HYPERTENSION: ICD-10-CM

## 2024-12-23 RX ORDER — POTASSIUM CHLORIDE 1500 MG/1
20 TABLET, EXTENDED RELEASE ORAL DAILY
Qty: 90 TABLET | Refills: 1 | Status: SHIPPED | OUTPATIENT
Start: 2024-12-23

## 2024-12-23 NOTE — TELEPHONE ENCOUNTER
Reason for call:   [x] Refill   [] Prior Auth  [] Other:     Office:   [x] PCP/Provider -   [] Specialty/Provider -     Medication:  Klor-Con M20 20 MEQ tablet    Dose/Frequency: TAKE 1 TABLET BY MOUTH EVERY DAY,     Quantity: 90    Pharmacy: [facility] #[id number] - [place], [place] - [address] [phone]    Does the patient have enough for 3 days?   [] Yes   [x] No - Send as HP to POD

## 2024-12-27 ENCOUNTER — TELEPHONE (OUTPATIENT)
Dept: CARDIOLOGY CLINIC | Facility: CLINIC | Age: 89
End: 2024-12-27

## 2024-12-27 LAB
ANION GAP SERPL CALCULATED.3IONS-SCNC: 11 MMOL/L (ref 3–11)
BUN SERPL-MCNC: 26 MG/DL (ref 7–28)
CALCIUM SERPL-MCNC: 9.5 MG/DL (ref 8.5–10.5)
CHLORIDE SERPL-SCNC: 103 MMOL/L (ref 100–109)
CO2 SERPL-SCNC: 28 MMOL/L (ref 21–31)
CREAT SERPL-MCNC: 1.31 MG/DL (ref 0.53–1.3)
CYTOLOGY CMNT CVX/VAG CYTO-IMP: ABNORMAL
GFR/BSA.PRED SERPLBLD CYS-BASED-ARV: 51 ML/MIN/{1.73_M2}
GLUCOSE SERPL-MCNC: 104 MG/DL (ref 65–99)
POTASSIUM SERPL-SCNC: 4.3 MMOL/L (ref 3.5–5.2)
SODIUM SERPL-SCNC: 142 MMOL/L (ref 135–145)

## 2024-12-27 NOTE — TELEPHONE ENCOUNTER
Pt was contacted and instructions given as per Dr. Baires,     Please let the patient know his renal function is stable.  He was placed on Entresto - given samples.  Please see if he is tolerating it - he will need a prescription sent to the pharmacy if this is the case.  Thanks.     He stated he is tolerating Entresto, and will call when he needs a script.

## 2025-01-21 ENCOUNTER — RA CDI HCC (OUTPATIENT)
Dept: OTHER | Facility: HOSPITAL | Age: OVER 89
End: 2025-01-21

## 2025-01-21 ENCOUNTER — TELEPHONE (OUTPATIENT)
Age: OVER 89
End: 2025-01-21

## 2025-01-21 PROBLEM — Z86.73 HISTORY OF CVA (CEREBROVASCULAR ACCIDENT) WITHOUT RESIDUAL DEFICITS: Status: ACTIVE | Noted: 2018-03-06

## 2025-01-21 PROBLEM — Z86.73 HISTORY OF CVA (CEREBROVASCULAR ACCIDENT) WITHOUT RESIDUAL DEFICITS: Status: ACTIVE | Noted: 2025-01-21

## 2025-01-21 NOTE — PROGRESS NOTES
HCC coding opportunities          Chart Reviewed number of suggestions sent to Provider: 2   I48.3  I50.20    This is a reminder to address (resolve/update/assess) ALL HCC (risk adjustment) codes as found on active problem list for 2025 as patient scores reset to zero JEANA.  Patients Insurance     Medicare Insurance: [employer]

## 2025-01-21 NOTE — TELEPHONE ENCOUNTER
Patient returned call from office.  No notes in chart. Contacted clinical who is unsure of nature of call.  Patient confirmed upcoming appointment.

## 2025-01-28 LAB
ALBUMIN SERPL-MCNC: 4.1 G/DL (ref 3.5–5.7)
ALP SERPL-CCNC: 61 U/L (ref 35–120)
ALT SERPL-CCNC: 16 U/L
ANION GAP SERPL CALCULATED.3IONS-SCNC: 11 MMOL/L (ref 3–11)
AST SERPL-CCNC: 17 U/L
BASOPHILS # BLD AUTO: 0 THOU/CMM (ref 0–0.1)
BASOPHILS NFR BLD AUTO: 1 %
BILIRUB SERPL-MCNC: 0.6 MG/DL (ref 0.2–1)
BUN SERPL-MCNC: 17 MG/DL (ref 7–28)
CALCIUM SERPL-MCNC: 8.7 MG/DL (ref 8.5–10.5)
CHLORIDE SERPL-SCNC: 104 MMOL/L (ref 100–109)
CHOLEST SERPL-MCNC: 143 MG/DL
CHOLEST/HDLC SERPL: 2.4 {RATIO}
CO2 SERPL-SCNC: 27 MMOL/L (ref 21–31)
CREAT SERPL-MCNC: 1.4 MG/DL (ref 0.53–1.3)
CYTOLOGY CMNT CVX/VAG CYTO-IMP: ABNORMAL
DIFFERENTIAL METHOD BLD: ABNORMAL
EOSINOPHIL # BLD AUTO: 0.2 THOU/CMM (ref 0–0.5)
EOSINOPHIL NFR BLD AUTO: 2 %
ERYTHROCYTE [DISTWIDTH] IN BLOOD BY AUTOMATED COUNT: 14.5 % (ref 12–16)
EST. AVERAGE GLUCOSE BLD GHB EST-MCNC: 134 MG/DL
GFR/BSA.PRED SERPLBLD CYS-BASED-ARV: 47 ML/MIN/{1.73_M2}
GLUCOSE SERPL-MCNC: 102 MG/DL (ref 65–99)
HBA1C MFR BLD: 6.3 %
HCT VFR BLD AUTO: 39.7 % (ref 37–48)
HDLC SERPL-MCNC: 59 MG/DL (ref 23–92)
HGB BLD-MCNC: 13.3 G/DL (ref 12.5–17)
LDLC SERPL CALC-MCNC: 60 MG/DL
LYMPHOCYTES # BLD AUTO: 3.3 THOU/CMM (ref 1–3)
LYMPHOCYTES NFR BLD AUTO: 42 %
MCH RBC QN AUTO: 31 PG (ref 27–36)
MCHC RBC AUTO-ENTMCNC: 33.4 G/DL (ref 32–37)
MCV RBC AUTO: 93 FL (ref 80–100)
MONOCYTES # BLD AUTO: 0.8 THOU/CMM (ref 0.3–1)
MONOCYTES NFR BLD AUTO: 10 %
NEUTROPHILS # BLD AUTO: 3.5 THOU/CMM (ref 1.8–7.8)
NEUTROPHILS NFR BLD AUTO: 45 %
NONHDLC SERPL-MCNC: 84 MG/DL
PLATELET # BLD AUTO: 199 THOU/CMM (ref 140–350)
PMV BLD REES-ECKER: 9.4 FL (ref 7.5–11.3)
POTASSIUM SERPL-SCNC: 4.4 MMOL/L (ref 3.5–5.2)
PROT SERPL-MCNC: 6.7 G/DL (ref 6.3–8.3)
RBC # BLD AUTO: 4.28 MILL/CMM (ref 4–5.4)
SODIUM SERPL-SCNC: 142 MMOL/L (ref 135–145)
TRIGL SERPL-MCNC: 121 MG/DL
WBC # BLD AUTO: 7.9 THOU/CMM (ref 4–10.5)

## 2025-02-04 ENCOUNTER — OFFICE VISIT (OUTPATIENT)
Dept: FAMILY MEDICINE CLINIC | Facility: CLINIC | Age: OVER 89
End: 2025-02-04
Payer: COMMERCIAL

## 2025-02-04 VITALS
HEART RATE: 84 BPM | OXYGEN SATURATION: 98 % | RESPIRATION RATE: 18 BRPM | WEIGHT: 201 LBS | TEMPERATURE: 98 F | HEIGHT: 66 IN | BODY MASS INDEX: 32.3 KG/M2 | DIASTOLIC BLOOD PRESSURE: 82 MMHG | SYSTOLIC BLOOD PRESSURE: 134 MMHG

## 2025-02-04 DIAGNOSIS — M48.062 SPINAL STENOSIS OF LUMBAR REGION WITH NEUROGENIC CLAUDICATION: ICD-10-CM

## 2025-02-04 DIAGNOSIS — Z79.01 CHRONIC ANTICOAGULATION: ICD-10-CM

## 2025-02-04 DIAGNOSIS — I13.0 HYPERTENSIVE HEART AND KIDNEY DISEASE WITH HF AND WITH CKD STAGE I-IV (HCC): ICD-10-CM

## 2025-02-04 DIAGNOSIS — R73.03 PREDIABETES: Primary | ICD-10-CM

## 2025-02-04 DIAGNOSIS — I25.10 CORONARY ARTERY DISEASE INVOLVING NATIVE CORONARY ARTERY OF NATIVE HEART WITHOUT ANGINA PECTORIS: Chronic | ICD-10-CM

## 2025-02-04 DIAGNOSIS — E66.811 OBESITY (BMI 30.0-34.9): ICD-10-CM

## 2025-02-04 DIAGNOSIS — K59.01 SLOW TRANSIT CONSTIPATION: ICD-10-CM

## 2025-02-04 DIAGNOSIS — R73.01 IMPAIRED FASTING GLUCOSE: ICD-10-CM

## 2025-02-04 DIAGNOSIS — Z95.5 STATUS POST INSERTION OF DRUG ELUTING CORONARY ARTERY STENT: ICD-10-CM

## 2025-02-04 DIAGNOSIS — Z95.1 HX OF CABG: ICD-10-CM

## 2025-02-04 DIAGNOSIS — E78.5 DYSLIPIDEMIA: ICD-10-CM

## 2025-02-04 DIAGNOSIS — I25.5 ISCHEMIC CARDIOMYOPATHY: ICD-10-CM

## 2025-02-04 DIAGNOSIS — I50.20 HFREF (HEART FAILURE WITH REDUCED EJECTION FRACTION) (HCC): ICD-10-CM

## 2025-02-04 DIAGNOSIS — Z95.810 PRESENCE OF DOUBLE CHAMBER AUTOMATIC CARDIOVERTER/DEFIBRILLATOR (AICD): ICD-10-CM

## 2025-02-04 DIAGNOSIS — N18.31 STAGE 3A CHRONIC KIDNEY DISEASE (HCC): ICD-10-CM

## 2025-02-04 PROBLEM — I48.3 TYPICAL ATRIAL FLUTTER (HCC): Status: RESOLVED | Noted: 2018-03-06 | Resolved: 2025-02-04

## 2025-02-04 PROBLEM — I47.20 VENTRICULAR TACHYCARDIA (HCC): Status: RESOLVED | Noted: 2019-10-19 | Resolved: 2025-02-04

## 2025-02-04 PROBLEM — G95.19 OTHER VASCULAR MYELOPATHIES (HCC): Status: RESOLVED | Noted: 2024-01-17 | Resolved: 2025-02-04

## 2025-02-04 PROCEDURE — G2211 COMPLEX E/M VISIT ADD ON: HCPCS | Performed by: NURSE PRACTITIONER

## 2025-02-04 PROCEDURE — G0439 PPPS, SUBSEQ VISIT: HCPCS | Performed by: NURSE PRACTITIONER

## 2025-02-04 PROCEDURE — 99214 OFFICE O/P EST MOD 30 MIN: CPT | Performed by: NURSE PRACTITIONER

## 2025-02-04 RX ORDER — ATORVASTATIN CALCIUM 40 MG/1
40 TABLET, FILM COATED ORAL DAILY
Qty: 90 TABLET | Refills: 1 | Status: SHIPPED | OUTPATIENT
Start: 2025-02-04

## 2025-02-04 NOTE — ASSESSMENT & PLAN NOTE
Symptoms are stable.  Ambulates with a cane.  He continues to drive without accidents.  No falls.

## 2025-02-04 NOTE — ASSESSMENT & PLAN NOTE
Lab Results   Component Value Date    HGBA1C 6.3 (H) 01/28/2025   Patient had a hemoglobin A1c performed last week which was 6.3.  Slightly elevated from prior.  Instructed to watch carbohydrates in his diet.

## 2025-02-04 NOTE — PROGRESS NOTES
Name: Main Kidd      : [id number]      MRN: [id number]  Encounter Provider: MARJAN Smith  Encounter Date: 2025   Encounter department: [facility]    Chief Complaint   Patient presents with    Medicare Wellness Visit     Subsequent visit      Health Maintenance   Topic Date Due    SLP PLAN OF CARE  Never done    RSV Vaccine for Pregnant Patients and Patients Age 60+ Years (1 - 1-dose 75+ series) Never done    Medicare Annual Wellness Visit (AWV)  2025    Fall Risk  2026    Depression Screening  2026    Zoster Vaccine  Completed    Pneumococcal Vaccine: 65+ Years  Completed    Influenza Vaccine  Completed    COVID-19 Vaccine  Completed    Meningococcal B Vaccine  Aged Out    RSV Vaccine age 0-20 Months  Aged Out    HIB Vaccine  Aged Out    IPV Vaccine  Aged Out    Hepatitis A Vaccine  Aged Out    Meningococcal ACWY Vaccine  Aged Out    HPV Vaccine  Aged Out       Assessment & Plan  Dyslipidemia  Component      Latest Ref Rng 2025   Cholesterol      <200 mg/dL 143    Triglycerides      <150 mg/dL 121    HDL CHOLESTEROL LIPOPROTEIN      23 - 92 mg/dL 59    NON HDL CHOL. (LDL+VLDL)      <160 mg/dL 84    Chol/HDL Ratio 2.4    LDL Calculated      <130 mg/dL 60    Patient continues on Lipitor.  Most recent lipid panel reviewed with the patient.  Continue to modify diet.      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  Asymptomatic.             Stage 3a chronic kidney disease (HCC)  Lab Results   Component Value Date    EGFR 47 (L) 2025    EGFR 51 (L) 2024    EGFR 51 (L) 2024    CREATININE 1.40 (H) 2025    CREATININE 1.31 (H) 2024     CREATININE 1.31 (H) 07/16/2024   Kidney function stable.  Watch salt and NSAIDs in diet.    Orders:    Comprehensive metabolic panel    Prediabetes    Orders:    Hemoglobin A1C    Coronary artery disease involving native coronary artery of native heart without angina pectoris  Patient with a history of CABG.  He continues Plavix.  Continues to follow with cardiology.  Asymptomatic.         HFrEF (heart failure with reduced ejection fraction) ([facility])  Wt Readings from Last 3 Encounters:   02/04/25 91.2 kg (201 lb)   12/04/24 89.4 kg (197 lb)   12/04/24 89.9 kg (198 lb 3.2 oz)     Patient had a recent surveillance echocardiogram in December.  He follows closely with cardiology.  He is asymptomatic.  12/4/2024    Left Ventricle: Left ventricular cavity size is normal. Wall thickness is normal. There is no concentric hypertrophy. The left ventricular ejection fraction is 35% by visual estimation. Systolic function is moderately reduced. There is akinesis of apical septum, apex, and anteroapical region. There is no evidence of thrombus. Diastolic function is mildly abnormal, consistent with grade I (abnormal) relaxation.    Tricuspid Valve: There is trace regurgitation. The estimated right ventricular systolic pressure is 21.00 mmHg.             Ischemic cardiomyopathy  Follows closely with cardiology.  Asymptomatic.         Slow transit constipation  Continues with stool softener.  Adequate fluid intake.  Currently no problems with bowel movements.         Impaired fasting glucose  Lab Results   Component Value Date    HGBA1C 6.3 (H) 01/28/2025   Patient had a hemoglobin A1c performed last week which was 6.3.  Slightly elevated from prior.  Instructed to watch carbohydrates in his diet.         Hx of CABG         Status post insertion of drug eluting coronary artery stent         Spinal stenosis of lumbar region with neurogenic claudication  Symptoms are stable.  Ambulates with a cane.  He continues to drive without  accidents.  No falls.         Chronic anticoagulation  Patient remains on Eliquis.  No recent falls.         Obesity (BMI 30.0-34.9)  BMI stable.  Patient does ambulate with a cane and does have spinal stenosis.  He can ambulate short distances but is unable to perform weightbearing exercises due to continued discomfort.         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)           Depression Screening and Follow-up Plan: Patient was screened for depression during today's encounter. They screened negative with a PHQ-2 score of 0.      Preventive health issues were discussed with patient, and age appropriate screening tests were ordered as noted in patient's After Visit Summary. Personalized health advice and appropriate referrals for health education or preventive services given if needed, as noted in patient's After Visit Summary.    History of Present Illness     Patient presents to the office today for annual wellness visit and follow-up.  Overall he feels well.  He continues to follow with cardiology and urology.  He has no new medical concerns.  Recent blood work reviewed.  Up-to-date with vaccines.  No recent falls.       Patient Care Team:  MARJAN Munoz as PCP - General (Internal Medicine)  Rei Saleh MD as PCP - PCP-[facility]  Ilan Gutierrez MD (Urology)  Tisha Baires DO (Cardiology)  Denisse Redman MD (Ophthalmology)  Ceci Lebron MD (Neurology)    Review of Systems   Constitutional: Negative.  Negative for fatigue.   HENT: Negative.  Negative for congestion, postnasal drip, rhinorrhea and trouble swallowing.    Eyes: Negative.  Negative for visual disturbance.   Respiratory: Negative.  Negative for choking and shortness of breath.    Cardiovascular: Negative.  Negative for chest pain.   Gastrointestinal: Negative.    Endocrine: Negative.    Genitourinary: Negative.    Musculoskeletal:  Positive for gait problem (Ambulates with cane).  Negative for arthralgias, back pain, myalgias and neck pain.   Skin: Negative.    Neurological:  Negative for dizziness and headaches.   Psychiatric/Behavioral: Negative.       Medical History Reviewed by provider this encounter:  Tobacco  Allergies  Meds  Problems  Med Hx  Surg Hx  Fam Hx       Annual Wellness Visit Questionnaire   Main is here for his Subsequent Wellness visit. Last Medicare Wellness visit information reviewed, patient interviewed, no change since last AWV.     Health Risk Assessment:   Patient rates overall health as good. Patient feels that their physical health rating is same. Patient is very satisfied with their life. Eyesight was rated as same. Hearing was rated as same. Patient feels that their emotional and mental health rating is same. Patients states they are never, rarely angry. Patient states they are never, rarely unusually tired/fatigued. Pain experienced in the last 7 days has been none. Patient states that he has experienced no weight loss or gain in last 6 months.     Depression Screening:   PHQ-2 Score: 0      Fall Risk Screening:   In the past year, patient has experienced: no history of falling in past year      Home Safety:  Patient does not have trouble with stairs inside or outside of their home. Patient has working smoke alarms and has working carbon monoxide detector. Home safety hazards include: none.     Nutrition:   Current diet is No Added Salt and Low Cholesterol.     Medications:   Patient is currently taking over-the-counter supplements. OTC medications include: see medication list. Patient is able to manage medications.     Activities of Daily Living (ADLs)/Instrumental Activities of Daily Living (IADLs):   Walk and transfer into and out of bed and chair?: Yes  Dress and groom yourself?: Yes    Bathe or shower yourself?: Yes    Feed yourself? Yes  Do your laundry/housekeeping?: Yes  Manage your money, pay your bills and track your expenses?: Yes  Make your  own meals?: Yes    Do your own shopping?: Yes    Previous Hospitalizations:   Any hospitalizations or ED visits within the last 12 months?: No      Advance Care Planning:   Living will: Yes    Durable POA for healthcare: Yes    Advanced directive: Yes    Five wishes given: No      Comments: Son in durable POA    Cognitive Screening:   Provider or family/friend/caregiver concerned regarding cognition?: No    PREVENTIVE SCREENINGS      Cardiovascular Screening:    General: Screening Current      Diabetes Screening:     General: Screening Current      Colorectal Cancer Screening:     General: Screening Not Indicated      Prostate Cancer Screening:    General: Screening Not Indicated      Osteoporosis Screening:    General: Screening Not Indicated      Abdominal Aortic Aneurysm (AAA) Screening:    Risk factors include: tobacco use        General: Screening Not Indicated      Lung Cancer Screening:     General: Screening Not Indicated      Hepatitis C Screening:    General: Screening Not Indicated    Screening, Brief Intervention, and Referral to Treatment (SBIRT)    Screening      AUDIT-C Screenin) How often did you have a drink containing alcohol in the past year? monthly or less  2) How many drinks did you have on a typical day when you were drinking in the past year? 1 to 2  3) How often did you have 6 or more drinks on one occasion in the past year? never    AUDIT-C Score: 1  Interpretation: Score 0-3 (male): Negative screen for alcohol misuse    Single Item Drug Screening:  How often have you used an illegal drug (including marijuana) or a prescription medication for non-medical reasons in the past year? never    Single Item Drug Screen Score: 0  Interpretation: Negative screen for possible drug use disorder    Social Drivers of Health     Financial Resource Strain: Low Risk  (2024)    Overall Financial Resource Strain (CARDIA)     Difficulty of Paying Living Expenses: Not hard at all   Food Insecurity:  "No Food Insecurity (2/4/2025)    Hunger Vital Sign     Worried About Running Out of Food in the Last Year: Never true     Ran Out of Food in the Last Year: Never true   Transportation Needs: No Transportation Needs (2/4/2025)    PRAPARE - Transportation     Lack of Transportation (Medical): No     Lack of Transportation (Non-Medical): No   Housing Stability: Unknown (2/4/2025)    Housing Stability Vital Sign     Unable to Pay for Housing in the Last Year: No   Utilities: Not At Risk (2/4/2025)    [facility] Utilities     Threatened with loss of utilities: No     No results found.    Objective   /82   Pulse 84   Temp 98 °F (36.7 °C) (Temporal)   Resp 18   Ht 5' 6\" (1.676 m)   Wt 91.2 kg (201 lb)   SpO2 98%   BMI 32.44 kg/m²     Physical Exam  Vitals and nursing note reviewed.   Constitutional:       Appearance: Normal appearance. He is well-developed. He is obese.   HENT:      Head: Normocephalic and atraumatic.      Right Ear: Tympanic membrane, ear canal and external ear normal. There is no impacted cerumen.      Left Ear: Tympanic membrane, ear canal and external ear normal. There is no impacted cerumen.      Nose: Nose normal.      Mouth/Throat:      Mouth: Mucous membranes are moist.      Pharynx: Oropharynx is clear.   Eyes:      Conjunctiva/sclera: Conjunctivae normal.   Cardiovascular:      Rate and Rhythm: Normal rate and regular rhythm.      Pulses: Normal pulses.      Heart sounds: Normal heart sounds. No murmur heard.  Pulmonary:      Effort: Pulmonary effort is normal. No respiratory distress.      Breath sounds: Normal breath sounds. No stridor. No wheezing, rhonchi or rales.   Abdominal:      General: Bowel sounds are normal. There is no distension.      Palpations: Abdomen is soft. There is no mass.      Tenderness: There is no abdominal tenderness. There is no guarding or rebound.      Hernia: No hernia is present.   Musculoskeletal:         General: Normal range of motion.      Cervical back: " Normal range of motion and neck supple.   Skin:     General: Skin is warm and dry.   Neurological:      General: No focal deficit present.      Mental Status: He is alert and oriented to person, place, and time. Mental status is at baseline.      Gait: Gait abnormal.   Psychiatric:         Mood and Affect: Mood normal.         Behavior: Behavior normal.         Thought Content: Thought content normal.         Judgment: Judgment normal.

## 2025-02-04 NOTE — ASSESSMENT & PLAN NOTE
Component      Latest Ref Rng 1/28/2025   Cholesterol      <200 mg/dL 143    Triglycerides      <150 mg/dL 121    HDL CHOLESTEROL LIPOPROTEIN      23 - 92 mg/dL 59    NON HDL CHOL. (LDL+VLDL)      <160 mg/dL 84    Chol/HDL Ratio 2.4    LDL Calculated      <130 mg/dL 60    Patient continues on Lipitor.  Most recent lipid panel reviewed with the patient.  Continue to modify diet.      Orders:    atorvastatin (LIPITOR) 40 mg tablet; Take 1 tablet (40 mg total) by mouth daily

## 2025-02-04 NOTE — ASSESSMENT & PLAN NOTE
Lab Results   Component Value Date    EGFR 47 (L) 01/28/2025    EGFR 51 (L) 12/27/2024    EGFR 51 (L) 07/16/2024    CREATININE 1.40 (H) 01/28/2025    CREATININE 1.31 (H) 12/27/2024    CREATININE 1.31 (H) 07/16/2024   Kidney function stable.  Watch salt and NSAIDs in diet.    Orders:    Comprehensive metabolic panel

## 2025-02-04 NOTE — ASSESSMENT & PLAN NOTE
Wt Readings from Last 3 Encounters:   02/04/25 91.2 kg (201 lb)   12/04/24 89.4 kg (197 lb)   12/04/24 89.9 kg (198 lb 3.2 oz)       Continues to follow with cardiology.  Asymptomatic.

## 2025-02-04 NOTE — PATIENT INSTRUCTIONS
Medicare Preventive Visit Patient Instructions  Thank you for completing your Welcome to Medicare Visit or Medicare Annual Wellness Visit today. Your next wellness visit will be due in one year (2/5/2026).  The screening/preventive services that you may require over the next 5-10 years are detailed below. Some tests may not apply to you based off risk factors and/or age. Screening tests ordered at today's visit but not completed yet may show as past due. Also, please note that scanned in results may not display below.  Preventive Screenings:  Service Recommendations Previous Testing/Comments   Colorectal Cancer Screening  Colonoscopy    Fecal Occult Blood Test (FOBT)/Fecal Immunochemical Test (FIT)  Fecal DNA/Cologuard Test  Flexible Sigmoidoscopy Age: 45-75 years old   Colonoscopy: every 10 years (May be performed more frequently if at higher risk)  OR  FOBT/FIT: every 1 year  OR  Cologuard: every 3 years  OR  Sigmoidoscopy: every 5 years  Screening may be recommended earlier than age 45 if at higher risk for colorectal cancer. Also, an individualized decision between you and your healthcare provider will decide whether screening between the ages of 76-85 would be appropriate. Colonoscopy: Not on file  FOBT/FIT: Not on file  Cologuard: Not on file  Sigmoidoscopy: Not on file          Prostate Cancer Screening Individualized decision between patient and health care provider in men between ages of 55-69   Medicare will cover every 12 months beginning on the day after your 50th birthday PSA: 0.93 ng/mL           Hepatitis C Screening Once for adults born between 1945 and 1965  More frequently in patients at high risk for Hepatitis C Hep C Antibody: Not on file        Diabetes Screening 1-2 times per year if you're at risk for diabetes or have pre-diabetes Fasting glucose: No results in last 5 years (No results in last 5 years)  A1C: 6.3 % (1/28/2025)      Cholesterol Screening Once every 5 years if you don't have a  lipid disorder. May order more often based on risk factors. Lipid panel: 01/28/2025         Other Preventive Screenings Covered by Medicare:  Abdominal Aortic Aneurysm (AAA) Screening: covered once if your at risk. You're considered to be at risk if you have a family history of AAA or a male between the age of 65-75 who smoking at least 100 cigarettes in your lifetime.  Lung Cancer Screening: covers low dose CT scan once per year if you meet all of the following conditions: (1) Age 55-77; (2) No signs or symptoms of lung cancer; (3) Current smoker or have quit smoking within the last 15 years; (4) You have a tobacco smoking history of at least 20 pack years (packs per day x number of years you smoked); (5) You get a written order from a healthcare provider.  Glaucoma Screening: covered annually if you're considered high risk: (1) You have diabetes OR (2) Family history of glaucoma OR (3)  aged 50 and older OR (4)  American aged 65 and older  Osteoporosis Screening: covered every 2 years if you meet one of the following conditions: (1) Have a vertebral abnormality; (2) On glucocorticoid therapy for more than 3 months; (3) Have primary hyperparathyroidism; (4) On osteoporosis medications and need to assess response to drug therapy.  HIV Screening: covered annually if you're between the age of 15-65. Also covered annually if you are younger than 15 and older than 65 with risk factors for HIV infection. For pregnant patients, it is covered up to 3 times per pregnancy.    Immunizations:  Immunization Recommendations   Influenza Vaccine Annual influenza vaccination during flu season is recommended for all persons aged >= 6 months who do not have contraindications   Pneumococcal Vaccine   * Pneumococcal conjugate vaccine = PCV13 (Prevnar 13), PCV15 (Vaxneuvance), PCV20 (Prevnar 20)  * Pneumococcal polysaccharide vaccine = PPSV23 (Pneumovax) Adults 19-65 yo with certain risk factors or if 65+ yo  If  never received any pneumonia vaccine: recommend Prevnar 20 (PCV20)  Give PCV20 if previously received 1 dose of PCV13 or PPSV23   Hepatitis B Vaccine 3 dose series if at intermediate or high risk (ex: diabetes, end stage renal disease, liver disease)   Respiratory syncytial virus (RSV) Vaccine - COVERED BY MEDICARE PART D  * RSVPreF3 (Arexvy) CDC recommends that adults 60 years of age and older may receive a single dose of RSV vaccine using shared clinical decision-making (SCDM)   Tetanus (Td) Vaccine - COST NOT COVERED BY MEDICARE PART B Following completion of primary series, a booster dose should be given every 10 years to maintain immunity against tetanus. Td may also be given as tetanus wound prophylaxis.   Tdap Vaccine - COST NOT COVERED BY MEDICARE PART B Recommended at least once for all adults. For pregnant patients, recommended with each pregnancy.   Shingles Vaccine (Shingrix) - COST NOT COVERED BY MEDICARE PART B  2 shot series recommended in those 19 years and older who have or will have weakened immune systems or those 50 years and older     Health Maintenance Due:  There are no preventive care reminders to display for this patient.  Immunizations Due:  There are no preventive care reminders to display for this patient.  Advance Directives   What are advance directives?  Advance directives are legal documents that state your wishes and plans for medical care. These plans are made ahead of time in case you lose your ability to make decisions for yourself. Advance directives can apply to any medical decision, such as the treatments you want, and if you want to donate organs.   What are the types of advance directives?  There are many types of advance directives, and each state has rules about how to use them. You may choose a combination of any of the following:  Living will:  This is a written record of the treatment you want. You can also choose which treatments you do not want, which to limit, and  which to stop at a certain time. This includes surgery, medicine, IV fluid, and tube feedings.   Durable power of  for healthcare (DPAHC):  This is a written record that states who you want to make healthcare choices for you when you are unable to make them for yourself. This person, called a proxy, is usually a family member or a friend. You may choose more than 1 proxy.  Do not resuscitate (DNR) order:  A DNR order is used in case your heart stops beating or you stop breathing. It is a request not to have certain forms of treatment, such as CPR. A DNR order may be included in other types of advance directives.  Medical directive:  This covers the care that you want if you are in a coma, near death, or unable to make decisions for yourself. You can list the treatments you want for each condition. Treatment may include pain medicine, surgery, blood transfusions, dialysis, IV or tube feedings, and a ventilator (breathing machine).  Values history:  This document has questions about your views, beliefs, and how you feel and think about life. This information can help others choose the care that you would choose.  Why are advance directives important?  An advance directive helps you control your care. Although spoken wishes may be used, it is better to have your wishes written down. Spoken wishes can be misunderstood, or not followed. Treatments may be given even if you do not want them. An advance directive may make it easier for your family to make difficult choices about your care.   Weight Management   Why it is important to manage your weight:  Being overweight increases your risk of health conditions such as heart disease, high blood pressure, type 2 diabetes, and certain types of cancer. It can also increase your risk for osteoarthritis, sleep apnea, and other respiratory problems. Aim for a slow, steady weight loss. Even a small amount of weight loss can lower your risk of health problems.  How to lose  weight safely:  A safe and healthy way to lose weight is to eat fewer calories and get regular exercise. You can lose up about 1 pound a week by decreasing the number of calories you eat by 500 calories each day.   Healthy meal plan for weight management:  A healthy meal plan includes a variety of foods, contains fewer calories, and helps you stay healthy. A healthy meal plan includes the following:  Eat whole-grain foods more often.  A healthy meal plan should contain fiber. Fiber is the part of grains, fruits, and vegetables that is not broken down by your body. Whole-grain foods are healthy and provide extra fiber in your diet. Some examples of whole-grain foods are whole-wheat breads and pastas, oatmeal, brown rice, and bulgur.  Eat a variety of vegetables every day.  Include dark, leafy greens such as spinach, kale, julito greens, and mustard greens. Eat yellow and orange vegetables such as carrots, sweet potatoes, and winter squash.   Eat a variety of fruits every day.  Choose fresh or canned fruit (canned in its own juice or light syrup) instead of juice. Fruit juice has very little or no fiber.  Eat low-fat dairy foods.  Drink fat-free (skim) milk or 1% milk. Eat fat-free yogurt and low-fat cottage cheese. Try low-fat cheeses such as mozzarella and other reduced-fat cheeses.  Choose meat and other protein foods that are low in fat.  Choose beans or other legumes such as split peas or lentils. Choose fish, skinless poultry (chicken or turkey), or lean cuts of red meat (beef or pork). Before you cook meat or poultry, cut off any visible fat.   Use less fat and oil.  Try baking foods instead of frying them. Add less fat, such as margarine, sour cream, regular salad dressing and mayonnaise to foods. Eat fewer high-fat foods. Some examples of high-fat foods include french fries, doughnuts, ice cream, and cakes.  Eat fewer sweets.  Limit foods and drinks that are high in sugar. This includes candy, cookies,  regular soda, and sweetened drinks.  Exercise:  Exercise at least 30 minutes per day on most days of the week. Some examples of exercise include walking, biking, dancing, and swimming. You can also fit in more physical activity by taking the stairs instead of the elevator or parking farther away from stores. Ask your healthcare provider about the best exercise plan for you.      © Copyright [employer] 2018 Information is for End User's use only and may not be sold, redistributed or otherwise used for commercial purposes. All illustrations and images included in CareNotes® are the copyrighted property of [employer] or [employer]

## 2025-02-04 NOTE — ASSESSMENT & PLAN NOTE
BMI stable.  Patient does ambulate with a cane and does have spinal stenosis.  He can ambulate short distances but is unable to perform weightbearing exercises due to continued discomfort.

## 2025-02-04 NOTE — ASSESSMENT & PLAN NOTE
Wt Readings from Last 3 Encounters:   02/04/25 91.2 kg (201 lb)   12/04/24 89.4 kg (197 lb)   12/04/24 89.9 kg (198 lb 3.2 oz)     Patient had a recent surveillance echocardiogram in December.  He follows closely with cardiology.  He is asymptomatic.  12/4/2024    Left Ventricle: Left ventricular cavity size is normal. Wall thickness is normal. There is no concentric hypertrophy. The left ventricular ejection fraction is 35% by visual estimation. Systolic function is moderately reduced. There is akinesis of apical septum, apex, and anteroapical region. There is no evidence of thrombus. Diastolic function is mildly abnormal, consistent with grade I (abnormal) relaxation.    Tricuspid Valve: There is trace regurgitation. The estimated right ventricular systolic pressure is 21.00 mmHg.

## 2025-02-04 NOTE — ASSESSMENT & PLAN NOTE
Continues with stool softener.  Adequate fluid intake.  Currently no problems with bowel movements.

## 2025-02-05 DIAGNOSIS — E03.8 SUBCLINICAL HYPOTHYROIDISM: Primary | ICD-10-CM

## 2025-02-12 DIAGNOSIS — I25.5 ISCHEMIC CARDIOMYOPATHY: ICD-10-CM

## 2025-02-12 DIAGNOSIS — I47.20 VENTRICULAR TACHYCARDIA (HCC): ICD-10-CM

## 2025-02-12 DIAGNOSIS — I10 BENIGN ESSENTIAL HYPERTENSION: ICD-10-CM

## 2025-02-12 RX ORDER — CLOPIDOGREL BISULFATE 75 MG/1
75 TABLET ORAL DAILY
Qty: 90 TABLET | Refills: 1 | Status: SHIPPED | OUTPATIENT
Start: 2025-02-12

## 2025-02-12 RX ORDER — FUROSEMIDE 20 MG/1
20 TABLET ORAL DAILY
Qty: 90 TABLET | Refills: 1 | Status: SHIPPED | OUTPATIENT
Start: 2025-02-12

## 2025-02-18 ENCOUNTER — RESULTS FOLLOW-UP (OUTPATIENT)
Dept: CARDIOLOGY CLINIC | Facility: CLINIC | Age: OVER 89
End: 2025-02-18

## 2025-02-18 ENCOUNTER — REMOTE DEVICE CLINIC VISIT (OUTPATIENT)
Dept: CARDIOLOGY CLINIC | Facility: CLINIC | Age: OVER 89
End: 2025-02-18
Payer: COMMERCIAL

## 2025-02-18 DIAGNOSIS — Z95.810 PRESENCE OF IMPLANTABLE CARDIOVERTER-DEFIBRILLATOR (ICD): Primary | ICD-10-CM

## 2025-02-18 DIAGNOSIS — I10 BENIGN ESSENTIAL HYPERTENSION: ICD-10-CM

## 2025-02-18 PROCEDURE — 93296 REM INTERROG EVL PM/IDS: CPT | Performed by: INTERNAL MEDICINE

## 2025-02-18 PROCEDURE — 93295 DEV INTERROG REMOTE 1/2/MLT: CPT | Performed by: INTERNAL MEDICINE

## 2025-02-18 NOTE — PROGRESS NOTES
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  AP 99.2%  0.1% (AAIR-DDDR 60 PPM).  ALL AVAILABLE LEAD PARAMETERS WITHIN NORMAL LIMITS.  NO SIGNIFICANT HIGH RATE EPISODES.  OPTI-VOL WITHIN NORMAL LIMITS.  NORMAL DEVICE FUNCTION.   ES

## 2025-02-19 RX ORDER — METOPROLOL TARTRATE 25 MG/1
TABLET, FILM COATED ORAL
Qty: 270 TABLET | Refills: 1 | Status: SHIPPED | OUTPATIENT
Start: 2025-02-19

## 2025-05-19 ENCOUNTER — IN-CLINIC DEVICE VISIT (OUTPATIENT)
Dept: CARDIOLOGY CLINIC | Facility: CLINIC | Age: OVER 89
End: 2025-05-19
Payer: COMMERCIAL

## 2025-05-19 ENCOUNTER — RESULTS FOLLOW-UP (OUTPATIENT)
Dept: CARDIOLOGY CLINIC | Facility: CLINIC | Age: OVER 89
End: 2025-05-19

## 2025-05-19 DIAGNOSIS — Z95.810 PRESENCE OF AUTOMATIC CARDIOVERTER/DEFIBRILLATOR (AICD): Primary | ICD-10-CM

## 2025-05-19 PROCEDURE — 93283 PRGRMG EVAL IMPLANTABLE DFB: CPT | Performed by: INTERNAL MEDICINE

## 2025-05-19 NOTE — PROGRESS NOTES
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 BATTERY VOLTAGE ADEQUATE (3.5 YR). AP 99.5%.  0.1%. ALL LEAD PARAMETERS WITHIN NORMAL LIMITS. NO SIGNIFICANT HIGH RATE EPISODES. 1.8 PVC SINGLES/H. OPTI-VOL WITHIN NORMAL LIMITS. WAVELET TEMPLATE COLLECTED. NORMAL DEVICE FUNCTION. [facility]

## 2025-06-12 DIAGNOSIS — I10 BENIGN ESSENTIAL HYPERTENSION: ICD-10-CM

## 2025-06-12 DIAGNOSIS — I25.5 ISCHEMIC CARDIOMYOPATHY: ICD-10-CM

## 2025-06-12 RX ORDER — POTASSIUM CHLORIDE 1500 MG/1
20 TABLET, EXTENDED RELEASE ORAL DAILY
Qty: 90 TABLET | Refills: 1 | Status: SHIPPED | OUTPATIENT
Start: 2025-06-12

## 2025-07-16 DIAGNOSIS — I48.3 TYPICAL ATRIAL FLUTTER (HCC): ICD-10-CM

## 2025-07-17 RX ORDER — APIXABAN 5 MG/1
5 TABLET, FILM COATED ORAL 2 TIMES DAILY
Qty: 60 TABLET | Refills: 5 | Status: SHIPPED | OUTPATIENT
Start: 2025-07-17

## 2025-07-25 ENCOUNTER — OFFICE VISIT (OUTPATIENT)
Dept: CARDIOLOGY CLINIC | Facility: CLINIC | Age: OVER 89
End: 2025-07-25
Payer: COMMERCIAL

## 2025-07-25 VITALS
SYSTOLIC BLOOD PRESSURE: 136 MMHG | HEART RATE: 72 BPM | WEIGHT: 198.2 LBS | DIASTOLIC BLOOD PRESSURE: 74 MMHG | HEIGHT: 66 IN | BODY MASS INDEX: 31.85 KG/M2

## 2025-07-25 DIAGNOSIS — E78.5 DYSLIPIDEMIA: ICD-10-CM

## 2025-07-25 DIAGNOSIS — Z79.899 LONG TERM CURRENT USE OF AMIODARONE: ICD-10-CM

## 2025-07-25 DIAGNOSIS — I50.20 HFREF (HEART FAILURE WITH REDUCED EJECTION FRACTION) (HCC): Primary | ICD-10-CM

## 2025-07-25 DIAGNOSIS — Z95.1 HX OF CABG: ICD-10-CM

## 2025-07-25 DIAGNOSIS — I13.0 HYPERTENSIVE HEART AND KIDNEY DISEASE WITH HF AND WITH CKD STAGE I-IV (HCC): ICD-10-CM

## 2025-07-25 DIAGNOSIS — Z95.810 PRESENCE OF DOUBLE CHAMBER AUTOMATIC CARDIOVERTER/DEFIBRILLATOR (AICD): ICD-10-CM

## 2025-07-25 DIAGNOSIS — I25.5 ISCHEMIC CARDIOMYOPATHY: ICD-10-CM

## 2025-07-25 DIAGNOSIS — I48.3 TYPICAL ATRIAL FLUTTER (HCC): ICD-10-CM

## 2025-07-25 DIAGNOSIS — I25.10 CORONARY ARTERY DISEASE INVOLVING NATIVE CORONARY ARTERY OF NATIVE HEART WITHOUT ANGINA PECTORIS: Chronic | ICD-10-CM

## 2025-07-25 LAB
ATRIAL RATE: 72 BPM
PR INTERVAL: 318 MS
QRS AXIS: -51 DEGREES
QRSD INTERVAL: 80 MS
QT INTERVAL: 368 MS
QTC INTERVAL: 402 MS
T WAVE AXIS: 132 DEGREES
VENTRICULAR RATE: 72 BPM

## 2025-07-25 PROCEDURE — 93000 ELECTROCARDIOGRAM COMPLETE: CPT | Performed by: INTERNAL MEDICINE

## 2025-07-25 PROCEDURE — 99214 OFFICE O/P EST MOD 30 MIN: CPT | Performed by: INTERNAL MEDICINE

## 2025-07-25 RX ORDER — SACUBITRIL AND VALSARTAN 24; 26 MG/1; MG/1
1 TABLET, FILM COATED ORAL 2 TIMES DAILY
Qty: 180 TABLET | Refills: 3 | Status: SHIPPED | OUTPATIENT
Start: 2025-07-25

## 2025-07-25 RX ORDER — DIPHENOXYLATE HYDROCHLORIDE AND ATROPINE SULFATE 2.5; .025 MG/1; MG/1
1 TABLET ORAL DAILY
COMMUNITY

## 2025-07-25 RX ORDER — METOPROLOL SUCCINATE 25 MG/1
25 TABLET, EXTENDED RELEASE ORAL SEE ADMIN INSTRUCTIONS
Qty: 270 TABLET | Refills: 3 | Status: SHIPPED | OUTPATIENT
Start: 2025-07-25

## 2025-07-25 NOTE — PROGRESS NOTES
Cardiology Follow Up    Main Kidd  9/1/1932  [id number]  HEART & VASCULAR [facility]  [address]  [place] [address]    Assessment & Plan  HFrEF (heart failure with reduced ejection fraction) ([facility])  Wt Readings from Last 3 Encounters:   07/25/25 89.9 kg (198 lb 3.2 oz)   02/04/25 91.2 kg (201 lb)   12/04/24 89.4 kg (197 lb)   -  Euvolemic on exam  -  Continue current oral diuretic regimen  -  Transition metoprolol tartrate to succinate in setting of reduced EF  -  Prescription for Entresto provided  -  To have BMP repeated in one week, has blood work provided by PCP    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 ([facility])  Wt Readings from Last 3 Encounters:   07/25/25 89.9 kg (198 lb 3.2 oz)   02/04/25 91.2 kg (201 lb)   12/04/24 89.4 kg (197 lb)   -  Blood pressure well-controlled  -  Low-sodium diet reinforced  Coronary artery disease involving native coronary artery of native heart without angina pectoris  -  No symptoms of angina  -  On Plavix, statin and beta-blocker  Hx of CABG  -  LIMA to LAD (patent)  -  SVG to D1 (patent)  -  SVG to OM1 (s/p MINNIE 2019)   -  SVG to RCA (occluded)    Ischemic cardiomyopathy  -  EF 35% by echo 12/2024  -  Transition metoprolol tartrate to succinate  -  Add Entresto  -  BMP in one week  Orders:    POCT ECG    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)  -  No events noted on device check  Typical atrial flutter (HCC)  -  Maintaining normal sinus rhythm, no recurrence on device checks  -  On anticoagulation with Eliquis  Long term current use of amiodarone  -  Due for LFTs and TFTs  -  Up-to-date on eye exams  Dyslipidemia  -  LDL at goal, continue current statin regimen      Interval History:   Mr. Kidd is a pleasant 89-year-old gentleman who presents to the office today for routine follow-up.    During his last visit with me he was given  samples of Entresto.  He took the medication with no side effects.  However he never called the office so that a prescription could be provided.    Since his last visit from a cardiac standpoint he has been feeling relatively well.  He does not participate in any formal physical activity.  He ambulates with the aid of a rollator when he is walking to and from the dining facility.  He ambulates with the aid of a cane outside of his home.  With the activity he does perform he denies any exertional chest pain or shortness.  He denies any signs or symptoms of congestive heart failure including lower extremity edema, paroxysmal nocturnal dyspnea, orthopnea, acute weight gain or increasing abdominal girth.  He denies lightheadedness, syncope or presyncope.  He denies palpitations.  He denies symptoms of claudication.    He is maintained on Eliquis without any bleeding consequences or falls.    He denies any discharges from his AICD.    Problem List       CVA (cerebral vascular accident) (HCC)    Essential hypertension (Chronic)    HLD (hyperlipidemia) (Chronic)    CAD (coronary artery disease) (Chronic)    Atrial flutter (HCC) (Chronic)          Past Medical History:   Diagnosis Date    Acute kidney injury ([facility]) 10/19/2019    Acute myocardial infarction ([facility])     Allergic rhinitis     Anxiety     Bimalleolar fracture of left ankle     CAD (coronary artery disease)     Dyslipidemia 3/6/2018    Erectile dysfunction of non-organic origin     Hematuria     workup was negative and felt to be benign    Herpes zoster with complication     Hyperlipidemia     Hypertension     Hypotension 10/28/2019    Impaired fasting glucose     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    Neurogenic claudication 6/17/2021    Prostatic hypertrophy     benign    Sepsis with acute renal failure without septic shock ([facility]) 11/20/2021    Stroke ([facility])      Social History     Socioeconomic History    Marital status:      Spouse  name: moncho    Number of children: Not on file    Years of education: Not on file    Highest education level: Not on file   Occupational History    Occupation: retired     Comment: clergy   Tobacco Use    Smoking status: Former     Current packs/day: 0.00     Types: Cigarettes     Start date: 1950     Quit date:      Years since quittin.6     Passive exposure: Past    Smokeless tobacco: Never   Vaping Use    Vaping status: Never Used   Substance and Sexual Activity    Alcohol use: Not Currently     Comment: very rarely    Drug use: No    Sexual activity: Not Currently   Other Topics Concern    Not on file   Social History Narrative    Death in the family- spouse, moncho  after a prolonged francis with lymphoma     Exercises regularly     Social Drivers of Health     Financial Resource Strain: Low Risk  (2024)    Overall Financial Resource Strain (CARDIA)     Difficulty of Paying Living Expenses: Not hard at all   Food Insecurity: No Food Insecurity (2025)    Nursing - Inadequate Food Risk Classification     Worried About Running Out of Food in the Last Year: Never true     Ran Out of Food in the Last Year: Never true     Ran Out of Food in the Last Year: Not on file   Transportation Needs: No Transportation Needs (2025)    PRAPARE - Transportation     Lack of Transportation (Medical): No     Lack of Transportation (Non-Medical): No   Physical Activity: Not on file   Stress: Not on file   Social Connections: Not on file   Intimate Partner Violence: Not on file   Housing Stability: Unknown (2025)    Housing Stability Vital Sign     Unable to Pay for Housing in the Last Year: No     Number of Times Moved in the Last Year: Not on file     Homeless in the Last Year: Not on file      Family History   Problem Relation Name Age of Onset    Cancer Mother      Hypertension Mother          essential    Pancreatic cancer Mother       Past Surgical History:   Procedure Laterality Date    ANKLE  FRACTURE SURGERY Left     CARDIAC PACEMAKER PLACEMENT Left     CORONARY ARTERY BYPASS GRAFT      x4       Current Outpatient Medications:     amiodarone 200 mg tablet, TAKE 1 TABLET BY MOUTH EVERY DAY, Disp: 90 tablet, Rfl: 3    atorvastatin (LIPITOR) 40 mg tablet, Take 1 tablet (40 mg total) by mouth daily, Disp: 90 tablet, Rfl: 1    clopidogrel (PLAVIX) 75 mg tablet, TAKE 1 TABLET BY MOUTH EVERY DAY, Disp: 90 tablet, Rfl: 1    Coenzyme Q10 200 MG capsule, Take 200 mg by mouth in the morning., Disp: , Rfl:     Eliquis 5 MG, TAKE 1 TABLET BY MOUTH TWICE A DAY, Disp: 60 tablet, Rfl: 5    furosemide (LASIX) 20 mg tablet, TAKE 1 TABLET BY MOUTH EVERY DAY, Disp: 90 tablet, Rfl: 1    gabapentin (NEURONTIN) 300 mg capsule, Take 300 mg by mouth in the morning and 300 mg in the evening., Disp: , Rfl:     metoprolol succinate (TOPROL-XL) 25 mg 24 hr tablet, Take 1 tablet (25 mg total) by mouth see administration instructions Take one tablet orally in the AM and two tablets orally in the evening, Disp: 270 tablet, Rfl: 3    multivitamin (THERAGRAN) TABS, Take 1 tablet by mouth daily, Disp: , Rfl:     nitroglycerin (NITROSTAT) 0.4 mg SL tablet, Place 1 tablet (0.4 mg total) under the tongue every 5 (five) minutes as needed for chest pain, Disp: 25 tablet, Rfl: 1    potassium chloride (Klor-Con M20) 20 mEq tablet, TAKE 1 TABLET BY MOUTH EVERY DAY, Disp: 90 tablet, Rfl: 1    Probiotic Product (PROBIOTIC COLON SUPPORT) CAPS, Take 1 capsule by mouth in the morning., Disp: , Rfl:     sacubitril-valsartan (Entresto) 24-26 MG TABS, Take 1 tablet by mouth 2 (two) times a day, Disp: 180 tablet, Rfl: 3    tamsulosin (FLOMAX) 0.4 mg, Take 0.4 mg by mouth daily at bedtime, Disp: , Rfl:   Allergies   Allergen Reactions    Penicillins Rash and Edema     Reaction Date: 01Jan2000; Category: Allergy; Annotation - [id number]: Severe reaction with hospitaization at [facility]-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       Labs:     Chemistry        Component Value Date/Time    NA  "139 08/17/2017 0705    K 4.4 01/28/2025 0912    K 4.5 01/12/2024 0913     01/28/2025 0912     01/12/2024 0913    CO2 27 01/28/2025 0912    CO2 30 01/12/2024 0913    BUN 17 01/28/2025 0912    BUN 19 05/28/2024 0936    CREATININE 1.40 (H) 01/28/2025 0912    CREATININE 1.30 05/28/2024 0936        Component Value Date/Time    CALCIUM 8.7 01/28/2025 0912    CALCIUM 9.3 01/12/2024 0913    ALKPHOS 61 01/28/2025 0912    ALKPHOS 60 01/12/2024 0913    AST 17 01/28/2025 0912    AST 15 01/12/2024 0913    ALT 16 01/28/2025 0912    ALT 14 01/12/2024 0913    BILITOT 0.4 07/19/2016 0642            Lab Results   Component Value Date    CHOL 156 08/17/2017    CHOL 166 01/26/2017    CHOL 166 07/19/2016     Lab Results   Component Value Date    HDL 59 01/28/2025    HDL 58 10/20/2019    HDL 53 03/28/2019     Lab Results   Component Value Date    LDLCALC 60 01/28/2025    LDLCALC 65 10/20/2019    LDLCALC 75 03/28/2019     Lab Results   Component Value Date    TRIG 121 01/28/2025    TRIG 65 10/20/2019    TRIG 150 (H) 03/28/2019     No results found for: \"CHOLHDL\"    Imaging: Mri Brain Wo Contrast    Result Date: 3/6/2018  Narrative: MRI BRAIN WITHOUT CONTRAST INDICATION: STROKE- WO BRAIN. History taken directly from the electronic ordering system.  Right-sided weakness.  Difficulty with balance. COMPARISON:   3/6/2018 TECHNIQUE:  Sagittal T1, axial T2, axial FLAIR, axial T1, axial Freedom and axial diffusion imaging. IMAGE QUALITY:  Diagnostic. FINDINGS: BRAIN PARENCHYMA:  There is a small band of diffusion restriction in the left insular cortex extending into the periventricular white matter of the left frontal lobe images 19 through 23 of series 3 indicative of recent left MCA infarction.  There is bandlike associated FLAIR hyperintensity in this region. Elsewhere there is a small to moderate chronic right frontal infarction with cystic encephalomalacia and gliosis.  There is somewhat patchy periventricular FLAIR hyperintensity " as well. No acute intracranial hemorrhage or extra-axial fluid collection.  Cerebellar tonsils are normally positioned.  VENTRICLES:  The ventricles are normal in size and contour. SELLA AND PITUITARY GLAND:  Normal. ORBITS:  Normal. PARANASAL SINUSES:  Normal. VASCULATURE:  Evaluation of the major intracranial vasculature demonstrates appropriate flow voids. CALVARIUM AND SKULL BASE:  Normal. EXTRACRANIAL SOFT TISSUES:  Normal.     Impression: 1.  Small acute/recent left MCA cortical infarction involving a small portion of the insular cortex extending into the periventricular white matter. 2.  Chronic small to moderate right frontal infarction and mild to moderate, chronic microangiopathy.  I personally discussed this study with Dr. Razo on 3/6/2018 at 11:42 AM. Workstation performed: [id number]     Xr Stroke Alert Portable Chest    Result Date: 3/6/2018  Narrative: CHEST INDICATION:  Right-sided weakness.  Stroke COMPARISON:  None EXAM PERFORMED/VIEWS:  XR STROKE ALERT PORTABLE CHEST FINDINGS:  There are median sternotomy wires indicating prior cardiac surgery. Cardiomediastinal silhouette appears unremarkable. The lungs are clear.  No pneumothorax or pleural effusion. Osseous structures appear within normal limits for patient age.     Impression: No acute cardiopulmonary disease. Workstation performed: [id number]     Ct Stroke Alert Brain    Result Date: 3/6/2018  Narrative: CT BRAIN - STROKE ALERT PROTOCOL INDICATION:  Woke up with right-sided weakness.  Pronator drift.  Cerebellar symptoms. COMPARISON:  None. TECHNIQUE:  CT examination of the brain was performed.  In addition to axial images, coronal reformatted images were created and submitted for interpretation.  Radiation dose length product (DLP) for this visit: .  This examination, like all CT scans performed in the [facility], was performed utilizing techniques to minimize radiation dose exposure, including the use of iterative  reconstruction  and automated exposure control.  IMAGE QUALITY:  Diagnostic. FINDINGS:  PARENCHYMA:  Small to moderate-sized area of encephalomalacia and gliosis right frontal lobe indicative of chronic infarction.  Elsewhere there are mild periventricular hypodensities. No acute intracranial hemorrhage. Atherosclerotic calcifications noted.  VENTRICLES AND EXTRA-AXIAL SPACES:  Age-appropriate volume loss is noted.  No hydrocephalus. VISUALIZED ORBITS AND PARANASAL SINUSES:  Orbits appear normal.  Mild scattered sinus mucosal thickening is noted.  No fluid levels are seen. CALVARIUM AND EXTRACRANIAL SOFT TISSUES:   Normal.     Impression: 1.  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, chronic microangiopathy. 2.  No acute intracranial hemorrhage. Findings were directly discussed with WENDY ARMAS on 3/6/2018 7:31 AM. Workstation performed: [id number]     Cta Stroke Alert (head/neck)    Result Date: 3/6/2018  Narrative: CTA NECK AND BRAIN WITH AND WITHOUT CONTRAST INDICATION: Left-sided weakness.  Stroke alert.  Prominent or drift. Pointing. COMPARISON:   None. TECHNIQUE:  Routine CT imaging of the Brain without contrast.  Post contrast imaging was performed after administration of iodinated contrast through the neck and brain. Post contrast axial 0.625 mm images timed to opacify the arterial system.  3D rendering was performed on an independent workstation.   MIP reconstructions performed. Coronal reconstructions were performed of the noncontrast portion of the brain.  Radiation dose length product (DLP) for this visit:  526.3 mGy-cm .  This examination, like all CT scans performed in the [facility], was performed utilizing techniques to minimize radiation dose exposure, including the use of iterative reconstruction and automated exposure control.   IV Contrast:  100 mL of iohexol (OMNIPAQUE)  IMAGE QUALITY:   Diagnostic FINDINGS: NONCONTRAST BRAIN: Reported  separately CERVICAL VASCULATURE AORTIC ARCH AND GREAT VESSELS:  Mild athersclerotic disease of the arch, proximal great vessels and visualized subclavian vessels.  No significant stenosis. Sternotomy wires and mediastinal clips are noted, compatible with prior CABG. RIGHT VERTEBRAL ARTERY CERVICAL SEGMENT:  Mild stenosis at the origin. The vessel is normal in caliber throughout the neck. LEFT VERTEBRAL ARTERY CERVICAL SEGMENT:  Mild stenosis at the origin. The vessel is normal in caliber throughout the neck.  Left vertebral artery is congenitally dominant. RIGHT EXTRACRANIAL CAROTID SEGMENT:  Normal caliber common carotid artery.  Normal bifurcation and cervical internal carotid artery.  No stenosis or dissection. LEFT EXTRACRANIAL CAROTID SEGMENT:  Mild atherosclerotic disease of the distal common carotid artery and proximal cervical internal carotid artery without significant stenosis compared to the more distal ICA. NASCET criteria was used to determine the degree of internal carotid artery diameter stenosis. INTRACRANIAL VASCULATURE INTERNAL CAROTID ARTERIES:  Atherosclerotic calcifications of the cavernous segment of the internal carotid artery are very mild.  Normal ophthalmic artery origins.  Normal ICA terminus. ANTERIOR CIRCULATION:  Symmetric A1 segments and anterior cerebral arteries with normal enhancement.  Normal anterior communicating artery. MIDDLE CEREBRAL ARTERY CIRCULATION:  M1 segment and middle cerebral artery branches demonstrate normal enhancement bilaterally. DISTAL VERTEBRAL ARTERIES:  Normal distal vertebral arteries.  Posterior inferior cerebellar artery origins are normal. Normal vertebral basilar junction. BASILAR ARTERY:  Basilar artery is normal in caliber.  Normal superior cerebellar arteries. POSTERIOR CEREBRAL ARTERIES: Both posterior cerebral arteries arises from the basilar tip.  Both arteries demonstrate normal enhancement.   Normal posterior communicating arteries. DURAL VENOUS  "SINUSES:  Not well visualized on this early arterial phase scan. NON VASCULAR ANATOMY BONY STRUCTURES:  No acute osseous abnormality. SOFT TISSUES OF THE NECK:  Unremarkable. THORACIC INLET:  Unremarkable.     Impression: 1.  No hemodynamically significant stenosis in the major arteries of the neck. 2.  No intracranial aneurysm or major intracranial arterial stenosis.  I personally discussed this study with WENDY ARMAS on 3/6/2018 at 7:31 AM. Workstation performed: [id number]        Review of Systems   Cardiovascular:  Negative for chest pain, claudication, dyspnea on exertion, irregular heartbeat, near-syncope, orthopnea, palpitations and paroxysmal nocturnal dyspnea.   Musculoskeletal:  Positive for arthritis and back pain.   All other systems reviewed and are negative.      Vitals:    07/25/25 1354   BP: 136/74   Pulse: 72         Vitals:    07/25/25 1354   Weight: 89.9 kg (198 lb 3.2 oz)         Height: 5' 6\" (167.6 cm)   Body mass index is 31.99 kg/m².     Physical Exam:  General:  Alert and cooperative, appears stated age  HEENT:  PERRLA, EOMI, no scleral icterus, no conjunctival pallor  Neck:  No lymphadenopathy, no thyromegaly, no carotid bruits, no elevated JVP  Heart:  Regular rate and rhythm, normal S1/S2, no S3/S4, no murmur  Lungs:  Clear to auscultation bilaterally   Abdomen:  Soft, non-tender, positive bowel sounds, no rebound or guarding,   no organomegaly   Extremities:  No edema   Skin:  No rashes or lesions on exposed skin  Neurologic:  Cranial nerves II-XII grossly intact without focal deficits   "

## 2025-07-25 NOTE — ASSESSMENT & PLAN NOTE
Wt Readings from Last 3 Encounters:   07/25/25 89.9 kg (198 lb 3.2 oz)   02/04/25 91.2 kg (201 lb)   12/04/24 89.4 kg (197 lb)   -  Blood pressure well-controlled  -  Low-sodium diet reinforced

## 2025-07-25 NOTE — ASSESSMENT & PLAN NOTE
-  LIMA to LAD (patent)  -  SVG to D1 (patent)  -  SVG to OM1 (s/p MINNIE 2019)   -  SVG to RCA (occluded)

## 2025-07-25 NOTE — ASSESSMENT & PLAN NOTE
-  Maintaining normal sinus rhythm, no recurrence on device checks  -  On anticoagulation with Eliquis

## 2025-07-25 NOTE — ASSESSMENT & PLAN NOTE
-  EF 35% by echo 12/2024  -  Transition metoprolol tartrate to succinate  -  Add Entresto  -  BMP in one week  Orders:    POCT ECG

## 2025-07-25 NOTE — ASSESSMENT & PLAN NOTE
Wt Readings from Last 3 Encounters:   07/25/25 89.9 kg (198 lb 3.2 oz)   02/04/25 91.2 kg (201 lb)   12/04/24 89.4 kg (197 lb)   -  Euvolemic on exam  -  Continue current oral diuretic regimen  -  Transition metoprolol tartrate to succinate in setting of reduced EF  -  Prescription for Entresto provided  -  To have BMP repeated in one week, has blood work provided by PCP

## 2025-07-31 DIAGNOSIS — I25.5 ISCHEMIC CARDIOMYOPATHY: ICD-10-CM

## 2025-07-31 DIAGNOSIS — I10 BENIGN ESSENTIAL HYPERTENSION: ICD-10-CM

## 2025-08-01 RX ORDER — FUROSEMIDE 20 MG/1
20 TABLET ORAL DAILY
Qty: 90 TABLET | Refills: 1 | Status: SHIPPED | OUTPATIENT
Start: 2025-08-01

## 2025-08-05 ENCOUNTER — TELEPHONE (OUTPATIENT)
Dept: FAMILY MEDICINE CLINIC | Facility: CLINIC | Age: OVER 89
End: 2025-08-05

## 2025-08-05 ENCOUNTER — OFFICE VISIT (OUTPATIENT)
Dept: FAMILY MEDICINE CLINIC | Facility: CLINIC | Age: OVER 89
End: 2025-08-05
Payer: COMMERCIAL

## 2025-08-05 VITALS
OXYGEN SATURATION: 98 % | HEART RATE: 75 BPM | DIASTOLIC BLOOD PRESSURE: 72 MMHG | TEMPERATURE: 97.8 F | WEIGHT: 199.6 LBS | BODY MASS INDEX: 32.08 KG/M2 | RESPIRATION RATE: 16 BRPM | SYSTOLIC BLOOD PRESSURE: 112 MMHG | HEIGHT: 66 IN

## 2025-08-05 DIAGNOSIS — Z95.810 PRESENCE OF DOUBLE CHAMBER AUTOMATIC CARDIOVERTER/DEFIBRILLATOR (AICD): ICD-10-CM

## 2025-08-05 DIAGNOSIS — R73.03 PREDIABETES: ICD-10-CM

## 2025-08-05 DIAGNOSIS — I25.5 ISCHEMIC CARDIOMYOPATHY: ICD-10-CM

## 2025-08-05 DIAGNOSIS — I48.3 TYPICAL ATRIAL FLUTTER (HCC): ICD-10-CM

## 2025-08-05 DIAGNOSIS — I25.10 CORONARY ARTERY DISEASE INVOLVING NATIVE CORONARY ARTERY OF NATIVE HEART WITHOUT ANGINA PECTORIS: Primary | Chronic | ICD-10-CM

## 2025-08-05 DIAGNOSIS — Z79.01 CHRONIC ANTICOAGULATION: ICD-10-CM

## 2025-08-05 DIAGNOSIS — E78.5 DYSLIPIDEMIA: ICD-10-CM

## 2025-08-05 DIAGNOSIS — E03.8 SUBCLINICAL HYPOTHYROIDISM: ICD-10-CM

## 2025-08-05 DIAGNOSIS — Z95.5 STATUS POST INSERTION OF DRUG ELUTING CORONARY ARTERY STENT: ICD-10-CM

## 2025-08-05 DIAGNOSIS — I50.20 HFREF (HEART FAILURE WITH REDUCED EJECTION FRACTION) (HCC): ICD-10-CM

## 2025-08-05 DIAGNOSIS — Z79.899 LONG TERM CURRENT USE OF AMIODARONE: ICD-10-CM

## 2025-08-05 DIAGNOSIS — I13.0 HYPERTENSIVE HEART AND KIDNEY DISEASE WITH HF AND WITH CKD STAGE I-IV (HCC): ICD-10-CM

## 2025-08-05 DIAGNOSIS — Z95.1 HX OF CABG: ICD-10-CM

## 2025-08-05 DIAGNOSIS — Z86.73 HISTORY OF CVA (CEREBROVASCULAR ACCIDENT) WITHOUT RESIDUAL DEFICITS: ICD-10-CM

## 2025-08-05 DIAGNOSIS — R73.01 IMPAIRED FASTING GLUCOSE: ICD-10-CM

## 2025-08-05 DIAGNOSIS — N18.31 STAGE 3A CHRONIC KIDNEY DISEASE (HCC): ICD-10-CM

## 2025-08-05 PROCEDURE — 99214 OFFICE O/P EST MOD 30 MIN: CPT | Performed by: NURSE PRACTITIONER

## 2025-08-05 PROCEDURE — G2211 COMPLEX E/M VISIT ADD ON: HCPCS | Performed by: NURSE PRACTITIONER

## 2025-08-06 DIAGNOSIS — I47.20 VENTRICULAR TACHYCARDIA (HCC): ICD-10-CM

## 2025-08-07 RX ORDER — AMIODARONE HYDROCHLORIDE 200 MG/1
200 TABLET ORAL DAILY
Qty: 90 TABLET | Refills: 3 | Status: SHIPPED | OUTPATIENT
Start: 2025-08-07

## 2025-08-07 RX ORDER — CLOPIDOGREL BISULFATE 75 MG/1
75 TABLET ORAL DAILY
Qty: 90 TABLET | Refills: 1 | Status: SHIPPED | OUTPATIENT
Start: 2025-08-07

## 2025-08-08 ENCOUNTER — APPOINTMENT (OUTPATIENT)
Dept: RADIOLOGY | Age: OVER 89
End: 2025-08-08
Payer: COMMERCIAL

## 2025-08-20 ENCOUNTER — REMOTE DEVICE CLINIC VISIT (OUTPATIENT)
Dept: CARDIOLOGY CLINIC | Facility: CLINIC | Age: OVER 89
End: 2025-08-20
Payer: COMMERCIAL

## 2025-08-20 DIAGNOSIS — Z95.810 AICD (AUTOMATIC CARDIOVERTER/DEFIBRILLATOR) PRESENT: Primary | ICD-10-CM

## 2025-08-20 PROCEDURE — 93296 REM INTERROG EVL PM/IDS: CPT | Performed by: INTERNAL MEDICINE

## 2025-08-20 PROCEDURE — 93295 DEV INTERROG REMOTE 1/2/MLT: CPT | Performed by: INTERNAL MEDICINE
